# Patient Record
Sex: FEMALE | Race: WHITE | NOT HISPANIC OR LATINO | Employment: FULL TIME | URBAN - METROPOLITAN AREA
[De-identification: names, ages, dates, MRNs, and addresses within clinical notes are randomized per-mention and may not be internally consistent; named-entity substitution may affect disease eponyms.]

---

## 2017-03-05 ENCOUNTER — OFFICE VISIT (OUTPATIENT)
Dept: URGENT CARE | Age: 57
End: 2017-03-05
Payer: COMMERCIAL

## 2017-03-05 PROCEDURE — 99203 OFFICE O/P NEW LOW 30 MIN: CPT | Performed by: FAMILY MEDICINE

## 2017-03-26 ENCOUNTER — TRANSCRIBE ORDERS (OUTPATIENT)
Dept: ADMINISTRATIVE | Age: 57
End: 2017-03-26

## 2017-03-26 ENCOUNTER — APPOINTMENT (OUTPATIENT)
Dept: LAB | Age: 57
End: 2017-03-26
Payer: COMMERCIAL

## 2017-03-26 DIAGNOSIS — Z98.84 BARIATRIC SURGERY STATUS: ICD-10-CM

## 2017-03-26 DIAGNOSIS — Z98.84 BARIATRIC SURGERY STATUS: Primary | ICD-10-CM

## 2017-03-26 LAB
25(OH)D3 SERPL-MCNC: 28.5 NG/ML (ref 30–100)
ALBUMIN SERPL BCP-MCNC: 3.8 G/DL (ref 3.5–5)
ALP SERPL-CCNC: 87 U/L (ref 46–116)
ALT SERPL W P-5'-P-CCNC: 21 U/L (ref 12–78)
ANION GAP SERPL CALCULATED.3IONS-SCNC: 7 MMOL/L (ref 4–13)
AST SERPL W P-5'-P-CCNC: 17 U/L (ref 5–45)
BASOPHILS # BLD AUTO: 0.02 THOUSANDS/ΜL (ref 0–0.1)
BASOPHILS NFR BLD AUTO: 0 % (ref 0–1)
BILIRUB SERPL-MCNC: 0.39 MG/DL (ref 0.2–1)
BUN SERPL-MCNC: 14 MG/DL (ref 5–25)
CALCIUM SERPL-MCNC: 8.7 MG/DL (ref 8.3–10.1)
CHLORIDE SERPL-SCNC: 108 MMOL/L (ref 100–108)
CO2 SERPL-SCNC: 30 MMOL/L (ref 21–32)
CREAT SERPL-MCNC: 0.73 MG/DL (ref 0.6–1.3)
EOSINOPHIL # BLD AUTO: 0.06 THOUSAND/ΜL (ref 0–0.61)
EOSINOPHIL NFR BLD AUTO: 1 % (ref 0–6)
ERYTHROCYTE [DISTWIDTH] IN BLOOD BY AUTOMATED COUNT: 13.2 % (ref 11.6–15.1)
FERRITIN SERPL-MCNC: 18 NG/ML (ref 8–388)
FOLATE SERPL-MCNC: 15.3 NG/ML (ref 3.1–17.5)
GFR SERPL CREATININE-BSD FRML MDRD: >60 ML/MIN/1.73SQ M
GLUCOSE P FAST SERPL-MCNC: 96 MG/DL (ref 65–99)
HCT VFR BLD AUTO: 43.5 % (ref 34.8–46.1)
HGB BLD-MCNC: 13.8 G/DL (ref 11.5–15.4)
IRON SERPL-MCNC: 51 UG/DL (ref 50–170)
LYMPHOCYTES # BLD AUTO: 1.54 THOUSANDS/ΜL (ref 0.6–4.47)
LYMPHOCYTES NFR BLD AUTO: 28 % (ref 14–44)
MCH RBC QN AUTO: 29.6 PG (ref 26.8–34.3)
MCHC RBC AUTO-ENTMCNC: 31.7 G/DL (ref 31.4–37.4)
MCV RBC AUTO: 93 FL (ref 82–98)
MONOCYTES # BLD AUTO: 0.47 THOUSAND/ΜL (ref 0.17–1.22)
MONOCYTES NFR BLD AUTO: 9 % (ref 4–12)
NEUTROPHILS # BLD AUTO: 3.41 THOUSANDS/ΜL (ref 1.85–7.62)
NEUTS SEG NFR BLD AUTO: 62 % (ref 43–75)
NRBC BLD AUTO-RTO: 0 /100 WBCS
PLATELET # BLD AUTO: 235 THOUSANDS/UL (ref 149–390)
PMV BLD AUTO: 12 FL (ref 8.9–12.7)
POTASSIUM SERPL-SCNC: 4.4 MMOL/L (ref 3.5–5.3)
PROT SERPL-MCNC: 7.4 G/DL (ref 6.4–8.2)
RBC # BLD AUTO: 4.66 MILLION/UL (ref 3.81–5.12)
SODIUM SERPL-SCNC: 145 MMOL/L (ref 136–145)
TIBC SERPL-MCNC: 332 UG/DL (ref 250–450)
TSH SERPL DL<=0.05 MIU/L-ACNC: 0.68 UIU/ML (ref 0.36–3.74)
VIT B12 SERPL-MCNC: 377 PG/ML (ref 100–900)
WBC # BLD AUTO: 5.51 THOUSAND/UL (ref 4.31–10.16)

## 2017-03-26 PROCEDURE — 36415 COLL VENOUS BLD VENIPUNCTURE: CPT

## 2017-03-26 PROCEDURE — 83550 IRON BINDING TEST: CPT

## 2017-03-26 PROCEDURE — 84443 ASSAY THYROID STIM HORMONE: CPT

## 2017-03-26 PROCEDURE — 82607 VITAMIN B-12: CPT

## 2017-03-26 PROCEDURE — 85025 COMPLETE CBC W/AUTO DIFF WBC: CPT

## 2017-03-26 PROCEDURE — 84425 ASSAY OF VITAMIN B-1: CPT

## 2017-03-26 PROCEDURE — 82728 ASSAY OF FERRITIN: CPT

## 2017-03-26 PROCEDURE — 82746 ASSAY OF FOLIC ACID SERUM: CPT

## 2017-03-26 PROCEDURE — 80053 COMPREHEN METABOLIC PANEL: CPT

## 2017-03-26 PROCEDURE — 84207 ASSAY OF VITAMIN B-6: CPT

## 2017-03-26 PROCEDURE — 83540 ASSAY OF IRON: CPT

## 2017-03-26 PROCEDURE — 82306 VITAMIN D 25 HYDROXY: CPT

## 2017-03-30 LAB
VIT B1 BLD-SCNC: 147.8 NMOL/L (ref 66.5–200)
VIT B6 SERPL-MCNC: 9.6 UG/L (ref 2–32.8)

## 2017-06-02 ENCOUNTER — ALLSCRIPTS OFFICE VISIT (OUTPATIENT)
Dept: OTHER | Facility: OTHER | Age: 57
End: 2017-06-02

## 2017-06-02 ENCOUNTER — LAB REQUISITION (OUTPATIENT)
Dept: LAB | Facility: HOSPITAL | Age: 57
End: 2017-06-02
Payer: COMMERCIAL

## 2017-06-02 DIAGNOSIS — Z01.419 ENCOUNTER FOR GYNECOLOGICAL EXAMINATION WITHOUT ABNORMAL FINDING: ICD-10-CM

## 2017-06-02 DIAGNOSIS — R35.0 FREQUENCY OF MICTURITION: ICD-10-CM

## 2017-06-02 DIAGNOSIS — Z12.31 ENCOUNTER FOR SCREENING MAMMOGRAM FOR MALIGNANT NEOPLASM OF BREAST: ICD-10-CM

## 2017-06-02 PROCEDURE — G0145 SCR C/V CYTO,THINLAYER,RESCR: HCPCS | Performed by: PHYSICIAN ASSISTANT

## 2017-06-02 PROCEDURE — 87624 HPV HI-RISK TYP POOLED RSLT: CPT | Performed by: PHYSICIAN ASSISTANT

## 2017-06-06 LAB — HPV RRNA GENITAL QL NAA+PROBE: NORMAL

## 2017-06-12 LAB
LAB AP GYN PRIMARY INTERPRETATION: NORMAL
Lab: NORMAL

## 2017-06-13 ENCOUNTER — OFFICE VISIT (OUTPATIENT)
Dept: URGENT CARE | Age: 57
End: 2017-06-13
Payer: COMMERCIAL

## 2017-06-13 PROCEDURE — S9088 SERVICES PROVIDED IN URGENT: HCPCS | Performed by: FAMILY MEDICINE

## 2017-06-13 PROCEDURE — 99213 OFFICE O/P EST LOW 20 MIN: CPT | Performed by: FAMILY MEDICINE

## 2017-06-28 ENCOUNTER — OFFICE VISIT (OUTPATIENT)
Dept: URGENT CARE | Age: 57
End: 2017-06-28
Payer: COMMERCIAL

## 2017-06-28 ENCOUNTER — TRANSCRIBE ORDERS (OUTPATIENT)
Dept: URGENT CARE | Age: 57
End: 2017-06-28

## 2017-06-28 ENCOUNTER — APPOINTMENT (OUTPATIENT)
Dept: LAB | Age: 57
End: 2017-06-28
Payer: COMMERCIAL

## 2017-06-28 DIAGNOSIS — R35.0 FREQUENCY OF MICTURITION: ICD-10-CM

## 2017-06-28 PROCEDURE — 87086 URINE CULTURE/COLONY COUNT: CPT

## 2017-06-28 PROCEDURE — S9088 SERVICES PROVIDED IN URGENT: HCPCS | Performed by: FAMILY MEDICINE

## 2017-06-28 PROCEDURE — 87186 SC STD MICRODIL/AGAR DIL: CPT

## 2017-06-28 PROCEDURE — 87077 CULTURE AEROBIC IDENTIFY: CPT

## 2017-06-28 PROCEDURE — 99213 OFFICE O/P EST LOW 20 MIN: CPT | Performed by: FAMILY MEDICINE

## 2017-07-01 LAB — BACTERIA UR CULT: NORMAL

## 2017-07-15 ENCOUNTER — TRANSCRIBE ORDERS (OUTPATIENT)
Dept: LAB | Facility: CLINIC | Age: 57
End: 2017-07-15

## 2017-07-15 ENCOUNTER — APPOINTMENT (OUTPATIENT)
Dept: LAB | Facility: CLINIC | Age: 57
End: 2017-07-15
Payer: COMMERCIAL

## 2017-07-15 DIAGNOSIS — Z00.8 HEALTH EXAMINATION IN POPULATION SURVEY: ICD-10-CM

## 2017-07-15 DIAGNOSIS — E55.9 UNSPECIFIED VITAMIN D DEFICIENCY: ICD-10-CM

## 2017-07-15 DIAGNOSIS — Z00.8 HEALTH EXAMINATION IN POPULATION SURVEY: Primary | ICD-10-CM

## 2017-07-15 DIAGNOSIS — B35.1 DERMATOPHYTOSIS OF NAIL: ICD-10-CM

## 2017-07-15 DIAGNOSIS — B35.1 DERMATOPHYTOSIS OF NAIL: Primary | ICD-10-CM

## 2017-07-15 LAB
25(OH)D3 SERPL-MCNC: 36.3 NG/ML (ref 30–100)
ALBUMIN SERPL BCP-MCNC: 3.3 G/DL (ref 3.5–5)
ALP SERPL-CCNC: 74 U/L (ref 46–116)
ALT SERPL W P-5'-P-CCNC: 20 U/L (ref 12–78)
AST SERPL W P-5'-P-CCNC: 23 U/L (ref 5–45)
BILIRUB DIRECT SERPL-MCNC: 0.08 MG/DL (ref 0–0.2)
BILIRUB SERPL-MCNC: 0.3 MG/DL (ref 0.2–1)
CHOLEST SERPL-MCNC: 173 MG/DL (ref 50–200)
EST. AVERAGE GLUCOSE BLD GHB EST-MCNC: 105 MG/DL
HBA1C MFR BLD: 5.3 % (ref 4.2–6.3)
HDLC SERPL-MCNC: 68 MG/DL (ref 40–60)
LDLC SERPL CALC-MCNC: 97 MG/DL (ref 0–100)
PROT SERPL-MCNC: 6.8 G/DL (ref 6.4–8.2)
TRIGL SERPL-MCNC: 40 MG/DL

## 2017-07-15 PROCEDURE — 80076 HEPATIC FUNCTION PANEL: CPT

## 2017-07-15 PROCEDURE — 36415 COLL VENOUS BLD VENIPUNCTURE: CPT

## 2017-07-15 PROCEDURE — 83036 HEMOGLOBIN GLYCOSYLATED A1C: CPT

## 2017-07-15 PROCEDURE — 82306 VITAMIN D 25 HYDROXY: CPT

## 2017-07-15 PROCEDURE — 80061 LIPID PANEL: CPT

## 2017-07-28 ENCOUNTER — HOSPITAL ENCOUNTER (OUTPATIENT)
Dept: RADIOLOGY | Age: 57
Discharge: HOME | End: 2017-07-28
Payer: COMMERCIAL

## 2017-07-28 DIAGNOSIS — Z12.31 ENCOUNTER FOR SCREENING MAMMOGRAM FOR MALIGNANT NEOPLASM OF BREAST: ICD-10-CM

## 2017-07-28 PROCEDURE — G0202 SCR MAMMO BI INCL CAD: HCPCS

## 2017-11-29 ENCOUNTER — APPOINTMENT (OUTPATIENT)
Dept: LAB | Facility: HOSPITAL | Age: 57
End: 2017-11-29
Payer: COMMERCIAL

## 2017-11-29 ENCOUNTER — TRANSCRIBE ORDERS (OUTPATIENT)
Dept: ADMINISTRATIVE | Facility: HOSPITAL | Age: 57
End: 2017-11-29

## 2017-11-29 DIAGNOSIS — K12.1 PYOSTOMATITIS VEGETANS: Primary | ICD-10-CM

## 2017-11-29 DIAGNOSIS — K12.1 PYOSTOMATITIS VEGETANS: ICD-10-CM

## 2017-11-29 PROCEDURE — 87252 VIRUS INOCULATION TISSUE: CPT

## 2017-11-29 PROCEDURE — 36415 COLL VENOUS BLD VENIPUNCTURE: CPT

## 2017-12-12 LAB — MISCELLANEOUS LAB TEST RESULT: NORMAL

## 2018-01-09 ENCOUNTER — APPOINTMENT (OUTPATIENT)
Dept: LAB | Facility: HOSPITAL | Age: 58
End: 2018-01-09
Payer: COMMERCIAL

## 2018-01-09 ENCOUNTER — TRANSCRIBE ORDERS (OUTPATIENT)
Dept: ADMINISTRATIVE | Facility: HOSPITAL | Age: 58
End: 2018-01-09

## 2018-01-09 DIAGNOSIS — B35.1 DERMATOPHYTOSIS OF NAIL: ICD-10-CM

## 2018-01-09 DIAGNOSIS — B35.1 DERMATOPHYTOSIS OF NAIL: Primary | ICD-10-CM

## 2018-01-09 LAB
ALBUMIN SERPL BCP-MCNC: 3.5 G/DL (ref 3.5–5)
ALP SERPL-CCNC: 79 U/L (ref 46–116)
ALT SERPL W P-5'-P-CCNC: 23 U/L (ref 12–78)
AST SERPL W P-5'-P-CCNC: 22 U/L (ref 5–45)
BILIRUB DIRECT SERPL-MCNC: 0.11 MG/DL (ref 0–0.2)
BILIRUB SERPL-MCNC: 0.36 MG/DL (ref 0.2–1)
PROT SERPL-MCNC: 7.1 G/DL (ref 6.4–8.2)

## 2018-01-09 PROCEDURE — 36415 COLL VENOUS BLD VENIPUNCTURE: CPT

## 2018-01-09 PROCEDURE — 80076 HEPATIC FUNCTION PANEL: CPT

## 2018-01-18 NOTE — MISCELLANEOUS
Message  Return to work or school:   Olga Quintero is under my professional care  She was seen in my office on feb 29th, 2016   She is able to return to work on  march 2nd 2016            Future Appointments    Signatures   Electronically signed by : HERB Rivera; Feb 29 2016  5:33PM EST                       (Author)    Electronically signed by :  HERB Rivera; Feb 29 2016  5:33PM EST                       (Author)

## 2018-01-18 NOTE — MISCELLANEOUS
Message   Recorded as Task   Date: 05/23/2016 08:32 AM, Created By: Estelle Mas   Task Name: Miscellaneous   Assigned To: Karrie Holland   Regarding Patient: Kaia Obrien, Status: Active   CommentRob Damion - 23 May 2016 8:32 AM     TASK CREATED  Caller: Self; [phone] (Home)  pt needs script for mammo   is due in june will go to [facility]  [address] [facility]   pt had to r/s tobin appt for today w/ l87  [facility] appt now 11/21 pt @ [phone]   Karrie Holland - 23 May 2016 9:23 AM     TASK EDITED  order in allscripts        Active Problems    1  Acute sinus infection (461 9) (J01 90)   2  Anxiety disorder (300 00) (F41 9)   3  Encounter for routine gynecological examination (V72 31) (Z01 419)   4  Encounter for screening mammogram for malignant neoplasm of breast (V76 12)   (Z12 31)   5  Joint pain, knee (719 46) (M25 569)   6  Neck pain (723 1) (M54 2)   7  Screening for human papillomavirus (HPV) (V73 81) (Z11 51)    Current Meds   1  Amoxicillin-Pot Clavulanate 875-125 MG Oral Tablet; take 1 tablet every twelve hours; Therapy: 31MDK1364 to (Evaluate:10Mar2016)  Requested for: 08GTY1030; Last   Rx:[id number] Ordered   2  Calcium 600+D 600-400 MG-UNIT Oral Tablet; TAKE 2 TABLET Daily; Therapy: 27TVR3960 to (Last Rx:00Eqg2699)  Requested for: 28Apr2014 Ordered   3  Multi-Day Vitamins Oral Tablet; TAKE 1 TABLET DAILY; Therapy: 44KEW1398 to Recorded   4  Multiple Vitamins Oral Tablet; Take 1 daily; Therapy: 42UHD6982 to (Last Rx:49Dvx3406)  Requested for: 28Apr2014 Ordered   5  TraMADol HCl - 50 MG Oral Tablet; TAKE 1 TABLET 3 TIMES DAILY AS NEEDED; Therapy: 75HCP9840 to (Evaluate:07Mar2016); Last Rx:94Kjw7753 Ordered    Allergies    1  Sulfa Drugs    Plan  Encounter for screening mammogram for malignant neoplasm of breast    · * MAMMO SCREENING BILATERAL W CAD; Status:Hold For - Scheduling,Retrospective  By Protocol Authorization;  Requested FQX:[id number];     Signatures   Electronically signed by : Ramirez Jacobs Kaci Block, ; May 23 2016  9:24AM EST                       (Author)

## 2018-03-12 ENCOUNTER — OFFICE VISIT (OUTPATIENT)
Dept: URGENT CARE | Age: 58
End: 2018-03-12
Payer: COMMERCIAL

## 2018-03-12 VITALS
SYSTOLIC BLOOD PRESSURE: 130 MMHG | DIASTOLIC BLOOD PRESSURE: 71 MMHG | HEART RATE: 67 BPM | OXYGEN SATURATION: 98 % | RESPIRATION RATE: 20 BRPM | TEMPERATURE: 97.7 F

## 2018-03-12 DIAGNOSIS — J06.9 ACUTE URI: Primary | ICD-10-CM

## 2018-03-12 PROCEDURE — 99203 OFFICE O/P NEW LOW 30 MIN: CPT | Performed by: FAMILY MEDICINE

## 2018-03-12 PROCEDURE — S9088 SERVICES PROVIDED IN URGENT: HCPCS | Performed by: FAMILY MEDICINE

## 2018-03-12 RX ORDER — FLUTICASONE PROPIONATE 50 MCG
1 SPRAY, SUSPENSION (ML) NASAL DAILY
Qty: 16 G | Refills: 0 | Status: SHIPPED | OUTPATIENT
Start: 2018-03-12 | End: 2020-05-11

## 2018-03-12 NOTE — PATIENT INSTRUCTIONS
As we discussed your illness is viral   No antibiotics are indicated at this time  Rest and drink extra fluids  OTC cough and cold medications as needed  Tylenol as needed for pain or fever  We will also try Flonase to help with nasal and sinus pressure  You can also use Mucinex but increase fluids while taking medication  Follow up with PCP if no improvement  Go to ER with worsening symptoms  Cold Symptoms   WHAT YOU NEED TO KNOW:   A cold is an infection caused by a virus  The infection causes your upper respiratory system to become inflamed  Common symptoms of a cold include sneezing, dry throat, a stuffy nose, headache, watery eyes, and a cough  Your cough may be dry, or you may cough up mucus  You may also have muscle aches, joint pain, and tiredness  Rarely, you may have a fever  Most colds go away without treatment  DISCHARGE INSTRUCTIONS:   Return to the emergency department if:   · You have increased tiredness and weakness  · You are unable to eat  · Your heart is beating much faster than usual for you  · You see white spots in the back of your throat and your neck is swollen and sore to the touch  · You see pinpoint or larger reddish-purple dots on your skin  Contact your healthcare provider if:   · You have a fever higher than 102°F (38 9°C)  · You have new or worsening shortness of breath  · You have thick nasal drainage for more than 2 days  · Your symptoms do not improve or get worse within 5 days  · You have questions or concerns about your condition or care  Medicines: The following medicines may be suggested by your healthcare provider to decrease your cold symptoms  These medicines are available without a doctor's order  Ask which medicines to take and when to take them  Follow directions  · acetaminophen  help to bring down a fever or decrease pain  · Decongestants  help decrease nasal stuffiness       · Antihistamines  help decrease sneezing and a runny nose  · Cough suppressants  help decrease how much you cough  · Expectorants  help loosen mucus so you can cough it up  · Take your medicine as directed  Contact your healthcare provider if you think your medicine is not helping or if you have side effects  Tell him of her if you are allergic to any medicine  Keep a list of the medicines, vitamins, and herbs you take  Include the amounts, and when and why you take them  Bring the list or the pill bottles to follow-up visits  Carry your medicine list with you in case of an emergency  Symptom relief: The following may help relieve cold symptoms, such as a dry throat and congestion:  · Gargle with mouthwash or warm salt water as directed  · Suck on throat lozenges or hard candy  · Use a cold or warm vaporizer or humidifier to ease your breathing  · Rest for at least 2 days and then as needed to decrease tiredness and weakness  · Use petroleum based jelly around your nostrils to decrease irritation from blowing your nose  Drink liquids:  Liquids will help thin and loosen thick mucus so you can cough it up  Liquids will also keep you hydrated  Ask your healthcare provider which liquids are best for you and how much to drink each day  Prevent the spread of germs: You can spread your cold germs to others for at least 3 days after your symptoms start  Wash your hands often  Do not share items, such as eating utensils  Cover your nose and mouth when you cough or sneeze using the crook of your elbow instead of your hands  Throw used tissues in the garbage  Do not smoke:  Smoking may worsen your symptoms and increase the length of time you feel sick  Talk with your healthcare provider if you need help to stop smoking  Follow up with your healthcare provider as directed:  Write down your questions so you remember to ask them during your visits     © 2017 [employer] Information is for End User's use only and may not be sold, redistributed or otherwise used for commercial purposes  All illustrations and images included in CareNotes® are the copyrighted property of [employer]  or Rustam Manjarrez  The above information is an  only  It is not intended as medical advice for individual conditions or treatments  Talk to your doctor, nurse or pharmacist before following any medical regimen to see if it is safe and effective for you

## 2018-03-12 NOTE — PROGRESS NOTES
[facility]        NAME: Christine Figueroa is a 60 y o  female  : 1960    MRN: [id number]  DATE: 2018  TIME: 8:16 AM    Assessment and Plan   Acute URI [J06 9]  1  Acute URI  fluticasone (FLONASE) 50 mcg/act nasal spray         Patient Instructions     Patient Instructions   As we discussed your illness is viral   No antibiotics are indicated at this time  Rest and drink extra fluids  OTC cough and cold medications as needed  Tylenol as needed for pain or fever  We will also try Flonase to help with nasal and sinus pressure  You can also use Mucinex but increase fluids while taking medication  Follow up with PCP if no improvement  Go to ER with worsening symptoms  Chief Complaint     Chief Complaint   Patient presents with    Cold Like Symptoms         History of Present Illness   Christine Figueroa presents to the clinic c/o    This is a 60year old female here today with sinus pressure and congestion  Symptoms started yesterday  She had chills and tmax of 99 9  She also has slight cough which may be from post nasal drip  She has slight sore throat  She used Advil cold and sinus with no relief  No body aches  Review of Systems   Review of Systems   Constitutional: Positive for activity change, chills and fatigue  Negative for fever  HENT: Positive for congestion, sinus pain and sinus pressure  Respiratory: Positive for cough and chest tightness  Negative for wheezing  Cardiovascular: Negative  Musculoskeletal: Negative  Psychiatric/Behavioral: Negative            Current Medications     Long-Term Prescriptions   Medication Sig Dispense Refill    fluticasone (FLONASE) 50 mcg/act nasal spray 1 spray into each nostril daily 16 g 0       Current Allergies     Allergies as of 2018 - Reviewed 2016   Allergen Reaction Noted    Sulfa antibiotics Swelling 2016            The following portions of the patient's history were reviewed and updated as appropriate: allergies, current medications, past family history, past medical history, past social history, past surgical history and problem list     Objective   /71   Pulse 67   Temp 97 7 °F (36 5 °C) (Temporal)   Resp 20   SpO2 98%        Physical Exam     Physical Exam   Constitutional: She is oriented to person, place, and time  She appears well-developed and well-nourished  HENT:   Head: Normocephalic  Right Ear: External ear normal    Left Ear: External ear normal    Mouth/Throat: Oropharynx is clear and moist    Neck: Normal range of motion  Cardiovascular: Regular rhythm  Pulmonary/Chest: Effort normal and breath sounds normal    Neurological: She is alert and oriented to person, place, and time  Skin: Skin is warm and dry  Psychiatric: She has a normal mood and affect   Her behavior is normal

## 2018-03-13 ENCOUNTER — TELEPHONE (OUTPATIENT)
Dept: URGENT CARE | Age: 58
End: 2018-03-13

## 2018-03-13 NOTE — LETTER
March 13, 2018         Patient: Allen Bonilla   YOB: 1960   Date of Visit: 3/13/2018        To whom it May concern:    Sonya Magallanes was seen in our urgent care department on 3/12/2018  Please excuse from work today           Sincerely,        Heidy Escobar PA-C      CC: [unfilled]    Enclosure

## 2018-03-14 ENCOUNTER — TELEPHONE (OUTPATIENT)
Dept: URGENT CARE | Age: 58
End: 2018-03-14

## 2018-06-14 ENCOUNTER — APPOINTMENT (OUTPATIENT)
Dept: LAB | Age: 58
End: 2018-06-14

## 2018-06-14 ENCOUNTER — TRANSCRIBE ORDERS (OUTPATIENT)
Dept: ADMINISTRATIVE | Age: 58
End: 2018-06-14

## 2018-06-14 DIAGNOSIS — Z00.8 HEALTH EXAMINATION IN POPULATION SURVEY: ICD-10-CM

## 2018-06-14 DIAGNOSIS — Z00.8 HEALTH EXAMINATION IN POPULATION SURVEY: Primary | ICD-10-CM

## 2018-06-14 LAB
CHOLEST SERPL-MCNC: 179 MG/DL (ref 50–200)
EST. AVERAGE GLUCOSE BLD GHB EST-MCNC: 105 MG/DL
HBA1C MFR BLD: 5.3 % (ref 4.2–6.3)
HDLC SERPL-MCNC: 75 MG/DL (ref 40–60)
LDLC SERPL CALC-MCNC: 91 MG/DL (ref 0–100)
NONHDLC SERPL-MCNC: 104 MG/DL
TRIGL SERPL-MCNC: 64 MG/DL

## 2018-06-14 PROCEDURE — 83036 HEMOGLOBIN GLYCOSYLATED A1C: CPT

## 2018-06-14 PROCEDURE — 80061 LIPID PANEL: CPT

## 2018-06-14 PROCEDURE — 36415 COLL VENOUS BLD VENIPUNCTURE: CPT

## 2018-07-27 ENCOUNTER — OFFICE VISIT (OUTPATIENT)
Dept: URGENT CARE | Facility: MEDICAL CENTER | Age: 58
End: 2018-07-27
Payer: COMMERCIAL

## 2018-07-27 VITALS
DIASTOLIC BLOOD PRESSURE: 70 MMHG | OXYGEN SATURATION: 98 % | SYSTOLIC BLOOD PRESSURE: 122 MMHG | HEART RATE: 62 BPM | WEIGHT: 203 LBS | RESPIRATION RATE: 16 BRPM | BODY MASS INDEX: 33.82 KG/M2 | TEMPERATURE: 97.1 F | HEIGHT: 65 IN

## 2018-07-27 DIAGNOSIS — N30.01 ACUTE CYSTITIS WITH HEMATURIA: Primary | ICD-10-CM

## 2018-07-27 PROBLEM — R42 VERTIGO: Status: ACTIVE | Noted: 2017-06-02

## 2018-07-27 LAB
SL AMB  POCT GLUCOSE, UA: NEGATIVE
SL AMB LEUKOCYTE ESTERASE,UA: ABNORMAL
SL AMB POCT BILIRUBIN,UA: NEGATIVE
SL AMB POCT BLOOD,UA: ABNORMAL
SL AMB POCT CLARITY,UA: ABNORMAL
SL AMB POCT COLOR,UA: YELLOW
SL AMB POCT KETONES,UA: NEGATIVE
SL AMB POCT NITRITE,UA: NEGATIVE
SL AMB POCT PH,UA: 7
SL AMB POCT SPECIFIC GRAVITY,UA: 1.01
SL AMB POCT URINE PROTEIN: NEGATIVE
SL AMB POCT UROBILINOGEN: NEGATIVE

## 2018-07-27 PROCEDURE — 87077 CULTURE AEROBIC IDENTIFY: CPT | Performed by: PHYSICIAN ASSISTANT

## 2018-07-27 PROCEDURE — 81002 URINALYSIS NONAUTO W/O SCOPE: CPT | Performed by: PHYSICIAN ASSISTANT

## 2018-07-27 PROCEDURE — S9088 SERVICES PROVIDED IN URGENT: HCPCS | Performed by: PHYSICIAN ASSISTANT

## 2018-07-27 PROCEDURE — 87086 URINE CULTURE/COLONY COUNT: CPT | Performed by: PHYSICIAN ASSISTANT

## 2018-07-27 PROCEDURE — 99213 OFFICE O/P EST LOW 20 MIN: CPT | Performed by: PHYSICIAN ASSISTANT

## 2018-07-27 PROCEDURE — 87186 SC STD MICRODIL/AGAR DIL: CPT | Performed by: PHYSICIAN ASSISTANT

## 2018-07-27 RX ORDER — NITROFURANTOIN 25; 75 MG/1; MG/1
100 CAPSULE ORAL 2 TIMES DAILY
Qty: 10 CAPSULE | Refills: 0 | Status: SHIPPED | OUTPATIENT
Start: 2018-07-27 | End: 2018-08-01

## 2018-07-27 NOTE — PROGRESS NOTES
[facility]        NAME: Mariangel Smith is a 60 y o  female  : 1960    MRN: [id number]  DATE: 2018  TIME: 7:53 AM    Assessment and Plan   Acute cystitis with hematuria [N30 01]  1  Acute cystitis with hematuria  POCT urine dip    Urine culture    nitrofurantoin (MACROBID) 100 mg capsule         Patient Instructions       Follow up with PCP in 3-5 days  Proceed to  ER if symptoms worsen  Chief Complaint     Chief Complaint   Patient presents with    Possible UTI     Pt with complaints of urgency since last night  Denies fever, chills or abdominal pain  States had "dip stick" at home, tested postitive for leukocytes and nitrites  History of Present Illness       50-year-old female presents complaining of dysuria, urinary urgency and frequency since last night  Denies fever, chills, nausea, vomiting, flank pain  She has an allergy to Bactrim  She has had pyelonephritis in the past        Review of Systems   Review of Systems   Constitutional: Negative for chills and fever  Respiratory: Negative for cough and shortness of breath  Cardiovascular: Negative for chest pain  Gastrointestinal: Negative for abdominal pain, nausea and vomiting  Genitourinary: Positive for dysuria, frequency, pelvic pain and urgency  Negative for dyspareunia, flank pain, hematuria, vaginal discharge and vaginal pain  Musculoskeletal: Negative for back pain  Current Medications       Current Outpatient Prescriptions:     Cholecalciferol (VITAMIN D PO), Take 3,000 Units by mouth daily  , Disp: , Rfl:     Multiple Vitamins-Minerals (BARIATRIC FUSION PO), Take by mouth 4 (four) times a day Indications: 1 tablet , Disp: , Rfl:     fluticasone (FLONASE) 50 mcg/act nasal spray, 1 spray into each nostril daily, Disp: 16 g, Rfl: 0    nitrofurantoin (MACROBID) 100 mg capsule, Take 1 capsule (100 mg total) by mouth 2 (two) times a day for 5 days, Disp: 10 capsule, Rfl: 0    Current Allergies Allergies as of 07/27/2018 - Reviewed 07/27/2018   Allergen Reaction Noted    Sulfa antibiotics Swelling 06/01/2016            The following portions of the patient's history were reviewed and updated as appropriate: allergies, current medications, past family history, past medical history, past social history, past surgical history and problem list      Past Medical History:   Diagnosis Date    Tooth abscess        Past Surgical History:   Procedure Laterality Date    BACK SURGERY      lumbar laminectomy    GASTRIC BYPASS  2004    ND COLONOSCOPY FLX DX W/COLLJ SPEC WHEN PFRMD N/A 6/1/2016    Procedure: COLONOSCOPY;  Surgeon: Michelle Ornelas MD;  Location: [facility]; Service: Colorectal    ROTATOR CUFF REPAIR Right 2010       No family history on file  Medications have been verified  Objective   /70 (BP Location: Right arm, Patient Position: Sitting, Cuff Size: Standard)   Pulse 62   Temp (!) 97 1 °F (36 2 °C) (Tympanic)   Resp 16   Ht 5' 5" (1 651 m)   Wt 92 1 kg (203 lb)   SpO2 98%   BMI 33 78 kg/m²        Physical Exam     Physical Exam   Constitutional: She appears well-developed and well-nourished  No distress  Cardiovascular: Normal rate, regular rhythm and normal heart sounds  Exam reveals no gallop and no friction rub  No murmur heard  Pulmonary/Chest: Effort normal and breath sounds normal    Abdominal: Soft  She exhibits no distension and no mass  There is no tenderness  There is no rebound, no guarding and no CVA tenderness  Skin: She is not diaphoretic

## 2018-07-27 NOTE — PATIENT INSTRUCTIONS

## 2018-07-29 LAB — BACTERIA UR CULT: ABNORMAL

## 2018-07-30 ENCOUNTER — HOSPITAL ENCOUNTER (OUTPATIENT)
Dept: RADIOLOGY | Age: 58
Discharge: HOME | End: 2018-07-30
Payer: COMMERCIAL

## 2018-07-30 DIAGNOSIS — Z12.31 ENCOUNTER FOR SCREENING MAMMOGRAM FOR BREAST CANCER: Primary | ICD-10-CM

## 2018-07-30 DIAGNOSIS — Z12.31 ENCOUNTER FOR SCREENING MAMMOGRAM FOR BREAST CANCER: ICD-10-CM

## 2018-07-30 DIAGNOSIS — Z12.31 ENCOUNTER FOR SCREENING MAMMOGRAM FOR MALIGNANT NEOPLASM OF BREAST: ICD-10-CM

## 2018-07-30 PROCEDURE — 77067 SCR MAMMO BI INCL CAD: CPT

## 2018-07-31 ENCOUNTER — TRANSCRIBE ORDERS (OUTPATIENT)
Dept: LAB | Facility: CLINIC | Age: 58
End: 2018-07-31

## 2018-07-31 ENCOUNTER — APPOINTMENT (OUTPATIENT)
Dept: LAB | Facility: CLINIC | Age: 58
End: 2018-07-31
Payer: COMMERCIAL

## 2018-07-31 DIAGNOSIS — M79.10 MYALGIA: Primary | ICD-10-CM

## 2018-07-31 DIAGNOSIS — Z98.84 BARIATRIC SURGERY STATUS: ICD-10-CM

## 2018-07-31 DIAGNOSIS — E55.9 AVITAMINOSIS D: ICD-10-CM

## 2018-07-31 DIAGNOSIS — R53.83 TIREDNESS: ICD-10-CM

## 2018-07-31 DIAGNOSIS — M79.10 MYALGIA: ICD-10-CM

## 2018-07-31 DIAGNOSIS — M25.50 PAIN IN JOINT, MULTIPLE SITES: ICD-10-CM

## 2018-07-31 LAB
ALBUMIN SERPL BCP-MCNC: 3.2 G/DL (ref 3.5–5)
ALP SERPL-CCNC: 98 U/L (ref 46–116)
ALT SERPL W P-5'-P-CCNC: 28 U/L (ref 12–78)
ANION GAP SERPL CALCULATED.3IONS-SCNC: 6 MMOL/L (ref 4–13)
AST SERPL W P-5'-P-CCNC: 25 U/L (ref 5–45)
BASOPHILS # BLD AUTO: 0.03 THOUSANDS/ΜL (ref 0–0.1)
BASOPHILS NFR BLD AUTO: 1 % (ref 0–1)
BILIRUB SERPL-MCNC: 0.4 MG/DL (ref 0.2–1)
BUN SERPL-MCNC: 16 MG/DL (ref 5–25)
CALCIUM SERPL-MCNC: 8.7 MG/DL (ref 8.3–10.1)
CHLORIDE SERPL-SCNC: 104 MMOL/L (ref 100–108)
CK SERPL-CCNC: 122 U/L (ref 26–192)
CO2 SERPL-SCNC: 31 MMOL/L (ref 21–32)
CREAT SERPL-MCNC: 0.86 MG/DL (ref 0.6–1.3)
CRP SERPL QL: 36.1 MG/L
EOSINOPHIL # BLD AUTO: 0.44 THOUSAND/ΜL (ref 0–0.61)
EOSINOPHIL NFR BLD AUTO: 8 % (ref 0–6)
ERYTHROCYTE [DISTWIDTH] IN BLOOD BY AUTOMATED COUNT: 13 % (ref 11.6–15.1)
ERYTHROCYTE [SEDIMENTATION RATE] IN BLOOD: 15 MM/HOUR (ref 0–20)
GFR SERPL CREATININE-BSD FRML MDRD: 74 ML/MIN/1.73SQ M
GLUCOSE SERPL-MCNC: 91 MG/DL (ref 65–140)
HCT VFR BLD AUTO: 43.5 % (ref 34.8–46.1)
HGB BLD-MCNC: 13.7 G/DL (ref 11.5–15.4)
IMM GRANULOCYTES # BLD AUTO: 0.02 THOUSAND/UL (ref 0–0.2)
IMM GRANULOCYTES NFR BLD AUTO: 0 % (ref 0–2)
LYMPHOCYTES # BLD AUTO: 0.41 THOUSANDS/ΜL (ref 0.6–4.47)
LYMPHOCYTES NFR BLD AUTO: 8 % (ref 14–44)
MCH RBC QN AUTO: 29.4 PG (ref 26.8–34.3)
MCHC RBC AUTO-ENTMCNC: 31.5 G/DL (ref 31.4–37.4)
MCV RBC AUTO: 93 FL (ref 82–98)
MONOCYTES # BLD AUTO: 0.29 THOUSAND/ΜL (ref 0.17–1.22)
MONOCYTES NFR BLD AUTO: 6 % (ref 4–12)
NEUTROPHILS # BLD AUTO: 4.07 THOUSANDS/ΜL (ref 1.85–7.62)
NEUTS SEG NFR BLD AUTO: 77 % (ref 43–75)
NRBC BLD AUTO-RTO: 0 /100 WBCS
PLATELET # BLD AUTO: 200 THOUSANDS/UL (ref 149–390)
PMV BLD AUTO: 10.8 FL (ref 8.9–12.7)
POTASSIUM SERPL-SCNC: 4.1 MMOL/L (ref 3.5–5.3)
PROT SERPL-MCNC: 6.9 G/DL (ref 6.4–8.2)
RBC # BLD AUTO: 4.66 MILLION/UL (ref 3.81–5.12)
SODIUM SERPL-SCNC: 141 MMOL/L (ref 136–145)
WBC # BLD AUTO: 5.26 THOUSAND/UL (ref 4.31–10.16)

## 2018-07-31 PROCEDURE — 85652 RBC SED RATE AUTOMATED: CPT

## 2018-07-31 PROCEDURE — 82550 ASSAY OF CK (CPK): CPT

## 2018-07-31 PROCEDURE — 86140 C-REACTIVE PROTEIN: CPT

## 2018-07-31 PROCEDURE — 85025 COMPLETE CBC W/AUTO DIFF WBC: CPT

## 2018-07-31 PROCEDURE — 36415 COLL VENOUS BLD VENIPUNCTURE: CPT

## 2018-07-31 PROCEDURE — 80053 COMPREHEN METABOLIC PANEL: CPT

## 2018-08-25 ENCOUNTER — APPOINTMENT (OUTPATIENT)
Dept: LAB | Facility: CLINIC | Age: 58
End: 2018-08-25
Payer: COMMERCIAL

## 2018-08-25 DIAGNOSIS — R53.83 TIREDNESS: ICD-10-CM

## 2018-08-25 DIAGNOSIS — M25.50 PAIN IN JOINT, MULTIPLE SITES: ICD-10-CM

## 2018-08-25 DIAGNOSIS — M79.10 MYALGIA: ICD-10-CM

## 2018-08-25 DIAGNOSIS — Z98.84 BARIATRIC SURGERY STATUS: ICD-10-CM

## 2018-08-25 DIAGNOSIS — E55.9 AVITAMINOSIS D: ICD-10-CM

## 2018-08-25 LAB
25(OH)D3 SERPL-MCNC: 40.7 NG/ML (ref 30–100)
CHOLEST SERPL-MCNC: 199 MG/DL (ref 50–200)
FERRITIN SERPL-MCNC: 13 NG/ML (ref 8–388)
FOLATE SERPL-MCNC: >20 NG/ML (ref 3.1–17.5)
HDLC SERPL-MCNC: 75 MG/DL (ref 40–60)
IRON SERPL-MCNC: 81 UG/DL (ref 50–170)
LDLC SERPL CALC-MCNC: 113 MG/DL (ref 0–100)
MAGNESIUM SERPL-MCNC: 1.8 MG/DL (ref 1.6–2.6)
NONHDLC SERPL-MCNC: 124 MG/DL
T4 FREE SERPL-MCNC: 1.12 NG/DL (ref 0.76–1.46)
TIBC SERPL-MCNC: 352 UG/DL (ref 250–450)
TRIGL SERPL-MCNC: 53 MG/DL
TSH SERPL DL<=0.05 MIU/L-ACNC: 1.16 UIU/ML (ref 0.36–3.74)
VIT B12 SERPL-MCNC: 316 PG/ML (ref 100–900)

## 2018-08-25 PROCEDURE — 82728 ASSAY OF FERRITIN: CPT

## 2018-08-25 PROCEDURE — 80061 LIPID PANEL: CPT

## 2018-08-25 PROCEDURE — 83540 ASSAY OF IRON: CPT

## 2018-08-25 PROCEDURE — 86038 ANTINUCLEAR ANTIBODIES: CPT

## 2018-08-25 PROCEDURE — 83550 IRON BINDING TEST: CPT

## 2018-08-25 PROCEDURE — 84207 ASSAY OF VITAMIN B-6: CPT

## 2018-08-25 PROCEDURE — 84425 ASSAY OF VITAMIN B-1: CPT

## 2018-08-25 PROCEDURE — 82746 ASSAY OF FOLIC ACID SERUM: CPT

## 2018-08-25 PROCEDURE — 82306 VITAMIN D 25 HYDROXY: CPT

## 2018-08-25 PROCEDURE — 36415 COLL VENOUS BLD VENIPUNCTURE: CPT

## 2018-08-25 PROCEDURE — 86200 CCP ANTIBODY: CPT

## 2018-08-25 PROCEDURE — 84443 ASSAY THYROID STIM HORMONE: CPT

## 2018-08-25 PROCEDURE — 83735 ASSAY OF MAGNESIUM: CPT

## 2018-08-25 PROCEDURE — 82607 VITAMIN B-12: CPT

## 2018-08-25 PROCEDURE — 82085 ASSAY OF ALDOLASE: CPT

## 2018-08-25 PROCEDURE — 86618 LYME DISEASE ANTIBODY: CPT

## 2018-08-25 PROCEDURE — 86430 RHEUMATOID FACTOR TEST QUAL: CPT

## 2018-08-25 PROCEDURE — 84439 ASSAY OF FREE THYROXINE: CPT

## 2018-08-27 LAB
ALDOLASE SERPL-CCNC: 4.6 U/L (ref 3.3–10.3)
B BURGDOR IGG SER IA-ACNC: 0.06
B BURGDOR IGM SER IA-ACNC: 0.14
RHEUMATOID FACT SER QL LA: NEGATIVE
RYE IGE QN: NEGATIVE

## 2018-08-28 LAB — CCP IGA+IGG SERPL IA-ACNC: 15 UNITS (ref 0–19)

## 2018-08-28 PROCEDURE — 88305 TISSUE EXAM BY PATHOLOGIST: CPT | Performed by: PATHOLOGY

## 2018-08-29 LAB
VIT B1 BLD-SCNC: 141.9 NMOL/L (ref 66.5–200)
VIT B6 SERPL-MCNC: 11.2 UG/L (ref 2–32.8)

## 2018-08-30 ENCOUNTER — LAB REQUISITION (OUTPATIENT)
Dept: LAB | Facility: HOSPITAL | Age: 58
End: 2018-08-30
Payer: COMMERCIAL

## 2018-08-30 DIAGNOSIS — C44.612 BASAL CELL CARCINOMA OF SKIN OF RIGHT UPPER LIMB, INCLUDING SHOULDER: ICD-10-CM

## 2018-10-12 NOTE — PROGRESS NOTES
Assessment/Plan:    Class 1 obesity  -Discussed options of HealthyCORE-Intensive Lifestyle Intervention Program, Very Low Calorie Diet-VLCD and Conservative Program and the role of weight loss medications   -Initial weight loss goal of 5-10% weight loss for improved health  -Screening labs: check fasting insulin with vitamin labs, otherwise within acceptable limits    -Patient is interested in pursuing HealthyCORE-Intensive Lifestyle Intervention Program and will call to schedule if she would like to proceed after further review of the programs  For now, she will reduce calories to 1200 calories, continue food logging, add in exercise and increase servings of fruits/vegetables  -Reviewed indications, mechanism, and side effect profile of weight loss medication  List provided to the patient  If interested she will call  May benefit from Contrave due to stressors and increased cravings for carbohydrates  Initial (Start of [facility] 10/15/18): 201 5 lbs  Goal: 170 lbs, feel better in clothes, fit comfortably in size 14    Status post bariatric surgery  -s/p Graciela-En-Y Gastric Bypass on May 2004  Overall doing Well  Initial: 325 lbs  Current: 201 5 lbs  Luis: 178 lbs  Current BMI is 34 59  There is no height or weight on file to calculate BMI  Tolerating a regular diet-yes  Eating at least 60 grams of protein per day-yes  Following 30/60 minute rule with liquids-yes  Drinking at least 64 ounces of fluid per day-yes  Drinking carbonated beverages-no  Sufficient exercise-no  Using alcohol-yes- last winter was drinking 1-3 drinks per day on the weekend, but now has reduced to one  Encouraged elimination of alcohol      Postgastrectomy malabsorption  -At risk for malabsorption of vitamins/minerals secondary to malabsorption and restriction of intake from their procedure  -Currently taking adequate postop bariatric surgery vitamin supplementation-yes- barifusion  -Last set of bariatric labs completed on 08/2018 and are not within acceptable limits  B12 slightly decreased  She will start additional B12 and will repeat with MMA in 3 months    -Next set of bariatric labs ordered: check A, copper, zinc, PTH, fasting insulin  Recommend checking lab coverage before having labs drawn    Anxiety disorder  -feeling overwhelmed at times with life stressors, but denies feeling depressed  Has xanax prn and reports she has not needed to use in a long time    -Encouraged meditation, deep breathing, and exercise  -Reviewed options for medication if sx of depression occur- pt currently not interested    Follow up for HealthyCore start and in approximately 3 months-4 months with Non-Surgical Physician/Advanced Practitioner  45 minute visit, >50% time spent counseling patient on nonsurgical interventions for the treatment of excess weight  Discussed in detail nonsurgical options including intensive lifestyle intervention program, very low-calorie diet program and conservative program   Discussed the role of weight loss medications  Counseled patient on diet behavior and exercise modification for weight loss  Reviewed the importance of following the lessons in the manual and the dietary, behavioral, and exercise changes for long-term success following bariatric surgery  Goals:  Food log (ie ) www myfitnesspal com,sparkpeople  com,loseit com,calorieking  com,etc  baritastic  No sugary beverages  At least 64oz of water daily  Increase physical activity by 10 minutes daily   Gradually increase physical activity to a goal of 5 days per week for 30 minutes of MODERATE intensity PLUS 2 days per week of FULL BODY resistance training  Exercise: 10 minute walks during work + additional 10 minutes 4/7 days per week  Practice lesson plans 1-6 in bariatric manual  and Practice 30/60 rule  0501-1692 calories per day  5-10 servings of fruits and vegetables per day Goal:  3 servings per day  25-35 grams of dietary fiber per day    Diagnoses and all orders for this visit:    Abnormal weight gain  Comments:  See plan as discussed under Class 1 obesity  Orders:  -     PTH, intact; Future  -     Insulin, fasting; Future  -     Zinc; Future  -     Vitamin A; Future  -     Copper Level; Future  -     Vitamin B12; Future  -     Methylmalonic acid, serum; Future    Body mass index 34 0-34 9, adult    Class 1 obesity  -     PTH, intact; Future  -     Insulin, fasting; Future  -     Zinc; Future  -     Vitamin A; Future  -     Copper Level; Future  -     Vitamin B12; Future  -     Methylmalonic acid, serum; Future    Status post bariatric surgery  -     PTH, intact; Future  -     Insulin, fasting; Future  -     Zinc; Future  -     Vitamin A; Future  -     Copper Level; Future  -     Vitamin B12; Future  -     Methylmalonic acid, serum; Future    Postgastrectomy malabsorption  -     PTH, intact; Future  -     Insulin, fasting; Future  -     Zinc; Future  -     Vitamin A; Future  -     Copper Level; Future  -     Vitamin B12; Future  -     Methylmalonic acid, serum; Future    Generalized anxiety disorder    Vitamin B12 deficiency  -     Vitamin B12; Future  -     Methylmalonic acid, serum; Future    Other orders  -     ALPRAZolam (XANAX PO); Take by mouth Prn use    Subjective:   Chief Complaint   Patient presents with    Consult     Patient ID: Nayeli Olguin  is a 60 y o  female with excess weight/obesity here to pursue weight management  Past Medical History:   Diagnosis Date    Anxiety disorder     Basal cell carcinoma     Hypertension     Neck pain     Sinus infection     Tooth abscess     Vertigo      HPI:  Obesity/Excess Weight:  Severity: Mild  Onset:  3 years    Modifiers: Diet and Exercise, s/p gastric bypass  Contributing factors: Insufficient Physical Activity, Stress/Emotional Eating and Depression   Reports three years ago was caring for her mother until her passing, shortly after her  was diagnosed with prostate cancer and was on dialysis for kidney failure  She works full time, but has many responsibilities at home in addition to accompanying her  to his many appointments  Had been feeling down last winter and was given Celexa  She worked on lifestyle changes (prayer, etc) to help with sx and she found sx improved  Occasionally feels down when overwhelmed, but denies sx of depression/feeling depressed  Home responsibilities often keeping her from making appropriate lifestyle changes  States she is feeling appropriately full since her surgery  Associated symptoms: decreased self esteem and clothes do not fit    Goals: feel more comfortable in her clothing/fit comfortably in size 14, 170s  Hydration: reaching hydration goals, avoid caloric beverages other than alcohol, but reducing and only having one drink per week  Trying to reduce condiment servings    B: toast, 1 and 1/2 eggs, swiss cheese (1/2 oz)  S: chobani yogurt  L: leftovers  D: protein and vegetable  S: crackers    The following portions of the patient's history were reviewed and updated as appropriate: allergies, current medications, past family history, past medical history, past social history, past surgical history and problem list     Review of Systems   Psychiatric/Behavioral:        Denies sx of depression     Objective:    /82 (BP Location: Left arm, Patient Position: Sitting, Cuff Size: Standard)   Pulse 63   Temp 97 8 °F (36 6 °C) (Tympanic)   Resp 18   Ht 5' 4" (1 626 m)   Wt 91 4 kg (201 lb 8 oz)   BMI 34 59 kg/m²     Physical Exam   Nursing note and vitals reviewed  Constitutional   General appearance: Abnormal   well developed and obese  Eyes No conjunctival pallor  Ears, Nose, Mouth, and Throat Oral mucosa moist    Pulmonary   Respiratory effort: No increased work of breathing or signs of respiratory distress      Auscultation of lungs: Clear to auscultation, equal breath sounds   Musculoskeletal   Gait and station: Normal     Psychiatric   Orientation to person, place and time: Normal     Affect: appropriate

## 2018-10-15 ENCOUNTER — OFFICE VISIT (OUTPATIENT)
Dept: BARIATRICS | Facility: CLINIC | Age: 58
End: 2018-10-15
Payer: COMMERCIAL

## 2018-10-15 VITALS
HEIGHT: 64 IN | SYSTOLIC BLOOD PRESSURE: 132 MMHG | TEMPERATURE: 97.8 F | WEIGHT: 201.5 LBS | DIASTOLIC BLOOD PRESSURE: 82 MMHG | RESPIRATION RATE: 18 BRPM | BODY MASS INDEX: 34.4 KG/M2 | HEART RATE: 63 BPM

## 2018-10-15 DIAGNOSIS — Z90.3 POSTGASTRECTOMY MALABSORPTION: ICD-10-CM

## 2018-10-15 DIAGNOSIS — Z98.84 STATUS POST BARIATRIC SURGERY: ICD-10-CM

## 2018-10-15 DIAGNOSIS — R63.5 ABNORMAL WEIGHT GAIN: Primary | ICD-10-CM

## 2018-10-15 DIAGNOSIS — E53.8 VITAMIN B12 DEFICIENCY: ICD-10-CM

## 2018-10-15 DIAGNOSIS — E66.9 CLASS 1 OBESITY: ICD-10-CM

## 2018-10-15 DIAGNOSIS — K91.2 POSTGASTRECTOMY MALABSORPTION: ICD-10-CM

## 2018-10-15 DIAGNOSIS — F41.1 GENERALIZED ANXIETY DISORDER: ICD-10-CM

## 2018-10-15 PROCEDURE — 99204 OFFICE O/P NEW MOD 45 MIN: CPT | Performed by: PHYSICIAN ASSISTANT

## 2018-10-15 NOTE — ASSESSMENT & PLAN NOTE
-At risk for malabsorption of vitamins/minerals secondary to malabsorption and restriction of intake from their procedure  -Currently taking adequate postop bariatric surgery vitamin supplementation-yes- barifusion  -Last set of bariatric labs completed on 08/2018 and are not within acceptable limits  B12 slightly decreased  She will start additional B12 and will repeat with MMA in 3 months    -Next set of bariatric labs ordered: check A, copper, zinc, PTH, fasting insulin   Recommend checking lab coverage before having labs drawn

## 2018-10-15 NOTE — ASSESSMENT & PLAN NOTE
-s/p Graciela-En-Y Gastric Bypass on May 2004  Overall doing Well  Initial: 325 lbs  Current: 201 5 lbs  Luis: 178 lbs  Current BMI is 34 59  There is no height or weight on file to calculate BMI  Tolerating a regular diet-yes  Eating at least 60 grams of protein per day-yes  Following 30/60 minute rule with liquids-yes  Drinking at least 64 ounces of fluid per day-yes  Drinking carbonated beverages-no  Sufficient exercise-no  Using alcohol-yes- last winter was drinking 1-3 drinks per day on the weekend, but now has reduced to one  Encouraged elimination of alcohol

## 2018-10-15 NOTE — ASSESSMENT & PLAN NOTE
-Discussed options of HealthyCORE-Intensive Lifestyle Intervention Program, Very Low Calorie Diet-VLCD and Conservative Program and the role of weight loss medications   -Initial weight loss goal of 5-10% weight loss for improved health  -Screening labs: check fasting insulin with vitamin labs, otherwise within acceptable limits    -Patient is interested in pursuing HealthyCORE-Intensive Lifestyle Intervention Program and will call to schedule if she would like to proceed after further review of the programs  For now, she will reduce calories to 1200 calories, continue food logging, add in exercise and increase servings of fruits/vegetables  -Reviewed indications, mechanism, and side effect profile of weight loss medication  List provided to the patient  If interested she will call  May benefit from Contrave due to stressors and increased cravings for carbohydrates      Initial (Start of [facility] 10/15/18): 201 5 lbs  Goal: 170 lbs, feel better in clothes, fit comfortably in size 14

## 2018-10-16 NOTE — ASSESSMENT & PLAN NOTE
-feeling overwhelmed at times with life stressors, but denies feeling depressed   Has xanax prn and reports she has not needed to use in a long time    -Encouraged meditation, deep breathing, and exercise  -Reviewed options for medication if sx of depression occur- pt currently not interested

## 2018-10-31 PROBLEM — Z01.419 WELL WOMAN EXAM: Status: ACTIVE | Noted: 2018-10-31

## 2018-10-31 NOTE — PROGRESS NOTES
Assessment/Plan   Diagnoses and all orders for this visit:    Well woman exam    Hypoestrogenism  -     DXA bone density spine hip and pelvis; Future    Breast cancer screening  -     Mammo screening bilateral w 3d & cad; Future    Other orders  -     cyclobenzaprine (FLEXERIL) 10 mg tablet; Discussion    All questions have been answered to her satisfaction  RTO for APE or sooner if needed      Subjective     Pt for annual GYN exam  , LMP 2005 no hot flashes or  complaints  Naty Kathleen is a 60 y o  female who presents for annual well woman exam    LMP -2005 ; Denies  bleeding  No vulvar itch/burn; No vaginal itch/burn; No abn discharge or odor; No urinary sx - burning/pain/frequency/hematuria  (+) SBEs - no breast masses, asymmetry, nipple discharge or bleeding, changes in skin of breast, or breast tenderness bilaterally  No abd/pelvic pain or HAs; No menopausal symptoms: No hot flashes/night sweats, problems with intercourse, vaginal dryness; sleeping well  Pt is sexually active in a mutually monog/ sexual relationship; No issues with intercourse; She declines std/hiv/hep testing; Feels safe at home  Condom use:  (+) PCP for routine Bw/care; Last Pap - 6/2/17 WNL neg HPV  History of abnormal Pap smear: 1985-had cryo, WNL since  Last mammo - 7/30/2018 WNL  History of abnormal mammogram: none  Last colonoscopy - 6/1/16  Last Dexa scan - 2011ish    Review of Systems   Constitutional: Negative for fatigue, fever and unexpected weight change  HENT: Negative for dental problem, mouth sores, nosebleeds, rhinorrhea, sinus pain, sinus pressure and sore throat  Eyes: Negative for pain, discharge and visual disturbance  Respiratory: Negative for cough, chest tightness, shortness of breath and wheezing  Cardiovascular: Negative for chest pain, palpitations and leg swelling  Gastrointestinal: Negative for blood in stool, constipation, diarrhea, nausea and vomiting  Endocrine: Negative for polydipsia  Genitourinary: Negative for difficulty urinating, dyspareunia, dysuria, menstrual problem, pelvic pain, urgency, vaginal discharge and vaginal pain  Musculoskeletal: Negative for arthralgias, back pain and joint swelling  Allergic/Immunologic: Negative for environmental allergies  Neurological: Negative for seizures, light-headedness and headaches  Hematological: Does not bruise/bleed easily  Psychiatric/Behavioral: Negative for sleep disturbance  The patient is not nervous/anxious  The following portions of the patient's history were reviewed and updated as appropriate: allergies, current medications, past family history, past medical history, past social history, past surgical history and problem list          OB History      Para Term  AB Living    1 1            SAB TAB Ectopic Multiple Live Births            1        Obstetric Comments     x 1          Past Medical History:   Diagnosis Date    Anxiety disorder     Basal cell carcinoma      1 para 1     Hypertension     Neck pain     Sinus infection     Tooth abscess     Vertigo        Past Surgical History:   Procedure Laterality Date    BACK SURGERY      lumbar laminectomy    GASTRIC BYPASS  2004    GYNECOLOGIC CRYOSURGERY      MT COLONOSCOPY FLX DX W/COLLJ SPEC WHEN PFRMD N/A 2016    Procedure: COLONOSCOPY;  Surgeon: Jodie Whipple MD;  Location: [facility]; Service: Colorectal    ROTATOR CUFF REPAIR Right 2010       Family History   Problem Relation Age of Onset    Heart block Mother     Hypertension Mother     Thyroid disease Mother     Heart block Father     Hypertension Father     Diabetes Family     Arthritis Family     Breast cancer Family        Social History     Social History    Marital status: /Civil Union     Spouse name: N/A    Number of children: N/A    Years of education: N/A     Occupational History    Not on file       Social History Main Topics    Smoking status: Never Smoker    Smokeless tobacco: Never Used    Alcohol use Yes      Comment: social 1 cocktail sat and sun    Drug use: No    Sexual activity: Yes     Partners: Male     Birth control/ protection: Post-menopausal     Other Topics Concern    Not on file     Social History Narrative    No narrative on file         Current Outpatient Prescriptions:     ALPRAZolam (XANAX PO), Take by mouth Prn use, Disp: , Rfl:     Cholecalciferol (VITAMIN D PO), Take 3,000 Units by mouth daily  , Disp: , Rfl:     cyclobenzaprine (FLEXERIL) 10 mg tablet, , Disp: , Rfl:     fluticasone (FLONASE) 50 mcg/act nasal spray, 1 spray into each nostril daily, Disp: 16 g, Rfl: 0    Multiple Vitamins-Minerals (BARIATRIC FUSION PO), Take by mouth 4 (four) times a day Indications: 1 tablet , Disp: , Rfl:     Allergies   Allergen Reactions    Nitrofurantoin      myalgias    Sulfa Antibiotics Swelling     Tongue swelling       Objective   Vitals:    11/03/18 0804   BP: 122/76   BP Location: Left arm   Patient Position: Sitting   Cuff Size: Large   Weight: 91 6 kg (202 lb)   Height: 5' 4" (1 626 m)     Physical Exam   Constitutional: She is oriented to person, place, and time  She appears well-developed and well-nourished  HENT:   Head: Normocephalic and atraumatic  Cardiovascular: Normal rate and regular rhythm  Pulmonary/Chest: Effort normal and breath sounds normal  Right breast exhibits no inverted nipple, no mass, no nipple discharge, no skin change and no tenderness  Left breast exhibits no inverted nipple, no mass, no nipple discharge, no skin change and no tenderness  Breasts are symmetrical    Abdominal: Soft  She exhibits no distension and no mass  There is no rebound and no guarding  Genitourinary: Vagina normal and uterus normal  Rectal exam shows guaiac negative stool  Neurological: She is alert and oriented to person, place, and time  Skin: Skin is warm and dry  Psychiatric: She has a normal mood and affect  Patient Instructions   Pt doing well  Pap deferred today  F/u 1 year, earlier as needed  Order entered for mammo for next year and DEXA as well

## 2018-11-03 ENCOUNTER — ANNUAL EXAM (OUTPATIENT)
Dept: OBGYN CLINIC | Facility: CLINIC | Age: 58
End: 2018-11-03
Payer: COMMERCIAL

## 2018-11-03 VITALS
HEIGHT: 64 IN | DIASTOLIC BLOOD PRESSURE: 76 MMHG | BODY MASS INDEX: 34.49 KG/M2 | WEIGHT: 202 LBS | SYSTOLIC BLOOD PRESSURE: 122 MMHG

## 2018-11-03 DIAGNOSIS — Z01.419 WELL WOMAN EXAM: Primary | ICD-10-CM

## 2018-11-03 DIAGNOSIS — E28.39 HYPOESTROGENISM: ICD-10-CM

## 2018-11-03 DIAGNOSIS — Z12.39 BREAST CANCER SCREENING: ICD-10-CM

## 2018-11-03 PROCEDURE — 99396 PREV VISIT EST AGE 40-64: CPT | Performed by: PHYSICIAN ASSISTANT

## 2018-11-03 RX ORDER — CYCLOBENZAPRINE HCL 10 MG
10 TABLET ORAL DAILY PRN
COMMUNITY
Start: 2018-10-02 | End: 2020-05-11

## 2018-11-03 NOTE — PATIENT INSTRUCTIONS
Pt doing well  Pap deferred today  F/u 1 year, earlier as needed  Order entered for mammo for next year and DEXA as well

## 2019-01-16 ENCOUNTER — TRANSCRIBE ORDERS (OUTPATIENT)
Dept: ADMINISTRATIVE | Facility: HOSPITAL | Age: 59
End: 2019-01-16

## 2019-01-16 ENCOUNTER — APPOINTMENT (OUTPATIENT)
Dept: LAB | Facility: HOSPITAL | Age: 59
End: 2019-01-16
Payer: COMMERCIAL

## 2019-01-16 DIAGNOSIS — Z98.84 BARIATRIC SURGERY STATUS: ICD-10-CM

## 2019-01-16 DIAGNOSIS — M54.40 LOW BACK PAIN WITH SCIATICA, SCIATICA LATERALITY UNSPECIFIED, UNSPECIFIED BACK PAIN LATERALITY, UNSPECIFIED CHRONICITY: ICD-10-CM

## 2019-01-16 DIAGNOSIS — R79.82 ELEVATED C-REACTIVE PROTEIN (CRP): ICD-10-CM

## 2019-01-16 DIAGNOSIS — R79.82 ELEVATED C-REACTIVE PROTEIN (CRP): Primary | ICD-10-CM

## 2019-01-16 LAB
CRP SERPL QL: <3 MG/L
ERYTHROCYTE [SEDIMENTATION RATE] IN BLOOD: 10 MM/HOUR (ref 0–20)

## 2019-01-16 PROCEDURE — 86140 C-REACTIVE PROTEIN: CPT

## 2019-01-16 PROCEDURE — 85652 RBC SED RATE AUTOMATED: CPT

## 2019-01-16 PROCEDURE — 36415 COLL VENOUS BLD VENIPUNCTURE: CPT

## 2019-01-25 ENCOUNTER — HOSPITAL ENCOUNTER (OUTPATIENT)
Dept: RADIOLOGY | Facility: HOSPITAL | Age: 59
Discharge: HOME | End: 2019-01-25
Payer: COMMERCIAL

## 2019-01-25 ENCOUNTER — APPOINTMENT (OUTPATIENT)
Dept: LAB | Facility: CLINIC | Age: 59
End: 2019-01-25
Payer: COMMERCIAL

## 2019-01-25 DIAGNOSIS — R79.82 ELEVATED C-REACTIVE PROTEIN (CRP): ICD-10-CM

## 2019-01-25 DIAGNOSIS — K91.2 POSTGASTRECTOMY MALABSORPTION: ICD-10-CM

## 2019-01-25 DIAGNOSIS — E53.8 VITAMIN B12 DEFICIENCY: ICD-10-CM

## 2019-01-25 DIAGNOSIS — Z98.84 BARIATRIC SURGERY STATUS: ICD-10-CM

## 2019-01-25 DIAGNOSIS — M54.40 LOW BACK PAIN WITH SCIATICA, SCIATICA LATERALITY UNSPECIFIED, UNSPECIFIED BACK PAIN LATERALITY, UNSPECIFIED CHRONICITY: ICD-10-CM

## 2019-01-25 DIAGNOSIS — R63.5 ABNORMAL WEIGHT GAIN: ICD-10-CM

## 2019-01-25 DIAGNOSIS — Z90.3 POSTGASTRECTOMY MALABSORPTION: ICD-10-CM

## 2019-01-25 DIAGNOSIS — Z98.84 STATUS POST BARIATRIC SURGERY: ICD-10-CM

## 2019-01-25 DIAGNOSIS — E66.9 CLASS 1 OBESITY: ICD-10-CM

## 2019-01-25 LAB
25(OH)D3 SERPL-MCNC: 39 NG/ML (ref 30–100)
ALBUMIN SERPL BCP-MCNC: 4.1 G/DL (ref 3.5–5)
ALP SERPL-CCNC: 98 U/L (ref 46–116)
ALT SERPL W P-5'-P-CCNC: 26 U/L (ref 12–78)
ANION GAP SERPL CALCULATED.3IONS-SCNC: 9 MMOL/L (ref 4–13)
AST SERPL W P-5'-P-CCNC: 17 U/L (ref 5–45)
BASOPHILS # BLD AUTO: 0.04 THOUSANDS/ΜL (ref 0–0.1)
BASOPHILS NFR BLD AUTO: 1 % (ref 0–1)
BILIRUB SERPL-MCNC: 0.4 MG/DL (ref 0.2–1)
BUN SERPL-MCNC: 20 MG/DL (ref 5–25)
CALCIUM SERPL-MCNC: 9.4 MG/DL (ref 8.3–10.1)
CHLORIDE SERPL-SCNC: 104 MMOL/L (ref 100–108)
CO2 SERPL-SCNC: 29 MMOL/L (ref 21–32)
CREAT SERPL-MCNC: 0.85 MG/DL (ref 0.6–1.3)
EOSINOPHIL # BLD AUTO: 0.1 THOUSAND/ΜL (ref 0–0.61)
EOSINOPHIL NFR BLD AUTO: 2 % (ref 0–6)
ERYTHROCYTE [DISTWIDTH] IN BLOOD BY AUTOMATED COUNT: 12.7 % (ref 11.6–15.1)
FERRITIN SERPL-MCNC: 21 NG/ML (ref 8–388)
GFR SERPL CREATININE-BSD FRML MDRD: 75 ML/MIN/1.73SQ M
GLUCOSE P FAST SERPL-MCNC: 83 MG/DL (ref 65–99)
HCT VFR BLD AUTO: 45 % (ref 34.8–46.1)
HGB BLD-MCNC: 14.5 G/DL (ref 11.5–15.4)
IMM GRANULOCYTES # BLD AUTO: 0.01 THOUSAND/UL (ref 0–0.2)
IMM GRANULOCYTES NFR BLD AUTO: 0 % (ref 0–2)
INSULIN SERPL-ACNC: 5.9 MU/L (ref 3–25)
LYMPHOCYTES # BLD AUTO: 1.52 THOUSANDS/ΜL (ref 0.6–4.47)
LYMPHOCYTES NFR BLD AUTO: 27 % (ref 14–44)
MCH RBC QN AUTO: 29.7 PG (ref 26.8–34.3)
MCHC RBC AUTO-ENTMCNC: 32.2 G/DL (ref 31.4–37.4)
MCV RBC AUTO: 92 FL (ref 82–98)
MONOCYTES # BLD AUTO: 0.47 THOUSAND/ΜL (ref 0.17–1.22)
MONOCYTES NFR BLD AUTO: 9 % (ref 4–12)
NEUTROPHILS # BLD AUTO: 3.41 THOUSANDS/ΜL (ref 1.85–7.62)
NEUTS SEG NFR BLD AUTO: 61 % (ref 43–75)
NRBC BLD AUTO-RTO: 0 /100 WBCS
PLATELET # BLD AUTO: 229 THOUSANDS/UL (ref 149–390)
PMV BLD AUTO: 11.4 FL (ref 8.9–12.7)
POTASSIUM SERPL-SCNC: 3.8 MMOL/L (ref 3.5–5.3)
PROT SERPL-MCNC: 7.8 G/DL (ref 6.4–8.2)
PTH-INTACT SERPL-MCNC: 48.2 PG/ML (ref 18.4–80.1)
RBC # BLD AUTO: 4.89 MILLION/UL (ref 3.81–5.12)
SODIUM SERPL-SCNC: 142 MMOL/L (ref 136–145)
TIBC SERPL-MCNC: 367 UG/DL (ref 250–450)
VIT B12 SERPL-MCNC: 670 PG/ML (ref 100–900)
WBC # BLD AUTO: 5.55 THOUSAND/UL (ref 4.31–10.16)

## 2019-01-25 PROCEDURE — 36415 COLL VENOUS BLD VENIPUNCTURE: CPT

## 2019-01-25 PROCEDURE — 82306 VITAMIN D 25 HYDROXY: CPT

## 2019-01-25 PROCEDURE — 80053 COMPREHEN METABOLIC PANEL: CPT

## 2019-01-25 PROCEDURE — 82607 VITAMIN B-12: CPT

## 2019-01-25 PROCEDURE — 83970 ASSAY OF PARATHORMONE: CPT

## 2019-01-25 PROCEDURE — 82525 ASSAY OF COPPER: CPT

## 2019-01-25 PROCEDURE — 83525 ASSAY OF INSULIN: CPT

## 2019-01-25 PROCEDURE — 84630 ASSAY OF ZINC: CPT

## 2019-01-25 PROCEDURE — 84590 ASSAY OF VITAMIN A: CPT

## 2019-01-25 PROCEDURE — 73502 X-RAY EXAM HIP UNI 2-3 VIEWS: CPT

## 2019-01-25 PROCEDURE — 85025 COMPLETE CBC W/AUTO DIFF WBC: CPT

## 2019-01-25 PROCEDURE — 82728 ASSAY OF FERRITIN: CPT

## 2019-01-25 PROCEDURE — 72110 X-RAY EXAM L-2 SPINE 4/>VWS: CPT

## 2019-01-25 PROCEDURE — 83918 ORGANIC ACIDS TOTAL QUANT: CPT

## 2019-01-25 PROCEDURE — 84207 ASSAY OF VITAMIN B-6: CPT

## 2019-01-25 PROCEDURE — 84425 ASSAY OF VITAMIN B-1: CPT

## 2019-01-25 PROCEDURE — 83550 IRON BINDING TEST: CPT

## 2019-01-26 LAB
COPPER SERPL-MCNC: 131 UG/DL (ref 72–166)
ZINC SERPL-MCNC: 96 UG/DL (ref 56–134)

## 2019-01-28 LAB
VIT B1 BLD-SCNC: 160.1 NMOL/L (ref 66.5–200)
VIT B6 SERPL-MCNC: 9.9 UG/L (ref 2–32.8)

## 2019-01-29 LAB
METHYLMALONATE SERPL-SCNC: 193 NMOL/L (ref 0–378)
SL AMB DISCLAIMER: NORMAL

## 2019-01-30 LAB — VIT A SERPL-MCNC: 18.6 UG/DL (ref 22–69.5)

## 2019-01-31 DIAGNOSIS — R79.89 LOW SERUM VITAMIN A: Primary | ICD-10-CM

## 2019-02-05 ENCOUNTER — EVALUATION (OUTPATIENT)
Dept: PHYSICAL THERAPY | Facility: CLINIC | Age: 59
End: 2019-02-05
Payer: COMMERCIAL

## 2019-02-05 DIAGNOSIS — M25.551 RIGHT HIP PAIN: ICD-10-CM

## 2019-02-05 DIAGNOSIS — M54.16 LUMBAR RADICULOPATHY: Primary | ICD-10-CM

## 2019-02-05 PROCEDURE — 97110 THERAPEUTIC EXERCISES: CPT | Performed by: PHYSICAL THERAPIST

## 2019-02-05 PROCEDURE — 97162 PT EVAL MOD COMPLEX 30 MIN: CPT | Performed by: PHYSICAL THERAPIST

## 2019-02-05 NOTE — PROGRESS NOTES
PT Evaluation     Today's date: 2019  Patient name: Donta Bartholomew  : 1960  MRN: [id number]  Referring provider: Angele Gaucher, DO  Dx:   Encounter Diagnosis     ICD-10-CM    1  Lumbar radiculopathy M54 16    2  Right hip pain M25 551                   Assessment  Assessment details: Pt presents with s/s consistent with L4-S1 DDD with an stability preference and R greater trochanteric bursitis  Pt will benefit from PT to address impairments and restore function  Impairments: abnormal gait, abnormal or restricted ROM, activity intolerance, impaired physical strength, lacks appropriate home exercise program, pain with function, poor posture  and poor body mechanics    Goals  ST-4 weeks  1   Pt will decrease pain > 50%  2   Pt will increase Hip ABD MMT > 3+/5 without pain  LT-6 weeks  1   Pt will decrease pain > 75%  2   Pt will standing for 2 hours at work without pain  Plan  Planned therapy interventions: abdominal trunk stabilization, manual therapy, neuromuscular re-education, body mechanics training, patient education, strengthening, stretching, therapeutic activities, therapeutic exercise, therapeutic training, flexibility, functional ROM exercises and home exercise program  Frequency: 2x week  Duration in weeks: 6        Subjective Evaluation    History of Present Illness  Mechanism of injury: Pt is a 60 yof c/o central LBP with occasional L posterior radicular sx to the foot that began 20 years ago  Pt c/o R lateral hip pain that began 2 months ago     Pain  Current pain ratin  At best pain ratin  At worst pain ratin  Quality: dull ache and radiating      Diagnostic Tests  X-ray: abnormal (19: R hip mild-mod OA, lumbar spine: L5/S1 DDD, L4 endplate osteophtye )  Patient Goals  Patient goals for therapy: decreased pain, decreased edema, increased strength, increased motion, independence with ADLs/IADLs and return to sport/leisure activities          Objective Palpation     Right   Tenderness of the gluteus rosi, gluteus medius and piriformis  Active Range of Motion     Lumbar   Flexion: 75 degrees   Extension: 25 degrees   Left lateral flexion:  Restriction level: minimal  Right lateral flexion:  with pain Restriction level: minimal  Left rotation:  Restriction level: minimal  Right rotation:  with pain Restriction level: minimal    Passive Range of Motion     Lumbar   Left lateral flexion:  Restriction level: minimal  Right lateral flexion:  Restriction level: minimal  Left rotation:  Restriction level: minimal  Right rotation:  Restriction level: minimal    Right Hip   Flexion: 95 degrees with pain  Abduction: 40 degrees   Adduction: 30 degrees with pain  External rotation (90/90): 45 degrees   Internal rotation (90/90): 15 degrees with pain    Joint Play   Joints within functional limits: L3, L4, L5 and S1   Mechanical Assessment    Cervical      Thoracic      Lumbar    Standing flexion: repeated movements   Pain location:no change  Standing extension: repeated movements  Pain location: no change    Strength/Myotome Testing     Right Hip   Planes of Motion   Flexion: 3+  Extension: 3+  Abduction: 3+  External rotation: 3+    Additional Strength Details  Pain with PROM Hip ADD, MMT Hip ER and ABD and R SL  Squatting: mod Valgus  SLS: Mod Trendelenberg Gait pattern  Tests     Lumbar   Negative sacral thrust  and sacral spring   Right Hip   Negative FADIR and Riley  Precautions: Vertigo currently unable to lie flat in supine  PMHx: Vertigo, obesity, anxiety, HTN, lumbar laminectomy  Dx: L4-S1 DDD/DJD with a stability preference, R GT bursitis      Daily Treatment Diary     Manual  2/5            R ITB foam roller             R hip flex, IR, ER PROM                                                        Exercise Diary  2/5            bridges 1x10            SL Hip ABD( R only) 1x10            SL clamshells ( R only) G/ 1x15 Mini-squats             Prone Hip ext             Mini-squats             Standing ITB stretch 15"x1            Seated Piriformis stretch                                                                                                                                                                             Modalities

## 2019-02-05 NOTE — LETTER
2019    Janice Sheehan[address]    Patient: Uriel Angel   YOB: 1960   Date of Visit: 2019     Encounter Diagnosis     ICD-10-CM    1  Lumbar radiculopathy M54 16    2  Right hip pain M25 551        Dear Dr Maria L Poole:    Please review [facility]  Please verify that you agree therapy should continue by signing the attached document and sending it back to our office  If you have any questions or concerns, please don't hesitate to call  Sincerely,    Jose Fall, PT      Referring Provider:      I certify that I have read the below Plan of Care and certify the need for these services furnished under this plan of treatment while under my care  DO Kings Sheehan[address]: [phone]          PT Evaluation     Today's date: 2019  Patient name: Uriel Angel  : 1960  MRN: [id number]  Referring provider: Lon Mclaughlin DO  Dx:   Encounter Diagnosis     ICD-10-CM    1  Lumbar radiculopathy M54 16    2  Right hip pain M25 551                   Assessment  Assessment details: Pt presents with s/s consistent with L4-S1 DDD with an stability preference and R greater trochanteric bursitis  Pt will benefit from PT to address impairments and restore function  Impairments: abnormal gait, abnormal or restricted ROM, activity intolerance, impaired physical strength, lacks appropriate home exercise program, pain with function, poor posture  and poor body mechanics    Goals  ST-4 weeks  1   Pt will decrease pain > 50%  2   Pt will increase Hip ABD MMT > 3+/5 without pain  LT-6 weeks  1   Pt will decrease pain > 75%  2   Pt will standing for 2 hours at work without pain      Plan  Planned therapy interventions: abdominal trunk stabilization, manual therapy, neuromuscular re-education, body mechanics training, patient education, strengthening, stretching, therapeutic activities, therapeutic exercise, therapeutic training, flexibility, functional ROM exercises and home exercise program  Frequency: 2x week  Duration in weeks: 6        Subjective Evaluation    History of Present Illness  Mechanism of injury: Pt is a 60 yof c/o central LBP with occasional L posterior radicular sx to the foot that began 20 years ago  Pt c/o R lateral hip pain that began 2 months ago  Pain  Current pain ratin  At best pain ratin  At worst pain ratin  Quality: dull ache and radiating      Diagnostic Tests  X-ray: abnormal (19: R hip mild-mod OA, lumbar spine: L5/S1 DDD, L4 endplate osteophtye )  Patient Goals  Patient goals for therapy: decreased pain, decreased edema, increased strength, increased motion, independence with ADLs/IADLs and return to sport/leisure activities          Objective     Palpation     Right   Tenderness of the gluteus rosi, gluteus medius and piriformis       Active Range of Motion     Lumbar   Flexion: 75 degrees   Extension: 25 degrees   Left lateral flexion:  Restriction level: minimal  Right lateral flexion:  with pain Restriction level: minimal  Left rotation:  Restriction level: minimal  Right rotation:  with pain Restriction level: minimal    Passive Range of Motion     Lumbar   Left lateral flexion:  Restriction level: minimal  Right lateral flexion:  Restriction level: minimal  Left rotation:  Restriction level: minimal  Right rotation:  Restriction level: minimal    Right Hip   Flexion: 95 degrees with pain  Abduction: 40 degrees   Adduction: 30 degrees with pain  External rotation (90/90): 45 degrees   Internal rotation (90/90): 15 degrees with pain    Joint Play   Joints within functional limits: L3, L4, L5 and S1   Mechanical Assessment    Cervical      Thoracic      Lumbar    Standing flexion: repeated movements   Pain location:no change  Standing extension: repeated movements  Pain location: no change    Strength/Myotome Testing     Right Hip   Planes of Motion   Flexion: 3+  Extension: 3+  Abduction: 3+  External rotation: 3+    Additional Strength Details  Pain with PROM Hip ADD, MMT Hip ER and ABD and R SL  Squatting: mod Valgus  SLS: Mod Trendelenberg Gait pattern  Tests     Lumbar   Negative sacral thrust  and sacral spring   Right Hip   Negative FADIR and Riley  Precautions: Vertigo currently unable to lie flat in supine  PMHx: Vertigo, obesity, anxiety, HTN, lumbar laminectomy  Dx: L4-S1 DDD/DJD with a stability preference, R GT bursitis      Daily Treatment Diary     Manual  2/5            R ITB foam roller             R hip flex, IR, ER PROM                                                        Exercise Diary  2/5            bridges 1x10            SL Hip ABD( R only) 1x10            SL clamshells ( R only) G/ 1x15            Mini-squats             Prone Hip ext             Mini-squats             Standing ITB stretch 15"x1            Seated Piriformis stretch                                                                                                                                                                             Modalities

## 2019-02-08 NOTE — PROGRESS NOTES
Assessment/Plan:    Class 1 obesity  -Patient is pursuing the Conservative Program  -Initial weight loss goal of 5-10% weight loss for improved health  -Reviewed indications, mechanisms, and potential side effects of weight loss medications  The patient would like to hold off for now, but may consider them in the future, especially if cravings for carbs persist  Would likely try Contrave or Topamax first for this reason  Would be cautious with phentermine as she reports taking Benadryl has resulted in bigeminy  She reports she is feeling motivated now that her stressors have resolved  She will start by adding in fruits and vegetables  She has also purchased healthy freezer meals she will be taking to lunch that contain healthy proteins such as edamame and lentils  Reviewed that increasing fiber may help reduce her cravings  She would like to lose 4-5 lbs by her next follow up  Initial(Start of [facility] 10/15/18): 201 5 lbs  Current: 201 4  Change: -0 1 lbs  Goal: 170 lbs, feel better in clothes, fit comfortably in size 14    Status post bariatric surgery  -s/p Graciela-En-Y Gastric Bypass on May 2004  Overall doing Well  Initial: 325 lbs  Current: 201 4  Luis: 178 lbs  Current BMI is Body mass index is 33 51 kg/m²  Tolerating a regular diet-yes  Eating at least 60 grams of protein per day-yes  Following 30/60 minute rule with liquids-yes  Drinking at least 64 ounces of fluid per day-yes  Drinking carbonated beverages-no  Sufficient exercise-no  Using alcohol-since last visit had 3 drinks- one on each holiday  Keep up the great work!     Anxiety disorder  -improving now that her  and son's health is improving  -Would consider Contrave if considering weight loss medications    Postgastrectomy malabsorption  -At risk for malabsorption of vitamins/minerals secondary to malabsorption and restriction of intake from their procedure  -Currently taking adequate postop bariatric surgery vitamin supplementation-yes - barifusion  -Last set of bariatric labs completed on 1/25/18 and are within acceptable limits except for vitamin A  Supplementation as directed  Repeat A as ordered  -Next set of bariatric labs ordered for approximately 1 year    Follow up in approximately 4-6 weeks with Non-Surgical Physician/Advanced Practitioner  25 minute visit, >50% time spent counseling patient on diet behavior and exercise modification for weight loss  Goals:  Food log (ie ) www myfitnesspal com,sparkpeople  com,loseit com,calorieking  com,etc  baritastic  No sugary beverages  At least 64oz of water daily  Increase physical activity by 10 minutes daily  Gradually increase physical activity to a goal of 5 days per week for 30 minutes of MODERATE intensity PLUS 2 days per week of FULL BODY resistance training  7341-9470 calories per day  Practice 30/60 rule     Diagnoses and all orders for this visit:    Abnormal weight gain  Comments:  See plan under Class 1 Obesity    Class 1 obesity    Status post bariatric surgery    Postgastrectomy malabsorption    Generalized anxiety disorder    Body mass index 33 0-33 9, adult    Subjective:   Chief Complaint   Patient presents with    Follow-up     4 month mw follow up      Patient ID: Naty Kathleen  is a 60 y o  female with excess weight/obesity here to pursue weight management  Patient is pursuing Conservative Program      HPI  The patient presents for MWM follow up  Reports increased stress over the last few winter months with her  and son's health  Reports frequently going to appointments with him resulting in increased dining out/take out  Despite this, she has done well with maintaining her weight  Feels appropriately full, but does prefer/have cravings for carbohydrates       The following portions of the patient's history were reviewed and updated as appropriate: allergies, current medications, past family history, past medical history, past social history, past surgical history and problem list     Review of Systems    Objective:    /62 (BP Location: Left arm, Patient Position: Sitting, Cuff Size: Adult)   Pulse 68   Temp 97 8 °F (36 6 °C) (Tympanic)   Resp 16   Ht 5' 5" (1 651 m)   Wt 91 4 kg (201 lb 6 4 oz)   BMI 33 51 kg/m²      Physical Exam   Nursing note and vitals reviewed  Constitutional   General appearance: Abnormal   well developed and obese  Pulmonary   Respiratory effort: No increased work of breathing or signs of respiratory distress      Musculoskeletal   Gait and station: Normal     Psychiatric   Orientation to person, place and time: Normal     Affect: appropriate

## 2019-02-08 NOTE — ASSESSMENT & PLAN NOTE
-s/p Graciela-En-Y Gastric Bypass on May 2004  Overall doing Well  Initial: 325 lbs  Current: 201 4  Luis: 178 lbs  Current BMI is Body mass index is 33 51 kg/m²  Tolerating a regular diet-yes  Eating at least 60 grams of protein per day-yes  Following 30/60 minute rule with liquids-yes  Drinking at least 64 ounces of fluid per day-yes  Drinking carbonated beverages-no  Sufficient exercise-no  Using alcohol-since last visit had 3 drinks- one on each holiday  Keep up the great work!

## 2019-02-08 NOTE — ASSESSMENT & PLAN NOTE
-Patient is pursuing the Conservative Program  -Initial weight loss goal of 5-10% weight loss for improved health  -Reviewed indications, mechanisms, and potential side effects of weight loss medications  The patient would like to hold off for now, but may consider them in the future, especially if cravings for carbs persist  Would likely try Contrave or Topamax first for this reason  Would be cautious with phentermine as she reports taking Benadryl has resulted in bigeminy  She reports she is feeling motivated now that her stressors have resolved  She will start by adding in fruits and vegetables  She has also purchased healthy freezer meals she will be taking to lunch that contain healthy proteins such as edamame and lentils  Reviewed that increasing fiber may help reduce her cravings  She would like to lose 4-5 lbs by her next follow up      Initial(Start of [facility] 10/15/18): 201 5 lbs  Current: 201 4  Change: -0 1 lbs  Goal: 170 lbs, feel better in clothes, fit comfortably in size 14

## 2019-02-11 ENCOUNTER — OFFICE VISIT (OUTPATIENT)
Dept: BARIATRICS | Facility: CLINIC | Age: 59
End: 2019-02-11
Payer: COMMERCIAL

## 2019-02-11 VITALS
DIASTOLIC BLOOD PRESSURE: 62 MMHG | HEART RATE: 68 BPM | HEIGHT: 65 IN | RESPIRATION RATE: 16 BRPM | WEIGHT: 201.4 LBS | SYSTOLIC BLOOD PRESSURE: 116 MMHG | TEMPERATURE: 97.8 F | BODY MASS INDEX: 33.55 KG/M2

## 2019-02-11 DIAGNOSIS — Z98.84 STATUS POST BARIATRIC SURGERY: ICD-10-CM

## 2019-02-11 DIAGNOSIS — E66.9 CLASS 1 OBESITY: ICD-10-CM

## 2019-02-11 DIAGNOSIS — Z90.3 POSTGASTRECTOMY MALABSORPTION: ICD-10-CM

## 2019-02-11 DIAGNOSIS — R63.5 ABNORMAL WEIGHT GAIN: Primary | ICD-10-CM

## 2019-02-11 DIAGNOSIS — F41.1 GENERALIZED ANXIETY DISORDER: ICD-10-CM

## 2019-02-11 DIAGNOSIS — K91.2 POSTGASTRECTOMY MALABSORPTION: ICD-10-CM

## 2019-02-11 PROCEDURE — 99214 OFFICE O/P EST MOD 30 MIN: CPT | Performed by: PHYSICIAN ASSISTANT

## 2019-02-11 NOTE — PATIENT INSTRUCTIONS
Goals: Food log (ie ) www myfitnesspal com,sparkpeople  com,loseit com,calorieking  com,etc  baritastic  No sugary beverages  At least 64oz of water daily  Increase physical activity by 10 minutes daily   Gradually increase physical activity to a goal of 5 days per week for 30 minutes of MODERATE intensity PLUS 2 days per week of FULL BODY resistance training  0470-6372 calories per day  Practice 30/60 rule

## 2019-02-12 ENCOUNTER — APPOINTMENT (OUTPATIENT)
Dept: PHYSICAL THERAPY | Facility: CLINIC | Age: 59
End: 2019-02-12
Payer: COMMERCIAL

## 2019-02-12 NOTE — ASSESSMENT & PLAN NOTE
-At risk for malabsorption of vitamins/minerals secondary to malabsorption and restriction of intake from their procedure  -Currently taking adequate postop bariatric surgery vitamin supplementation-yes - barifusion  -Last set of bariatric labs completed on 1/25/18 and are within acceptable limits except for vitamin A  Supplementation as directed  Repeat A as ordered    -Next set of bariatric labs ordered for approximately 1 year

## 2019-02-12 NOTE — ASSESSMENT & PLAN NOTE
-improving now that her  and son's health is improving  -Would consider Contrave if considering weight loss medications

## 2019-02-13 ENCOUNTER — APPOINTMENT (OUTPATIENT)
Dept: PHYSICAL THERAPY | Facility: CLINIC | Age: 59
End: 2019-02-13
Payer: COMMERCIAL

## 2019-02-14 ENCOUNTER — OFFICE VISIT (OUTPATIENT)
Dept: PHYSICAL THERAPY | Facility: CLINIC | Age: 59
End: 2019-02-14
Payer: COMMERCIAL

## 2019-02-14 DIAGNOSIS — M25.551 RIGHT HIP PAIN: ICD-10-CM

## 2019-02-14 DIAGNOSIS — M54.16 LUMBAR RADICULOPATHY: Primary | ICD-10-CM

## 2019-02-14 PROCEDURE — 97140 MANUAL THERAPY 1/> REGIONS: CPT | Performed by: PHYSICAL THERAPIST

## 2019-02-14 PROCEDURE — 97110 THERAPEUTIC EXERCISES: CPT | Performed by: PHYSICAL THERAPIST

## 2019-02-14 PROCEDURE — 97112 NEUROMUSCULAR REEDUCATION: CPT | Performed by: PHYSICAL THERAPIST

## 2019-02-14 NOTE — PROGRESS NOTES
Daily Note     Today's date: 2019  Patient name: Angel Fine  : 1960  MRN: [id number]  Referring provider: Niyah Kerr DO  Dx:   Encounter Diagnosis     ICD-10-CM    1  Lumbar radiculopathy M54 16    2  Right hip pain M25 551                   Subjective: Pt reports good compliance with HEP, pain with R SL that wakes her up at night  Objective: See treatment diary below  Assessment: Pt demonstrated ITB and piriformis tightness  Plan: Cont POC  Precautions: Vertigo currently unable to lie flat in supine   PMHx: Vertigo, obesity, anxiety, HTN, lumbar laminectomy      Dx: L4-S1 DDD/DJD with a stability preference, R GT bursitis      Daily Treatment Diary      Manual                     R ITB foam roller    5 min                   R hip flex, IR, ER PROM    5 min                                                                                                 Exercise Diary                     bridges 1x10  2x10                   SL Hip ABD( R only) 1x10  2x10                   SL clamshells ( R only) G/ 1x15  G/ 3x15                   Mini-squats    2x10                   Prone Hip ext                                              Standing ITB stretch 15"x1                     Seated Piriformis stretch    15"x3                    Supine ITB stretch   15"x3                    Standing 4 way hip   1x20 ea                                                                                                                                                                                                                                                                         Modalities

## 2019-02-19 ENCOUNTER — OFFICE VISIT (OUTPATIENT)
Dept: PHYSICAL THERAPY | Facility: CLINIC | Age: 59
End: 2019-02-19
Payer: COMMERCIAL

## 2019-02-19 DIAGNOSIS — M54.16 LUMBAR RADICULOPATHY: Primary | ICD-10-CM

## 2019-02-19 DIAGNOSIS — M25.551 RIGHT HIP PAIN: ICD-10-CM

## 2019-02-19 PROCEDURE — 97110 THERAPEUTIC EXERCISES: CPT | Performed by: PHYSICAL THERAPIST

## 2019-02-19 PROCEDURE — 97112 NEUROMUSCULAR REEDUCATION: CPT | Performed by: PHYSICAL THERAPIST

## 2019-02-19 PROCEDURE — 97140 MANUAL THERAPY 1/> REGIONS: CPT | Performed by: PHYSICAL THERAPIST

## 2019-02-19 NOTE — PROGRESS NOTES
Daily Note     Today's date: 2019  Patient name: Debbie David  : 1960  MRN: [id number]  Referring provider: Namrata Moralez DO  Dx:   Encounter Diagnosis     ICD-10-CM    1  Lumbar radiculopathy M54 16    2  Right hip pain M25 551                   Subjective: Pt reports improved R hip pain when she avoids R SL, but began reporting L hip pain with L SL  Objective: See treatment diary below  Assessment: Pt demonstrated L hip flexion PROM restriction  Plan: Cont POC  Progress core and hip stability as tolerated  Precautions: Vertigo currently unable to lie flat in supine   PMHx: Vertigo, obesity, anxiety, HTN, lumbar laminectomy      Dx: L4-S1 DDD/DJD with a stability preference, R GT bursitis      Daily Treatment Diary      Manual                   R ITB foam roller    5 min  5 min                 B hip flex, IR, ER PROM    5 min  4 min                  R hip distraction      1 min                                                                       Exercise Diary                   bridges 1x10  2x10  2x12                 SL Hip ABD( R only) 1x10  2x10  2x12                 SL clamshells ( R only) G/ 1x15  G/ 3x15  G/ 3x15                 Mini-squats    2x10  2x12                                                               Standing ITB stretch 15"x1                     Seated Piriformis stretch    15"x3  15"x3                  Supine ITB stretch   15"x3  15"x3                  Standing 4 way hip   1x20 ea  1#/ 1x20                  Supine HA stretch     15"x3                                                                                                                                                                                                                                               Modalities

## 2019-02-21 ENCOUNTER — OFFICE VISIT (OUTPATIENT)
Dept: PHYSICAL THERAPY | Facility: CLINIC | Age: 59
End: 2019-02-21
Payer: COMMERCIAL

## 2019-02-21 DIAGNOSIS — M25.551 RIGHT HIP PAIN: ICD-10-CM

## 2019-02-21 DIAGNOSIS — M54.16 LUMBAR RADICULOPATHY: Primary | ICD-10-CM

## 2019-02-21 PROCEDURE — 97140 MANUAL THERAPY 1/> REGIONS: CPT | Performed by: PHYSICAL THERAPIST

## 2019-02-21 PROCEDURE — 97112 NEUROMUSCULAR REEDUCATION: CPT | Performed by: PHYSICAL THERAPIST

## 2019-02-21 PROCEDURE — 97110 THERAPEUTIC EXERCISES: CPT | Performed by: PHYSICAL THERAPIST

## 2019-02-21 NOTE — PROGRESS NOTES
Daily Note     Today's date: 2019  Patient name: Darien Hunt  : 1960  MRN: [id number]  Referring provider: Hortencia Ojeda DO  Dx:   Encounter Diagnosis     ICD-10-CM    1  Lumbar radiculopathy M54 16    2  Right hip pain M25 551                   Subjective: Pt reports improved R hip pain and FROM during ADLs and less pain with sleeping  Objective: See treatment diary below  Assessment: Pt demonstrated improved R hip PROM  Plan: Cont POC  Progress core and hip stability as tolerated  Precautions: Vertigo currently unable to lie flat in supine   PMHx: Vertigo, obesity, anxiety, HTN, lumbar laminectomy      Dx: L4-S1 DDD/DJD with a stability preference, R GT bursitis      Daily Treatment Diary      Manual                 R ITB foam roller    5 min  5 min  5 min               B hip flex, IR, ER PROM    5 min  4 min  4 min                R hip distraction      1 min  1 min                                                                     Exercise Diary                 bridges 1x10  2x10  2x12  2x12               SL Hip ABD( R only) 1x10  2x10  2x12  2x12               SL clamshells ( R only) G/ 1x15  G/ 3x15  G/ 3x15  GTB   3x15               Mini-squats    2x10  2x12  2x12                                                             Standing ITB stretch 15"x1                     Seated Piriformis stretch    15"x3  15"x3  15"x3                Supine ITB stretch   15"x3  15"x3  15"x3                Standing 4 way hip   1x20 ea  1#/ 1x20  1#/   3x15                Supine HA stretch     15"x3  15"x3                                                                                                                                                                                                                                             Modalities

## 2019-02-26 ENCOUNTER — OFFICE VISIT (OUTPATIENT)
Dept: PHYSICAL THERAPY | Facility: CLINIC | Age: 59
End: 2019-02-26
Payer: COMMERCIAL

## 2019-02-26 DIAGNOSIS — M54.16 LUMBAR RADICULOPATHY: Primary | ICD-10-CM

## 2019-02-26 DIAGNOSIS — M25.551 RIGHT HIP PAIN: ICD-10-CM

## 2019-02-26 PROCEDURE — 97112 NEUROMUSCULAR REEDUCATION: CPT | Performed by: PHYSICAL THERAPIST

## 2019-02-26 PROCEDURE — 97110 THERAPEUTIC EXERCISES: CPT | Performed by: PHYSICAL THERAPIST

## 2019-02-26 PROCEDURE — 97140 MANUAL THERAPY 1/> REGIONS: CPT | Performed by: PHYSICAL THERAPIST

## 2019-02-26 NOTE — PROGRESS NOTES
Daily Note     Today's date: 2019  Patient name: Adolfo Duffy  : 1960  MRN: [id number]  Referring provider: Manuel Vargas DO  Dx:   No diagnosis found  Subjective: Pt reports left sciatic pain starting over the weekend  She notes it has been steadily improving since the weekend  She notes 2/10 pain in the left hip today  No other pain noted  Objective: See treatment diary below  Assessment: Pt demonstrated good response to hip distraction  Fatigue noted with clamshells and S/L SLR  Plan: Cont POC  Progress core and hip stability as tolerated  Precautions: Vertigo currently unable to lie flat in supine   PMHx: Vertigo, obesity, anxiety, HTN, lumbar laminectomy      Dx: L4-S1 DDD/DJD with a stability preference, R GT bursitis      Daily Treatment Diary      Manual               R ITB foam roller    5 min  5 min  5 min  5 min             B hip flex, IR, ER PROM    5 min  4 min  4 min  4 min              R hip distraction      1 min  1 min  1 min                                                                   Exercise Diary               bridges 1x10  2x10  2x12  2x12  3x10             SL Hip ABD( R only) 1x10  2x10  2x12  2x12  2x12             SL clamshells ( R only) G/ 1x15  G/ 3x15  G/ 3x15  GTB   3x15  GTB 3x18             Mini-squats    2x10  2x12  2x12  2x12                                                           Standing ITB stretch 15"x1                     Seated Piriformis stretch    15"x3  15"x3  15"x3                Supine ITB stretch   15"x3  15"x3  15"x3  15"x3              Standing 4 way hip   1x20 ea  1#/ 1x20  1#/   3x15  1#/ 3x15              Supine HA stretch     15"x3  15"x3  15"x3                                                                                                                                                                                                                                         Modalities

## 2019-02-28 ENCOUNTER — APPOINTMENT (OUTPATIENT)
Dept: PHYSICAL THERAPY | Facility: CLINIC | Age: 59
End: 2019-02-28
Payer: COMMERCIAL

## 2019-03-05 ENCOUNTER — APPOINTMENT (OUTPATIENT)
Dept: PHYSICAL THERAPY | Facility: CLINIC | Age: 59
End: 2019-03-05
Payer: COMMERCIAL

## 2019-03-07 ENCOUNTER — OFFICE VISIT (OUTPATIENT)
Dept: PHYSICAL THERAPY | Facility: CLINIC | Age: 59
End: 2019-03-07
Payer: COMMERCIAL

## 2019-03-07 DIAGNOSIS — M54.16 LUMBAR RADICULOPATHY: Primary | ICD-10-CM

## 2019-03-07 DIAGNOSIS — M25.551 RIGHT HIP PAIN: ICD-10-CM

## 2019-03-07 PROCEDURE — 97110 THERAPEUTIC EXERCISES: CPT | Performed by: PHYSICAL THERAPIST

## 2019-03-07 PROCEDURE — 97140 MANUAL THERAPY 1/> REGIONS: CPT | Performed by: PHYSICAL THERAPIST

## 2019-03-07 PROCEDURE — 97112 NEUROMUSCULAR REEDUCATION: CPT | Performed by: PHYSICAL THERAPIST

## 2019-03-07 NOTE — PROGRESS NOTES
Daily Note     Today's date: 3/7/2019  Patient name: Angel Ibarra  : 1960  MRN: [id number]  Referring provider: Victor Hugo Davila DO  Dx:   Encounter Diagnosis     ICD-10-CM    1  Lumbar radiculopathy M54 16    2  Right hip pain M25 551                   Subjective: Pt reports 7-8/10 B sharp glute pain for 2 days after tx  Pt reports that she believes the pain is coming from the PROM since she performs her HEP without this pain at home  Objective: See treatment diary below  Held PROM  Assessment: Hip PROM may be irritating her SIJs  Plan: Cont POC  Assess response to glute pain     Precautions: Vertigo currently unable to lie flat in supine   PMHx: Vertigo, obesity, anxiety, HTN, lumbar laminectomy      Dx: L4-S1 DDD/DJD with a stability preference, R GT bursitis      Daily Treatment Diary      Manual    3/7           B ITB foam roller    5 min  5 min  5 min  5 min  10 min           B hip flex, IR, ER PROM    5 min  4 min  4 min  4 min  held hold           R hip distraction      1 min  1 min  1 min  held  hold                                                               Exercise Diary    3/7           bridges 1x10  2x10  2x12  2x12  3x10  3x10           SL Hip ABD( R only) 1x10  2x10  2x12  2x12  2x12  3x12           SL clamshells ( R only) G/ 1x15  G/ 3x15  G/ 3x15  GTB   3x15  GTB 3x18  G/ 3x20           Mini-squats    2x10  2x12  2x12  2x12  2x12                                                           Standing ITB stretch 15"x1          15"x1           Seated Piriformis stretch    15"x3  15"x3  15"x3    15"x3            Supine ITB stretch   15"x3  15"x3  15"x3  15"x3  15"x3            Standing 4 way hip   1x20 ea  1#/ 1x20  1#/   3x15  1#/ 3x15  1#/ 3x15            Supine HA stretch     15"x3  15"x3  15"x3  15"x3                                                                                                                                                                                                                                         Modalities

## 2019-03-12 ENCOUNTER — APPOINTMENT (OUTPATIENT)
Dept: PHYSICAL THERAPY | Facility: CLINIC | Age: 59
End: 2019-03-12
Payer: COMMERCIAL

## 2019-03-14 ENCOUNTER — APPOINTMENT (OUTPATIENT)
Dept: PHYSICAL THERAPY | Facility: CLINIC | Age: 59
End: 2019-03-14
Payer: COMMERCIAL

## 2019-03-19 ENCOUNTER — OFFICE VISIT (OUTPATIENT)
Dept: PHYSICAL THERAPY | Facility: CLINIC | Age: 59
End: 2019-03-19
Payer: COMMERCIAL

## 2019-03-19 DIAGNOSIS — M54.16 LUMBAR RADICULOPATHY: Primary | ICD-10-CM

## 2019-03-19 DIAGNOSIS — M25.551 RIGHT HIP PAIN: ICD-10-CM

## 2019-03-19 PROCEDURE — 97112 NEUROMUSCULAR REEDUCATION: CPT | Performed by: PHYSICAL THERAPIST

## 2019-03-19 PROCEDURE — 97140 MANUAL THERAPY 1/> REGIONS: CPT | Performed by: PHYSICAL THERAPIST

## 2019-03-19 PROCEDURE — 97110 THERAPEUTIC EXERCISES: CPT | Performed by: PHYSICAL THERAPIST

## 2019-03-19 NOTE — PROGRESS NOTES
Daily Note     Today's date: 3/19/2019  Patient name: Nettie Ny  : 1960  MRN: [id number]  Referring provider: Benita Bradford DO  Dx:   Encounter Diagnosis     ICD-10-CM    1  Lumbar radiculopathy M54 16    2  Right hip pain M25 551                   Subjective: Pt reports no radicular LE sx since last visit, improved tolerance to sleeping supine, no pain currently  Objective: See treatment diary below  Assessment: Pt demonstrated improved hip strength  Plan: Cont POC  Cyndra Eleazar currently unable to lie flat in supine   PMHx: Vertigo, obesity, anxiety, HTN, lumbar laminectomy      Dx: L4-S1 DDD/DJD with a stability preference, R GT bursitis      Daily Treatment Diary      Manual  2/5  2/14  2/19  2/21  2/26  3/7    3/19       B ITB foam roller    5 min  5 min  5 min  5 min  10 min    10 min       B hip flex, IR, ER PROM    5 min  4 min  4 min  4 min  held hold   d/c        R hip distraction      1 min  1 min  1 min  held  hold  d/c                                                             Exercise Diary  2/5  2/14  2/19  2/21  2/26  3/7  3/19         bridges 1x10  2x10  2x12  2x12  3x10  3x10  3x12         SL Hip ABD( R only) 1x10  2x10  2x12  2x12  2x12  3x12  3x12         SL clamshells ( R only) G/ 1x15  G/ 3x15  G/ 3x15  GTB   3x15  GTB 3x18  G/ 3x20  G/ 3x20         Mini-squats    2x10  2x12  2x12  2x12  2x12  2x13                                                         Standing ITB stretch 15"x1          15"x1  15"x3         Seated Piriformis stretch    15"x3  15"x3  15"x3    15"x3  15"x3          Supine ITB stretch   15"x3  15"x3  15"x3  15"x3  15"x3  15"x3          Standing 4 way hip   1x20 ea  1#/ 1x20  1#/   3x15  1#/ 3x15  1#/ 3x15  2#/ 3x10 ea          Supine HA stretch     15"x3  15"x3  15"x3  15"x3  15"x3                                                                                                                                                                                                                                       Modalities

## 2019-03-21 ENCOUNTER — APPOINTMENT (OUTPATIENT)
Dept: PHYSICAL THERAPY | Facility: CLINIC | Age: 59
End: 2019-03-21
Payer: COMMERCIAL

## 2019-03-26 ENCOUNTER — OFFICE VISIT (OUTPATIENT)
Dept: PHYSICAL THERAPY | Facility: CLINIC | Age: 59
End: 2019-03-26
Payer: COMMERCIAL

## 2019-03-26 DIAGNOSIS — M25.551 RIGHT HIP PAIN: ICD-10-CM

## 2019-03-26 DIAGNOSIS — M54.16 LUMBAR RADICULOPATHY: Primary | ICD-10-CM

## 2019-03-26 PROCEDURE — 97112 NEUROMUSCULAR REEDUCATION: CPT | Performed by: PHYSICAL THERAPIST

## 2019-03-26 PROCEDURE — 97110 THERAPEUTIC EXERCISES: CPT | Performed by: PHYSICAL THERAPIST

## 2019-03-26 NOTE — PROGRESS NOTES
Daily Note     Today's date: 3/26/2019  Patient name: Henrik Young  : 1960  MRN: [id number]  Referring provider: Kaia Ruff DO  Dx:   Encounter Diagnosis     ICD-10-CM    1  Lumbar radiculopathy M54 16    2  Right hip pain M25 551                   Subjective: Pt reports 0/10 pain today  States that she is still having difficulty sleeping on the R side  Objective: See treatment diary below  Assessment: Pt tolerated exercises well without increase in pain  Pt required minimal VCs for technique with mini squats and side stepping  Since d/c PROM of the hip, pt continues to report no sciatic pain  Plan: Cont POC  Mac Gins currently unable to lie flat in supine   PMHx: Vertigo, obesity, anxiety, HTN, lumbar laminectomy      Dx: L4-S1 DDD/DJD with a stability preference, R GT bursitis      Daily Treatment Diary      Manual  2/5  2/14  2/19  2/21  2/26  3/7    3/19  3/26     B ITB foam roller    5 min  5 min  5 min  5 min  10 min    10 min  8 min     B hip flex, IR, ER PROM    5 min  4 min  4 min  4 min  held hold   d/c        R hip distraction      1 min  1 min  1 min  held  hold  d/c                                                             Exercise Diary  2/5  2/14  2/19  2/21  2/26  3/7  3/19  3/26       bridges 1x10  2x10  2x12  2x12  3x10  3x10  3x12  3x12       SL Hip ABD( R only) 1x10  2x10  2x12  2x12  2x12  3x12  3x12  3x12       SL clamshells ( R only) G/ 1x15  G/ 3x15  G/ 3x15  GTB   3x15  GTB 3x18  G/ 3x20  G/ 3x20  G/ 3x20       Mini-squats    2x10  2x12  2x12  2x12  2x12  2x13  2x13                                                       Standing ITB stretch 15"x1          15"x1  15"x3  15"x3       Seated Piriformis stretch    15"x3  15"x3  15"x3    15"x3  15"x3  15"x3        Supine ITB stretch   15"x3  15"x3  15"x3  15"x3  15"x3  15"x3  15"x3        Standing 4 way hip   1x20 ea  1#/ 1x20  1#/   3x15  1#/ 3x15  1#/ 3x15  2#/ 3x10 ea  2#/ 3x10 ea        Supine HA stretch     15"x3  15"x3  15"x3  15"x3  15"x3  15"x3        TB Side Stepping                YTB / 6x15ft                                                                                                                                                                                                             Modalities

## 2019-03-28 ENCOUNTER — OFFICE VISIT (OUTPATIENT)
Dept: PHYSICAL THERAPY | Facility: CLINIC | Age: 59
End: 2019-03-28
Payer: COMMERCIAL

## 2019-03-28 DIAGNOSIS — M25.551 RIGHT HIP PAIN: ICD-10-CM

## 2019-03-28 DIAGNOSIS — M54.16 LUMBAR RADICULOPATHY: Primary | ICD-10-CM

## 2019-03-28 PROCEDURE — 97112 NEUROMUSCULAR REEDUCATION: CPT | Performed by: PHYSICAL THERAPIST

## 2019-03-28 PROCEDURE — 97140 MANUAL THERAPY 1/> REGIONS: CPT | Performed by: PHYSICAL THERAPIST

## 2019-03-28 PROCEDURE — 97110 THERAPEUTIC EXERCISES: CPT | Performed by: PHYSICAL THERAPIST

## 2019-03-28 NOTE — PROGRESS NOTES
Daily Note     Today's date: 3/28/2019  Patient name: Bryanna Parkinson  : 1960  MRN: [id number]  Referring provider: Bolivar Reed DO  Dx:   Encounter Diagnosis     ICD-10-CM    1  Lumbar radiculopathy M54 16    2  Right hip pain M25 551                   Subjective: Pt reports no pain with ambulation, standing, stairs or squatting  Pt reports 7-8/10 pain after lying on her R side  Pt is not sure how long she lies on  States that she is still having difficulty sleeping on the R side  Objective: See treatment diary below  Assessment: Pt demonstrated a Trendelenberg gait pattern with L SLS and difficulty with SLS balance and mild dizziness with L SLS with EC  Plan: Cont POC  Clora Oregon currently unable to lie flat in supine   PMHx: Vertigo, obesity, anxiety, HTN, lumbar laminectomy      Dx: L4-S1 DDD/DJD with a stability preference, R GT bursitis      Daily Treatment Diary      Manual  2/5  2/14  2/19  2/21  2/26  3/7    3/19  3/26  3/28   B ITB foam roller    5 min  5 min  5 min  5 min  10 min    10 min  8 min                                                                                         Exercise Diary  2/5  2/14  2/19  2/21  2/26  3/7  3/19  3/26  3/28     bridges 1x10  2x10  2x12  2x12  3x10  3x10  3x12  3x12  3x15     SL Hip ABD( R only) 1x10  2x10  2x12  2x12  2x12  3x12  3x12  3x12       SL clamshells ( R only) G/ 1x15  G/ 3x15  G/ 3x15  GTB   3x15  GTB 3x18  G/ 3x20  G/ 3x20  G/ 3x20  G/ 3x22     Mini-squats    2x10  2x12  2x12  2x12  2x12  2x13  2x13  2x13                                                     Standing ITB stretch 15"x1          15"x1  15"x3  15"x3  15"x3     Seated Piriformis stretch    15"x3  15"x3  15"x3    15"x3  15"x3  15"x3  15"x3      Supine ITB stretch   15"x3  15"x3  15"x3  15"x3  15"x3  15"x3  15"x3  15"x3      Standing 4 way hip   1x20 ea  1#/ 1x20  1#/   3x15  1#/ 3x15  1#/ 3x15  2#/ 3x10 ea  2#/ 3x10 ea        Supine HA stretch     15"x3  15"x3  15"x3  15"x3  15"x3  15"x3  15"x3      TB Side Stepping                YTB / 6x15ft  Y/ 6x15 ft      Biodex: SLS                 L11/ 1x60" ea      Biodex: SLS EC                 R/ 1x60"      Biodex: tandem stance                 L/1x60"                                                                                                                                   Modalities

## 2019-04-02 ENCOUNTER — OFFICE VISIT (OUTPATIENT)
Dept: PHYSICAL THERAPY | Facility: CLINIC | Age: 59
End: 2019-04-02
Payer: COMMERCIAL

## 2019-04-02 DIAGNOSIS — M54.16 LUMBAR RADICULOPATHY: Primary | ICD-10-CM

## 2019-04-02 DIAGNOSIS — M25.551 RIGHT HIP PAIN: ICD-10-CM

## 2019-04-02 PROCEDURE — 97112 NEUROMUSCULAR REEDUCATION: CPT | Performed by: PHYSICAL THERAPIST

## 2019-04-02 PROCEDURE — 97140 MANUAL THERAPY 1/> REGIONS: CPT | Performed by: PHYSICAL THERAPIST

## 2019-04-02 PROCEDURE — 97110 THERAPEUTIC EXERCISES: CPT | Performed by: PHYSICAL THERAPIST

## 2019-04-04 ENCOUNTER — OFFICE VISIT (OUTPATIENT)
Dept: PHYSICAL THERAPY | Facility: CLINIC | Age: 59
End: 2019-04-04
Payer: COMMERCIAL

## 2019-04-04 DIAGNOSIS — M54.16 LUMBAR RADICULOPATHY: Primary | ICD-10-CM

## 2019-04-04 DIAGNOSIS — M25.551 RIGHT HIP PAIN: ICD-10-CM

## 2019-04-04 PROCEDURE — 97140 MANUAL THERAPY 1/> REGIONS: CPT | Performed by: PHYSICAL THERAPIST

## 2019-04-04 PROCEDURE — 97112 NEUROMUSCULAR REEDUCATION: CPT | Performed by: PHYSICAL THERAPIST

## 2019-04-04 PROCEDURE — 97110 THERAPEUTIC EXERCISES: CPT | Performed by: PHYSICAL THERAPIST

## 2019-04-11 ENCOUNTER — APPOINTMENT (OUTPATIENT)
Dept: PHYSICAL THERAPY | Facility: CLINIC | Age: 59
End: 2019-04-11
Payer: COMMERCIAL

## 2019-04-16 ENCOUNTER — OFFICE VISIT (OUTPATIENT)
Dept: PHYSICAL THERAPY | Facility: CLINIC | Age: 59
End: 2019-04-16
Payer: COMMERCIAL

## 2019-04-16 DIAGNOSIS — M25.551 RIGHT HIP PAIN: ICD-10-CM

## 2019-04-16 DIAGNOSIS — M54.2 NECK PAIN: ICD-10-CM

## 2019-04-16 DIAGNOSIS — M54.16 LUMBAR RADICULOPATHY: Primary | ICD-10-CM

## 2019-04-16 PROCEDURE — 97112 NEUROMUSCULAR REEDUCATION: CPT | Performed by: PHYSICAL THERAPIST

## 2019-04-16 PROCEDURE — 97110 THERAPEUTIC EXERCISES: CPT | Performed by: PHYSICAL THERAPIST

## 2019-04-16 PROCEDURE — 97140 MANUAL THERAPY 1/> REGIONS: CPT | Performed by: PHYSICAL THERAPIST

## 2019-04-18 ENCOUNTER — OFFICE VISIT (OUTPATIENT)
Dept: PHYSICAL THERAPY | Facility: CLINIC | Age: 59
End: 2019-04-18
Payer: COMMERCIAL

## 2019-04-18 DIAGNOSIS — M54.16 LUMBAR RADICULOPATHY: Primary | ICD-10-CM

## 2019-04-18 DIAGNOSIS — M25.551 RIGHT HIP PAIN: ICD-10-CM

## 2019-04-18 DIAGNOSIS — M54.2 NECK PAIN: ICD-10-CM

## 2019-04-18 PROCEDURE — 97112 NEUROMUSCULAR REEDUCATION: CPT | Performed by: PHYSICAL THERAPIST

## 2019-04-18 PROCEDURE — 97140 MANUAL THERAPY 1/> REGIONS: CPT | Performed by: PHYSICAL THERAPIST

## 2019-04-23 ENCOUNTER — APPOINTMENT (OUTPATIENT)
Dept: PHYSICAL THERAPY | Facility: CLINIC | Age: 59
End: 2019-04-23
Payer: COMMERCIAL

## 2019-04-25 ENCOUNTER — APPOINTMENT (OUTPATIENT)
Dept: PHYSICAL THERAPY | Facility: CLINIC | Age: 59
End: 2019-04-25
Payer: COMMERCIAL

## 2019-04-30 ENCOUNTER — OFFICE VISIT (OUTPATIENT)
Dept: PHYSICAL THERAPY | Facility: CLINIC | Age: 59
End: 2019-04-30
Payer: COMMERCIAL

## 2019-04-30 DIAGNOSIS — M54.16 LUMBAR RADICULOPATHY: Primary | ICD-10-CM

## 2019-04-30 DIAGNOSIS — M25.551 RIGHT HIP PAIN: ICD-10-CM

## 2019-04-30 DIAGNOSIS — M54.2 NECK PAIN: ICD-10-CM

## 2019-04-30 PROCEDURE — 97140 MANUAL THERAPY 1/> REGIONS: CPT | Performed by: PHYSICAL THERAPIST

## 2019-04-30 PROCEDURE — 97110 THERAPEUTIC EXERCISES: CPT | Performed by: PHYSICAL THERAPIST

## 2019-04-30 PROCEDURE — 97112 NEUROMUSCULAR REEDUCATION: CPT | Performed by: PHYSICAL THERAPIST

## 2019-05-02 ENCOUNTER — OFFICE VISIT (OUTPATIENT)
Dept: BARIATRICS | Facility: CLINIC | Age: 59
End: 2019-05-02
Payer: COMMERCIAL

## 2019-05-02 ENCOUNTER — APPOINTMENT (OUTPATIENT)
Dept: PHYSICAL THERAPY | Facility: CLINIC | Age: 59
End: 2019-05-02
Payer: COMMERCIAL

## 2019-05-02 VITALS
HEART RATE: 70 BPM | TEMPERATURE: 97.9 F | WEIGHT: 204 LBS | SYSTOLIC BLOOD PRESSURE: 120 MMHG | HEIGHT: 65 IN | RESPIRATION RATE: 16 BRPM | DIASTOLIC BLOOD PRESSURE: 70 MMHG | BODY MASS INDEX: 33.99 KG/M2

## 2019-05-02 DIAGNOSIS — Z98.84 STATUS POST BARIATRIC SURGERY: ICD-10-CM

## 2019-05-02 DIAGNOSIS — R63.5 WEIGHT GAIN FOLLOWING GASTRIC BYPASS SURGERY: ICD-10-CM

## 2019-05-02 DIAGNOSIS — R63.5 ABNORMAL WEIGHT GAIN: Primary | ICD-10-CM

## 2019-05-02 DIAGNOSIS — Z98.84 WEIGHT GAIN FOLLOWING GASTRIC BYPASS SURGERY: ICD-10-CM

## 2019-05-02 DIAGNOSIS — E66.9 CLASS 1 OBESITY: ICD-10-CM

## 2019-05-02 PROCEDURE — 99214 OFFICE O/P EST MOD 30 MIN: CPT | Performed by: PHYSICIAN ASSISTANT

## 2019-05-03 ENCOUNTER — APPOINTMENT (OUTPATIENT)
Dept: LAB | Age: 59
End: 2019-05-03
Payer: COMMERCIAL

## 2019-05-03 ENCOUNTER — TRANSCRIBE ORDERS (OUTPATIENT)
Dept: ADMINISTRATIVE | Age: 59
End: 2019-05-03

## 2019-05-03 ENCOUNTER — OFFICE VISIT (OUTPATIENT)
Dept: URGENT CARE | Age: 59
End: 2019-05-03
Payer: COMMERCIAL

## 2019-05-03 VITALS
WEIGHT: 204 LBS | BODY MASS INDEX: 33.99 KG/M2 | TEMPERATURE: 98 F | RESPIRATION RATE: 18 BRPM | OXYGEN SATURATION: 99 % | HEART RATE: 75 BPM | DIASTOLIC BLOOD PRESSURE: 82 MMHG | HEIGHT: 65 IN | SYSTOLIC BLOOD PRESSURE: 126 MMHG

## 2019-05-03 DIAGNOSIS — R79.89 LOW SERUM VITAMIN A: ICD-10-CM

## 2019-05-03 DIAGNOSIS — J06.9 ACUTE URI: Primary | ICD-10-CM

## 2019-05-03 DIAGNOSIS — R05.9 COUGH: ICD-10-CM

## 2019-05-03 PROCEDURE — 99213 OFFICE O/P EST LOW 20 MIN: CPT | Performed by: FAMILY MEDICINE

## 2019-05-03 PROCEDURE — 36415 COLL VENOUS BLD VENIPUNCTURE: CPT

## 2019-05-03 PROCEDURE — 84590 ASSAY OF VITAMIN A: CPT

## 2019-05-03 PROCEDURE — S9088 SERVICES PROVIDED IN URGENT: HCPCS | Performed by: FAMILY MEDICINE

## 2019-05-03 RX ORDER — BENZONATATE 100 MG/1
100 CAPSULE ORAL 3 TIMES DAILY PRN
Qty: 20 CAPSULE | Refills: 0 | Status: SHIPPED | OUTPATIENT
Start: 2019-05-03 | End: 2019-11-08

## 2019-05-07 ENCOUNTER — OFFICE VISIT (OUTPATIENT)
Dept: PHYSICAL THERAPY | Facility: CLINIC | Age: 59
End: 2019-05-07
Payer: COMMERCIAL

## 2019-05-07 DIAGNOSIS — M54.16 LUMBAR RADICULOPATHY: Primary | ICD-10-CM

## 2019-05-07 DIAGNOSIS — M25.551 RIGHT HIP PAIN: ICD-10-CM

## 2019-05-07 PROCEDURE — 97112 NEUROMUSCULAR REEDUCATION: CPT | Performed by: PHYSICAL THERAPIST

## 2019-05-07 PROCEDURE — 97110 THERAPEUTIC EXERCISES: CPT | Performed by: PHYSICAL THERAPIST

## 2019-05-07 PROCEDURE — 97140 MANUAL THERAPY 1/> REGIONS: CPT | Performed by: PHYSICAL THERAPIST

## 2019-05-09 LAB — VIT A SERPL-MCNC: 33.9 UG/DL (ref 22–69.5)

## 2019-06-02 ENCOUNTER — APPOINTMENT (OUTPATIENT)
Dept: RADIOLOGY | Age: 59
End: 2019-06-02
Attending: PHYSICIAN ASSISTANT
Payer: COMMERCIAL

## 2019-06-02 ENCOUNTER — OFFICE VISIT (OUTPATIENT)
Dept: URGENT CARE | Age: 59
End: 2019-06-02
Payer: COMMERCIAL

## 2019-06-02 VITALS
OXYGEN SATURATION: 98 % | RESPIRATION RATE: 18 BRPM | TEMPERATURE: 97.8 F | DIASTOLIC BLOOD PRESSURE: 82 MMHG | WEIGHT: 204 LBS | HEIGHT: 65 IN | BODY MASS INDEX: 33.99 KG/M2 | SYSTOLIC BLOOD PRESSURE: 134 MMHG | HEART RATE: 68 BPM

## 2019-06-02 DIAGNOSIS — M79.672 LEFT FOOT PAIN: ICD-10-CM

## 2019-06-02 DIAGNOSIS — S92.515A NONDISPLACED FRACTURE OF PROXIMAL PHALANX OF LEFT LESSER TOE(S), INITIAL ENCOUNTER FOR CLOSED FRACTURE: Primary | ICD-10-CM

## 2019-06-02 PROCEDURE — 99213 OFFICE O/P EST LOW 20 MIN: CPT | Performed by: FAMILY MEDICINE

## 2019-06-02 PROCEDURE — 73630 X-RAY EXAM OF FOOT: CPT

## 2019-06-02 PROCEDURE — S9088 SERVICES PROVIDED IN URGENT: HCPCS | Performed by: FAMILY MEDICINE

## 2019-06-02 RX ORDER — HYDROCODONE BITARTRATE AND ACETAMINOPHEN 5; 325 MG/1; MG/1
1 TABLET ORAL EVERY 6 HOURS PRN
Qty: 16 TABLET | Refills: 0 | Status: SHIPPED | OUTPATIENT
Start: 2019-06-02 | End: 2019-06-06 | Stop reason: SDUPTHER

## 2019-06-03 ENCOUNTER — OFFICE VISIT (OUTPATIENT)
Dept: OBGYN CLINIC | Facility: HOSPITAL | Age: 59
End: 2019-06-03

## 2019-06-03 VITALS
BODY MASS INDEX: 34.83 KG/M2 | HEART RATE: 57 BPM | HEIGHT: 64 IN | SYSTOLIC BLOOD PRESSURE: 135 MMHG | DIASTOLIC BLOOD PRESSURE: 79 MMHG | WEIGHT: 204 LBS

## 2019-06-03 DIAGNOSIS — S92.902A FOOT FRACTURE, LEFT, CLOSED, INITIAL ENCOUNTER: Primary | ICD-10-CM

## 2019-06-03 PROCEDURE — 99202 OFFICE O/P NEW SF 15 MIN: CPT | Performed by: PHYSICIAN ASSISTANT

## 2019-06-06 ENCOUNTER — TELEPHONE (OUTPATIENT)
Dept: OBGYN CLINIC | Facility: HOSPITAL | Age: 59
End: 2019-06-06

## 2019-06-06 DIAGNOSIS — S92.515A NONDISPLACED FRACTURE OF PROXIMAL PHALANX OF LEFT LESSER TOE(S), INITIAL ENCOUNTER FOR CLOSED FRACTURE: ICD-10-CM

## 2019-06-06 RX ORDER — HYDROCODONE BITARTRATE AND ACETAMINOPHEN 5; 325 MG/1; MG/1
1 TABLET ORAL EVERY 6 HOURS PRN
Qty: 30 TABLET | Refills: 0 | Status: SHIPPED | OUTPATIENT
Start: 2019-06-06 | End: 2020-02-13 | Stop reason: ALTCHOICE

## 2019-06-18 ENCOUNTER — HOSPITAL ENCOUNTER (OUTPATIENT)
Dept: RADIOLOGY | Facility: HOSPITAL | Age: 59
Discharge: HOME | End: 2019-06-18
Attending: ORTHOPAEDIC SURGERY
Payer: COMMERCIAL

## 2019-06-18 ENCOUNTER — OFFICE VISIT (OUTPATIENT)
Dept: OBGYN CLINIC | Facility: HOSPITAL | Age: 59
End: 2019-06-18
Payer: COMMERCIAL

## 2019-06-18 VITALS
SYSTOLIC BLOOD PRESSURE: 125 MMHG | HEART RATE: 74 BPM | HEIGHT: 64 IN | BODY MASS INDEX: 34.83 KG/M2 | WEIGHT: 204 LBS | DIASTOLIC BLOOD PRESSURE: 81 MMHG

## 2019-06-18 DIAGNOSIS — Z09 FRACTURE FOLLOW-UP: Primary | ICD-10-CM

## 2019-06-18 DIAGNOSIS — S92.515A NONDISPLACED FRACTURE OF PROXIMAL PHALANX OF LEFT LESSER TOE(S), INITIAL ENCOUNTER FOR CLOSED FRACTURE: ICD-10-CM

## 2019-06-18 DIAGNOSIS — Z09 FRACTURE FOLLOW-UP: ICD-10-CM

## 2019-06-18 PROCEDURE — 73630 X-RAY EXAM OF FOOT: CPT

## 2019-06-18 PROCEDURE — 99213 OFFICE O/P EST LOW 20 MIN: CPT | Performed by: ORTHOPAEDIC SURGERY

## 2019-06-21 ENCOUNTER — TELEPHONE (OUTPATIENT)
Dept: OBGYN CLINIC | Facility: HOSPITAL | Age: 59
End: 2019-06-21

## 2019-08-01 ENCOUNTER — HOSPITAL ENCOUNTER (OUTPATIENT)
Dept: RADIOLOGY | Age: 59
Discharge: HOME | End: 2019-08-01
Payer: COMMERCIAL

## 2019-08-01 VITALS — WEIGHT: 195 LBS | BODY MASS INDEX: 33.29 KG/M2 | HEIGHT: 64 IN

## 2019-08-01 DIAGNOSIS — Z12.39 BREAST CANCER SCREENING: ICD-10-CM

## 2019-08-01 PROCEDURE — 77063 BREAST TOMOSYNTHESIS BI: CPT

## 2019-08-01 PROCEDURE — 77067 SCR MAMMO BI INCL CAD: CPT

## 2019-11-07 ENCOUNTER — TELEPHONE (OUTPATIENT)
Dept: BARIATRICS | Facility: CLINIC | Age: 59
End: 2019-11-07

## 2019-11-07 NOTE — TELEPHONE ENCOUNTER
Pt reached out, she only started Saxenda on Monday  She has an office visit tomorrow so will use that weight as the Saxenda start weight  Experienng mild burning with injections, but reports this is tolerable  Not using an alcohol swab before injecting, but had been injecting after a shower  Recommended she start using an alcohol swab prior to injection  Reports having cravings in the evenings and feeling hungry between lunch and dinner  Trying to switch afternoon snack from a beer pretzel to cheese  Recommended she add a small fruit  Snacking on crackers in the evening  If unsuccessful with Saxenda, can consider Contrave due to cravings  She stopped food logging 2 weeks ago, but states she was logging around 1500 calories  Reaching 8,000-15,000 steps 6 days of the week

## 2019-11-08 ENCOUNTER — ANNUAL EXAM (OUTPATIENT)
Dept: OBGYN CLINIC | Facility: CLINIC | Age: 59
End: 2019-11-08
Payer: COMMERCIAL

## 2019-11-08 VITALS
BODY MASS INDEX: 35 KG/M2 | WEIGHT: 205 LBS | HEIGHT: 64 IN | DIASTOLIC BLOOD PRESSURE: 82 MMHG | SYSTOLIC BLOOD PRESSURE: 124 MMHG

## 2019-11-08 DIAGNOSIS — Z13.820 SCREENING FOR OSTEOPOROSIS: ICD-10-CM

## 2019-11-08 DIAGNOSIS — M85.872: ICD-10-CM

## 2019-11-08 DIAGNOSIS — Z01.419 GYNECOLOGIC EXAM NORMAL: Primary | ICD-10-CM

## 2019-11-08 PROBLEM — M25.551 RIGHT HIP PAIN: Status: ACTIVE | Noted: 2019-01-18

## 2019-11-08 PROCEDURE — 99396 PREV VISIT EST AGE 40-64: CPT | Performed by: PHYSICIAN ASSISTANT

## 2019-11-08 NOTE — PROGRESS NOTES
Assessment/Plan   Problem List Items Addressed This Visit        Other    Gynecologic exam normal - Primary     Pap guidelines reviewed  Pap deferred secondary to negative pap and HPV in 2017 in this low risk patient  Recommend Calcium 1200mg in two divided doses  Vitamin D3 2,000-4,000 IU daily   Weight bearing exercises 3-4x's a week for 30-40min  Return to office for annual or as needed  Other Visit Diagnoses     Screening for osteoporosis        Relevant Orders    DXA bone density spine hip and pelvis    Oth disrd of bone density and structure, left ankle and foot        Relevant Orders    DXA bone density spine hip and pelvis          Subjective:     Patient ID: Carolyn White is a 60 y o  y o  female  HPI  59 yo seen for annual exam  LMP: 2005, no bleeding since  Denies bowel or bladder issues  Last pap: 2017 NILM (-)HRHPV  Last mammogram: 2019 BIR[facility] 1: negative  Last colonoscopy: 2016 Benign polyp  Has not had a DEXA scan  Had a toe fracture this past year of left foot and more hip pain  The following portions of the patient's history were reviewed and updated as appropriate:   She  has a past medical history of Anxiety disorder, Basal cell carcinoma,  1 para 1, Hypertension, Neck pain, Sinus infection, Tooth abscess, and Vertigo    She   Patient Active Problem List    Diagnosis Date Noted    Gynecologic exam normal 2019    Nondisplaced fracture of proximal phalanx of left lesser toe(s), initial encounter for closed fracture 2019    Weight gain following gastric bypass surgery 2019    Right hip pain 2019    Well woman exam 10/31/2018    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    Acute cystitis with hematuria 2018    Vertigo 2017    Screening for malignant neoplasm of colon 2016    Class 1 obesity 2015    Vitamin D deficiency 2014    Status post bariatric surgery 2014    Low back pain 08/15/2013    Neck pain 2013    Anxiety disorder 2013    Knee pain 2013     She  has a past surgical history that includes Back surgery; Rotator cuff repair (Right, ); Gastric bypass (); pr colonoscopy flx dx w/collj spec when pfrmd (N/A, 2016); and Gynecologic cryosurgery  Her family history includes Arthritis in her family; Breast cancer in her family; Breast cancer (age of onset: 61) in her cousin and paternal aunt; Diabetes in her family; Heart block in her father and mother; Hypertension in her father and mother; No Known Problems in her maternal aunt, maternal grandfather, maternal grandmother, paternal aunt, paternal grandfather, and paternal grandmother; Prostate cancer in her father; Thyroid disease in her mother  She  reports that she has never smoked  She has never used smokeless tobacco  She reports that she drinks alcohol  She reports that she does not use drugs  Current Outpatient Medications   Medication Sig Dispense Refill    ALPRAZolam (XANAX PO) Take by mouth Prn use      Cholecalciferol (VITAMIN D PO) Take 3,000 Units by mouth daily   cyclobenzaprine (FLEXERIL) 10 mg tablet       fluticasone (FLONASE) 50 mcg/act nasal spray 1 spray into each nostril daily 16 g 0    HYDROcodone-acetaminophen (NORCO) 5-325 mg per tablet Take 1 tablet by mouth every 6 (six) hours as needed for pain for up to 30 dosesMax Daily Amount: 4 tablets 30 tablet 0    Insulin Pen Needle (NOVOFINE PLUS) 32G X 4 MM MISC by Does not apply route daily 30 each 3    liraglutide (SAXENDA) injection Inject 0 5 mL (3 mg total) under the skin daily 15 mL 0    Multiple Vitamins-Minerals (BARIATRIC FUSION PO) Take by mouth 4 (four) times a day Indications: 1 tablet  No current facility-administered medications for this visit  She is allergic to nitrofurantoin and sulfa antibiotics       Menstrual History:  OB History        1    Para   1    Term   1            AB Living   1       SAB        TAB        Ectopic        Multiple        Live Births   1           Obstetric Comments    x 1            Menarche age: 14  No LMP recorded (approximate)  Patient is postmenopausal          Review of Systems   Constitutional: Negative for fatigue, fever and unexpected weight change  HENT: Negative for dental problem and sinus pressure  Eyes: Negative for visual disturbance  Respiratory: Negative for cough, shortness of breath and wheezing  Cardiovascular: Negative for chest pain  Gastrointestinal: Negative for abdominal pain, blood in stool, constipation, diarrhea, nausea and vomiting  Endocrine: Negative for polydipsia  Genitourinary: Negative for difficulty urinating, dyspareunia, dysuria, frequency, hematuria, pelvic pain and urgency  Musculoskeletal: Negative for arthralgias and back pain  Neurological: Negative for dizziness, seizures, light-headedness and headaches  Psychiatric/Behavioral: Negative for suicidal ideas  The patient is not nervous/anxious  Objective:  Vitals:    19 0758   BP: 124/82   BP Location: Left arm   Patient Position: Sitting   Cuff Size: Large   Weight: 93 kg (205 lb)   Height: 5' 4" (1 626 m)      Physical Exam   Constitutional: She is oriented to person, place, and time  She appears well-developed and well-nourished  Genitourinary: Rectum normal, vagina normal and uterus normal  There is no rash, tenderness, lesion, injury or Bartholin's cyst on the right labia  There is no rash, tenderness, lesion, injury or Bartholin's cyst on the left labia  Vagina exhibits no lesion  No erythema, tenderness or bleeding in the vagina  No signs of injury around the vagina  No vaginal discharge found  Right adnexum does not display mass, does not display tenderness and does not display fullness  Left adnexum does not display mass, does not display tenderness and does not display fullness   Cervix does not exhibit motion tenderness, lesion or discharge  Uterus is not enlarged, tender, exhibiting a mass, irregular (is regular) or mobile  Rectal exam shows no external hemorrhoid, no internal hemorrhoid, no fissure, no mass, no tenderness, anal tone normal and guaiac negative stool  HENT:   Head: Normocephalic and atraumatic  Neck: No thyromegaly present  Cardiovascular: Normal rate, regular rhythm and normal heart sounds  Exam reveals no gallop and no friction rub  No murmur heard  Pulmonary/Chest: Effort normal and breath sounds normal  No respiratory distress  She has no wheezes  Right breast exhibits no inverted nipple, no mass, no nipple discharge, no skin change and no tenderness  Left breast exhibits no inverted nipple, no mass, no nipple discharge, no skin change and no tenderness  No breast swelling, tenderness, discharge or bleeding  Breasts are symmetrical    Abdominal: Soft  She exhibits no distension and no mass  There is no tenderness  There is no rebound and no guarding  No hernia  Lymphadenopathy:     She has no cervical adenopathy  Right: No inguinal adenopathy present  Left: No inguinal adenopathy present  Neurological: She is alert and oriented to person, place, and time  Skin: Skin is warm and dry  Psychiatric: She has a normal mood and affect   Her behavior is normal

## 2019-12-03 ENCOUNTER — TRANSCRIBE ORDERS (OUTPATIENT)
Dept: ADMINISTRATIVE | Facility: HOSPITAL | Age: 59
End: 2019-12-03

## 2019-12-03 ENCOUNTER — HOSPITAL ENCOUNTER (OUTPATIENT)
Dept: RADIOLOGY | Facility: HOSPITAL | Age: 59
Discharge: HOME | End: 2019-12-03
Attending: ORTHOPAEDIC SURGERY
Payer: COMMERCIAL

## 2019-12-03 DIAGNOSIS — M25.551 RIGHT HIP PAIN: ICD-10-CM

## 2019-12-03 DIAGNOSIS — M25.551 RIGHT HIP PAIN: Primary | ICD-10-CM

## 2019-12-03 PROCEDURE — 73502 X-RAY EXAM HIP UNI 2-3 VIEWS: CPT

## 2019-12-10 NOTE — ASSESSMENT & PLAN NOTE
-s/pRYGB  -Patient is pursuing the Conservative Program  -Initial weight loss goal of 5-10% weight loss for improved health  Patient denies personal and family history of MEN2 tumors and medullary thyroid/thyroid carcinoma  Patient denies personal history of pancreatitis  She did not start [address] until 11/4/19 (weight from GYN office visit on 11/8/19 was 205 lbs)  On 1 8 and tolerating with mild nausea  Feeling stanton and eating smaller portions  Plans to increase to 2 4 mg, but discussed that she can return to 1 8 if nausea worsens    -Has Norco prn- has not been taking   Caution Contrave      Initial(Start of [facility] 10/15/18): 201 5 lbs  Highest weight: 205  Current: 198 7  Change: -6 3 lbs  Goal: 170 lbs, feel better in clothes, fit comfortably in size 14

## 2019-12-10 NOTE — PROGRESS NOTES
Assessment/Plan:    Class 1 obesity  -s/pRYGB  -Patient is pursuing the Conservative Program  -Initial weight loss goal of 5-10% weight loss for improved health  Patient denies personal and family history of MEN2 tumors and medullary thyroid/thyroid carcinoma  Patient denies personal history of pancreatitis  She did not start [address] until 11/4/19 (weight from GYN office visit on 11/8/19 was 205 lbs)  On 1 8 and tolerating with mild nausea  Feeling stanton and eating smaller portions  Plans to increase to 2 4 mg, but discussed that she can return to 1 8 if nausea worsens    -Has Norco prn- has not been taking  Caution Contrave      Initial(Start of [facility] 10/15/18): 201 5 lbs  Highest weight: 205  Current: 198 7  Change: -6 3 lbs  Goal: 170 lbs, feel better in clothes, fit comfortably in size 14    Status post bariatric surgery  -s/p Graciela-En-Y Gastric Bypass on May 2004      Initial: 325 lbs  Luis: 178 lbs    Postgastrectomy malabsorption  -At risk for malabsorption of vitamins/minerals secondary to malabsorption and restriction of intake from their procedure  -Last set of bariatric labs completed on 01/25/2019  -To f/u with PCP as this is routinely ordered by them    Follow up in approximately 2 months with Non-Surgical Physician/Advanced Practitioner  15 minute visit, >50% time spent counseling patient on diet behavior and exercise modification for weight loss  Colonoscopy-6/1/16- 5 year recall recommended  Discussed with the pt    Goals:  Food log (ie ) www myfitnesspal com,sparkpeople  com,loseit com,calorieking  com,etc  baritastic  No sugary beverages  At least 64oz of water daily  Increase physical activity by 10 minutes daily   Gradually increase physical activity to a goal of 5 days per week for 30 minutes of MODERATE intensity PLUS 2 days per week of FULL BODY resistance training  Practice lesson plans 1-6 in bariatric manual  and Practice 30/60 rule  Goal protein intake of 60-80 grams per day  1200 calories per day  Alcohol and nicotine use is not recommended following bariatric surgery  NSAIDs (Motrin, Advil, Aleve, Naproxen, ibuprofen, etc) should be avoided following bariatric surgery    Diagnoses and all orders for this visit:    Class 1 obesity  -     liraglutide (SAXENDA) injection; Inject 0 3 mL (1 8 mg total) under the skin daily Increase in weekly intervals by 0 6 mg until a dose of 3 mg is reached  -     Insulin Pen Needle (NOVOFINE PLUS) 32G X 4 MM MISC; by Does not apply route daily    Status post bariatric surgery  -     liraglutide (SAXENDA) injection; Inject 0 3 mL (1 8 mg total) under the skin daily Increase in weekly intervals by 0 6 mg until a dose of 3 mg is reached  -     Insulin Pen Needle (NOVOFINE PLUS) 32G X 4 MM MISC; by Does not apply route daily    Postgastrectomy malabsorption    Body mass index 34 0-34 9, adult      Subjective:   Chief Complaint   Patient presents with    Follow-up     mwm fu     Patient ID: Gus Verma  is a 60 y o  female with excess weight/obesity here to pursue weight management  Past Medical History:   Diagnosis Date    Anxiety disorder     Basal cell carcinoma      1 para 1     Hypertension     Neck pain     Sinus infection     Tooth abscess     Vertigo      HPI:  The patient presents for [facility]WM  On  8  Has been increasing dose every 2 weeks  Has nausea in the morning  Suggested trying it in the evening  Had one episode of vomiting after eating potato chips  Otherwise, denies vomiting  Trying to slow meal times and minimizing evening snacking  Happy to report she wore a pair of pants she hasn't worn in a year! Food logging: yes, 1200 calories  Fruit/Vegetable servings: 2 fruit, 1-2 vegetables  Exercise: walking more at work- getting 15,000 steps per day  Hydration: 16-24 oz water, 40 oz fluids- proprel, decaf peach tea  Sleep: working on getting more sleep   6 5-7 hours (improved from 5 5 hours)    The following portions of the patient's history were reviewed and updated as appropriate: allergies, current medications, past family history, past medical history, past social history, past surgical history and problem list     Review of Systems   Respiratory: Negative  Cardiovascular: Negative  Gastrointestinal: Negative  Psychiatric/Behavioral:        Has been sad over the holidays with loss of family members this year and sister in law diagnosed with cancer  Denies feeling down/depressed     Objective:    /64 (BP Location: Left arm, Patient Position: Sitting, Cuff Size: Large)   Pulse 75   Temp 97 5 °F (36 4 °C) (Tympanic)   Resp 18   Ht 5' 4" (1 626 m)   Wt 90 1 kg (198 lb 11 2 oz)   BMI 34 11 kg/m²     Physical Exam   Nursing note and vitals reviewed  Constitutional   General appearance: Abnormal   well developed and obese  Pulmonary   Respiratory effort: No increased work of breathing or signs of respiratory distress  Abdomen   Abdomen: Abnormal   The abdomen was obese      Musculoskeletal   Gait and station: Normal     Psychiatric   Orientation to person, place and time: Normal     Affect: appropriate

## 2019-12-12 ENCOUNTER — OFFICE VISIT (OUTPATIENT)
Dept: BARIATRICS | Facility: CLINIC | Age: 59
End: 2019-12-12
Payer: COMMERCIAL

## 2019-12-12 VITALS
HEIGHT: 64 IN | SYSTOLIC BLOOD PRESSURE: 100 MMHG | BODY MASS INDEX: 33.92 KG/M2 | HEART RATE: 75 BPM | TEMPERATURE: 97.5 F | DIASTOLIC BLOOD PRESSURE: 64 MMHG | RESPIRATION RATE: 18 BRPM | WEIGHT: 198.7 LBS

## 2019-12-12 DIAGNOSIS — Z90.3 POSTGASTRECTOMY MALABSORPTION: ICD-10-CM

## 2019-12-12 DIAGNOSIS — Z98.84 STATUS POST BARIATRIC SURGERY: ICD-10-CM

## 2019-12-12 DIAGNOSIS — K91.2 POSTGASTRECTOMY MALABSORPTION: ICD-10-CM

## 2019-12-12 DIAGNOSIS — E66.9 CLASS 1 OBESITY: Primary | ICD-10-CM

## 2019-12-12 PROCEDURE — 99213 OFFICE O/P EST LOW 20 MIN: CPT | Performed by: PHYSICIAN ASSISTANT

## 2019-12-12 NOTE — ASSESSMENT & PLAN NOTE
-At risk for malabsorption of vitamins/minerals secondary to malabsorption and restriction of intake from their procedure  -Last set of bariatric labs completed on 01/25/2019  -To f/u with PCP as this is routinely ordered by them

## 2019-12-12 NOTE — PATIENT INSTRUCTIONS
Goals: Food log (ie ) www myfitnesspal com,sparkpeople  com,loseit com,calorieking  com,etc  baritastic  No sugary beverages  At least 64oz of water daily  Increase physical activity by 10 minutes daily   Gradually increase physical activity to a goal of 5 days per week for 30 minutes of MODERATE intensity PLUS 2 days per week of FULL BODY resistance training  Practice lesson plans 1-6 in bariatric manual  and Practice 30/60 rule  Goal protein intake of 60-80 grams per day  1200 calories per day  Alcohol and nicotine use is not recommended following bariatric surgery  NSAIDs (Motrin, Advil, Aleve, Naproxen, ibuprofen, etc) should be avoided following bariatric surgery

## 2020-02-11 NOTE — PROGRESS NOTES
Assessment/Plan:    Class 1 obesity  -s/p RYGB  -Patient is pursuing the Conservative Program  -Initial weight loss goal of 5-10% weight loss for improved health  -Reviewed indications, mechanisms, and potential side effects of weight loss medications  Would be cautious with phentermine as she reports taking Benadryl has resulted in bigeminy  -Avoid Contrave as she takes Norco  -Patient denies personal and family history of MEN2 tumors and medullary thyroid/thyroid carcinoma  Patient denies personal history of pancreatitis  (started 11/4/19)  Still having cravings  -Reviewed option of Topamax vs Contrave  Since she is feeling sad at times will try Contrave  Denied hx of seizure and glaucoma  -Reviewed the potential side effects of Contrave, which include: abdominal upset, headache, dizziness, trouble sleeping, increased blood pressure, depression/anxiety, and fatigue  Patient should call/return if he/she develops symptoms of depression/aniety  Patient should have ER evaluation if thoughts of harming self/others occur  Contrave should be stopped prior to narcotic pain medications  Notify provider with any changes in vision  -Needs hip replacement, but trying to wait until the summer     Initial(Start of [facility] 10/15/18): 201 5 lbs  Start of [facility]: (205 lbs, per 11/8/19, states she was 208 lbs when she started)  Current: 197 4 (7 6 lbs since 11/4/19)  Change: -7 6 lbs since highest weight  Goal: 170 lbs, feel better in clothes, fit comfortably in size 14    Postgastrectomy malabsorption  -At risk for malabsorption of vitamins/minerals secondary to malabsorption and restriction of intake from their procedure  -Next set of bariatric labs ordered    Follow up in approximately 6 weeks with Non-Surgical Physician/Advanced Practitioner  25 minute visit, >50% time spent counseling patient on diet behavior and exercise modification for weight loss  Colonoscopy-per chart 6/1/16, 5 year recall recommended   Reviewed with the pt    Goals:  Food log (ie ) www myfitnesspal com,sparkpeople  com,loseit com,calorieking  com,etc  baritastic  No sugary beverages  At least 64oz of water daily  Increase physical activity by 10 minutes daily  Gradually increase physical activity to a goal of 5 days per week for 30 minutes of MODERATE intensity PLUS 2 days per week of FULL BODY resistance training  Practice lesson plans 1-6 in bariatric manual  and Practice 30/60 rule  Goal protein intake of 60-80 grams per day  9498-3135 calories per day  Alcohol and nicotine use is not recommended following bariatric surgery  NSAIDs (Motrin, Advil, Aleve, Naproxen, ibuprofen, etc) should be avoided following bariatric surgery  Recommend checking lab coverage before having labs drawn    Contrave: Take one tablet by mouth in the morning for one week, then take one tablet by mouth in the morning and one tablet by mouth at night for one week, then take two tablets by mouth in the morning and one tablet by mouth at night for one week, then take two tablets by mouth in the morning and at night  Do not take with high fat meal     Diagnoses and all orders for this visit:    Class 1 obesity  -     Discontinue: Naltrexone-buPROPion HCl ER 8-90 MG TB12; Take 1 tab po qam x1 week then 1 tab bid for 1 week then 2 tabs qam and 1 tab po qhs x1 week then 2 tabs po bid  -     Comprehensive metabolic panel; Future  -     PTH, intact; Future  -     Vitamin B1, whole blood; Future  -     Vitamin B12; Future  -     Vitamin D 25 hydroxy; Future  -     Zinc; Future  -     Copper Level; Future  -     Ferritin; Future  -     Folate; Future  -     CBC and Platelet; Future  -     Magnesium; Future  -     Vitamin A; Future  -     Iron Panel (Includes Ferritin, Iron Sat%, Iron, and TIBC); Future  -     liraglutide (SAXENDA) injection;  Inject 0 5 mL (3 mg total) under the skin daily  -     Naltrexone-buPROPion HCl ER 8-90 MG TB12; Take 1 tab po qam x1 week then 1 tab bid for 1 week then 2 tabs qam and 1 tab po qhs x1 week then 2 tabs po bid    Postgastrectomy malabsorption  -     Comprehensive metabolic panel; Future  -     PTH, intact; Future  -     Vitamin B1, whole blood; Future  -     Vitamin B12; Future  -     Vitamin D 25 hydroxy; Future  -     Zinc; Future  -     Copper Level; Future  -     Ferritin; Future  -     Folate; Future  -     CBC and Platelet; Future  -     Magnesium; Future  -     Vitamin A; Future  -     Iron Panel (Includes Ferritin, Iron Sat%, Iron, and TIBC); Future    Status post bariatric surgery  -     Comprehensive metabolic panel; Future  -     PTH, intact; Future  -     Vitamin B1, whole blood; Future  -     Vitamin B12; Future  -     Vitamin D 25 hydroxy; Future  -     Zinc; Future  -     Copper Level; Future  -     Ferritin; Future  -     Folate; Future  -     CBC and Platelet; Future  -     Magnesium; Future  -     Vitamin A; Future  -     Iron Panel (Includes Ferritin, Iron Sat%, Iron, and TIBC); Future  -     liraglutide (SAXENDA) injection; Inject 0 5 mL (3 mg total) under the skin daily    Low serum vitamin A  -     Comprehensive metabolic panel; Future  -     PTH, intact; Future  -     Vitamin B1, whole blood; Future  -     Vitamin B12; Future  -     Vitamin D 25 hydroxy; Future  -     Zinc; Future  -     Copper Level; Future  -     Ferritin; Future  -     Folate; Future  -     CBC and Platelet; Future  -     Magnesium; Future  -     Vitamin A; Future  -     Iron Panel (Includes Ferritin, Iron Sat%, Iron, and TIBC); Future    Body mass index 33 0-33 9, adult        Subjective:   Chief Complaint   Patient presents with    Follow-up     mwm fu     Patient ID: Ishmael Tripp  is a 63 y o  female with excess weight/obesity here to pursue weight management    Past Medical History:   Diagnosis Date    Anxiety disorder     Basal cell carcinoma      1 para 1     Hypertension     Neck pain     Sinus infection     Tooth abscess     Vertigo      HPI:  The patient presents for [facility]    Feels Cesar Galindo is controlling her appetite  No longer feeling nausea  Being more mindful  Grazing less  Still having carb cravings  Food logging: yes, 1500 calories  Exercise: 1-2 miles on the treadmill four times per week  Hydration: 24 oz water, 24-36 oz of fluid- det peach tea/propel    The following portions of the patient's history were reviewed and updated as appropriate: allergies, current medications, past family history, past medical history, past social history, past surgical history and problem list     Review of Systems   Respiratory: Negative  Cardiovascular: Negative  Gastrointestinal: Negative  Psychiatric/Behavioral:        Feels down due to many family stressors over the year (3 deaths, husbands health issues)  Denies SI/HI     Objective:    /80 (BP Location: Left arm, Patient Position: Sitting, Cuff Size: Large)   Pulse 75   Temp (!) 95 6 °F (35 3 °C) (Tympanic)   Resp 18   Ht 5' 4" (1 626 m)   Wt 89 5 kg (197 lb 6 4 oz)   BMI 33 88 kg/m²     Physical Exam   Nursing note and vitals reviewed  Constitutional   General appearance: Abnormal   well developed and obese  Pulmonary   Respiratory effort: No increased work of breathing or signs of respiratory distress  Abdomen   Abdomen: Abnormal   The abdomen was obese     Musculoskeletal   Gait and station: Normal     Psychiatric   Orientation to person, place and time: Normal     Affect: appropriate

## 2020-02-11 NOTE — ASSESSMENT & PLAN NOTE
-s/p RYGB  -Patient is pursuing the Conservative Program  -Initial weight loss goal of 5-10% weight loss for improved health  -Reviewed indications, mechanisms, and potential side effects of weight loss medications  Would be cautious with phentermine as she reports taking Benadryl has resulted in bigeminy  -Avoid Contrave as she takes Norco  -Patient denies personal and family history of MEN2 tumors and medullary thyroid/thyroid carcinoma  Patient denies personal history of pancreatitis  (started 11/4/19)  Still having cravings  -Reviewed option of Topamax vs Contrave  Since she is feeling sad at times will try Contrave  Denied hx of seizure and glaucoma  -Reviewed the potential side effects of Contrave, which include: abdominal upset, headache, dizziness, trouble sleeping, increased blood pressure, depression/anxiety, and fatigue  Patient should call/return if he/she develops symptoms of depression/aniety  Patient should have ER evaluation if thoughts of harming self/others occur  Contrave should be stopped prior to narcotic pain medications   Notify provider with any changes in vision  -Needs hip replacement, but trying to wait until the summer     Initial(Start of [facility] 10/15/18): 201 5 lbs  Start of [facility]: (205 lbs, per 11/8/19, states she was 208 lbs when she started)  Current: 197 4 (7 6 lbs since 11/4/19)  Change: -7 6 lbs since highest weight  Goal: 170 lbs, feel better in clothes, fit comfortably in size 14

## 2020-02-13 ENCOUNTER — OFFICE VISIT (OUTPATIENT)
Dept: BARIATRICS | Facility: CLINIC | Age: 60
End: 2020-02-13
Payer: COMMERCIAL

## 2020-02-13 VITALS
BODY MASS INDEX: 33.7 KG/M2 | RESPIRATION RATE: 18 BRPM | DIASTOLIC BLOOD PRESSURE: 80 MMHG | HEIGHT: 64 IN | SYSTOLIC BLOOD PRESSURE: 118 MMHG | HEART RATE: 75 BPM | WEIGHT: 197.4 LBS | TEMPERATURE: 95.6 F

## 2020-02-13 DIAGNOSIS — Z90.3 POSTGASTRECTOMY MALABSORPTION: ICD-10-CM

## 2020-02-13 DIAGNOSIS — K91.2 POSTGASTRECTOMY MALABSORPTION: ICD-10-CM

## 2020-02-13 DIAGNOSIS — E66.9 CLASS 1 OBESITY: Primary | ICD-10-CM

## 2020-02-13 DIAGNOSIS — Z98.84 STATUS POST BARIATRIC SURGERY: ICD-10-CM

## 2020-02-13 DIAGNOSIS — R79.89 LOW SERUM VITAMIN A: ICD-10-CM

## 2020-02-13 PROCEDURE — 99214 OFFICE O/P EST MOD 30 MIN: CPT | Performed by: PHYSICIAN ASSISTANT

## 2020-02-13 NOTE — PATIENT INSTRUCTIONS
Goals: Food log (ie ) www myfitnesspal com,sparkpeople  com,loseit com,calorieking  com,etc  baritastic  No sugary beverages  At least 64oz of water daily  Increase physical activity by 10 minutes daily  Gradually increase physical activity to a goal of 5 days per week for 30 minutes of MODERATE intensity PLUS 2 days per week of FULL BODY resistance training  Practice lesson plans 1-6 in bariatric manual  and Practice 30/60 rule  Goal protein intake of 60-80 grams per day  1034-7022 calories per day  Alcohol and nicotine use is not recommended following bariatric surgery  NSAIDs (Motrin, Advil, Aleve, Naproxen, ibuprofen, etc) should be avoided following bariatric surgery  Recommend checking lab coverage before having labs drawn    Contrave: Take one tablet by mouth in the morning for one week, then take one tablet by mouth in the morning and one tablet by mouth at night for one week, then take two tablets by mouth in the morning and one tablet by mouth at night for one week, then take two tablets by mouth in the morning and at night    Do not take with high fat meal

## 2020-02-13 NOTE — ASSESSMENT & PLAN NOTE
-At risk for malabsorption of vitamins/minerals secondary to malabsorption and restriction of intake from their procedure  -Next set of bariatric labs ordered

## 2020-02-19 ENCOUNTER — TELEPHONE (OUTPATIENT)
Dept: BARIATRICS | Facility: CLINIC | Age: 60
End: 2020-02-19

## 2020-02-19 NOTE — TELEPHONE ENCOUNTER
Pt called to confirm that she can take xanax prn and aleve with Contrave  Confirmed this is okay  She also stated she has taken two doses and is experiencing flushing, feeling warm, and muscle aches  The muscle aches occur within an hour after taking  Both resolve shortly after  She would like to continue to see if sx improve  She may stay at this dose for 2 weeks before titrating  She will notify the provider if sx worsen or do no improve and can d/c and switch to Topamax       Pt verbalized understanding and agreement

## 2020-02-28 ENCOUNTER — CONSULT (OUTPATIENT)
Dept: PAIN MEDICINE | Facility: CLINIC | Age: 60
End: 2020-02-28
Payer: COMMERCIAL

## 2020-02-28 ENCOUNTER — TELEPHONE (OUTPATIENT)
Dept: RADIOLOGY | Facility: MEDICAL CENTER | Age: 60
End: 2020-02-28

## 2020-02-28 VITALS
WEIGHT: 196 LBS | BODY MASS INDEX: 33.46 KG/M2 | DIASTOLIC BLOOD PRESSURE: 78 MMHG | HEART RATE: 68 BPM | HEIGHT: 64 IN | SYSTOLIC BLOOD PRESSURE: 118 MMHG

## 2020-02-28 DIAGNOSIS — Z98.890 STATUS POST LUMBAR LAMINECTOMY: ICD-10-CM

## 2020-02-28 DIAGNOSIS — M54.16 LUMBAR RADICULOPATHY: ICD-10-CM

## 2020-02-28 DIAGNOSIS — M16.11 ARTHRITIS OF RIGHT HIP: Primary | ICD-10-CM

## 2020-02-28 PROCEDURE — 99244 OFF/OP CNSLTJ NEW/EST MOD 40: CPT | Performed by: ANESTHESIOLOGY

## 2020-02-28 NOTE — PROGRESS NOTES
Assessment  1  Arthritis of right hip    2  Lumbar radiculopathy - Right    3  Status post lumbar laminectomy        Plan  Extremely pleasant 18-year-old female who believe her symptoms are multifactorial   Certainly with moderate hip arthritis I believe her groin anterior thigh pain is secondary to her hip  In regards to that I would entertain a right fluoroscopically guided intra-articular hip joint injection that would serve both diagnostic and hopefully therapeutic purposes  This was discussed in detail with the patient and information was provided for home review  She understands the risks associated with the procedure not limited to but including bleeding, tissue injury, allergic reaction, infection, bleeding and the patient provided verbal consent  I do believe her lower extremity symptoms are secondary to most likely irritation along the L4-L5 region secondary to either disc pathology verses stenosis  As she has had prior surgery will order an MRI of the lumbar spine with and without contrast   She already has an order for BUN and creatinine which she would need prior to the MRI  In addition she states she cannot tolerate an MRI and I do not believe in this case CT scan would be appropriate thus will try to have her undergo an MRI with and without contrast with anesthesia and she is in agreement with the plan  My impressions and treatment recommendations were discussed in detail with the patient who verbalized understanding and had no further questions  Discharge instructions were provided  I personally saw and examined the patient and I agree with the above discussed plan of care  This note is created using dictation transcription  It may contain typographical errors, grammatical errors, improperly dictated words, background noise and other errors      Orders Placed This Encounter   Procedures    MRI lumbar spine with and without contrast     Standing Status:   Future     Standing Expiration Date:   2/28/2024     Scheduling Instructions: There is no preparation for this test  Please leave your jewelry and valuables at home, wedding rings are the exception  Please bring your insurance cards, a form of photo ID and a list of your medications with you  Arrive 15 minutes prior to your appointment time in order to register  Please bring any prior CT or MRI studies of this area that were not performed at a [facility]  To schedule this appointment, please contact Central Scheduling at [phone]  Order Specific Question:   What is the patient's sedation requirement? Answer: Anesthesia    FL spine and pain procedure     Standing Status:   Future     Standing Expiration Date:   2/28/2024     Order Specific Question:   Reason for Exam:     Answer:   right hip injection     Order Specific Question:   Anticoagulant hold needed? Answer:   no    X-ray lumbar spine complete with bending     Standing Status:   Future     Standing Expiration Date:   2/28/2024     Scheduling Instructions:      Bring along any outside films relating to this procedure  New Medications Ordered This Visit   Medications    Meclizine HCl (ANTIVERT PO)     Sig: Take by mouth     REFERRED BY DR Byrne Both    History of Present Illness    Angel Moise is a 63 y o  female with a approximately 14 month history of right groin anterior thigh pain and six month history of pain radiating down the postal aspect of her right leg  She is unaware of any clear precipitating event denies any trauma or injury  Her pain is moderate to severe it is nearly constant with no typical pattern  She has subjective weakness of the right lower limb and her pain is described as shooting throbbing and grabbing with a sharp burning sensation down her leg  She has undergone physical therapy  She notes that lying down standing decreases symptoms while bending, sitting and exercise aggravate them    Physical therapy time provided moderate relief heat nice provides moderate relief she takes an occasional Naprosyn  Denies bowel or bladder dysfunction  I have personally reviewed and/or updated the patient's past medical history, past surgical history, family history, social history, current medications, allergies, and vital signs today  Review of Systems   Constitutional: Negative for fever and unexpected weight change  HENT: Negative for trouble swallowing  Eyes: Negative for visual disturbance  Respiratory: Negative for shortness of breath and wheezing  Cardiovascular: Negative for chest pain and palpitations  Gastrointestinal: Negative for constipation, diarrhea, nausea and vomiting  Endocrine: Negative for cold intolerance, heat intolerance and polydipsia  Genitourinary: Negative for difficulty urinating and frequency  Musculoskeletal: Positive for joint swelling (joint pain ) and myalgias (muscle pain)  Negative for arthralgias and gait problem  Skin: Negative for rash  Neurological: Positive for dizziness  Negative for seizures, syncope, weakness and headaches  Hematological: Does not bruise/bleed easily  Psychiatric/Behavioral: Negative for dysphoric mood  All other systems reviewed and are negative        Patient Active Problem List   Diagnosis    Screening for malignant neoplasm of colon    Anxiety disorder    Vitamin D deficiency    Status post bariatric surgery    Knee pain    Low back pain    Neck pain    Class 1 obesity    Vertigo    Acute cystitis with hematuria    Postgastrectomy malabsorption    Well woman exam    Weight gain following gastric bypass surgery    Nondisplaced fracture of proximal phalanx of left lesser toe(s), initial encounter for closed fracture    Right hip pain    Gynecologic exam normal       Past Medical History:   Diagnosis Date    Anxiety disorder     Basal cell carcinoma      1 para 1     Hypertension     Neck pain     Sinus infection     Tooth abscess     Vertigo        Past Surgical History:   Procedure Laterality Date    BACK SURGERY      lumbar laminectomy    GASTRIC BYPASS  2004    GYNECOLOGIC CRYOSURGERY      ND COLONOSCOPY FLX DX W/COLLJ SPEC WHEN PFRMD N/A 6/1/2016    Procedure: COLONOSCOPY;  Surgeon: Roma Marquez MD;  [facility]; Service: Colorectal    ROTATOR CUFF REPAIR Right 2010       Family History   Problem Relation Age of Onset    Heart block Mother     Hypertension Mother     Thyroid disease Mother     Heart block Father     Hypertension Father     Prostate cancer Father     Diabetes Family     Arthritis Family     Breast cancer Family     No Known Problems Maternal Grandmother     No Known Problems Maternal Grandfather     No Known Problems Paternal Grandmother     No Known Problems Paternal Grandfather     No Known Problems Maternal Aunt     No Known Problems Paternal Aunt     Breast cancer Paternal Aunt 61    Breast cancer Cousin 61       Social History     Occupational History    Not on file   Tobacco Use    Smoking status: Never Smoker    Smokeless tobacco: Never Used   Substance and Sexual Activity    Alcohol use: Yes     Comment: social 1 cocktail sat and sun    Drug use: No    Sexual activity: Yes     Partners: Male     Birth control/protection: Post-menopausal       Current Outpatient Medications on File Prior to Visit   Medication Sig    ALPRAZolam (XANAX PO) Take by mouth Prn use    Calcium 200 MG TABS Take 500 mg by mouth    Cholecalciferol (VITAMIN D PO) Take 3,000 Units by mouth daily   cyclobenzaprine (FLEXERIL) 10 mg tablet     Insulin Pen Needle (NOVOFINE PLUS) 32G X 4 MM MISC by Does not apply route daily    liraglutide (SAXENDA) injection Inject 0 5 mL (3 mg total) under the skin daily    Meclizine HCl (ANTIVERT PO) Take by mouth    Multiple Vitamins-Minerals (BARIATRIC FUSION PO) Take by mouth 4 (four) times a day Indications: 1 tablet      Naltrexone-buPROPion HCl ER 8-90 MG TB12 Take 1 tab po qam x1 week then 1 tab bid for 1 week then 2 tabs qam and 1 tab po qhs x1 week then 2 tabs po bid    fluticasone (FLONASE) 50 mcg/act nasal spray 1 spray into each nostril daily (Patient not taking: Reported on 2/13/2020)     No current facility-administered medications on file prior to visit  Allergies   Allergen Reactions    Nitrofurantoin      myalgias    Sulfa Antibiotics Swelling     Tongue swelling       Physical Exam    /78   Pulse 68   Ht 5' 4" (1 626 m)   Wt 88 9 kg (196 lb)   BMI 33 64 kg/m²     Constitutional: normal, well developed, well nourished, alert, in no distress and non-toxic and no overt pain behavior  and overweight  Eyes: anicteric  HEENT: grossly intact  Neck: supple, symmetric, trachea midline and no masses   Pulmonary:even and unlabored  Cardiovascular:No edema or pitting edema present  Skin:Normal without rashes or lesions and well hydrated  Psychiatric:Mood and affect appropriate  Neurologic:Cranial Nerves II-XII grossly intact  Musculoskeletal:normal, slight difficulty going from sitting to standing to sitting position no obvious skin lesions or erythema lumbar sacral spine, no tenderness to palpation lumbar sacral spine spinous process sacroiliac joint or greater trochanter bilateral, deep tendon reflexes are absent right patella 1+ left patella diminished but symmetrical bilateral Achilles, decreased sensation right L5 distribution to pinwheel, positive straight leg raising on the right negative the left, 3/5 strength right extensor hallucis longus no other motor deficits are appreciated, she does have pain on right hip internal external rotation      Imaging  RIGHT HIP     INDICATION:   M25 551: Pain in right hip      COMPARISON:  1/25/2019     VIEWS:  XR HIP/PELV 2-3 VWS RIGHT W PELVIS IF PERFORMED   Images: 3     FINDINGS:     There is no acute fracture or dislocation      Progressive moderate degenerative arthritis right hip     No lytic or blastic osseous lesions      Soft tissues are unremarkable      Persistent mild multilevel degenerative spondylosis     IMPRESSION:     Progressive moderate degenerative arthritis right hip      LUMBAR SPINE     INDICATION:   R79 82: Elevated C-reactive protein (CRP)  M54 40: Lumbago with sciatica, unspecified side  Z98 84: Bariatric surgery status      COMPARISON:   views CT abdomen and pelvis 10/2/2012     VIEWS:  XR SPINE LUMBAR MINIMUM 4 VIEWS NON INJURY  Images: 5     FINDINGS:     There is no evidence of acute fracture or destructive osseous lesion      Alignment is unremarkable      Degenerative disc disease L5-S1  Marginal endplate osteophyte seen along the anterior superior margin of L4      Marginal endplate osteophytes V07-T85      The pedicles appear intact  No pars defects      Soft tissues are unremarkable  Surgical clips and sutures left mid abdomen      IMPRESSION:     No acute osseous abnormality        Degenerative changes as described    I have personally reviewed pertinent films in PACS

## 2020-03-03 NOTE — TELEPHONE ENCOUNTER
Pt called back and asked for a few dates/time and will check her scheduled and rtc to [place]

## 2020-03-09 NOTE — TELEPHONE ENCOUNTER
Pt scheduled for 3/19/20, pt aware need for , npo1 hr prior, wear pants without any metal, if become ill/abx call to r/s, pt verbalized understanding

## 2020-03-14 ENCOUNTER — APPOINTMENT (OUTPATIENT)
Dept: LAB | Facility: CLINIC | Age: 60
End: 2020-03-14
Payer: COMMERCIAL

## 2020-03-14 ENCOUNTER — HOSPITAL ENCOUNTER (OUTPATIENT)
Dept: RADIOLOGY | Facility: HOSPITAL | Age: 60
Discharge: HOME | End: 2020-03-14
Payer: COMMERCIAL

## 2020-03-14 DIAGNOSIS — Z98.84 STATUS POST BARIATRIC SURGERY: ICD-10-CM

## 2020-03-14 DIAGNOSIS — Z90.3 POSTGASTRECTOMY MALABSORPTION: ICD-10-CM

## 2020-03-14 DIAGNOSIS — R79.89 LOW SERUM VITAMIN A: ICD-10-CM

## 2020-03-14 DIAGNOSIS — K91.2 POSTGASTRECTOMY MALABSORPTION: ICD-10-CM

## 2020-03-14 DIAGNOSIS — M54.16 LUMBAR RADICULOPATHY: ICD-10-CM

## 2020-03-14 DIAGNOSIS — E66.9 CLASS 1 OBESITY: ICD-10-CM

## 2020-03-14 LAB
25(OH)D3 SERPL-MCNC: 57.2 NG/ML (ref 30–100)
ALBUMIN SERPL BCP-MCNC: 3.4 G/DL (ref 3.5–5)
ALP SERPL-CCNC: 104 U/L (ref 46–116)
ALT SERPL W P-5'-P-CCNC: 22 U/L (ref 12–78)
ANION GAP SERPL CALCULATED.3IONS-SCNC: 8 MMOL/L (ref 4–13)
AST SERPL W P-5'-P-CCNC: 9 U/L (ref 5–45)
BILIRUB SERPL-MCNC: 0.34 MG/DL (ref 0.2–1)
BUN SERPL-MCNC: 13 MG/DL (ref 5–25)
CALCIUM SERPL-MCNC: 9.1 MG/DL (ref 8.3–10.1)
CHLORIDE SERPL-SCNC: 106 MMOL/L (ref 100–108)
CO2 SERPL-SCNC: 30 MMOL/L (ref 21–32)
CREAT SERPL-MCNC: 0.88 MG/DL (ref 0.6–1.3)
ERYTHROCYTE [DISTWIDTH] IN BLOOD BY AUTOMATED COUNT: 12.5 % (ref 11.6–15.1)
FERRITIN SERPL-MCNC: 22 NG/ML (ref 8–388)
FOLATE SERPL-MCNC: >20 NG/ML (ref 3.1–17.5)
GFR SERPL CREATININE-BSD FRML MDRD: 71 ML/MIN/1.73SQ M
GLUCOSE P FAST SERPL-MCNC: 90 MG/DL (ref 65–99)
HCT VFR BLD AUTO: 42.5 % (ref 34.8–46.1)
HGB BLD-MCNC: 13.6 G/DL (ref 11.5–15.4)
IRON SATN MFR SERPL: 15 %
IRON SERPL-MCNC: 52 UG/DL (ref 50–170)
MAGNESIUM SERPL-MCNC: 1.9 MG/DL (ref 1.6–2.6)
MCH RBC QN AUTO: 30.1 PG (ref 26.8–34.3)
MCHC RBC AUTO-ENTMCNC: 32 G/DL (ref 31.4–37.4)
MCV RBC AUTO: 94 FL (ref 82–98)
PLATELET # BLD AUTO: 223 THOUSANDS/UL (ref 149–390)
PMV BLD AUTO: 11.2 FL (ref 8.9–12.7)
POTASSIUM SERPL-SCNC: 3.9 MMOL/L (ref 3.5–5.3)
PROT SERPL-MCNC: 7.3 G/DL (ref 6.4–8.2)
PTH-INTACT SERPL-MCNC: 54 PG/ML (ref 18.4–80.1)
RBC # BLD AUTO: 4.52 MILLION/UL (ref 3.81–5.12)
SODIUM SERPL-SCNC: 144 MMOL/L (ref 136–145)
TIBC SERPL-MCNC: 340 UG/DL (ref 250–450)
VIT B12 SERPL-MCNC: 549 PG/ML (ref 100–900)
WBC # BLD AUTO: 5.02 THOUSAND/UL (ref 4.31–10.16)

## 2020-03-14 PROCEDURE — 82525 ASSAY OF COPPER: CPT

## 2020-03-14 PROCEDURE — 85027 COMPLETE CBC AUTOMATED: CPT

## 2020-03-14 PROCEDURE — 83970 ASSAY OF PARATHORMONE: CPT

## 2020-03-14 PROCEDURE — 83735 ASSAY OF MAGNESIUM: CPT

## 2020-03-14 PROCEDURE — 36415 COLL VENOUS BLD VENIPUNCTURE: CPT

## 2020-03-14 PROCEDURE — 80053 COMPREHEN METABOLIC PANEL: CPT

## 2020-03-14 PROCEDURE — 84630 ASSAY OF ZINC: CPT

## 2020-03-14 PROCEDURE — 82607 VITAMIN B-12: CPT

## 2020-03-14 PROCEDURE — 84425 ASSAY OF VITAMIN B-1: CPT

## 2020-03-14 PROCEDURE — 83550 IRON BINDING TEST: CPT

## 2020-03-14 PROCEDURE — 82306 VITAMIN D 25 HYDROXY: CPT

## 2020-03-14 PROCEDURE — 82728 ASSAY OF FERRITIN: CPT

## 2020-03-14 PROCEDURE — 72114 X-RAY EXAM L-S SPINE BENDING: CPT

## 2020-03-14 PROCEDURE — 84590 ASSAY OF VITAMIN A: CPT

## 2020-03-14 PROCEDURE — 82746 ASSAY OF FOLIC ACID SERUM: CPT

## 2020-03-14 PROCEDURE — 83540 ASSAY OF IRON: CPT

## 2020-03-17 DIAGNOSIS — K91.2 POSTGASTRECTOMY MALABSORPTION: ICD-10-CM

## 2020-03-17 DIAGNOSIS — R79.89 LOW SERUM VITAMIN A: Primary | ICD-10-CM

## 2020-03-17 DIAGNOSIS — Z90.3 POSTGASTRECTOMY MALABSORPTION: ICD-10-CM

## 2020-03-17 DIAGNOSIS — Z98.84 STATUS POST BARIATRIC SURGERY: ICD-10-CM

## 2020-03-17 DIAGNOSIS — E66.9 CLASS 1 OBESITY: ICD-10-CM

## 2020-03-17 LAB
COPPER SERPL-MCNC: 138 UG/DL (ref 72–166)
VIT A SERPL-MCNC: 20 UG/DL (ref 22–69.5)
ZINC SERPL-MCNC: 73 UG/DL (ref 56–134)

## 2020-03-18 LAB — VIT B1 BLD-SCNC: 170.1 NMOL/L (ref 66.5–200)

## 2020-03-25 DIAGNOSIS — E66.9 CLASS 1 OBESITY: ICD-10-CM

## 2020-04-09 ENCOUNTER — TELEMEDICINE (OUTPATIENT)
Dept: BARIATRICS | Facility: CLINIC | Age: 60
End: 2020-04-09
Payer: COMMERCIAL

## 2020-04-09 VITALS — BODY MASS INDEX: 31.76 KG/M2 | WEIGHT: 185 LBS

## 2020-04-09 DIAGNOSIS — Z90.3 POSTGASTRECTOMY MALABSORPTION: ICD-10-CM

## 2020-04-09 DIAGNOSIS — Z98.84 STATUS POST BARIATRIC SURGERY: ICD-10-CM

## 2020-04-09 DIAGNOSIS — K91.2 POSTGASTRECTOMY MALABSORPTION: ICD-10-CM

## 2020-04-09 DIAGNOSIS — F41.1 GENERALIZED ANXIETY DISORDER: ICD-10-CM

## 2020-04-09 DIAGNOSIS — E66.9 CLASS 1 OBESITY: Primary | ICD-10-CM

## 2020-04-09 PROCEDURE — 99213 OFFICE O/P EST LOW 20 MIN: CPT | Performed by: PHYSICIAN ASSISTANT

## 2020-04-09 RX ORDER — BUSPIRONE HYDROCHLORIDE 5 MG/1
5 TABLET ORAL 2 TIMES DAILY
COMMUNITY
Start: 2020-04-01 | End: 2020-05-11 | Stop reason: SDUPTHER

## 2020-05-05 ENCOUNTER — TELEPHONE (OUTPATIENT)
Dept: FAMILY MEDICINE CLINIC | Facility: CLINIC | Age: 60
End: 2020-05-05

## 2020-05-11 ENCOUNTER — OFFICE VISIT (OUTPATIENT)
Dept: FAMILY MEDICINE CLINIC | Facility: CLINIC | Age: 60
End: 2020-05-11
Payer: COMMERCIAL

## 2020-05-11 VITALS
WEIGHT: 193.3 LBS | BODY MASS INDEX: 33 KG/M2 | OXYGEN SATURATION: 97 % | SYSTOLIC BLOOD PRESSURE: 116 MMHG | HEIGHT: 64 IN | DIASTOLIC BLOOD PRESSURE: 72 MMHG | RESPIRATION RATE: 16 BRPM | HEART RATE: 88 BPM | TEMPERATURE: 98.1 F

## 2020-05-11 DIAGNOSIS — E66.9 CLASS 1 OBESITY WITH BODY MASS INDEX (BMI) OF 33.0 TO 33.9 IN ADULT, UNSPECIFIED OBESITY TYPE, UNSPECIFIED WHETHER SERIOUS COMORBIDITY PRESENT: ICD-10-CM

## 2020-05-11 DIAGNOSIS — Z00.00 ANNUAL PHYSICAL EXAM: Primary | ICD-10-CM

## 2020-05-11 DIAGNOSIS — Z13.6 SCREENING FOR CARDIOVASCULAR CONDITION: ICD-10-CM

## 2020-05-11 DIAGNOSIS — F41.1 GENERALIZED ANXIETY DISORDER: ICD-10-CM

## 2020-05-11 DIAGNOSIS — M50.20 CERVICAL DISC HERNIATION: ICD-10-CM

## 2020-05-11 DIAGNOSIS — E55.9 VITAMIN D DEFICIENCY: ICD-10-CM

## 2020-05-11 DIAGNOSIS — G47.09 OTHER INSOMNIA: ICD-10-CM

## 2020-05-11 DIAGNOSIS — Z98.84 STATUS POST BARIATRIC SURGERY: ICD-10-CM

## 2020-05-11 DIAGNOSIS — M16.11 OSTEOARTHRITIS OF RIGHT HIP, UNSPECIFIED OSTEOARTHRITIS TYPE: ICD-10-CM

## 2020-05-11 DIAGNOSIS — K91.2 POSTGASTRECTOMY MALABSORPTION: ICD-10-CM

## 2020-05-11 DIAGNOSIS — R42 VERTIGO: ICD-10-CM

## 2020-05-11 DIAGNOSIS — Z13.1 DIABETES MELLITUS SCREENING: ICD-10-CM

## 2020-05-11 DIAGNOSIS — Z90.3 POSTGASTRECTOMY MALABSORPTION: ICD-10-CM

## 2020-05-11 PROBLEM — N30.01 ACUTE CYSTITIS WITH HEMATURIA: Status: RESOLVED | Noted: 2018-07-27 | Resolved: 2020-05-11

## 2020-05-11 PROCEDURE — 99386 PREV VISIT NEW AGE 40-64: CPT | Performed by: FAMILY MEDICINE

## 2020-05-11 PROCEDURE — 1036F TOBACCO NON-USER: CPT | Performed by: FAMILY MEDICINE

## 2020-05-11 PROCEDURE — 99203 OFFICE O/P NEW LOW 30 MIN: CPT | Performed by: FAMILY MEDICINE

## 2020-05-11 RX ORDER — TRAZODONE HYDROCHLORIDE 50 MG/1
1 TABLET ORAL
COMMUNITY
Start: 2020-04-01 | End: 2020-05-11 | Stop reason: SDUPTHER

## 2020-05-11 RX ORDER — TRAZODONE HYDROCHLORIDE 50 MG/1
50 TABLET ORAL
Qty: 90 TABLET | Refills: 0 | Status: SHIPPED | OUTPATIENT
Start: 2020-05-11 | End: 2020-05-21

## 2020-05-11 RX ORDER — BUSPIRONE HYDROCHLORIDE 5 MG/1
5 TABLET ORAL 2 TIMES DAILY
Qty: 180 TABLET | Refills: 0 | Status: SHIPPED | OUTPATIENT
Start: 2020-05-11 | End: 2020-05-21

## 2020-05-11 RX ORDER — OXYCODONE HYDROCHLORIDE AND ACETAMINOPHEN 5; 325 MG/1; MG/1
1 TABLET ORAL EVERY 8 HOURS PRN
COMMUNITY
Start: 2020-05-04 | End: 2020-05-11

## 2020-05-15 PROBLEM — Z01.419 GYNECOLOGIC EXAM NORMAL: Status: RESOLVED | Noted: 2019-11-08 | Resolved: 2020-05-15

## 2020-05-15 PROBLEM — Z01.419 WELL WOMAN EXAM: Status: RESOLVED | Noted: 2018-10-31 | Resolved: 2020-05-15

## 2020-05-21 ENCOUNTER — ANESTHESIA EVENT (OUTPATIENT)
Dept: RADIOLOGY | Facility: HOSPITAL | Age: 60
End: 2020-05-21

## 2020-05-21 DIAGNOSIS — Z11.59 SCREENING FOR VIRAL DISEASE: ICD-10-CM

## 2020-05-21 PROCEDURE — U0003 INFECTIOUS AGENT DETECTION BY NUCLEIC ACID (DNA OR RNA); SEVERE ACUTE RESPIRATORY SYNDROME CORONAVIRUS 2 (SARS-COV-2) (CORONAVIRUS DISEASE [COVID-19]), AMPLIFIED PROBE TECHNIQUE, MAKING USE OF HIGH THROUGHPUT TECHNOLOGIES AS DESCRIBED BY CMS-2020-01-R: HCPCS

## 2020-05-21 RX ORDER — CALCIUM CARBONATE/VITAMIN D3 600 MG-10
2 TABLET ORAL 2 TIMES DAILY
COMMUNITY

## 2020-05-21 RX ORDER — CYCLOBENZAPRINE HCL 10 MG
10 TABLET ORAL 3 TIMES DAILY PRN
COMMUNITY

## 2020-05-22 ENCOUNTER — TELEPHONE (OUTPATIENT)
Dept: FAMILY MEDICINE CLINIC | Facility: CLINIC | Age: 60
End: 2020-05-22

## 2020-05-22 LAB
INPATIENT: NORMAL
SARS-COV-2 RNA SPEC QL NAA+PROBE: NOT DETECTED

## 2020-05-27 ENCOUNTER — TELEPHONE (OUTPATIENT)
Dept: PAIN MEDICINE | Facility: CLINIC | Age: 60
End: 2020-05-27

## 2020-05-28 ENCOUNTER — HOSPITAL ENCOUNTER (OUTPATIENT)
Dept: RADIOLOGY | Facility: HOSPITAL | Age: 60
Discharge: HOME | End: 2020-05-28
Attending: ANESTHESIOLOGY
Payer: COMMERCIAL

## 2020-05-28 ENCOUNTER — ANESTHESIA (OUTPATIENT)
Dept: RADIOLOGY | Facility: HOSPITAL | Age: 60
End: 2020-05-28

## 2020-05-28 VITALS
DIASTOLIC BLOOD PRESSURE: 71 MMHG | BODY MASS INDEX: 32.95 KG/M2 | SYSTOLIC BLOOD PRESSURE: 135 MMHG | RESPIRATION RATE: 18 BRPM | OXYGEN SATURATION: 99 % | HEIGHT: 64 IN | WEIGHT: 193 LBS | TEMPERATURE: 98 F | HEART RATE: 73 BPM

## 2020-05-28 DIAGNOSIS — Z98.84 STATUS POST BARIATRIC SURGERY: ICD-10-CM

## 2020-05-28 DIAGNOSIS — Z11.59 SCREENING FOR VIRAL DISEASE: Primary | ICD-10-CM

## 2020-05-28 DIAGNOSIS — M54.16 LUMBAR RADICULOPATHY: ICD-10-CM

## 2020-05-28 DIAGNOSIS — E66.9 CLASS 1 OBESITY: ICD-10-CM

## 2020-05-28 LAB — GLUCOSE SERPL-MCNC: 73 MG/DL (ref 65–140)

## 2020-05-28 PROCEDURE — 82948 REAGENT STRIP/BLOOD GLUCOSE: CPT

## 2020-05-28 PROCEDURE — 72158 MRI LUMBAR SPINE W/O & W/DYE: CPT

## 2020-05-28 PROCEDURE — A9585 GADOBUTROL INJECTION: HCPCS | Performed by: FAMILY MEDICINE

## 2020-05-28 RX ORDER — ONDANSETRON 2 MG/ML
4 INJECTION INTRAMUSCULAR; INTRAVENOUS ONCE AS NEEDED
Status: CANCELLED | OUTPATIENT
Start: 2020-05-28

## 2020-05-28 RX ORDER — ALPRAZOLAM 0.25 MG/1
0.25 TABLET ORAL 2 TIMES DAILY PRN
COMMUNITY
End: 2020-06-23

## 2020-05-28 RX ORDER — LIDOCAINE HYDROCHLORIDE 10 MG/ML
INJECTION, SOLUTION EPIDURAL; INFILTRATION; INTRACAUDAL; PERINEURAL AS NEEDED
Status: DISCONTINUED | OUTPATIENT
Start: 2020-05-28 | End: 2020-05-28 | Stop reason: SURG

## 2020-05-28 RX ORDER — SODIUM CHLORIDE, SODIUM LACTATE, POTASSIUM CHLORIDE, CALCIUM CHLORIDE 600; 310; 30; 20 MG/100ML; MG/100ML; MG/100ML; MG/100ML
125 INJECTION, SOLUTION INTRAVENOUS CONTINUOUS
Status: DISCONTINUED | OUTPATIENT
Start: 2020-05-28 | End: 2020-05-29 | Stop reason: HOSPADM

## 2020-05-28 RX ORDER — ONDANSETRON 2 MG/ML
INJECTION INTRAMUSCULAR; INTRAVENOUS AS NEEDED
Status: DISCONTINUED | OUTPATIENT
Start: 2020-05-28 | End: 2020-05-28 | Stop reason: SURG

## 2020-05-28 RX ORDER — LIDOCAINE HYDROCHLORIDE 10 MG/ML
0.5 INJECTION, SOLUTION EPIDURAL; INFILTRATION; INTRACAUDAL; PERINEURAL ONCE AS NEEDED
Status: COMPLETED | OUTPATIENT
Start: 2020-05-28 | End: 2020-05-28

## 2020-05-28 RX ORDER — PROPOFOL 10 MG/ML
INJECTION, EMULSION INTRAVENOUS AS NEEDED
Status: DISCONTINUED | OUTPATIENT
Start: 2020-05-28 | End: 2020-05-28 | Stop reason: SURG

## 2020-05-28 RX ADMIN — GADOBUTROL 8 ML: 604.72 INJECTION INTRAVENOUS at 12:22

## 2020-05-28 RX ADMIN — LIDOCAINE HYDROCHLORIDE 0.5 ML: 10 INJECTION, SOLUTION EPIDURAL; INFILTRATION; INTRACAUDAL; PERINEURAL at 10:39

## 2020-05-28 RX ADMIN — LIDOCAINE HYDROCHLORIDE 50 MG: 10 INJECTION, SOLUTION EPIDURAL; INFILTRATION; INTRACAUDAL; PERINEURAL at 11:26

## 2020-05-28 RX ADMIN — PROPOFOL 20 MG: 10 INJECTION, EMULSION INTRAVENOUS at 11:33

## 2020-05-28 RX ADMIN — PROPOFOL 180 MG: 10 INJECTION, EMULSION INTRAVENOUS at 11:26

## 2020-05-28 RX ADMIN — SODIUM CHLORIDE, SODIUM LACTATE, POTASSIUM CHLORIDE, AND CALCIUM CHLORIDE 125 ML/HR: .6; .31; .03; .02 INJECTION, SOLUTION INTRAVENOUS at 10:39

## 2020-05-28 RX ADMIN — ONDANSETRON 4 MG: 2 INJECTION INTRAMUSCULAR; INTRAVENOUS at 12:09

## 2020-05-29 ENCOUNTER — TELEPHONE (OUTPATIENT)
Dept: PAIN MEDICINE | Facility: CLINIC | Age: 60
End: 2020-05-29

## 2020-06-01 ENCOUNTER — HOSPITAL ENCOUNTER (OUTPATIENT)
Dept: RADIOLOGY | Facility: CLINIC | Age: 60
Discharge: HOME | End: 2020-06-01
Attending: ANESTHESIOLOGY
Payer: COMMERCIAL

## 2020-06-01 VITALS
OXYGEN SATURATION: 96 % | TEMPERATURE: 98.2 F | HEART RATE: 79 BPM | RESPIRATION RATE: 20 BRPM | DIASTOLIC BLOOD PRESSURE: 75 MMHG | SYSTOLIC BLOOD PRESSURE: 114 MMHG

## 2020-06-01 DIAGNOSIS — M16.11 PRIMARY OSTEOARTHRITIS OF RIGHT HIP: ICD-10-CM

## 2020-06-01 PROCEDURE — 77002 NEEDLE LOCALIZATION BY XRAY: CPT | Performed by: ANESTHESIOLOGY

## 2020-06-01 PROCEDURE — 20610 DRAIN/INJ JOINT/BURSA W/O US: CPT | Performed by: ANESTHESIOLOGY

## 2020-06-01 PROCEDURE — 77002 NEEDLE LOCALIZATION BY XRAY: CPT

## 2020-06-01 RX ORDER — METHYLPREDNISOLONE ACETATE 80 MG/ML
80 INJECTION, SUSPENSION INTRA-ARTICULAR; INTRALESIONAL; INTRAMUSCULAR; PARENTERAL; SOFT TISSUE ONCE
Status: COMPLETED | OUTPATIENT
Start: 2020-06-01 | End: 2020-06-01

## 2020-06-01 RX ORDER — LIDOCAINE HYDROCHLORIDE 10 MG/ML
5 INJECTION, SOLUTION EPIDURAL; INFILTRATION; INTRACAUDAL; PERINEURAL ONCE
Status: COMPLETED | OUTPATIENT
Start: 2020-06-01 | End: 2020-06-01

## 2020-06-01 RX ADMIN — IOHEXOL 1 ML: 300 INJECTION, SOLUTION INTRAVENOUS at 13:52

## 2020-06-01 RX ADMIN — LIDOCAINE HYDROCHLORIDE 2 ML: 20 INJECTION, SOLUTION EPIDURAL; INFILTRATION; INTRACAUDAL at 13:54

## 2020-06-01 RX ADMIN — METHYLPREDNISOLONE ACETATE 80 MG: 80 INJECTION, SUSPENSION INTRA-ARTICULAR; INTRALESIONAL; INTRAMUSCULAR; SOFT TISSUE at 13:55

## 2020-06-01 RX ADMIN — LIDOCAINE HYDROCHLORIDE 3 ML: 10 INJECTION, SOLUTION EPIDURAL; INFILTRATION; INTRACAUDAL; PERINEURAL at 13:52

## 2020-06-08 ENCOUNTER — TELEPHONE (OUTPATIENT)
Dept: PAIN MEDICINE | Facility: CLINIC | Age: 60
End: 2020-06-08

## 2020-06-09 ENCOUNTER — OFFICE VISIT (OUTPATIENT)
Dept: BARIATRICS | Facility: CLINIC | Age: 60
End: 2020-06-09
Payer: COMMERCIAL

## 2020-06-09 VITALS
DIASTOLIC BLOOD PRESSURE: 76 MMHG | BODY MASS INDEX: 30.92 KG/M2 | HEART RATE: 76 BPM | TEMPERATURE: 98.5 F | WEIGHT: 185.6 LBS | SYSTOLIC BLOOD PRESSURE: 112 MMHG | HEIGHT: 65 IN

## 2020-06-09 DIAGNOSIS — Z98.84 STATUS POST BARIATRIC SURGERY: ICD-10-CM

## 2020-06-09 DIAGNOSIS — Z90.3 POSTGASTRECTOMY MALABSORPTION: ICD-10-CM

## 2020-06-09 DIAGNOSIS — K91.2 POSTGASTRECTOMY MALABSORPTION: ICD-10-CM

## 2020-06-09 DIAGNOSIS — E66.9 CLASS 1 OBESITY: Primary | ICD-10-CM

## 2020-06-09 PROCEDURE — 1036F TOBACCO NON-USER: CPT | Performed by: PHYSICIAN ASSISTANT

## 2020-06-09 PROCEDURE — 3008F BODY MASS INDEX DOCD: CPT | Performed by: PHYSICIAN ASSISTANT

## 2020-06-09 PROCEDURE — 99214 OFFICE O/P EST MOD 30 MIN: CPT | Performed by: PHYSICIAN ASSISTANT

## 2020-06-20 ENCOUNTER — TRANSCRIBE ORDERS (OUTPATIENT)
Dept: LAB | Facility: CLINIC | Age: 60
End: 2020-06-20

## 2020-06-20 ENCOUNTER — LAB (OUTPATIENT)
Dept: LAB | Facility: CLINIC | Age: 60
End: 2020-06-20
Payer: COMMERCIAL

## 2020-06-20 DIAGNOSIS — R79.89 LOW SERUM VITAMIN A: ICD-10-CM

## 2020-06-20 DIAGNOSIS — Z13.6 SCREENING FOR CARDIOVASCULAR CONDITION: ICD-10-CM

## 2020-06-20 DIAGNOSIS — F41.1 GENERALIZED ANXIETY DISORDER: ICD-10-CM

## 2020-06-20 DIAGNOSIS — E66.9 CLASS 1 OBESITY: ICD-10-CM

## 2020-06-20 DIAGNOSIS — Z90.3 POSTGASTRECTOMY MALABSORPTION: ICD-10-CM

## 2020-06-20 DIAGNOSIS — Z98.84 STATUS POST BARIATRIC SURGERY: ICD-10-CM

## 2020-06-20 DIAGNOSIS — G47.09 OTHER INSOMNIA: ICD-10-CM

## 2020-06-20 DIAGNOSIS — E66.9 CLASS 1 OBESITY WITH BODY MASS INDEX (BMI) OF 33.0 TO 33.9 IN ADULT, UNSPECIFIED OBESITY TYPE, UNSPECIFIED WHETHER SERIOUS COMORBIDITY PRESENT: ICD-10-CM

## 2020-06-20 DIAGNOSIS — K91.2 POSTGASTRECTOMY MALABSORPTION: ICD-10-CM

## 2020-06-20 DIAGNOSIS — Z13.1 DIABETES MELLITUS SCREENING: ICD-10-CM

## 2020-06-20 LAB
ALBUMIN SERPL BCP-MCNC: 3.6 G/DL (ref 3.5–5)
ALP SERPL-CCNC: 106 U/L (ref 46–116)
ALT SERPL W P-5'-P-CCNC: 23 U/L (ref 12–78)
ANION GAP SERPL CALCULATED.3IONS-SCNC: 8 MMOL/L (ref 4–13)
AST SERPL W P-5'-P-CCNC: 18 U/L (ref 5–45)
BILIRUB SERPL-MCNC: 0.33 MG/DL (ref 0.2–1)
BUN SERPL-MCNC: 16 MG/DL (ref 5–25)
CALCIUM SERPL-MCNC: 9.1 MG/DL (ref 8.3–10.1)
CHLORIDE SERPL-SCNC: 105 MMOL/L (ref 100–108)
CHOLEST SERPL-MCNC: 192 MG/DL (ref 50–200)
CO2 SERPL-SCNC: 26 MMOL/L (ref 21–32)
CREAT SERPL-MCNC: 0.91 MG/DL (ref 0.6–1.3)
EST. AVERAGE GLUCOSE BLD GHB EST-MCNC: 103 MG/DL
GFR SERPL CREATININE-BSD FRML MDRD: 68 ML/MIN/1.73SQ M
GLUCOSE P FAST SERPL-MCNC: 85 MG/DL (ref 65–99)
HBA1C MFR BLD: 5.2 %
HDLC SERPL-MCNC: 84 MG/DL
LDLC SERPL CALC-MCNC: 99 MG/DL (ref 0–100)
LDLC SERPL DIRECT ASSAY-MCNC: 104 MG/DL (ref 0–100)
MAGNESIUM SERPL-MCNC: 1.8 MG/DL (ref 1.6–2.6)
NONHDLC SERPL-MCNC: 108 MG/DL
POTASSIUM SERPL-SCNC: 3.6 MMOL/L (ref 3.5–5.3)
PROT SERPL-MCNC: 7.1 G/DL (ref 6.4–8.2)
SODIUM SERPL-SCNC: 139 MMOL/L (ref 136–145)
TRIGL SERPL-MCNC: 47 MG/DL
TSH SERPL DL<=0.05 MIU/L-ACNC: 1.5 UIU/ML (ref 0.36–3.74)

## 2020-06-20 PROCEDURE — 80061 LIPID PANEL: CPT

## 2020-06-20 PROCEDURE — 83721 ASSAY OF BLOOD LIPOPROTEIN: CPT

## 2020-06-20 PROCEDURE — 83036 HEMOGLOBIN GLYCOSYLATED A1C: CPT

## 2020-06-20 PROCEDURE — 36415 COLL VENOUS BLD VENIPUNCTURE: CPT

## 2020-06-20 PROCEDURE — 80053 COMPREHEN METABOLIC PANEL: CPT

## 2020-06-20 PROCEDURE — 84590 ASSAY OF VITAMIN A: CPT

## 2020-06-20 PROCEDURE — 83735 ASSAY OF MAGNESIUM: CPT

## 2020-06-20 PROCEDURE — 84443 ASSAY THYROID STIM HORMONE: CPT

## 2020-06-23 ENCOUNTER — OFFICE VISIT (OUTPATIENT)
Dept: FAMILY MEDICINE CLINIC | Facility: CLINIC | Age: 60
End: 2020-06-23
Payer: COMMERCIAL

## 2020-06-23 VITALS
SYSTOLIC BLOOD PRESSURE: 110 MMHG | TEMPERATURE: 97.8 F | DIASTOLIC BLOOD PRESSURE: 70 MMHG | WEIGHT: 186.8 LBS | HEIGHT: 65 IN | RESPIRATION RATE: 16 BRPM | BODY MASS INDEX: 31.12 KG/M2 | OXYGEN SATURATION: 97 % | HEART RATE: 84 BPM

## 2020-06-23 DIAGNOSIS — Z23 NEED FOR VACCINATION: ICD-10-CM

## 2020-06-23 DIAGNOSIS — Z23 ENCOUNTER FOR IMMUNIZATION: ICD-10-CM

## 2020-06-23 DIAGNOSIS — F41.1 GENERALIZED ANXIETY DISORDER: ICD-10-CM

## 2020-06-23 DIAGNOSIS — Z11.59 NEED FOR HEPATITIS C SCREENING TEST: ICD-10-CM

## 2020-06-23 DIAGNOSIS — E66.9 CLASS 1 OBESITY WITH BODY MASS INDEX (BMI) OF 31.0 TO 31.9 IN ADULT, UNSPECIFIED OBESITY TYPE, UNSPECIFIED WHETHER SERIOUS COMORBIDITY PRESENT: ICD-10-CM

## 2020-06-23 DIAGNOSIS — R42 VERTIGO: ICD-10-CM

## 2020-06-23 DIAGNOSIS — E78.5 HYPERLIPIDEMIA, UNSPECIFIED HYPERLIPIDEMIA TYPE: Primary | ICD-10-CM

## 2020-06-23 DIAGNOSIS — M16.11 OSTEOARTHRITIS OF RIGHT HIP, UNSPECIFIED OSTEOARTHRITIS TYPE: ICD-10-CM

## 2020-06-23 DIAGNOSIS — R43.0 ANOSMIA: ICD-10-CM

## 2020-06-23 DIAGNOSIS — E66.9 CLASS 1 OBESITY: ICD-10-CM

## 2020-06-23 DIAGNOSIS — Z11.4 SCREENING FOR HIV (HUMAN IMMUNODEFICIENCY VIRUS): ICD-10-CM

## 2020-06-23 DIAGNOSIS — G47.09 OTHER INSOMNIA: ICD-10-CM

## 2020-06-23 PROCEDURE — 90471 IMMUNIZATION ADMIN: CPT | Performed by: FAMILY MEDICINE

## 2020-06-23 PROCEDURE — 90750 HZV VACC RECOMBINANT IM: CPT | Performed by: FAMILY MEDICINE

## 2020-06-23 PROCEDURE — 99214 OFFICE O/P EST MOD 30 MIN: CPT | Performed by: FAMILY MEDICINE

## 2020-06-23 PROCEDURE — 1036F TOBACCO NON-USER: CPT | Performed by: FAMILY MEDICINE

## 2020-06-23 PROCEDURE — 3008F BODY MASS INDEX DOCD: CPT | Performed by: FAMILY MEDICINE

## 2020-06-24 LAB — VIT A SERPL-MCNC: 31.4 UG/DL (ref 22–69.5)

## 2020-07-27 ENCOUNTER — OFFICE VISIT (OUTPATIENT)
Dept: PAIN MEDICINE | Facility: CLINIC | Age: 60
End: 2020-07-27
Payer: COMMERCIAL

## 2020-07-27 VITALS
TEMPERATURE: 97.9 F | BODY MASS INDEX: 31.49 KG/M2 | HEART RATE: 69 BPM | SYSTOLIC BLOOD PRESSURE: 116 MMHG | DIASTOLIC BLOOD PRESSURE: 80 MMHG | WEIGHT: 189 LBS | HEIGHT: 65 IN

## 2020-07-27 DIAGNOSIS — M25.551 RIGHT HIP PAIN: ICD-10-CM

## 2020-07-27 DIAGNOSIS — M54.50 CHRONIC RIGHT-SIDED LOW BACK PAIN WITHOUT SCIATICA: ICD-10-CM

## 2020-07-27 DIAGNOSIS — M47.816 LUMBAR SPONDYLOSIS: ICD-10-CM

## 2020-07-27 DIAGNOSIS — G89.29 CHRONIC RIGHT-SIDED LOW BACK PAIN WITHOUT SCIATICA: ICD-10-CM

## 2020-07-27 DIAGNOSIS — M16.11 OSTEOARTHRITIS OF RIGHT HIP, UNSPECIFIED OSTEOARTHRITIS TYPE: ICD-10-CM

## 2020-07-27 DIAGNOSIS — G89.4 CHRONIC PAIN SYNDROME: Primary | ICD-10-CM

## 2020-07-27 PROCEDURE — 1036F TOBACCO NON-USER: CPT | Performed by: NURSE PRACTITIONER

## 2020-07-27 PROCEDURE — 3008F BODY MASS INDEX DOCD: CPT | Performed by: NURSE PRACTITIONER

## 2020-07-27 PROCEDURE — 99213 OFFICE O/P EST LOW 20 MIN: CPT | Performed by: NURSE PRACTITIONER

## 2020-07-27 NOTE — PROGRESS NOTES
Assessment:  1  Chronic pain syndrome    2  Chronic right-sided low back pain without sciatica    3  Right hip pain    4  Osteoarthritis of right hip, unspecified osteoarthritis type    5  Lumbar spondylosis        Plan:  While the patient was in the office today, I did have a thorough conversation with the patient regarding their chronic pain syndrome, symptoms, medication regimen, and treatment plan  I discussed with the patient at this point time since she did not note any sustainable relief for at least 2-3 months, I do not feel would be in her best interest to proceed with any repeat injections and that I agree that most likely at this point she is looking at having to have her hip replaced to see if that will help with her pain symptoms as I feel most of it is coming from the hip joint  The patient was agreeable and verbalized an understanding  I did discuss medication options such as Cymbalta to help prevent weight gain, however, because she has a gastric bypass patient we have to be careful with extended release medications and for now she would like to hold off medication options  I discussed with the patient that at this point time she can followup with our office on an as-needed basis  I did review the patient that if her pain symptoms should change, worsen, and/or if she would experience any new symptoms as she would like to be evaluated for, she should give our office a call  The patient was agreeable and verbalized an understanding  History of Present Illness: The patient is a 63 y o  female last seen on 2/28/2020 who presents for a follow up office visit in regards to chronic right hip pain with chronic pain syndrome secondary to right hip osteoarthritis and lumbar spondylosis    The patient currently reports that she did feel there was 70-80% improvement in her right hip pain for approximately 1-2 weeks after her injection, however, then quickly the pain returned to his normal baseline  She does report right hip and groin pain as well as anterior thigh and lateral thigh pain over the greater trochanter bursa down to her knee  The patient reports that at this point she is aware that most likely she needs to proceed with the right total hip replacement as she has been trying as hard as she can to hold off on the hip replacement  She does report occasionally using over-the-counter naproxen as needed with some moderate relief of her pain, however, she has to be careful how much anti-inflammatory she takes because she is a gastric bypass patient  I have personally reviewed and/or updated the patient's past medical history, past surgical history, family history, social history, current medications, allergies, and vital signs today  Review of Systems:    Review of Systems   Respiratory: Negative for shortness of breath  Cardiovascular: Negative for chest pain  Gastrointestinal: Negative for constipation, diarrhea, nausea and vomiting  Musculoskeletal: Positive for gait problem  Negative for arthralgias, joint swelling and myalgias  Skin: Negative for rash  Neurological: Positive for weakness  Negative for dizziness and seizures  All other systems reviewed and are negative  Past Medical History:   Diagnosis Date    Anxiety disorder     Basal cell carcinoma     Cervical disc herniation     C7     1 para 1     Hyperlipidemia     Hypertension     Neck pain     OA (osteoarthritis) of hip     Obesity     Oral herpes simplex infection     Other insomnia 2020    Postmenopausal     Sciatica     Vertigo     Vitamin D deficiency 2014       Past Surgical History:   Procedure Laterality Date    COLPOSCOPY      Cervical Dysplasia    DENTAL SURGERY  2018    Has dental implant; s/p tooth abscess    GASTRIC BYPASS  2004    GYNECOLOGIC CRYOSURGERY      Cervical Dysplasia    LUMBAR LAMINECTOMY  1980    L5-S1;  No Hardware    CA COLONOSCOPY FLX DX W/COLLJ SPEC WHEN PFRMD N/A 6/1/2016    Procedure: COLONOSCOPY;  Surgeon: Nallely Blank MD;  [facility];   Service: Colorectal    ROTATOR CUFF REPAIR Right 2010    SKIN CANCER EXCISION      s/p BCC Back and R Clavicle    WISDOM TOOTH EXTRACTION         Family History   Problem Relation Age of Onset    Hypertension Mother     Heart failure Mother         s/p ICD    Diabetes type II Mother 54    Hypothyroidism Mother     Heart attack Mother 80    Hyperlipidemia Mother     Depression Mother     Heart block Father         s/p ICD    Hypertension Father     Prostate cancer Father 79        c Mets    Diabetes Family     Arthritis Family     Breast cancer Family     No Known Problems Maternal Grandmother     No Known Problems Maternal Grandfather     No Known Problems Paternal Grandmother     No Known Problems Paternal Grandfather     No Known Problems Maternal Aunt     No Known Problems Paternal Aunt     Breast cancer Paternal Aunt 61    Breast cancer Cousin 61    Leukemia Brother 72        CLL    Hyperlipidemia Brother     OCD Son     Anxiety disorder Son     Hypertension Son     Obesity Son     Diabetes type II Brother 61    Osteoarthritis Brother         s/p MVA       Social History     Occupational History    Not on file   Tobacco Use    Smoking status: Never Smoker    Smokeless tobacco: Never Used   Substance and Sexual Activity    Alcohol use: Never     Frequency: Never    Drug use: Not Currently     Types: Marijuana     Comment: Last marijuana use in college    Sexual activity: Yes     Partners: Male     Birth control/protection: Post-menopausal         Current Outpatient Medications:     Calcium Carbonate-Vitamin D3 (Calcium 600-D) 600-400 MG-UNIT TABS, Take 2 capsules by mouth 2 (two) times a day , Disp: , Rfl:     cyclobenzaprine (FLEXERIL) 10 mg tablet, Take 10 mg by mouth 3 (three) times a day as needed for muscle spasms, Disp: , Rfl:     liraglutide (SAXENDA) injection, Inject 0 5 mL (3 mg total) under the skin daily, Disp: 45 mL, Rfl: 0    Meclizine HCl (ANTIVERT PO), Take 1 tablet by mouth as needed Rx per prior PCP, Disp: , Rfl:     Multiple Vitamins-Minerals (BARIATRIC FUSION PO), Take 2 tablets by mouth 2 (two) times a day , Disp: , Rfl:     Naltrexone-buPROPion HCl ER (Contrave) 8-90 MG TB12, TAKE 2 tabs po bid, Disp: 120 tablet, Rfl: 2    Insulin Pen Needle (NOVOFINE PLUS) 32G X 4 MM MISC, by Does not apply route daily (Patient taking differently: by Does not apply route daily Rx per Weight Management for Saxenda), Disp: 30 each, Rfl: 3    Allergies   Allergen Reactions    Sulfa Antibiotics Swelling     Tongue swelling    Macrodantin [Nitrofurantoin]      myalgias       Physical Exam:    /80 (BP Location: Left arm, Patient Position: Sitting, Cuff Size: Standard)   Pulse 69   Temp 97 9 °F (36 6 °C)   Ht 5' 5" (1 651 m)   Wt 85 7 kg (189 lb)   LMP 05/01/2005 (Within Months)   BMI 31 45 kg/m²     Constitutional:normal, well developed, well nourished, alert, in no distress and non-toxic and no overt pain behavior  and overweight  Eyes:anicteric  HEENT:grossly intact  Neck:supple, symmetric, trachea midline and no masses   Pulmonary:even and unlabored  Cardiovascular:No edema or pitting edema present  Skin:Normal without rashes or lesions and well hydrated  Psychiatric:Mood and affect appropriate  Neurologic:Cranial Nerves II-XII grossly intact  Musculoskeletal:The patient's gait is slightly antalgic occasionally limping, but steady without the use of any assistive devices  Imaging  No orders to display         No orders of the defined types were placed in this encounter

## 2020-08-03 ENCOUNTER — HOSPITAL ENCOUNTER (OUTPATIENT)
Dept: RADIOLOGY | Age: 60
Discharge: HOME | End: 2020-08-03
Payer: COMMERCIAL

## 2020-08-03 VITALS — BODY MASS INDEX: 30.73 KG/M2 | WEIGHT: 180 LBS | HEIGHT: 64 IN

## 2020-08-03 DIAGNOSIS — Z12.31 ENCOUNTER FOR SCREENING MAMMOGRAM FOR MALIGNANT NEOPLASM OF BREAST: ICD-10-CM

## 2020-08-03 PROCEDURE — 77067 SCR MAMMO BI INCL CAD: CPT

## 2020-08-03 PROCEDURE — 77063 BREAST TOMOSYNTHESIS BI: CPT

## 2020-09-01 ENCOUNTER — CLINICAL SUPPORT (OUTPATIENT)
Dept: FAMILY MEDICINE CLINIC | Facility: CLINIC | Age: 60
End: 2020-09-01
Payer: COMMERCIAL

## 2020-09-01 DIAGNOSIS — Z23 NEED FOR VACCINATION: Primary | ICD-10-CM

## 2020-09-01 PROCEDURE — 90471 IMMUNIZATION ADMIN: CPT

## 2020-09-01 PROCEDURE — 90750 HZV VACC RECOMBINANT IM: CPT

## 2020-09-01 NOTE — PROGRESS NOTES
Patient tolerated vaccine(s) without issue  VIS sheet provided  No additional questions or concerns

## 2020-09-09 ENCOUNTER — OFFICE VISIT (OUTPATIENT)
Dept: BARIATRICS | Facility: CLINIC | Age: 60
End: 2020-09-09

## 2020-09-09 VITALS
WEIGHT: 179.5 LBS | RESPIRATION RATE: 16 BRPM | TEMPERATURE: 97.3 F | DIASTOLIC BLOOD PRESSURE: 76 MMHG | HEART RATE: 80 BPM | SYSTOLIC BLOOD PRESSURE: 126 MMHG | HEIGHT: 64 IN | BODY MASS INDEX: 30.64 KG/M2

## 2020-09-09 DIAGNOSIS — Z90.3 POSTGASTRECTOMY MALABSORPTION: ICD-10-CM

## 2020-09-09 DIAGNOSIS — K91.2 POSTGASTRECTOMY MALABSORPTION: ICD-10-CM

## 2020-09-09 DIAGNOSIS — Z98.84 STATUS POST BARIATRIC SURGERY: ICD-10-CM

## 2020-09-09 DIAGNOSIS — E66.9 CLASS 1 OBESITY: Primary | ICD-10-CM

## 2020-09-09 PROCEDURE — 99214 OFFICE O/P EST MOD 30 MIN: CPT | Performed by: PHYSICIAN ASSISTANT

## 2020-09-09 RX ORDER — NALTREXONE HYDROCHLORIDE AND BUPROPION HYDROCHLORIDE 8; 90 MG/1; MG/1
TABLET, EXTENDED RELEASE ORAL
Qty: 120 TABLET | Refills: 2 | Status: SHIPPED | OUTPATIENT
Start: 2020-09-09 | End: 2020-12-08 | Stop reason: ALTCHOICE

## 2020-09-09 RX ORDER — ALPRAZOLAM 0.25 MG/1
0.25 TABLET ORAL 3 TIMES DAILY PRN
COMMUNITY
End: 2021-06-03 | Stop reason: SDUPTHER

## 2020-09-09 NOTE — ASSESSMENT & PLAN NOTE
-At risk for malabsorption of vitamins/minerals secondary to malabsorption and restriction of intake from their procedure  -Currently taking adequate postop bariatric surgery vitamin supplementation-yes  -Last set of bariatric labs completed on 3/14/20

## 2020-09-09 NOTE — PROGRESS NOTES
Assessment/Plan:    Class 1 obesity  -s/p RYGB  -Patient is pursuing the Cons[employer]ative Program  -Initial weight loss goal of 5-10% weight loss for improved health  -Reviewed indications, mechanisms, and potential side effects of weight loss medications  Would be cautious with phentermine as she reports taking Benadryl has resulted in bigeminy     -Patient denies personal and family history of MEN2 tumors and medullary thyroid/thyroid carcinoma  Patient denies personal history of pancreatitis  (started 11/4/19)  Still having cravings   -Denied hx of seizure and glaucoma  Started on Contrave- confirmed she is not taking narcotics  (2/13/20, 197 4 lbs)  She plans to have hip replacement surgery and is aware medication will need to be tapered prior  Taper instruction and dates provided as well as retitration info for when she is off narcotics for 3 weeks following surgery     Initial(Start of MWM 10/15/18): 201 5 lbs  Start of Saxenda: (205 lbs, per 11/8/19, states she was 208 lbs when she started)  Current: 179 5  (-6 1 lbs since 6/9/20)   Change: -25 5 lbs (~12 5 % TBW)  Goal: 170 lbs, feel better in clothes, fit comfortably in size 14    Postgastrectomy malabsorption  -At risk for malabsorption of vitamins/minerals secondary to malabsorption and restriction of intake from their procedure  -Currently taking adequate postop bariatric surgery vitamin supplementation-yes  -Last set of bariatric labs completed on 3/14/20  Follow up in approximately 3 months with Non-Surgical Physician/Advanced Practitioner  25 minute visit, >50% time spent counseling patient on diet behavior and exercise modification for weight loss  Per 2/13/20 visit: Colonoscopy-per chart 6/1/16, 5 year recall recommended  Reviewed with the pt    Goals:  Food log (ie ) www myfitnesspal com,sparkpeople  com,loseit com,calorieking  com,etc  baritastic  No sugary beverages  At least 64oz of water daily    Increase physical activity by 10 minutes daily  Gradually increase physical activity to a goal of 5 days per week for 30 minutes of MODERATE intensity PLUS 2 days per week of FULL BODY resistance training  Practice lesson plans 1-6 in bariatric manual  and Practice  rule  Goal protein intake of 60-80 grams per day  [id number] calories  Alcohol and nicotine use is not recommended following bariatric surgery  NSAIDs (Motrin, Advil, Aleve, Naproxen, ibuprofen, etc) should be avoided following bariatric surgery  Start Contrave taper 20: Take 2 tabs po q am and 1 tab at night for 5 days, one tab twice a day for 5 days, then one tab po qam for 5 days then every other day for 3 doses  Retitrate once off narcotic pain medication for 10-14 days    Diagnoses and all orders for this visit:    Class 1 obesity  -     Naltrexone-buPROPion HCl ER (Contrave) 8-90 MG TB12; TAKE 2 tabs po bid  -     liraglutide (SAXENDA) injection; Inject 0 5 mL (3 mg total) under the skin daily    Postgastrectomy malabsorption    Body mass index 30 0-30 9, adult    Status post bariatric surgery  -     liraglutide (SAXENDA) injection; Inject 0 5 mL (3 mg total) under the skin daily      Subjective:   Chief Complaint   Patient presents with    Follow-up     3 month MWM follow up     Patient ID: Leeanna Rao  is a 63 y o  female with excess weight/obesity here to pursue weight management  Past Medical History:   Diagnosis Date    Anxiety disorder     Basal cell carcinoma     Cervical disc herniation     C7     1 para 1     Hyperlipidemia     Hypertension     Neck pain     OA (osteoarthritis) of hip     Obesity     Oral herpes simplex infection     Other insomnia 2020    Postmenopausal     Sciatica     Vertigo     Vitamin D deficiency 2014     HPI:  The patient presents for MWM    Has some nausea with Contrave, but otherwise tolerating well   No issues with the Rethink logging: no, but aware of portions and choices  Fruit/Vegetable servings: doing well since it is the summer  Exercise: no    The following portions of the patient's history were reviewed and updated as appropriate: allergies, current medications, past family history, past medical history, past social history, past surgical history and problem list     Review of Systems   Constitutional: Negative for fever  Respiratory: Negative for shortness of breath  Objective:    /76 (BP Location: Left arm, Patient Position: Sitting, Cuff Size: Adult)   Pulse 80   Temp (!) 97 3 °F (36 3 °C)   Resp 16   Ht 5' 4" (1 626 m)   Wt 81 4 kg (179 lb 8 oz)   LMP 05/01/2005 (Within Months)   BMI 30 81 kg/m²     Physical Exam  Vitals signs and nursing note reviewed  Constitutional   General appearance: Abnormal   well developed and obese  Pulmonary   Respiratory effort: No increased work of breathing or signs of respiratory distress  Abdomen   Abdomen: Abnormal   The abdomen was obese    Musculoskeletal   Gait and station: Normal     Psychiatric   Orientation to person, place and time: Normal     Affect: appropriate

## 2020-09-09 NOTE — ASSESSMENT & PLAN NOTE
-s/p RYGB  -Patient is pursuing the [facility] Program  -Initial weight loss goal of 5-10% weight loss for improved health  -Reviewed indications, mechanisms, and potential side effects of weight loss medications  Would be cautious with phentermine as she reports taking Benadryl has resulted in bigeminy     -Patient denies personal and family history of MEN2 tumors and medullary thyroid/thyroid carcinoma  Patient denies personal history of pancreatitis  (started 11/4/19)  Still having cravings   -Denied hx of seizure and glaucoma  Started on Contrave- confirmed she is not taking narcotics  (2/13/20, 197 4 lbs)  She plans to have hip replacement surgery and is aware medication will need to be tapered prior   Taper instruction and dates provided as well as retitration info for when she is off narcotics for 3 weeks following surgery     Initial(Start of MWM 10/15/18): 201 5 lbs  Start of Saxenda: (205 lbs, per 11/8/19, states she was 208 lbs when she started)  Current: 179 5  (-6 1 lbs since 6/9/20)   Change: -25 5 lbs (~12 5 % TBW)  Goal: 170 lbs, feel better in clothes, fit comfortably in size 14

## 2020-09-09 NOTE — PATIENT INSTRUCTIONS
Goals: Food log (ie ) www myfitnesspal com,sparkpeople  com,loseit com,calorieking  com,etc  baritastic  No sugary beverages  At least 64oz of water daily  Increase physical activity by 10 minutes daily  Gradually increase physical activity to a goal of 5 days per week for 30 minutes of MODERATE intensity PLUS 2 days per week of FULL BODY resistance training  Practice lesson plans 1-6 in bariatric manual  and Practice 30/60 rule  Goal protein intake of 60-80 grams per day  [id number] calories  Alcohol and nicotine use is not recommended following bariatric surgery  NSAIDs (Motrin, Advil, Aleve, Naproxen, ibuprofen, etc) should be avoided following bariatric surgery  Start Contrave taper 11/11/20: Take 2 tabs po q am and 1 tab at night for 5 days, one tab twice a day for 5 days, then one tab po qam for 5 days then every other day for 3 doses   Retitrate once off narcotic pain medication for 10-14 days

## 2020-11-04 ENCOUNTER — ANNUAL EXAM (OUTPATIENT)
Dept: OBGYN CLINIC | Facility: CLINIC | Age: 60
End: 2020-11-04
Payer: COMMERCIAL

## 2020-11-04 VITALS
DIASTOLIC BLOOD PRESSURE: 84 MMHG | BODY MASS INDEX: 30.39 KG/M2 | SYSTOLIC BLOOD PRESSURE: 132 MMHG | HEIGHT: 64 IN | TEMPERATURE: 96.8 F | WEIGHT: 178 LBS

## 2020-11-04 DIAGNOSIS — Z01.419 GYNECOLOGIC EXAM NORMAL: Primary | ICD-10-CM

## 2020-11-04 PROBLEM — M75.120 FULL THICKNESS ROTATOR CUFF TEAR: Status: ACTIVE | Noted: 2020-11-04

## 2020-11-04 PROBLEM — M71.9 DISORDER OF BURSAE OF SHOULDER REGION: Status: ACTIVE | Noted: 2020-11-04

## 2020-11-04 PROBLEM — M25.519 SHOULDER JOINT PAIN: Status: ACTIVE | Noted: 2020-11-04

## 2020-11-04 PROBLEM — M77.10 LATERAL EPICONDYLITIS: Status: ACTIVE | Noted: 2020-11-04

## 2020-11-04 PROBLEM — M25.529 PAIN IN ELBOW: Status: ACTIVE | Noted: 2020-11-04

## 2020-11-04 PROCEDURE — G0145 SCR C/V CYTO,THINLAYER,RESCR: HCPCS | Performed by: PHYSICIAN ASSISTANT

## 2020-11-04 PROCEDURE — 99396 PREV VISIT EST AGE 40-64: CPT | Performed by: PHYSICIAN ASSISTANT

## 2020-11-04 PROCEDURE — 87624 HPV HI-RISK TYP POOLED RSLT: CPT | Performed by: PHYSICIAN ASSISTANT

## 2020-11-05 LAB
HPV HR 12 DNA CVX QL NAA+PROBE: NEGATIVE
HPV16 DNA CVX QL NAA+PROBE: NEGATIVE
HPV18 DNA CVX QL NAA+PROBE: NEGATIVE

## 2020-11-10 ENCOUNTER — OFFICE VISIT (OUTPATIENT)
Dept: FAMILY MEDICINE CLINIC | Facility: CLINIC | Age: 60
End: 2020-11-10
Payer: COMMERCIAL

## 2020-11-10 VITALS
SYSTOLIC BLOOD PRESSURE: 128 MMHG | HEART RATE: 69 BPM | WEIGHT: 178.4 LBS | HEIGHT: 64 IN | BODY MASS INDEX: 30.46 KG/M2 | OXYGEN SATURATION: 100 % | TEMPERATURE: 97.3 F | DIASTOLIC BLOOD PRESSURE: 82 MMHG

## 2020-11-10 DIAGNOSIS — Z00.00 ANNUAL PHYSICAL EXAM: Primary | ICD-10-CM

## 2020-11-10 LAB
LAB AP GYN PRIMARY INTERPRETATION: NORMAL
LAB AP LMP: NORMAL
Lab: NORMAL

## 2020-11-10 PROCEDURE — 99386 PREV VISIT NEW AGE 40-64: CPT | Performed by: FAMILY MEDICINE

## 2020-11-22 ENCOUNTER — TRANSCRIBE ORDERS (OUTPATIENT)
Dept: ADMINISTRATIVE | Age: 60
End: 2020-11-22

## 2020-11-22 ENCOUNTER — APPOINTMENT (OUTPATIENT)
Dept: LAB | Age: 60
End: 2020-11-22
Payer: COMMERCIAL

## 2020-11-22 DIAGNOSIS — Z01.89 ENCOUNTER FOR OTHER SPECIFIED SPECIAL EXAMINATIONS: Primary | ICD-10-CM

## 2020-11-22 DIAGNOSIS — Z01.89 ENCOUNTER FOR OTHER SPECIFIED SPECIAL EXAMINATIONS: ICD-10-CM

## 2020-11-22 LAB
APTT PPP: 32 SECONDS (ref 23–37)
BASOPHILS # BLD AUTO: 0.03 THOUSANDS/ΜL (ref 0–0.1)
BASOPHILS NFR BLD AUTO: 1 % (ref 0–1)
BILIRUB UR QL STRIP: NEGATIVE
CLARITY UR: CLEAR
COLOR UR: YELLOW
CRP SERPL QL: <3 MG/L
EOSINOPHIL # BLD AUTO: 0.15 THOUSAND/ΜL (ref 0–0.61)
EOSINOPHIL NFR BLD AUTO: 3 % (ref 0–6)
ERYTHROCYTE [DISTWIDTH] IN BLOOD BY AUTOMATED COUNT: 13.2 % (ref 11.6–15.1)
EST. AVERAGE GLUCOSE BLD GHB EST-MCNC: 103 MG/DL
FERRITIN SERPL-MCNC: 8 NG/ML (ref 8–388)
GLUCOSE UR STRIP-MCNC: NEGATIVE MG/DL
HBA1C MFR BLD: 5.2 %
HCT VFR BLD AUTO: 42.8 % (ref 34.8–46.1)
HGB BLD-MCNC: 13.2 G/DL (ref 11.5–15.4)
HGB UR QL STRIP.AUTO: NEGATIVE
IMM GRANULOCYTES # BLD AUTO: 0.01 THOUSAND/UL (ref 0–0.2)
IMM GRANULOCYTES NFR BLD AUTO: 0 % (ref 0–2)
INR PPP: 0.98 (ref 0.84–1.19)
IRON SATN MFR SERPL: 17 %
IRON SERPL-MCNC: 66 UG/DL (ref 50–170)
KETONES UR STRIP-MCNC: NEGATIVE MG/DL
LEUKOCYTE ESTERASE UR QL STRIP: NEGATIVE
LYMPHOCYTES # BLD AUTO: 1.83 THOUSANDS/ΜL (ref 0.6–4.47)
LYMPHOCYTES NFR BLD AUTO: 32 % (ref 14–44)
MCH RBC QN AUTO: 29.1 PG (ref 26.8–34.3)
MCHC RBC AUTO-ENTMCNC: 30.8 G/DL (ref 31.4–37.4)
MCV RBC AUTO: 94 FL (ref 82–98)
MONOCYTES # BLD AUTO: 0.56 THOUSAND/ΜL (ref 0.17–1.22)
MONOCYTES NFR BLD AUTO: 10 % (ref 4–12)
NEUTROPHILS # BLD AUTO: 3.06 THOUSANDS/ΜL (ref 1.85–7.62)
NEUTS SEG NFR BLD AUTO: 54 % (ref 43–75)
NITRITE UR QL STRIP: NEGATIVE
NRBC BLD AUTO-RTO: 0 /100 WBCS
PH UR STRIP.AUTO: 6 [PH]
PLATELET # BLD AUTO: 210 THOUSANDS/UL (ref 149–390)
PMV BLD AUTO: 11.6 FL (ref 8.9–12.7)
PROT UR STRIP-MCNC: NEGATIVE MG/DL
PROTHROMBIN TIME: 13 SECONDS (ref 11.6–14.5)
RBC # BLD AUTO: 4.54 MILLION/UL (ref 3.81–5.12)
SP GR UR STRIP.AUTO: 1.02 (ref 1–1.03)
TIBC SERPL-MCNC: 397 UG/DL (ref 250–450)
UROBILINOGEN UR QL STRIP.AUTO: 1 E.U./DL
WBC # BLD AUTO: 5.64 THOUSAND/UL (ref 4.31–10.16)

## 2020-11-22 PROCEDURE — 81003 URINALYSIS AUTO W/O SCOPE: CPT | Performed by: PHYSICIAN ASSISTANT

## 2020-11-22 PROCEDURE — 83036 HEMOGLOBIN GLYCOSYLATED A1C: CPT

## 2020-11-22 PROCEDURE — 83550 IRON BINDING TEST: CPT

## 2020-11-22 PROCEDURE — 85610 PROTHROMBIN TIME: CPT

## 2020-11-22 PROCEDURE — 85025 COMPLETE CBC W/AUTO DIFF WBC: CPT

## 2020-11-22 PROCEDURE — 36415 COLL VENOUS BLD VENIPUNCTURE: CPT

## 2020-11-22 PROCEDURE — 86140 C-REACTIVE PROTEIN: CPT

## 2020-11-22 PROCEDURE — 82728 ASSAY OF FERRITIN: CPT

## 2020-11-22 PROCEDURE — 85730 THROMBOPLASTIN TIME PARTIAL: CPT

## 2020-11-22 PROCEDURE — 83540 ASSAY OF IRON: CPT

## 2020-11-23 ENCOUNTER — PREP FOR PROCEDURE (OUTPATIENT)
Dept: OBGYN CLINIC | Facility: OTHER | Age: 60
End: 2020-11-23

## 2020-11-23 ENCOUNTER — HOSPITAL ENCOUNTER (OUTPATIENT)
Dept: NON INVASIVE DIAGNOSTICS | Facility: HOSPITAL | Age: 60
Discharge: HOME | End: 2020-11-23
Payer: COMMERCIAL

## 2020-11-23 ENCOUNTER — TRANSCRIBE ORDERS (OUTPATIENT)
Dept: ADMINISTRATIVE | Facility: HOSPITAL | Age: 60
End: 2020-11-23

## 2020-11-23 ENCOUNTER — HOSPITAL ENCOUNTER (OUTPATIENT)
Dept: RADIOLOGY | Facility: HOSPITAL | Age: 60
Discharge: HOME | End: 2020-11-23
Payer: COMMERCIAL

## 2020-11-23 DIAGNOSIS — Z01.810 PRE-OPERATIVE CARDIOVASCULAR EXAMINATION: ICD-10-CM

## 2020-11-23 DIAGNOSIS — Z01.89 RADIOLOGICAL EXAMINATION, NOT ELSEWHERE CLASSIFIED: ICD-10-CM

## 2020-11-23 DIAGNOSIS — M16.11 PRIMARY OSTEOARTHRITIS OF RIGHT HIP: ICD-10-CM

## 2020-11-23 DIAGNOSIS — M16.11 PRIMARY OSTEOARTHRITIS OF RIGHT HIP: Primary | ICD-10-CM

## 2020-11-23 DIAGNOSIS — Z01.818 OTHER SPECIFIED PRE-OPERATIVE EXAMINATION: ICD-10-CM

## 2020-11-23 LAB
ATRIAL RATE: 63 BPM
P AXIS: 18 DEGREES
PR INTERVAL: 136 MS
QRS AXIS: 21 DEGREES
QRSD INTERVAL: 78 MS
QT INTERVAL: 396 MS
QTC INTERVAL: 405 MS
T WAVE AXIS: 35 DEGREES
VENTRICULAR RATE: 63 BPM

## 2020-11-23 PROCEDURE — 93005 ELECTROCARDIOGRAM TRACING: CPT

## 2020-11-23 PROCEDURE — 93010 ELECTROCARDIOGRAM REPORT: CPT | Performed by: INTERNAL MEDICINE

## 2020-11-23 PROCEDURE — 71046 X-RAY EXAM CHEST 2 VIEWS: CPT

## 2020-11-23 PROCEDURE — 73502 X-RAY EXAM HIP UNI 2-3 VIEWS: CPT

## 2020-11-27 RX ORDER — CHLORHEXIDINE GLUCONATE 4 G/100ML
SOLUTION TOPICAL DAILY PRN
COMMUNITY
End: 2020-12-18 | Stop reason: HOSPADM

## 2020-11-27 RX ORDER — NAPROXEN SODIUM 220 MG
220 TABLET ORAL EVERY 12 HOURS PRN
COMMUNITY
End: 2020-12-18 | Stop reason: HOSPADM

## 2020-11-30 ENCOUNTER — LAB (OUTPATIENT)
Dept: LAB | Facility: HOSPITAL | Age: 60
End: 2020-11-30
Attending: ORTHOPAEDIC SURGERY
Payer: COMMERCIAL

## 2020-11-30 DIAGNOSIS — M16.11 PRIMARY OSTEOARTHRITIS OF RIGHT HIP: ICD-10-CM

## 2020-11-30 LAB
ABO GROUP BLD: NORMAL
ALBUMIN SERPL BCP-MCNC: 3.7 G/DL (ref 3.5–5)
ALP SERPL-CCNC: 115 U/L (ref 46–116)
ALT SERPL W P-5'-P-CCNC: 24 U/L (ref 12–78)
ANION GAP SERPL CALCULATED.3IONS-SCNC: 8 MMOL/L (ref 4–13)
AST SERPL W P-5'-P-CCNC: 19 U/L (ref 5–45)
BILIRUB SERPL-MCNC: 0.31 MG/DL (ref 0.2–1)
BLD GP AB SCN SERPL QL: NEGATIVE
BUN SERPL-MCNC: 22 MG/DL (ref 5–25)
CALCIUM SERPL-MCNC: 9.3 MG/DL (ref 8.3–10.1)
CHLORIDE SERPL-SCNC: 106 MMOL/L (ref 100–108)
CO2 SERPL-SCNC: 25 MMOL/L (ref 21–32)
CREAT SERPL-MCNC: 0.87 MG/DL (ref 0.6–1.3)
GFR SERPL CREATININE-BSD FRML MDRD: 72 ML/MIN/1.73SQ M
GLUCOSE P FAST SERPL-MCNC: 82 MG/DL (ref 65–99)
POTASSIUM SERPL-SCNC: 4.2 MMOL/L (ref 3.5–5.3)
PROT SERPL-MCNC: 7.3 G/DL (ref 6.4–8.2)
RH BLD: POSITIVE
SODIUM SERPL-SCNC: 139 MMOL/L (ref 136–145)
SPECIMEN EXPIRATION DATE: NORMAL

## 2020-11-30 PROCEDURE — 86850 RBC ANTIBODY SCREEN: CPT

## 2020-11-30 PROCEDURE — 36415 COLL VENOUS BLD VENIPUNCTURE: CPT

## 2020-11-30 PROCEDURE — 80053 COMPREHEN METABOLIC PANEL: CPT

## 2020-11-30 PROCEDURE — 86901 BLOOD TYPING SEROLOGIC RH(D): CPT

## 2020-11-30 PROCEDURE — 86900 BLOOD TYPING SEROLOGIC ABO: CPT

## 2020-12-02 DIAGNOSIS — Z01.818 PREOP EXAMINATION: Primary | ICD-10-CM

## 2020-12-08 ENCOUNTER — OFFICE VISIT (OUTPATIENT)
Dept: FAMILY MEDICINE CLINIC | Facility: CLINIC | Age: 60
End: 2020-12-08
Payer: COMMERCIAL

## 2020-12-08 VITALS
WEIGHT: 179.6 LBS | DIASTOLIC BLOOD PRESSURE: 78 MMHG | SYSTOLIC BLOOD PRESSURE: 132 MMHG | HEIGHT: 64 IN | TEMPERATURE: 97.2 F | HEART RATE: 66 BPM | OXYGEN SATURATION: 100 % | BODY MASS INDEX: 30.66 KG/M2

## 2020-12-08 DIAGNOSIS — Z01.818 PRE-OP EXAMINATION: Primary | ICD-10-CM

## 2020-12-08 DIAGNOSIS — M16.11 OSTEOARTHRITIS OF RIGHT HIP, UNSPECIFIED OSTEOARTHRITIS TYPE: ICD-10-CM

## 2020-12-08 PROCEDURE — 99214 OFFICE O/P EST MOD 30 MIN: CPT | Performed by: FAMILY MEDICINE

## 2020-12-09 DIAGNOSIS — Z01.818 PREOP EXAMINATION: ICD-10-CM

## 2020-12-09 PROCEDURE — U0003 INFECTIOUS AGENT DETECTION BY NUCLEIC ACID (DNA OR RNA); SEVERE ACUTE RESPIRATORY SYNDROME CORONAVIRUS 2 (SARS-COV-2) (CORONAVIRUS DISEASE [COVID-19]), AMPLIFIED PROBE TECHNIQUE, MAKING USE OF HIGH THROUGHPUT TECHNOLOGIES AS DESCRIBED BY CMS-2020-01-R: HCPCS | Performed by: PHYSICIAN ASSISTANT

## 2020-12-10 ENCOUNTER — OFFICE VISIT (OUTPATIENT)
Dept: BARIATRICS | Facility: CLINIC | Age: 60
End: 2020-12-10
Payer: COMMERCIAL

## 2020-12-10 VITALS
TEMPERATURE: 97.2 F | DIASTOLIC BLOOD PRESSURE: 76 MMHG | HEIGHT: 65 IN | WEIGHT: 178.7 LBS | HEART RATE: 65 BPM | SYSTOLIC BLOOD PRESSURE: 114 MMHG | BODY MASS INDEX: 29.77 KG/M2 | RESPIRATION RATE: 16 BRPM

## 2020-12-10 DIAGNOSIS — R79.89 LOW SERUM VITAMIN A: ICD-10-CM

## 2020-12-10 DIAGNOSIS — Z79.899 MEDICATION MANAGEMENT: ICD-10-CM

## 2020-12-10 DIAGNOSIS — E66.3 OVERWEIGHT: Primary | ICD-10-CM

## 2020-12-10 DIAGNOSIS — Z90.3 POSTGASTRECTOMY MALABSORPTION: ICD-10-CM

## 2020-12-10 DIAGNOSIS — Z98.84 STATUS POST BARIATRIC SURGERY: ICD-10-CM

## 2020-12-10 DIAGNOSIS — K91.2 POSTGASTRECTOMY MALABSORPTION: ICD-10-CM

## 2020-12-10 LAB — SARS-COV-2 RNA SPEC QL NAA+PROBE: NOT DETECTED

## 2020-12-10 PROCEDURE — 99214 OFFICE O/P EST MOD 30 MIN: CPT | Performed by: PHYSICIAN ASSISTANT

## 2020-12-15 ENCOUNTER — ANESTHESIA EVENT (OUTPATIENT)
Dept: PERIOP | Facility: HOSPITAL | Age: 60
End: 2020-12-15
Payer: COMMERCIAL

## 2020-12-16 ENCOUNTER — APPOINTMENT (OUTPATIENT)
Dept: RADIOLOGY | Facility: HOSPITAL | Age: 60
End: 2020-12-16
Payer: COMMERCIAL

## 2020-12-16 ENCOUNTER — ANESTHESIA (OUTPATIENT)
Dept: PERIOP | Facility: HOSPITAL | Age: 60
End: 2020-12-16
Payer: COMMERCIAL

## 2020-12-16 ENCOUNTER — HOSPITAL ENCOUNTER (OUTPATIENT)
Dept: RADIOLOGY | Facility: HOSPITAL | Age: 60
Setting detail: OUTPATIENT SURGERY
Discharge: HOME | End: 2020-12-16
Payer: COMMERCIAL

## 2020-12-16 ENCOUNTER — HOSPITAL ENCOUNTER (OUTPATIENT)
Facility: HOSPITAL | Age: 60
Setting detail: OUTPATIENT SURGERY
Discharge: HOME HEALTH | End: 2020-12-18
Attending: ORTHOPAEDIC SURGERY | Admitting: ORTHOPAEDIC SURGERY
Payer: COMMERCIAL

## 2020-12-16 VITALS — HEART RATE: 63 BPM

## 2020-12-16 DIAGNOSIS — M16.11 PRIMARY OSTEOARTHRITIS OF RIGHT HIP: ICD-10-CM

## 2020-12-16 DIAGNOSIS — M16.11 PRIMARY OSTEOARTHRITIS OF ONE HIP, RIGHT: ICD-10-CM

## 2020-12-16 DIAGNOSIS — M16.11 OSTEOARTHRITIS OF RIGHT HIP, UNSPECIFIED OSTEOARTHRITIS TYPE: Primary | ICD-10-CM

## 2020-12-16 LAB
ABO GROUP BLD: NORMAL
RH BLD: POSITIVE

## 2020-12-16 PROCEDURE — 73502 X-RAY EXAM HIP UNI 2-3 VIEWS: CPT

## 2020-12-16 PROCEDURE — C1776 JOINT DEVICE (IMPLANTABLE): HCPCS | Performed by: ORTHOPAEDIC SURGERY

## 2020-12-16 PROCEDURE — 73501 X-RAY EXAM HIP UNI 1 VIEW: CPT

## 2020-12-16 DEVICE — PINNACLE HIP SOLUTIONS ALTRX POLYETHYLENE ACETABULAR LINER NEUTRAL 32MM ID 48MM OD
Type: IMPLANTABLE DEVICE | Site: HIP | Status: FUNCTIONAL
Brand: PINNACLE ALTRX

## 2020-12-16 DEVICE — PINNACLE POROCOAT ACETABULAR SHELL SECTOR II 48MM OD
Type: IMPLANTABLE DEVICE | Site: HIP | Status: FUNCTIONAL
Brand: PINNACLE POROCOAT

## 2020-12-16 DEVICE — BIOLOX DELTA CERAMIC FEMORAL HEAD 32MM DIA +1 12/14 TAPER
Type: IMPLANTABLE DEVICE | Site: HIP | Status: FUNCTIONAL
Brand: BIOLOX DELTA

## 2020-12-16 DEVICE — CORAIL HIP SYSTEM CEMENTLESS FEMORAL STEM 12/14 AMT 135 DEGREES KA SIZE 11 HA COATED STANDARD COLLAR
Type: IMPLANTABLE DEVICE | Site: HIP | Status: FUNCTIONAL
Brand: CORAIL

## 2020-12-16 RX ORDER — DOCUSATE SODIUM 100 MG/1
100 CAPSULE, LIQUID FILLED ORAL 2 TIMES DAILY
Status: DISCONTINUED | OUTPATIENT
Start: 2020-12-16 | End: 2020-12-18 | Stop reason: HOSPADM

## 2020-12-16 RX ORDER — CEFAZOLIN SODIUM 1 G/50ML
1000 SOLUTION INTRAVENOUS ONCE
Status: COMPLETED | OUTPATIENT
Start: 2020-12-16 | End: 2020-12-16

## 2020-12-16 RX ORDER — ONDANSETRON 2 MG/ML
INJECTION INTRAMUSCULAR; INTRAVENOUS AS NEEDED
Status: DISCONTINUED | OUTPATIENT
Start: 2020-12-16 | End: 2020-12-16

## 2020-12-16 RX ORDER — HYDROMORPHONE HCL/PF 1 MG/ML
0.5 SYRINGE (ML) INJECTION EVERY 2 HOUR PRN
Status: DISCONTINUED | OUTPATIENT
Start: 2020-12-16 | End: 2020-12-18 | Stop reason: HOSPADM

## 2020-12-16 RX ORDER — SODIUM CHLORIDE 9 MG/ML
125 INJECTION, SOLUTION INTRAVENOUS CONTINUOUS
Status: DISCONTINUED | OUTPATIENT
Start: 2020-12-16 | End: 2020-12-16 | Stop reason: HOSPADM

## 2020-12-16 RX ORDER — ACETAMINOPHEN 325 MG/1
650 TABLET ORAL EVERY 6 HOURS PRN
Status: DISCONTINUED | OUTPATIENT
Start: 2020-12-16 | End: 2020-12-18 | Stop reason: HOSPADM

## 2020-12-16 RX ORDER — OXYCODONE HYDROCHLORIDE 5 MG/1
5 TABLET ORAL EVERY 4 HOURS PRN
Status: DISCONTINUED | OUTPATIENT
Start: 2020-12-16 | End: 2020-12-18 | Stop reason: HOSPADM

## 2020-12-16 RX ORDER — FENTANYL CITRATE 50 UG/ML
25 INJECTION, SOLUTION INTRAMUSCULAR; INTRAVENOUS
Status: COMPLETED | OUTPATIENT
Start: 2020-12-16 | End: 2020-12-16

## 2020-12-16 RX ORDER — ONDANSETRON 2 MG/ML
4 INJECTION INTRAMUSCULAR; INTRAVENOUS EVERY 6 HOURS PRN
Status: DISCONTINUED | OUTPATIENT
Start: 2020-12-16 | End: 2020-12-18 | Stop reason: HOSPADM

## 2020-12-16 RX ORDER — FERROUS SULFATE 325(65) MG
325 TABLET ORAL
Status: DISCONTINUED | OUTPATIENT
Start: 2020-12-17 | End: 2020-12-18 | Stop reason: HOSPADM

## 2020-12-16 RX ORDER — SODIUM CHLORIDE 9 MG/ML
INJECTION, SOLUTION INTRAVENOUS AS NEEDED
Status: DISCONTINUED | OUTPATIENT
Start: 2020-12-16 | End: 2020-12-16 | Stop reason: HOSPADM

## 2020-12-16 RX ORDER — MAGNESIUM HYDROXIDE 1200 MG/15ML
LIQUID ORAL AS NEEDED
Status: DISCONTINUED | OUTPATIENT
Start: 2020-12-16 | End: 2020-12-16 | Stop reason: HOSPADM

## 2020-12-16 RX ORDER — BUPIVACAINE HYDROCHLORIDE 7.5 MG/ML
INJECTION, SOLUTION INTRASPINAL AS NEEDED
Status: DISCONTINUED | OUTPATIENT
Start: 2020-12-16 | End: 2020-12-16

## 2020-12-16 RX ORDER — ASPIRIN 325 MG
325 TABLET ORAL 2 TIMES DAILY
Status: DISCONTINUED | OUTPATIENT
Start: 2020-12-16 | End: 2020-12-16

## 2020-12-16 RX ORDER — FENTANYL CITRATE 50 UG/ML
INJECTION, SOLUTION INTRAMUSCULAR; INTRAVENOUS AS NEEDED
Status: DISCONTINUED | OUTPATIENT
Start: 2020-12-16 | End: 2020-12-16

## 2020-12-16 RX ORDER — CEFAZOLIN SODIUM 1 G/50ML
1000 SOLUTION INTRAVENOUS EVERY 8 HOURS
Status: COMPLETED | OUTPATIENT
Start: 2020-12-16 | End: 2020-12-17

## 2020-12-16 RX ORDER — DEXAMETHASONE SODIUM PHOSPHATE 4 MG/ML
INJECTION, SOLUTION INTRA-ARTICULAR; INTRALESIONAL; INTRAMUSCULAR; INTRAVENOUS; SOFT TISSUE AS NEEDED
Status: DISCONTINUED | OUTPATIENT
Start: 2020-12-16 | End: 2020-12-16

## 2020-12-16 RX ORDER — OXYCODONE HYDROCHLORIDE 10 MG/1
10 TABLET ORAL EVERY 6 HOURS PRN
Status: DISCONTINUED | OUTPATIENT
Start: 2020-12-16 | End: 2020-12-18 | Stop reason: HOSPADM

## 2020-12-16 RX ORDER — MIDAZOLAM HYDROCHLORIDE 2 MG/2ML
INJECTION, SOLUTION INTRAMUSCULAR; INTRAVENOUS AS NEEDED
Status: DISCONTINUED | OUTPATIENT
Start: 2020-12-16 | End: 2020-12-16

## 2020-12-16 RX ORDER — TRANEXAMIC ACID 100 MG/ML
INJECTION, SOLUTION INTRAVENOUS AS NEEDED
Status: DISCONTINUED | OUTPATIENT
Start: 2020-12-16 | End: 2020-12-16

## 2020-12-16 RX ORDER — SODIUM CHLORIDE, SODIUM LACTATE, POTASSIUM CHLORIDE, CALCIUM CHLORIDE 600; 310; 30; 20 MG/100ML; MG/100ML; MG/100ML; MG/100ML
100 INJECTION, SOLUTION INTRAVENOUS CONTINUOUS
Status: DISCONTINUED | OUTPATIENT
Start: 2020-12-16 | End: 2020-12-18 | Stop reason: HOSPADM

## 2020-12-16 RX ORDER — PROPOFOL 10 MG/ML
INJECTION, EMULSION INTRAVENOUS CONTINUOUS PRN
Status: DISCONTINUED | OUTPATIENT
Start: 2020-12-16 | End: 2020-12-16

## 2020-12-16 RX ORDER — SENNOSIDES 8.6 MG
1 TABLET ORAL DAILY
Status: DISCONTINUED | OUTPATIENT
Start: 2020-12-17 | End: 2020-12-18 | Stop reason: HOSPADM

## 2020-12-16 RX ORDER — HYDROMORPHONE HCL/PF 1 MG/ML
0.4 SYRINGE (ML) INJECTION
Status: DISCONTINUED | OUTPATIENT
Start: 2020-12-16 | End: 2020-12-16 | Stop reason: HOSPADM

## 2020-12-16 RX ORDER — ONDANSETRON 2 MG/ML
4 INJECTION INTRAMUSCULAR; INTRAVENOUS EVERY 6 HOURS PRN
Status: DISCONTINUED | OUTPATIENT
Start: 2020-12-16 | End: 2020-12-16 | Stop reason: HOSPADM

## 2020-12-16 RX ADMIN — FENTANYL CITRATE 25 MCG: 50 INJECTION INTRAMUSCULAR; INTRAVENOUS at 14:01

## 2020-12-16 RX ADMIN — SODIUM CHLORIDE 125 ML/HR: 0.9 INJECTION, SOLUTION INTRAVENOUS at 09:16

## 2020-12-16 RX ADMIN — CEFAZOLIN SODIUM 1000 MG: 1 SOLUTION INTRAVENOUS at 20:52

## 2020-12-16 RX ADMIN — MIDAZOLAM 1 MG: 1 INJECTION INTRAMUSCULAR; INTRAVENOUS at 11:08

## 2020-12-16 RX ADMIN — FENTANYL CITRATE 50 MCG: 50 INJECTION, SOLUTION INTRAMUSCULAR; INTRAVENOUS at 11:08

## 2020-12-16 RX ADMIN — BUPIVACAINE HYDROCHLORIDE IN DEXTROSE 1.6 ML: 7.5 INJECTION, SOLUTION SUBARACHNOID at 11:27

## 2020-12-16 RX ADMIN — PROPOFOL 100 MCG/KG/MIN: 10 INJECTION, EMULSION INTRAVENOUS at 11:32

## 2020-12-16 RX ADMIN — SODIUM CHLORIDE, SODIUM LACTATE, POTASSIUM CHLORIDE, AND CALCIUM CHLORIDE 100 ML/HR: .6; .31; .03; .02 INJECTION, SOLUTION INTRAVENOUS at 15:18

## 2020-12-16 RX ADMIN — FENTANYL CITRATE 50 MCG: 50 INJECTION, SOLUTION INTRAMUSCULAR; INTRAVENOUS at 11:17

## 2020-12-16 RX ADMIN — CEFAZOLIN SODIUM 1000 MG: 1 SOLUTION INTRAVENOUS at 11:29

## 2020-12-16 RX ADMIN — HYDROMORPHONE HYDROCHLORIDE 0.5 MG: 1 INJECTION, SOLUTION INTRAMUSCULAR; INTRAVENOUS; SUBCUTANEOUS at 18:33

## 2020-12-16 RX ADMIN — FENTANYL CITRATE 25 MCG: 50 INJECTION INTRAMUSCULAR; INTRAVENOUS at 13:41

## 2020-12-16 RX ADMIN — SODIUM CHLORIDE: 0.9 INJECTION, SOLUTION INTRAVENOUS at 11:55

## 2020-12-16 RX ADMIN — DOCUSATE SODIUM 100 MG: 100 CAPSULE, LIQUID FILLED ORAL at 20:52

## 2020-12-16 RX ADMIN — MIDAZOLAM 2 MG: 1 INJECTION INTRAMUSCULAR; INTRAVENOUS at 11:25

## 2020-12-16 RX ADMIN — ONDANSETRON 4 MG: 2 INJECTION INTRAMUSCULAR; INTRAVENOUS at 12:06

## 2020-12-16 RX ADMIN — OXYCODONE HYDROCHLORIDE 10 MG: 10 TABLET ORAL at 15:52

## 2020-12-16 RX ADMIN — MIDAZOLAM 1 MG: 1 INJECTION INTRAMUSCULAR; INTRAVENOUS at 11:17

## 2020-12-16 RX ADMIN — FENTANYL CITRATE 25 MCG: 50 INJECTION INTRAMUSCULAR; INTRAVENOUS at 14:16

## 2020-12-16 RX ADMIN — OXYCODONE HYDROCHLORIDE 10 MG: 10 TABLET ORAL at 21:24

## 2020-12-16 RX ADMIN — DEXAMETHASONE SODIUM PHOSPHATE 4 MG: 4 INJECTION INTRA-ARTICULAR; INTRALESIONAL; INTRAMUSCULAR; INTRAVENOUS; SOFT TISSUE at 12:06

## 2020-12-16 RX ADMIN — FENTANYL CITRATE 25 MCG: 50 INJECTION INTRAMUSCULAR; INTRAVENOUS at 13:51

## 2020-12-16 RX ADMIN — HYDROMORPHONE HYDROCHLORIDE 0.4 MG: 1 INJECTION, SOLUTION INTRAMUSCULAR; INTRAVENOUS; SUBCUTANEOUS at 14:44

## 2020-12-16 RX ADMIN — TRANEXAMIC ACID 1000 MG: 1 INJECTION, SOLUTION INTRAVENOUS at 12:06

## 2020-12-17 LAB
ANION GAP SERPL CALCULATED.3IONS-SCNC: 10 MMOL/L (ref 4–13)
BUN SERPL-MCNC: 12 MG/DL (ref 5–25)
CALCIUM SERPL-MCNC: 9 MG/DL (ref 8.3–10.1)
CHLORIDE SERPL-SCNC: 104 MMOL/L (ref 100–108)
CO2 SERPL-SCNC: 23 MMOL/L (ref 21–32)
CREAT SERPL-MCNC: 0.58 MG/DL (ref 0.6–1.3)
ERYTHROCYTE [DISTWIDTH] IN BLOOD BY AUTOMATED COUNT: 12.7 % (ref 11.6–15.1)
GFR SERPL CREATININE-BSD FRML MDRD: 100 ML/MIN/1.73SQ M
GLUCOSE SERPL-MCNC: 94 MG/DL (ref 65–140)
HCT VFR BLD AUTO: 35.7 % (ref 34.8–46.1)
HGB BLD-MCNC: 11.1 G/DL (ref 11.5–15.4)
MCH RBC QN AUTO: 29.1 PG (ref 26.8–34.3)
MCHC RBC AUTO-ENTMCNC: 31.1 G/DL (ref 31.4–37.4)
MCV RBC AUTO: 94 FL (ref 82–98)
PLATELET # BLD AUTO: 177 THOUSANDS/UL (ref 149–390)
PMV BLD AUTO: 12.2 FL (ref 8.9–12.7)
POTASSIUM SERPL-SCNC: 4 MMOL/L (ref 3.5–5.3)
RBC # BLD AUTO: 3.82 MILLION/UL (ref 3.81–5.12)
SODIUM SERPL-SCNC: 137 MMOL/L (ref 136–145)
WBC # BLD AUTO: 9.99 THOUSAND/UL (ref 4.31–10.16)

## 2020-12-17 PROCEDURE — 97530 THERAPEUTIC ACTIVITIES: CPT

## 2020-12-17 PROCEDURE — 97163 PT EVAL HIGH COMPLEX 45 MIN: CPT

## 2020-12-17 PROCEDURE — 80048 BASIC METABOLIC PNL TOTAL CA: CPT | Performed by: PHYSICIAN ASSISTANT

## 2020-12-17 PROCEDURE — 97166 OT EVAL MOD COMPLEX 45 MIN: CPT

## 2020-12-17 PROCEDURE — 97535 SELF CARE MNGMENT TRAINING: CPT

## 2020-12-17 PROCEDURE — 85027 COMPLETE CBC AUTOMATED: CPT | Performed by: ORTHOPAEDIC SURGERY

## 2020-12-17 PROCEDURE — 97110 THERAPEUTIC EXERCISES: CPT

## 2020-12-17 RX ORDER — METHOCARBAMOL 750 MG/1
750 TABLET, FILM COATED ORAL EVERY 6 HOURS PRN
Status: DISCONTINUED | OUTPATIENT
Start: 2020-12-17 | End: 2020-12-18 | Stop reason: HOSPADM

## 2020-12-17 RX ORDER — DOCUSATE SODIUM 100 MG/1
100 CAPSULE, LIQUID FILLED ORAL 2 TIMES DAILY
Qty: 10 CAPSULE | Refills: 0
Start: 2020-12-17 | End: 2021-05-19 | Stop reason: ALTCHOICE

## 2020-12-17 RX ORDER — FERROUS SULFATE 325(65) MG
325 TABLET ORAL
Refills: 0
Start: 2020-12-17 | End: 2021-06-03 | Stop reason: ALTCHOICE

## 2020-12-17 RX ORDER — OXYCODONE HYDROCHLORIDE 5 MG/1
5 TABLET ORAL EVERY 4 HOURS PRN
Qty: 30 TABLET | Refills: 0
Start: 2020-12-17 | End: 2021-03-17 | Stop reason: SDUPTHER

## 2020-12-17 RX ORDER — SENNOSIDES 8.6 MG
8.6 TABLET ORAL DAILY
Refills: 0
Start: 2020-12-17 | End: 2021-05-19 | Stop reason: ALTCHOICE

## 2020-12-17 RX ORDER — ACETAMINOPHEN 325 MG/1
650 TABLET ORAL EVERY 6 HOURS PRN
Refills: 0
Start: 2020-12-17 | End: 2021-11-09

## 2020-12-17 RX ORDER — METHOCARBAMOL 750 MG/1
750 TABLET, FILM COATED ORAL ONCE
Status: COMPLETED | OUTPATIENT
Start: 2020-12-17 | End: 2020-12-17

## 2020-12-17 RX ADMIN — DOCUSATE SODIUM 100 MG: 100 CAPSULE, LIQUID FILLED ORAL at 08:11

## 2020-12-17 RX ADMIN — OXYCODONE HYDROCHLORIDE 10 MG: 10 TABLET ORAL at 18:23

## 2020-12-17 RX ADMIN — OXYCODONE HYDROCHLORIDE 10 MG: 10 TABLET ORAL at 14:46

## 2020-12-17 RX ADMIN — DOCUSATE SODIUM 100 MG: 100 CAPSULE, LIQUID FILLED ORAL at 18:23

## 2020-12-17 RX ADMIN — CEFAZOLIN SODIUM 1000 MG: 1 SOLUTION INTRAVENOUS at 03:45

## 2020-12-17 RX ADMIN — METHOCARBAMOL 750 MG: 750 TABLET, FILM COATED ORAL at 10:28

## 2020-12-17 RX ADMIN — APIXABAN 2.5 MG: 2.5 TABLET, FILM COATED ORAL at 18:23

## 2020-12-17 RX ADMIN — SENNOSIDES 8.6 MG: 8.6 TABLET ORAL at 08:11

## 2020-12-17 RX ADMIN — HYDROMORPHONE HYDROCHLORIDE 0.5 MG: 1 INJECTION, SOLUTION INTRAMUSCULAR; INTRAVENOUS; SUBCUTANEOUS at 01:35

## 2020-12-17 RX ADMIN — METHOCARBAMOL 750 MG: 750 TABLET ORAL at 06:16

## 2020-12-17 RX ADMIN — FERROUS SULFATE TAB 325 MG (65 MG ELEMENTAL FE) 325 MG: 325 (65 FE) TAB at 08:11

## 2020-12-17 RX ADMIN — METHOCARBAMOL 750 MG: 750 TABLET, FILM COATED ORAL at 23:15

## 2020-12-17 RX ADMIN — OXYCODONE HYDROCHLORIDE 10 MG: 10 TABLET ORAL at 10:28

## 2020-12-17 RX ADMIN — OXYCODONE HYDROCHLORIDE 10 MG: 10 TABLET ORAL at 06:16

## 2020-12-17 RX ADMIN — SODIUM CHLORIDE, SODIUM LACTATE, POTASSIUM CHLORIDE, AND CALCIUM CHLORIDE 100 ML/HR: .6; .31; .03; .02 INJECTION, SOLUTION INTRAVENOUS at 06:12

## 2020-12-17 RX ADMIN — METHOCARBAMOL 750 MG: 750 TABLET, FILM COATED ORAL at 16:34

## 2020-12-17 RX ADMIN — DICLOFENAC SODIUM 2 G: 10 GEL TOPICAL at 03:23

## 2020-12-17 RX ADMIN — ACETAMINOPHEN 650 MG: 325 TABLET ORAL at 16:34

## 2020-12-18 VITALS
HEART RATE: 82 BPM | RESPIRATION RATE: 18 BRPM | SYSTOLIC BLOOD PRESSURE: 130 MMHG | BODY MASS INDEX: 30.83 KG/M2 | HEIGHT: 64 IN | TEMPERATURE: 98 F | DIASTOLIC BLOOD PRESSURE: 60 MMHG | WEIGHT: 180.56 LBS | OXYGEN SATURATION: 96 %

## 2020-12-18 LAB
ANION GAP SERPL CALCULATED.3IONS-SCNC: 1 MMOL/L (ref 4–13)
BUN SERPL-MCNC: 9 MG/DL (ref 5–25)
CALCIUM SERPL-MCNC: 8.9 MG/DL (ref 8.3–10.1)
CHLORIDE SERPL-SCNC: 105 MMOL/L (ref 100–108)
CO2 SERPL-SCNC: 32 MMOL/L (ref 21–32)
CREAT SERPL-MCNC: 0.71 MG/DL (ref 0.6–1.3)
GFR SERPL CREATININE-BSD FRML MDRD: 92 ML/MIN/1.73SQ M
GLUCOSE SERPL-MCNC: 110 MG/DL (ref 65–140)
POTASSIUM SERPL-SCNC: 3.7 MMOL/L (ref 3.5–5.3)
SODIUM SERPL-SCNC: 138 MMOL/L (ref 136–145)

## 2020-12-18 PROCEDURE — 97530 THERAPEUTIC ACTIVITIES: CPT

## 2020-12-18 PROCEDURE — 97110 THERAPEUTIC EXERCISES: CPT

## 2020-12-18 PROCEDURE — 80048 BASIC METABOLIC PNL TOTAL CA: CPT | Performed by: PHYSICIAN ASSISTANT

## 2020-12-18 RX ADMIN — APIXABAN 2.5 MG: 2.5 TABLET, FILM COATED ORAL at 08:18

## 2020-12-18 RX ADMIN — SENNOSIDES 8.6 MG: 8.6 TABLET ORAL at 08:18

## 2020-12-18 RX ADMIN — OXYCODONE HYDROCHLORIDE 5 MG: 5 TABLET ORAL at 12:26

## 2020-12-18 RX ADMIN — OXYCODONE HYDROCHLORIDE 10 MG: 10 TABLET ORAL at 00:18

## 2020-12-18 RX ADMIN — FERROUS SULFATE TAB 325 MG (65 MG ELEMENTAL FE) 325 MG: 325 (65 FE) TAB at 07:49

## 2020-12-18 RX ADMIN — OXYCODONE HYDROCHLORIDE 10 MG: 10 TABLET ORAL at 08:19

## 2020-12-18 RX ADMIN — METHOCARBAMOL 750 MG: 750 TABLET, FILM COATED ORAL at 11:26

## 2020-12-18 RX ADMIN — DOCUSATE SODIUM 100 MG: 100 CAPSULE, LIQUID FILLED ORAL at 08:18

## 2021-01-11 ENCOUNTER — APPOINTMENT (EMERGENCY)
Dept: RADIOLOGY | Facility: HOSPITAL | Age: 61
End: 2021-01-11
Payer: COMMERCIAL

## 2021-01-11 ENCOUNTER — OFFICE VISIT (OUTPATIENT)
Dept: URGENT CARE | Age: 61
End: 2021-01-11
Payer: COMMERCIAL

## 2021-01-11 ENCOUNTER — APPOINTMENT (EMERGENCY)
Dept: VASCULAR ULTRASOUND | Facility: HOSPITAL | Age: 61
End: 2021-01-11
Payer: COMMERCIAL

## 2021-01-11 ENCOUNTER — HOSPITAL ENCOUNTER (EMERGENCY)
Facility: HOSPITAL | Age: 61
Discharge: HOME | End: 2021-01-11
Attending: EMERGENCY MEDICINE | Admitting: EMERGENCY MEDICINE
Payer: COMMERCIAL

## 2021-01-11 ENCOUNTER — AMB VIDEO VISIT (OUTPATIENT)
Dept: OTHER | Facility: HOSPITAL | Age: 61
End: 2021-01-11
Payer: COMMERCIAL

## 2021-01-11 VITALS
DIASTOLIC BLOOD PRESSURE: 60 MMHG | RESPIRATION RATE: 18 BRPM | OXYGEN SATURATION: 98 % | TEMPERATURE: 100 F | SYSTOLIC BLOOD PRESSURE: 127 MMHG | HEART RATE: 88 BPM

## 2021-01-11 VITALS — TEMPERATURE: 96.3 F | HEART RATE: 113 BPM | OXYGEN SATURATION: 100 % | RESPIRATION RATE: 20 BRPM

## 2021-01-11 DIAGNOSIS — M25.569 KNEE PAIN: ICD-10-CM

## 2021-01-11 DIAGNOSIS — M25.461 PAIN AND SWELLING OF RIGHT KNEE: Primary | ICD-10-CM

## 2021-01-11 DIAGNOSIS — M25.561 PAIN AND SWELLING OF RIGHT KNEE: Primary | ICD-10-CM

## 2021-01-11 DIAGNOSIS — R50.9 LOW GRADE FEVER: ICD-10-CM

## 2021-01-11 DIAGNOSIS — M79.10 MYALGIA: ICD-10-CM

## 2021-01-11 DIAGNOSIS — M54.2 NECK PAIN: Primary | ICD-10-CM

## 2021-01-11 LAB
ALBUMIN SERPL BCP-MCNC: 3.6 G/DL (ref 3.5–5)
ALP SERPL-CCNC: 134 U/L (ref 46–116)
ALT SERPL W P-5'-P-CCNC: 18 U/L (ref 12–78)
ANION GAP SERPL CALCULATED.3IONS-SCNC: 9 MMOL/L (ref 4–13)
APTT PPP: 34 SECONDS (ref 23–37)
AST SERPL W P-5'-P-CCNC: 20 U/L (ref 5–45)
ATRIAL RATE: 101 BPM
BASOPHILS # BLD AUTO: 0.02 THOUSANDS/ΜL (ref 0–0.1)
BASOPHILS NFR BLD AUTO: 0 % (ref 0–1)
BILIRUB SERPL-MCNC: 0.83 MG/DL (ref 0.2–1)
BILIRUB UR QL STRIP: NEGATIVE
BUN SERPL-MCNC: 14 MG/DL (ref 5–25)
CALCIUM SERPL-MCNC: 9.8 MG/DL (ref 8.3–10.1)
CHLORIDE SERPL-SCNC: 99 MMOL/L (ref 100–108)
CLARITY UR: CLEAR
CO2 SERPL-SCNC: 28 MMOL/L (ref 21–32)
COLOR UR: YELLOW
CREAT SERPL-MCNC: 0.79 MG/DL (ref 0.6–1.3)
CRP SERPL HS-MCNC: 74.21 MG/L
EOSINOPHIL # BLD AUTO: 0.01 THOUSAND/ΜL (ref 0–0.61)
EOSINOPHIL NFR BLD AUTO: 0 % (ref 0–6)
ERYTHROCYTE [DISTWIDTH] IN BLOOD BY AUTOMATED COUNT: 12.6 % (ref 11.6–15.1)
FLUAV RNA RESP QL NAA+PROBE: NEGATIVE
FLUBV RNA RESP QL NAA+PROBE: NEGATIVE
GFR SERPL CREATININE-BSD FRML MDRD: 80 ML/MIN/1.73SQ M
GLUCOSE SERPL-MCNC: 114 MG/DL (ref 65–140)
GLUCOSE UR STRIP-MCNC: NEGATIVE MG/DL
HCT VFR BLD AUTO: 42 % (ref 34.8–46.1)
HGB BLD-MCNC: 12.7 G/DL (ref 11.5–15.4)
HGB UR QL STRIP.AUTO: NEGATIVE
IMM GRANULOCYTES # BLD AUTO: 0.04 THOUSAND/UL (ref 0–0.2)
IMM GRANULOCYTES NFR BLD AUTO: 0 % (ref 0–2)
INR PPP: 1.11 (ref 0.84–1.19)
KETONES UR STRIP-MCNC: NEGATIVE MG/DL
LACTATE SERPL-SCNC: 0.9 MMOL/L (ref 0.5–2)
LEUKOCYTE ESTERASE UR QL STRIP: NEGATIVE
LYMPHOCYTES # BLD AUTO: 0.86 THOUSANDS/ΜL (ref 0.6–4.47)
LYMPHOCYTES NFR BLD AUTO: 8 % (ref 14–44)
MCH RBC QN AUTO: 27.7 PG (ref 26.8–34.3)
MCHC RBC AUTO-ENTMCNC: 30.2 G/DL (ref 31.4–37.4)
MCV RBC AUTO: 92 FL (ref 82–98)
MONOCYTES # BLD AUTO: 1.05 THOUSAND/ΜL (ref 0.17–1.22)
MONOCYTES NFR BLD AUTO: 9 % (ref 4–12)
NEUTROPHILS # BLD AUTO: 9.28 THOUSANDS/ΜL (ref 1.85–7.62)
NEUTS SEG NFR BLD AUTO: 83 % (ref 43–75)
NITRITE UR QL STRIP: NEGATIVE
NRBC BLD AUTO-RTO: 0 /100 WBCS
P AXIS: 21 DEGREES
PH UR STRIP.AUTO: 5.5 [PH] (ref 4.5–8)
PLATELET # BLD AUTO: 262 THOUSANDS/UL (ref 149–390)
PMV BLD AUTO: 11.3 FL (ref 8.9–12.7)
POTASSIUM SERPL-SCNC: 3.7 MMOL/L (ref 3.5–5.3)
PR INTERVAL: 126 MS
PROT SERPL-MCNC: 8.1 G/DL (ref 6.4–8.2)
PROT UR STRIP-MCNC: NEGATIVE MG/DL
PROTHROMBIN TIME: 14.4 SECONDS (ref 11.6–14.5)
QRS AXIS: 19 DEGREES
QRSD INTERVAL: 86 MS
QT INTERVAL: 322 MS
QTC INTERVAL: 409 MS
RBC # BLD AUTO: 4.58 MILLION/UL (ref 3.81–5.12)
RSV RNA RESP QL NAA+PROBE: NEGATIVE
SARS-COV-2 RNA RESP QL NAA+PROBE: NEGATIVE
SODIUM SERPL-SCNC: 136 MMOL/L (ref 136–145)
SP GR UR STRIP.AUTO: 1.01 (ref 1–1.03)
T WAVE AXIS: 15 DEGREES
TROPONIN I SERPL-MCNC: <0.02 NG/ML
UROBILINOGEN UR QL STRIP.AUTO: 1 E.U./DL
VENTRICULAR RATE: 97 BPM
WBC # BLD AUTO: 11.26 THOUSAND/UL (ref 4.31–10.16)

## 2021-01-11 PROCEDURE — 71045 X-RAY EXAM CHEST 1 VIEW: CPT

## 2021-01-11 PROCEDURE — ECARE PR SL URGENT CARE VIRTUAL VISIT: Performed by: FAMILY MEDICINE

## 2021-01-11 PROCEDURE — 93005 ELECTROCARDIOGRAM TRACING: CPT

## 2021-01-11 PROCEDURE — 36415 COLL VENOUS BLD VENIPUNCTURE: CPT | Performed by: PHYSICIAN ASSISTANT

## 2021-01-11 PROCEDURE — 93010 ELECTROCARDIOGRAM REPORT: CPT | Performed by: INTERNAL MEDICINE

## 2021-01-11 PROCEDURE — 96374 THER/PROPH/DIAG INJ IV PUSH: CPT

## 2021-01-11 PROCEDURE — 93971 EXTREMITY STUDY: CPT

## 2021-01-11 PROCEDURE — 93971 EXTREMITY STUDY: CPT | Performed by: SURGERY

## 2021-01-11 PROCEDURE — 85730 THROMBOPLASTIN TIME PARTIAL: CPT | Performed by: PHYSICIAN ASSISTANT

## 2021-01-11 PROCEDURE — 86141 C-REACTIVE PROTEIN HS: CPT | Performed by: PHYSICIAN ASSISTANT

## 2021-01-11 PROCEDURE — 96361 HYDRATE IV INFUSION ADD-ON: CPT

## 2021-01-11 PROCEDURE — 80053 COMPREHEN METABOLIC PANEL: CPT | Performed by: PHYSICIAN ASSISTANT

## 2021-01-11 PROCEDURE — 84484 ASSAY OF TROPONIN QUANT: CPT | Performed by: PHYSICIAN ASSISTANT

## 2021-01-11 PROCEDURE — G0382 LEV 3 HOSP TYPE B ED VISIT: HCPCS | Performed by: PHYSICIAN ASSISTANT

## 2021-01-11 PROCEDURE — 81003 URINALYSIS AUTO W/O SCOPE: CPT

## 2021-01-11 PROCEDURE — 87040 BLOOD CULTURE FOR BACTERIA: CPT | Performed by: PHYSICIAN ASSISTANT

## 2021-01-11 PROCEDURE — 99284 EMERGENCY DEPT VISIT MOD MDM: CPT

## 2021-01-11 PROCEDURE — 83605 ASSAY OF LACTIC ACID: CPT | Performed by: PHYSICIAN ASSISTANT

## 2021-01-11 PROCEDURE — 85025 COMPLETE CBC W/AUTO DIFF WBC: CPT | Performed by: PHYSICIAN ASSISTANT

## 2021-01-11 PROCEDURE — 72040 X-RAY EXAM NECK SPINE 2-3 VW: CPT

## 2021-01-11 PROCEDURE — 99285 EMERGENCY DEPT VISIT HI MDM: CPT | Performed by: PHYSICIAN ASSISTANT

## 2021-01-11 PROCEDURE — 85610 PROTHROMBIN TIME: CPT | Performed by: PHYSICIAN ASSISTANT

## 2021-01-11 PROCEDURE — 0241U HB NFCT DS VIR RESP RNA 4 TRGT: CPT | Performed by: PHYSICIAN ASSISTANT

## 2021-01-11 PROCEDURE — 73564 X-RAY EXAM KNEE 4 OR MORE: CPT

## 2021-01-11 RX ORDER — METHOCARBAMOL 500 MG/1
500 TABLET, FILM COATED ORAL ONCE
Status: COMPLETED | OUTPATIENT
Start: 2021-01-11 | End: 2021-01-11

## 2021-01-11 RX ORDER — OXYCODONE HYDROCHLORIDE 5 MG/1
5 TABLET ORAL EVERY 6 HOURS PRN
Qty: 10 TABLET | Refills: 0 | Status: SHIPPED | OUTPATIENT
Start: 2021-01-11 | End: 2021-01-21

## 2021-01-11 RX ORDER — ACETAMINOPHEN 325 MG/1
650 TABLET ORAL ONCE
Status: COMPLETED | OUTPATIENT
Start: 2021-01-11 | End: 2021-01-11

## 2021-01-11 RX ORDER — HYDROMORPHONE HCL/PF 1 MG/ML
0.5 SYRINGE (ML) INJECTION ONCE
Status: COMPLETED | OUTPATIENT
Start: 2021-01-11 | End: 2021-01-11

## 2021-01-11 RX ORDER — LIDOCAINE 50 MG/G
1 PATCH TOPICAL ONCE
Status: DISCONTINUED | OUTPATIENT
Start: 2021-01-11 | End: 2021-01-11 | Stop reason: HOSPADM

## 2021-01-11 RX ADMIN — SODIUM CHLORIDE 1000 ML: 0.9 INJECTION, SOLUTION INTRAVENOUS at 12:06

## 2021-01-11 RX ADMIN — LIDOCAINE 5% 1 PATCH: 700 PATCH TOPICAL at 13:57

## 2021-01-11 RX ADMIN — HYDROMORPHONE HYDROCHLORIDE 0.5 MG: 1 INJECTION, SOLUTION INTRAMUSCULAR; INTRAVENOUS; SUBCUTANEOUS at 12:06

## 2021-01-11 RX ADMIN — ACETAMINOPHEN 650 MG: 325 TABLET, FILM COATED ORAL at 12:06

## 2021-01-11 RX ADMIN — METHOCARBAMOL TABLETS 500 MG: 500 TABLET, COATED ORAL at 13:57

## 2021-01-11 NOTE — PROGRESS NOTES
[facility]        NAME: Gus Verma is a 57 y o  female  : 1960    MRN: [id number]  DATE: 2021  TIME: 9:30 AM    Assessment and Plan   Pain and swelling of right knee [M25 561, M25 461]  1  Pain and swelling of right knee  Transfer to other facility   2  Myalgia         Patient Instructions     Pt sent to ER for further workup of pain and swelling  Primary concern/ r/o is for DVT  Pt agreeable to this  Her friend will drive her  She declines ambulance transport  All questions answered  Precautions given  Chief Complaint     Chief Complaint   Patient presents with    Fever     x yesterday     Neck Pain     x saturday, denies     Leg Swelling     right hip replacment x 3 weeks ago  History of Present Illness       56 y/o female who is 1 month s/p total hip replacement presenting with c/o right knee pain and swelling x 1 day  Awoke yesterday with low grade fever and swelling of the right knee  Reports pain of the posterior knee, that is constant but intensifies with activity  Has difficulty bending the knee  Pt underwent right hip replacement nearly 1 month ago on  without complication  Is on Eliquis and wears compression stockings for DVT prevention  No hx of clot  Assumed pain was secondary to grocery shopping the day prior noting she awoke with neck and b/l low back pain but reports these are very mild comparatively  No chills, sweats, cough, or stomach upset  No known sick contacts or recent travel  Non smoker  No hormone replacement  No active cancer  Review of Systems   Review of Systems   Constitutional: Positive for fever  Negative for chills and diaphoresis  Respiratory: Negative for cough, shortness of breath and wheezing  Cardiovascular: Negative for chest pain and palpitations  Gastrointestinal: Negative for abdominal pain, nausea and vomiting  Musculoskeletal: Positive for myalgias  Neurological: Negative for dizziness and headaches  Current Medications       Current Outpatient Medications:     acetaminophen (TYLENOL) 325 mg tablet, Take 2 tablets (650 mg total) by mouth every 6 (six) hours as needed for mild pain, Disp:  , Rfl: 0    ALPRAZolam (XANAX) 0 25 mg tablet, Take 0 25 mg by mouth 3 (three) times a day as needed for anxiety, Disp: , Rfl:     apixaban (ELIQUIS) 2 5 mg, Take 1 tablet (2 5 mg total) by mouth 2 (two) times a day, Disp:  , Rfl: 0    Calcium Carbonate-Vitamin D3 (Calcium 600-D) 600-400 MG-UNIT TABS, Take 2 capsules by mouth 2 (two) times a day , Disp: , Rfl:     cyclobenzaprine (FLEXERIL) 10 mg tablet, Take 10 mg by mouth 3 (three) times a day as needed for muscle spasms, Disp: , Rfl:     docusate sodium (COLACE) 100 mg capsule, Take 1 capsule (100 mg total) by mouth 2 (two) times a day, Disp: 10 capsule, Rfl: 0    ferrous sulfate 325 (65 Fe) mg tablet, Take 1 tablet (325 mg total) by mouth daily with breakfast, Disp:  , Rfl: 0    Insulin Pen Needle (NOVOFINE PLUS) 32G X 4 MM MISC, by Does not apply route daily (Patient taking differently: by Does not apply route daily Rx per Weight Management for Saxenda), Disp: 30 each, Rfl: 3    liraglutide (SAXENDA) injection, Inject 0 5 mL (3 mg total) under the skin daily, Disp: 15 mL, Rfl: 4    Meclizine HCl (ANTIVERT PO), Take 1 tablet by mouth as needed Rx per prior PCP, Disp: , Rfl:     Multiple Vitamins-Minerals (BARIATRIC FUSION PO), Take 2 tablets by mouth 2 (two) times a day , Disp: , Rfl:     senna (SENOKOT) 8 6 mg, Take 1 tablet (8 6 mg total) by mouth daily, Disp:  , Rfl: 0    Current Allergies     Allergies as of 01/11/2021 - Reviewed 01/11/2021   Allergen Reaction Noted    Macrodantin [nitrofurantoin]  07/31/2018    Sulfa antibiotics Swelling 06/01/2016            The following portions of the patient's history were reviewed and updated as appropriate: allergies, current medications, past family history, past medical history, past social history, past surgical history and problem list      Past Medical History:   Diagnosis Date    Anxiety disorder     Arthritis     osteoarthritis hip/poss knuckles"    Basal cell carcinoma     Cancer ([address] )     Cervical disc herniation     C7//sees chiropracter and no recent problems    Exercise involving walking     10-10346 steps per day     1 para 1     History of bariatric surgery     History of non anemic vitamin B12 deficiency     History of prediabetes     before bariatric surgery    Hyperlipidemia     not on meds    Hypertension     not on meds    Irregular heart beat     "history bigeminy from taking benadryl, tries to avoid taking it"no recent issues"    Motion sickness     "on rides"    Neck pain     occas    OA (osteoarthritis) of hip     Obesity     Oral herpes simplex infection     occas    Other insomnia 2020    Postmenopausal     Sciatica     Vertigo     Wears glasses        Past Surgical History:   Procedure Laterality Date    COLPOSCOPY      Cervical Dysplasia    DENTAL SURGERY      one lower right    DENTAL SURGERY      Has dental implant; s/p tooth abscess    EGD      GASTRIC BYPASS      titanium staples    GYNECOLOGIC CRYOSURGERY      Cervical Dysplasia    LUMBAR LAMINECTOMY  1980    L5-S1; No Hardware    MS COLONOSCOPY FLX DX W/COLLJ SPEC WHEN PFRMD N/A 2016    Procedure: COLONOSCOPY;  Surgeon: Lisa Moise MD;  Location: [facility];   Service: Colorectal    MS TOTAL HIP ARTHROPLASTY Right 2020    Procedure: ARTHROPLASTY HIP TOTAL ANTERIOR;  Surgeon: Garrett Russo MD;  Location: [facility];  Service: Orthopedics    ROTATOR CUFF REPAIR Right     SKIN CANCER EXCISION      s/p BCC Back and R Clavicle    WISDOM TOOTH EXTRACTION         Family History   Problem Relation Age of Onset    Hypertension Mother     Heart failure Mother         s/p ICD    Diabetes type II Mother 54    Hypothyroidism Mother     Heart attack Mother 80    Hyperlipidemia Mother     Depression Mother     Heart block Father         s/p ICD    Hypertension Father     Prostate cancer Father 79        c Mets    Diabetes Family     Arthritis Family     Breast cancer Family     No Known Problems Maternal Grandmother     No Known Problems Maternal Grandfather     No Known Problems Paternal Grandmother     No Known Problems Paternal Grandfather     No Known Problems Maternal Aunt     No Known Problems Paternal Aunt     Breast cancer Paternal Aunt 61    Breast cancer Cousin 61    Leukemia Brother 72        CLL    Hyperlipidemia Brother     OCD Son     Anxiety disorder Son     Hypertension Son     Obesity Son     Diabetes type II Brother 61    Osteoarthritis Brother         s/p MVA         Medications have been verified  Objective   Pulse (!) 113   Temp (!) 96 3 °F (35 7 °C)   Resp 20   LMP 05/01/2005 (Within Months)   SpO2 100%   Patient's last menstrual period was 05/01/2005 (within months)  Physical Exam     Physical Exam  Vitals signs and nursing note reviewed  Constitutional:       General: She is not in acute distress  Appearance: She is well-developed  She is not ill-appearing or diaphoretic  HENT:      Head: Normocephalic and atraumatic  Eyes:      General: Lids are normal       Conjunctiva/sclera: Conjunctivae normal    Cardiovascular:      Rate and Rhythm: Normal rate and regular rhythm  Pulmonary:      Effort: Pulmonary effort is normal       Breath sounds: Normal breath sounds  No decreased breath sounds, wheezing, rhonchi or rales  Musculoskeletal:      Comments: Pt is wearing compression stockings that extend to just below the knee with no distal edema  Right knee shows swelling 2 cm larger than left  Varicosieis present but no appreciable cord  There is tenderness of the posterior knee extending into the calf  Unable to perform Abdullahi's testing due to pt tolerance and decreased ROM  PT pulse +2   Pt appears unsteady with use of cane     Skin:     General: Skin is warm and dry  Comments: Surgical scar of anterior right hip with no signs of infection  Neurological:      General: No focal deficit present  Mental Status: She is alert  She is not disoriented  Cranial Nerves: Cranial nerves are intact  Coordination: Coordination normal       Gait: Gait normal    Psychiatric:         Behavior: Behavior normal  Behavior is cooperative  Thought Content:  Thought content normal          Judgment: Judgment normal

## 2021-01-11 NOTE — ED NOTES
Patient transported to [address] [place] [address], [address] [place] [address]  01/11/21 [id number]

## 2021-01-11 NOTE — ED PROVIDER NOTES
History  Chief Complaint   Patient presents with    Knee Swelling     c/o right knee pain/extensive swellilng/decreased AROM x 1 day  Pt admitted to having neck pain and back pain that started on Saturday  Pt had a fever last night  Pt denies recent trauma  +right hip replacement 12/16/2020     The patient is a 70-year-old female with history of hypertension, hyperlipidemia and recent right hip replacement on 12/16/2020 who presents the emergency department for evaluation of arthralgias and fever  The patient states 2 days ago, she began having a flare-up of her sciatica with pain in both of her buttocks  She was additionally reporting pain in her lower back  She states by evening on the same day, she began having severe pain in her neck  She states it is difficult to move her neck  She denies any injury to her neck  She reports that by the following evening, she had spiked a low-grade fever of 100  As of this morning, she is now having severe right knee pain with some swelling as well as ongoing fever  She states the neck pain has also significantly worsened  She reports until then, she not been having any significant issues after her hip replacement  She has been taking her Eliquis as directed  She states that she tried calling her surgeon this morning, however she has not received a call back  She did go to an urgent care, and she was ultimately advised to come to the emergency department for rule out of DVT of her right leg  Patient is additionally expressing concern about COVID-19, as her  is immunosuppressed  Patient states that she did not take anything for her pain yet this morning  She had been previously taking 10 mg oxycodone and Flexeril  The patient denies chest pain, shortness of breath, abdominal pain, nausea, vomiting, diarrhea, dysuria, hematuria, rash, headache, lightheadedness or dizziness          History provided by:  Patient   used: No        Prior to Admission Medications   Prescriptions Last Dose Informant Patient Reported? Taking?    ALPRAZolam (XANAX) 0 25 mg tablet  Self Yes No   Sig: Take 0 25 mg by mouth 3 (three) times a day as needed for anxiety   Calcium Carbonate-Vitamin D3 (Calcium 600-D) 600-400 MG-UNIT TABS   Yes No   Sig: Take 2 capsules by mouth 2 (two) times a day    Insulin Pen Needle (NOVOFINE PLUS) 32G X 4 MM MISC   No No   Sig: by Does not apply route daily   Patient taking differently: by Does not apply route daily Rx per Weight Management for Saxenda   Meclizine HCl (ANTIVERT PO)   Yes No   Sig: Take 1 tablet by mouth as needed Rx per prior PCP   Multiple Vitamins-Minerals (BARIATRIC FUSION PO)   Yes No   Sig: Take 2 tablets by mouth 2 (two) times a day    acetaminophen (TYLENOL) 325 mg tablet   No No   Sig: Take 2 tablets (650 mg total) by mouth every 6 (six) hours as needed for mild pain   apixaban (ELIQUIS) 2 5 mg   No No   Sig: Take 1 tablet (2 5 mg total) by mouth 2 (two) times a day   cyclobenzaprine (FLEXERIL) 10 mg tablet   Yes No   Sig: Take 10 mg by mouth 3 (three) times a day as needed for muscle spasms   docusate sodium (COLACE) 100 mg capsule   No No   Sig: Take 1 capsule (100 mg total) by mouth 2 (two) times a day   ferrous sulfate 325 (65 Fe) mg tablet   No No   Sig: Take 1 tablet (325 mg total) by mouth daily with breakfast   liraglutide (SAXENDA) injection   No No   Sig: Inject 0 5 mL (3 mg total) under the skin daily   senna (SENOKOT) 8 6 mg   No No   Sig: Take 1 tablet (8 6 mg total) by mouth daily      Facility-Administered Medications: None       Past Medical History:   Diagnosis Date    Anxiety disorder     Arthritis     osteoarthritis hip/poss knuckles"    Basal cell carcinoma     Cancer ([facility] [address] )     Cervical disc herniation     C7//sees chiropracter and no recent problems    Exercise involving walking     [phone] steps per day     1 para 1     History of bariatric surgery     History of non anemic vitamin B12 deficiency     History of prediabetes     before bariatric surgery    Hyperlipidemia     not on meds    Hypertension     not on meds    Irregular heart beat     "history bigeminy from taking benadryl, tries to avoid taking it"no recent issues"    Motion sickness     "on rides"    Neck pain     occas    OA (osteoarthritis) of hip     Obesity     Oral herpes simplex infection     occas    Other insomnia 5/11/2020    Postmenopausal     Sciatica     Vertigo     Wears glasses        Past Surgical History:   Procedure Laterality Date    COLPOSCOPY      Cervical Dysplasia    DENTAL SURGERY      one lower right    DENTAL SURGERY      Has dental implant; s/p tooth abscess    EGD      GASTRIC BYPASS  2004    titanium staples    GYNECOLOGIC CRYOSURGERY      Cervical Dysplasia    LUMBAR LAMINECTOMY  1980    L5-S1; No Hardware    OR COLONOSCOPY FLX DX W/COLLJ SPEC WHEN PFRMD N/A 6/1/2016    Procedure: COLONOSCOPY;  Surgeon: David Pena MD;  Location: [facility];   Service: Colorectal    OR TOTAL HIP ARTHROPLASTY Right 12/16/2020    Procedure: ARTHROPLASTY HIP TOTAL ANTERIOR;  Surgeon: Steve Knight MD;  Location: [facility];  Service: Orthopedics    ROTATOR CUFF REPAIR Right 2010    SKIN CANCER EXCISION      s/p BCC Back and R Clavicle    WISDOM TOOTH EXTRACTION         Family History   Problem Relation Age of Onset    Hypertension Mother     Heart failure Mother         s/p ICD    Diabetes type II Mother 54    Hypothyroidism Mother     Heart attack Mother 80    Hyperlipidemia Mother     Depression Mother     Heart block Father         s/p ICD    Hypertension Father     Prostate cancer Father 79        c Mets    Diabetes Family     Arthritis Family     Breast cancer Family     No Known Problems Maternal Grandmother     No Known Problems Maternal Grandfather     No Known Problems Paternal Grandmother     No Known Problems Paternal Grandfather     No Known Problems Maternal Aunt     No Known Problems Paternal Aunt     Breast cancer Paternal Aunt 61    Breast cancer Cousin 61    Leukemia Brother 72        CLL    Hyperlipidemia Brother     OCD Son    Mina Gloelena Anxiety disorder Son    Mina Gloelena Hypertension Son     Obesity Son     Diabetes type II Brother 61    Osteoarthritis Brother         s/p MVA     I have reviewed and agree with the history as documented  E-Cigarette/Vaping    E-Cigarette Use Never User      E-Cigarette/Vaping Substances    Nicotine No     THC No     CBD No     Flavoring No     Other No     Unknown No      Social History     Tobacco Use    Smoking status: Never Smoker    Smokeless tobacco: Never Used   Substance Use Topics    Alcohol use: Yes     Frequency: Monthly or less     Comment: rarely/holidays    Drug use: Not Currently     Types: Marijuana     Comment: Last marijuana use in college       Review of Systems   Constitutional: Positive for fever  Negative for chills  HENT: Negative for ear pain and sore throat  Eyes: Negative for redness and visual disturbance  Respiratory: Negative for cough and shortness of breath  Cardiovascular: Negative for chest pain  Gastrointestinal: Negative for abdominal pain, diarrhea, nausea and vomiting  Genitourinary: Negative for dysuria and hematuria  Musculoskeletal: Positive for arthralgias, back pain, joint swelling, myalgias, neck pain and neck stiffness  Skin: Negative for color change and rash  Neurological: Negative for dizziness, light-headedness and headaches  All other systems reviewed and are negative  Physical Exam  Physical Exam  Vitals signs and nursing note reviewed  Constitutional:       General: She is not in acute distress  Appearance: She is well-developed  She is not ill-appearing or toxic-appearing  HENT:      Head: Normocephalic and atraumatic  Mouth/Throat:      Pharynx: Uvula midline     Eyes:      General: Lids are normal       Conjunctiva/sclera: Conjunctivae normal    Neck:      Musculoskeletal: Decreased range of motion  Pain with movement, spinous process tenderness and muscular tenderness present  No neck rigidity or injury  Cardiovascular:      Rate and Rhythm: Regular rhythm  Tachycardia present  Heart sounds: Normal heart sounds  Pulmonary:      Effort: Pulmonary effort is normal       Breath sounds: Normal breath sounds  Abdominal:      General: There is no distension  Palpations: Abdomen is soft  Tenderness: There is no abdominal tenderness  Musculoskeletal:      Right knee: She exhibits decreased range of motion and swelling  Tenderness found  Right ankle: Normal       Cervical back: She exhibits decreased range of motion, tenderness and bony tenderness  Lumbar back: She exhibits decreased range of motion and tenderness  Legs:       Comments: Patient is able to actively flex her knee very slowly, however she appears uncomfortable  No overlying erythema  Knee is warm to the touch  No palpable effusion  Skin:     General: Skin is warm and dry  Neurological:      Mental Status: She is alert and oriented to person, place, and time           Vital Signs  ED Triage Vitals [01/11/21 0955]   Temperature Pulse Respirations Blood Pressure SpO2   100 °F (37 8 °C) (!) 116 20 123/60 97 %      Temp Source Heart Rate Source Patient Position - Orthostatic VS BP Location FiO2 (%)   Oral Monitor Sitting Left arm --      Pain Score       8           Vitals:    01/11/21 0955 01/11/21 1207 01/11/21 1308 01/11/21 1501   BP: 123/60 132/72 135/72 127/60   Pulse: (!) 116 100 85 88   Patient Position - Orthostatic VS: Sitting Lying Lying Lying         Visual Acuity  Visual Acuity      Most Recent Value   L Pupil Size (mm)  3   R Pupil Size (mm)  3          ED Medications  Medications   sodium chloride 0 9 % bolus 1,000 mL (0 mL Intravenous Stopped 1/11/21 1358)   HYDROmorphone (DILAUDID) injection 0 5 mg (0 5 mg Intravenous Given 1/11/21 1206)   acetaminophen (TYLENOL) tablet 650 mg (650 mg Oral Given 1/11/21 1206)   methocarbamol (ROBAXIN) tablet 500 mg (500 mg Oral Given 1/11/21 1357)       Diagnostic Studies  Results Reviewed     Procedure Component Value Units Date/Time    Blood culture #1 [[id number]] Collected: 01/11/21 1217    Lab Status: Preliminary result Specimen: Blood from Arm, Left Updated: 01/11/21 1602     Blood Culture Received in Microbiology Lab  Culture in Progress  Blood culture #2 [[id number]] Collected: 01/11/21 1207    Lab Status: Preliminary result Specimen: Blood from Arm, Right Updated: 01/11/21 1602     Blood Culture Received in Microbiology Lab  Culture in Progress      Urine Macroscopic, POC [[id number]] Collected: 01/11/21 1506    Lab Status: Final result Specimen: Urine Updated: 01/11/21 1507     Color, UA Yellow     Clarity, UA Clear     pH, UA 5 5     Leukocytes, UA Negative     Nitrite, UA Negative     Protein, UA Negative mg/dl      Glucose, UA Negative mg/dl      Ketones, UA Negative mg/dl      Urobilinogen, UA 1 0 E U /dl      Bilirubin, UA Negative     Blood, UA Negative     Specific Gravity, UA 1 015    Narrative:      CLINITEK RESULT    High sensitivity CRP [[id number]] Collected: 01/11/21 1207    Lab Status: Final result Specimen: Blood from Arm, Right Updated: 01/11/21 1353     CRP, High Sensitivity 74 21 mg/L     Narrative:            HsCRP Level       Relative Risk           <1 0 mg/L          Low           1 0 to 3 0 mg/L    Average           >3 0 mg/L          High        Comprehensive metabolic panel [[id number]]  (Abnormal) Collected: 01/11/21 1207    Lab Status: Final result Specimen: Blood from Arm, Right Updated: 01/11/21 1322     Sodium 136 mmol/L      Potassium 3 7 mmol/L      Chloride 99 mmol/L      CO2 28 mmol/L      ANION GAP 9 mmol/L      BUN 14 mg/dL      Creatinine 0 79 mg/dL      Glucose 114 mg/dL      Calcium 9 8 mg/dL      AST 20 U/L      ALT 18 U/L      Alkaline Phosphatase 134 U/L      Total Protein 8 1 g/dL      Albumin 3 6 g/dL      Total Bilirubin 0 83 mg/dL      eGFR 80 ml/min/1 73sq m     Narrative:      [place] guidelines for Chronic Kidney Disease (CKD):     Stage 1 with normal or high GFR (GFR > 90 mL/min/1 73 square meters)    Stage 2 Mild CKD (GFR = 60-89 mL/min/1 73 square meters)    Stage 3A Moderate CKD (GFR = 45-59 mL/min/1 73 square meters)    Stage 3B Moderate CKD (GFR = 30-44 mL/min/1 73 square meters)    Stage 4 Severe CKD (GFR = 15-29 mL/min/1 73 square meters)    Stage 5 End Stage CKD (GFR <15 mL/min/1 73 square meters)  Note: GFR calculation is accurate only with a steady state creatinine    COVID19, Influenza A/B, RSV PCR, [facility] [[id number]]  (Normal) Collected: 01/11/21 1210    Lab Status: Final result Specimen: Nares from Nose Updated: 01/11/21 1310     SARS-CoV-2 Negative     INFLUENZA A PCR Negative     INFLUENZA B PCR Negative     RSV PCR Negative    Narrative: This test has been authorized by FDA under an EUA (Emergency Use Assay) for use by authorized laboratories  Clinical caution and judgement should be used with the interpretation of these results with consideration of the clinical impression and other laboratory testing  Testing reported as "Positive" or "Negative" has been proven to be accurate according to standard laboratory validation requirements  All testing is performed with control materials showing appropriate reactivity at standard intervals  Lactic acid [[id number]]  (Normal) Collected: 01/11/21 1217    Lab Status: Final result Specimen: Blood from Arm, Left Updated: 01/11/21 1255     LACTIC ACID 0 9 mmol/L     Narrative:      Result may be elevated if tourniquet was used during collection      Troponin I [[id number]]  (Normal) Collected: 01/11/21 1207    Lab Status: Final result Specimen: Blood from Arm, Right Updated: 01/11/21 1254     Troponin I <0 02 ng/mL     Protime-INR [[id number]]  (Normal) Collected: 01/11/21 1207    Lab Status: Final result Specimen: Blood from Arm, Right Updated: 01/11/21 1243     Protime 14 4 seconds      INR 1 11    APTT [[id number]]  (Normal) Collected: 01/11/21 1207    Lab Status: Final result Specimen: Blood from Arm, Right Updated: 01/11/21 1243     PTT 34 seconds     CBC and differential [[id number]]  (Abnormal) Collected: 01/11/21 1207    Lab Status: Final result Specimen: Blood from Arm, Right Updated: 01/11/21 1227     WBC 11 26 Thousand/uL      RBC 4 58 Million/uL      Hemoglobin 12 7 g/dL      Hematocrit 42 0 %      MCV 92 fL      MCH 27 7 pg      MCHC 30 2 g/dL      RDW 12 6 %      MPV 11 3 fL      Platelets 215 Thousands/uL      nRBC 0 /100 WBCs      Neutrophils Relative 83 %      Immat GRANS % 0 %      Lymphocytes Relative 8 %      Monocytes Relative 9 %      Eosinophils Relative 0 %      Basophils Relative 0 %      Neutrophils Absolute 9 28 Thousands/µL      Immature Grans Absolute 0 04 Thousand/uL      Lymphocytes Absolute 0 86 Thousands/µL      Monocytes Absolute 1 05 Thousand/µL      Eosinophils Absolute 0 01 Thousand/µL      Basophils Absolute 0 02 Thousands/µL                  XR cervical spine 2 or 3 views   ED Interpretation by Janeen Beard PA-C (01/11 1428)   NO ACUTE OSSEOUS ABNORMALITY  Final Result by Nicole Jefferson MD (01/11 1539)      No acute osseous abnormality  Degenerative changes as above  Workstation performed: [id number]         VAS lower limb venous duplex study, unilateral/limited   Final Result by Elliot Russell MD (01/11 2024)      XR chest 1 view portable   Final Result by Guero Rowan DO (01/11 1337)      No acute cardiopulmonary disease  Workstation performed: [id number]         XR knee 4+ vw right injury   Final Result by Guero Rowan DO (01/11 1335)      No acute osseous abnormality  Degenerative changes as described  There is a moderate joint effusion in the suprapatellar bursa  Workstation performed: [id number]                    Procedures  Procedures         ED Course  ED Course as of Jan 12 0938   Mon Jan 11, 2021   1315 Negative for DVT per ultrasound tech  1330 Patient is resting comfortably after Dilaudid  1358 Labs unremarkable  X-ray of neck ordered  I suspect some sort of viral illness at this time, although the patient is negative for COVID and influenza  SBIRT 20yo+      Most Recent Value   SBIRT (24 yo +)   In order to provide better care to our patients, we are screening all of our patients for alcohol and drug use  Would it be okay to ask you these screening questions? Yes Filed at: 01/11/2021 1128   Initial Alcohol Screen: US AUDIT-C    1  How often do you have a drink containing alcohol?  0 Filed at: 01/11/2021 1128   2  How many drinks containing alcohol do you have on a typical day you are drinking? 0 Filed at: 01/11/2021 1128   3a  Male UNDER 65: How often do you have five or more drinks on one occasion? 0 Filed at: 01/11/2021 1128   3b  FEMALE Any Age, or MALE 65+: How often do you have 4 or more drinks on one occassion? 0 Filed at: 01/11/2021 1128   Audit-C Score  0 Filed at: 01/11/2021 1128   SCARLET: How many times in the past year have you    Used an illegal drug or used a prescription medication for non-medical reasons? Never Filed at: 01/11/2021 1128                    MDM  Number of Diagnoses or Management Options  Knee pain: new and requires workup  Low grade fever: new and requires workup  Neck pain: new and requires workup  Diagnosis management comments: Patient presents for evaluation of multiple arthralgias as well as fever that have been developing over the last 2 days  Patient is status post right hip replacement on 12/16/2020  Differential includes but is not limited to septic joint versus septic emboli versus meningitis versus COVID-19 versus other viral illness  Doubt septic joint at time  While there is some mild swelling, the patient is able to actively range her knee  There is no overlying erythema  Labs, imaging and meds ordered  Labs reviewed with no significant findings  Doubt meningitis  Patient states she does have a history of neck pain, although this is worse than normal   However, patient has low-grade fever with unremarkable labs  She has no headache  Would be unable to perform lumbar puncture the ED as patient is on Eliquis  We discussed this, and she acknowledges understanding  As there is low suspicion at this time, will not workup further for meningitis  Imaging reviewed no significant findings  Patient does have effusion of right knee  C-spine x-ray shows arthritic changes no acute findings  Suspicion for viral illness remains highest on differential at this time  However, I discussed strict return to ED precautions with the patient for any worsening fever, pain, swelling or other symptoms  Patient will be prescribed a course of pain medications for home  I advised that she further follow-up with her primary care doctor as well  She acknowledged understanding  At the time of discharge, the patient's tachycardia has resolved  She is feeling moderately better  She states she was able to ambulate back and forth to the bathroom with left pain in her knee than before  Patient is stable for discharge         Amount and/or Complexity of Data Reviewed  Clinical lab tests: ordered and reviewed  Tests in the radiology section of CPT®: ordered and reviewed  Decide to obtain previous medical records or to obtain history from someone other than the patient: yes  Review and summarize past medical records: yes  Discuss the patient with other providers: yes (Dr Davis Alamo)    Risk of Complications, Morbidity, and/or Mortality  Presenting problems: low  Diagnostic procedures: low  Management options: low    Patient Progress  Patient progress: stable      Disposition  Final diagnoses:   Neck pain   Knee pain   Low grade fever     Time reflects when diagnosis was documented in both MDM as applicable and the Disposition within this note     Time User Action Codes Description Comment    1/11/2021  2:52 PM Lars Clipper Add [M54 2] Neck pain     1/11/2021  2:53 PM Lars Clipper Add [M25 569] Knee pain     1/11/2021  2:53 PM Lars Clipper Add [R50 9] Low grade fever       ED Disposition     ED Disposition Condition Date/Time Comment    Discharge Stable Mon Jan 11, 2021  2:52 PM Deepti Baer discharge to home/self care  Follow-up Information     Follow up With Specialties Details Why Contact Info Additional Information    Sheila Mata MD Family Medicine Schedule an appointment as soon as possible for a visit in 2 days  [address] [facility] [address]  [phone]       Please follow-up with your surgeon as discussed           [facility] Emergency Medicine  If symptoms worsen [address] [id number] [phone], [address], [place], [place], [address]          Discharge Medication List as of 1/11/2021  2:57 PM      START taking these medications    Details   oxyCODONE (ROXICODONE) 5 mg immediate release tablet Take 1 tablet (5 mg total) by mouth every 6 (six) hours as needed for moderate pain for up to 10 daysMax Daily Amount: 20 mg, Starting Mon 1/11/2021, Until Thu 1/21/2021, Normal         CONTINUE these medications which have NOT CHANGED    Details   acetaminophen (TYLENOL) 325 mg tablet Take 2 tablets (650 mg total) by mouth every 6 (six) hours as needed for mild pain, Starting Thu 12/17/2020, No Print      ALPRAZolam (XANAX) 0 25 mg tablet Take 0 25 mg by mouth 3 (three) times a day as needed for anxiety, Historical Med      apixaban (ELIQUIS) 2 5 mg Take 1 tablet (2 5 mg total) by mouth 2 (two) times a day, Starting Thu 12/17/2020, No Print      Calcium Carbonate-Vitamin D3 (Calcium 600-D) 600-400 MG-UNIT TABS Take 2 capsules by mouth 2 (two) times a day , Historical Med      cyclobenzaprine (FLEXERIL) 10 mg tablet Take 10 mg by mouth 3 (three) times a day as needed for muscle spasms, Historical Med      docusate sodium (COLACE) 100 mg capsule Take 1 capsule (100 mg total) by mouth 2 (two) times a day, Starting Thu 12/17/2020, No Print      ferrous sulfate 325 (65 Fe) mg tablet Take 1 tablet (325 mg total) by mouth daily with breakfast, Starting Thu 12/17/2020, No Print      Insulin Pen Needle (NOVOFINE PLUS) 32G X 4 MM MISC by Does not apply route daily, Starting Thu 12/12/2019, Normal      liraglutide (SAXENDA) injection Inject 0 5 mL (3 mg total) under the skin daily, Starting Th 12/10/2020, Normal      Meclizine HCl (ANTIVERT PO) Take 1 tablet by mouth as needed Rx per prior PCP, Historical Med      Multiple Vitamins-Minerals (BARIATRIC FUSION PO) Take 2 tablets by mouth 2 (two) times a day , Historical Med      senna (SENOKOT) 8 6 mg Take 1 tablet (8 6 mg total) by mouth daily, Starting Thu 12/17/2020, No Print           No discharge procedures on file      PDMP Review       Value Time User    PDMP Reviewed  Yes 12/18/2020  8:17 AM Garrett Russo MD          ED Provider  Electronically Signed by           Janeen Beard PA-C  01/12/21 5709

## 2021-01-11 NOTE — CARE ANYWHERE EVISITS
Visit Summary for ZACK SCHULTZ - Gender: Female - Date of Birth: [id number]  Date: [id number] - Duration: 4 minutes  Patient: Tyree SCHULTZ  Provider: Ciara Perez    Patient Contact Information  [facility] [place] [address]  [phone]    Visit Topics  body aches and low grade fever [Added By: Self - 2021-01-11]    Triage Questions   What is your current physical address in the event of a medical emergency? Answer []  Are you allergic to any medications? Answer []  Are you now or could you be pregnant? Answer []  Do you have any immune system compromise or chronic lung   disease? Answer []  Do you have any vulnerable family members in the home (infant, pregnant, cancer, elderly)? Answer []     Conversation Transcripts  [0A][0A] [Notification] Mary Castañeda, Global Staff, will help you prepare for your visit  She is assisting Ciara Perez Family Physician [0A][[place] [address], and thank you for connecting  While you are waiting for the doctor, are there any   questions I can answer for you about our service? Please contact customer service if you have questions about billing, insurance, or technical issues  Visits work best with a stable WiFi connection, so please make sure you are connected before we   begin [0A][Notification] Mary Castañeda has left the room  [0A][Notification] You are connected with Family Radu Physician [0A][Notification] ZACK SCHULTZ is located in [place]  [0A][Notification] ZACK SCHULTZ has shared health   history  Jenna Serum  [0A]    Diagnosis  Myalgia, unspecified site    Procedures    Medications Prescribed    No prescriptions ordered    Provider Notes  [0A][0A] [0A]We strongly encourage you to share the following record of today's visit with your primary care physician  [0A][0A][0A][0A]Contact phone number: [0A][0A][0A][0A]Mode of Communication:  Video[0A][0A][0A][0A]HPI: The patient is having severe   neck and lower back pain for the last 2 days   The patient had  right hip replacement in december and now right knee is swollen  Had a temperature up to 100 last night also some nausea[0A][0A][0A][0A][0A][0A]PMH: Anxiety[0A][0A]PSH: Hip, lumbar, cervical,   shoulder, gastric bypas[0A][0A]Meds: eloquis, saxenda, oxycodone, flexeril, xanax[0A][0A]Allergies: NKDA[0A][0A][0A][0A]Exam: [0A][0A]Gen: Alert, normal mental status and interaction, no visible distress, non- toxic appearance  [0A][0A][0A][0A]Assessment: Diffuse body aches[0A][0A][0A][0A]Plan:  [0A][0A]1  I am recommending in person at urgent care for further evaluation and treatment  [0A][0A]2  Discussed precautions  [0A][0A][0A][0A]Follow up:[0A][0A]1  If there are any   questions or problems with the prescription, call [phone] anytime for assistance  [0A][0A]2  Please  see an in-person provider now[0A][0A]3  Taking a probiotic (either in pill form or by eating yogurt that contains probiotics) while using   antibiotics can help prevent some of the troublesome side effects that antibiotics can sometimes cause [0A][0A]4  Please print a copy of this note and send it to your regular doctor, or take it to your next visit so it may be included in your medical   record  [0A][0A][0A][0A]Patient voiced understanding and agrees to plan [0A][0A][0A][0A]Please see your PCP on an annual basis  [0A]    Electronically signed by: Nicholas Huerta(NPI [id number])

## 2021-01-16 LAB
BACTERIA BLD CULT: NORMAL
BACTERIA BLD CULT: NORMAL

## 2021-01-21 ENCOUNTER — HOSPITAL ENCOUNTER (OUTPATIENT)
Dept: RADIOLOGY | Facility: HOSPITAL | Age: 61
Discharge: HOME | End: 2021-01-21
Payer: COMMERCIAL

## 2021-01-21 ENCOUNTER — EVALUATION (OUTPATIENT)
Dept: PHYSICAL THERAPY | Facility: CLINIC | Age: 61
End: 2021-01-21
Payer: COMMERCIAL

## 2021-01-21 ENCOUNTER — TRANSCRIBE ORDERS (OUTPATIENT)
Dept: ADMINISTRATIVE | Facility: HOSPITAL | Age: 61
End: 2021-01-21

## 2021-01-21 DIAGNOSIS — M25.561 ACUTE PAIN OF RIGHT KNEE: ICD-10-CM

## 2021-01-21 DIAGNOSIS — M16.11 PRIMARY OSTEOARTHRITIS OF ONE HIP, RIGHT: ICD-10-CM

## 2021-01-21 DIAGNOSIS — M25.561 RIGHT KNEE PAIN, UNSPECIFIED CHRONICITY: Primary | ICD-10-CM

## 2021-01-21 DIAGNOSIS — M25.561 RIGHT KNEE PAIN, UNSPECIFIED CHRONICITY: ICD-10-CM

## 2021-01-21 DIAGNOSIS — Z96.641 STATUS POST TOTAL REPLACEMENT OF RIGHT HIP: Primary | ICD-10-CM

## 2021-01-21 DIAGNOSIS — M54.2 CERVICAL PAIN: ICD-10-CM

## 2021-01-21 PROCEDURE — 97110 THERAPEUTIC EXERCISES: CPT | Performed by: PHYSICAL THERAPIST

## 2021-01-21 PROCEDURE — 97112 NEUROMUSCULAR REEDUCATION: CPT | Performed by: PHYSICAL THERAPIST

## 2021-01-21 PROCEDURE — 97162 PT EVAL MOD COMPLEX 30 MIN: CPT | Performed by: PHYSICAL THERAPIST

## 2021-01-21 PROCEDURE — 73560 X-RAY EXAM OF KNEE 1 OR 2: CPT

## 2021-01-21 NOTE — PROGRESS NOTES
PT Evaluation     Today's date: 2021  Patient name: Antoine Joy  : 1960  MRN: [id number]  Referring provider: Johnnie Moore MD  Dx:   Encounter Diagnosis     ICD-10-CM    1  Status post total replacement of right hip  Z96 641    2  Primary osteoarthritis of one hip, right  M16 11 Ambulatory referral to Physical Therapy   3  Cervical pain  M54 2    4  Acute pain of right knee  M25 561                   Assessment  Assessment details: Pt presents with gait abnormalities, weakness, limited stair, standing and ambulation tolerance p/o R SINA  Pt presents with a posture dysfunction for her cervical spine and will utilize direct access benefits to address this for 30 days and will then be d/c'd to HEP  Impairments: abnormal gait, abnormal or restricted ROM, activity intolerance, impaired physical strength, lacks appropriate home exercise program, pain with function, poor posture  and poor body mechanics    Goals  ST-4 weeks  1   Pt will decrease cervical pain > 50%  2   Pt will have less 2/10 R hip pain with returning to work  LT-6 weeks  1   Pt will decrease pain > 75%  2   Pt will increase ambulation and standing tolerance > 2 hours  Plan  Planned therapy interventions: manual therapy, abdominal trunk stabilization, neuromuscular re-education, patient education, postural training, body mechanics training, strengthening, stretching, therapeutic activities, therapeutic exercise, therapeutic training, flexibility, functional ROM exercises, gait training and home exercise program  Frequency: 2x week  Duration in weeks: 6        Subjective Evaluation    History of Present Illness  Date of surgery: 2020  Mechanism of injury: Pt is a 57 yof s/p R SINA ant approach on 20  Pt reports an insidious onset of B c/s pain 2 weeks ago that is the worst with c/s Rot R>L  Pt also reports B glute pain without radicular sx and R knee pain and swelling    Pain  Current pain ratin (c/s 6/10, R hip 0/10 )  At best pain ratin  At worst pain rating: 10 (c/s 10/10, R hip: 1/10)  Quality: sharp  Relieving factors: rest    Social Support  Lives in: multiple-level home    Patient Goals  Patient goals for therapy: decreased edema, decreased pain, increased motion, increased strength, independence with ADLs/IADLs, return to sport/leisure activities and return to work          Objective     Postural Observations  Seated posture: poor  Standing posture: poor  Correction of posture: makes symptoms better    Additional Postural Observation Details  Severe protracted head, severe thoracic kyphosis, severe ext with B Rot  Sx improved 50% with posture correction during B c/s Rot  Active Range of Motion   Cervical/Thoracic Spine       Cervical    Flexion:  Restriction level: minimal  Extension:  Restriction level: moderate  Left lateral flexion:  Restriction level: maximal  Right lateral flexion:  Restriction level maximal  Left rotation:  Restriction level: moderate  Right rotation:  Restriction level: moderate    Thoracic    Flexion:  WFL  Extension:  Restriction level: maximal    Passive Range of Motion   Left Hip   Flexion: 100 degrees   External rotation (90/90): 30 degrees   Internal rotation (90/90): 10 degrees     Right Hip   Flexion: 100 degrees   External rotation (90/90): 3 degrees with pain  Internal rotation (90/90): 5 degrees with pain  Left Knee   Flexion: 120 degrees   Extension: -7 degrees     Right Knee   Flexion: 90 degrees with pain  Extension: 0 degrees with pain    Additional Passive Range of Motion Details  R hip ER reproduced glute pain  Gait: mild R lateral trunk lean during R stance  Stairs: ascendin inch on R, descendin inch on R      Strength/Myotome Testing     Left Hip   Planes of Motion   Flexion: 4-  Extension: 4-  Abduction: 4-  Adduction: 4-  External rotation: 4-  Internal rotation: 4-    Right Hip   Planes of Motion   Flexion: 3+  Extension: 3+  Abduction: 3  Adduction: 3+  External rotation: 3+  Internal rotation: 3+             Precautions:  L5/S1 laminectomy 80', no extension lumbar or hip ext beyond neutral     Dx: R SINA ant approach 12/16/20, c/s posture dysfunction/opening pref, R knee swelling        Manuals 1/21            R knee PROM             R hip gentle PROM 10"x3 ea                                      Neuro Re-Ed             scap retraction iso 5"x10            B c/s Rot with posture correction 1x5 ea            bridges 1x10            DLS: SLR                                                    Ther Ex             Supine clamshells Y/ 3"x10            AAROM heelslides 3"x10            Seated piriformis stretch B 10"x3 ea                                                                             Ther Activity             Step-ups 6"/ 1x2            Step-downs 6"/ 1x2            STS             Gait Training                                       Modalities

## 2021-01-22 ENCOUNTER — IMMUNIZATIONS (OUTPATIENT)
Dept: FAMILY MEDICINE CLINIC | Facility: HOSPITAL | Age: 61
End: 2021-01-22

## 2021-01-22 DIAGNOSIS — Z23 ENCOUNTER FOR IMMUNIZATION: Primary | ICD-10-CM

## 2021-01-22 PROCEDURE — 91300 SARS-COV-2 / COVID-19 MRNA VACCINE (PFIZER-BIONTECH) 30 MCG: CPT

## 2021-01-22 PROCEDURE — 0001A SARS-COV-2 / COVID-19 MRNA VACCINE (PFIZER-BIONTECH) 30 MCG: CPT

## 2021-01-25 ENCOUNTER — OFFICE VISIT (OUTPATIENT)
Dept: PHYSICAL THERAPY | Facility: CLINIC | Age: 61
End: 2021-01-25
Payer: COMMERCIAL

## 2021-01-25 DIAGNOSIS — M54.2 CERVICAL PAIN: ICD-10-CM

## 2021-01-25 DIAGNOSIS — M16.11 PRIMARY OSTEOARTHRITIS OF ONE HIP, RIGHT: Primary | ICD-10-CM

## 2021-01-25 DIAGNOSIS — Z96.641 STATUS POST TOTAL REPLACEMENT OF RIGHT HIP: ICD-10-CM

## 2021-01-25 DIAGNOSIS — M25.561 ACUTE PAIN OF RIGHT KNEE: ICD-10-CM

## 2021-01-25 PROCEDURE — 97112 NEUROMUSCULAR REEDUCATION: CPT | Performed by: PHYSICAL THERAPIST

## 2021-01-25 PROCEDURE — 97110 THERAPEUTIC EXERCISES: CPT | Performed by: PHYSICAL THERAPIST

## 2021-01-25 PROCEDURE — 97140 MANUAL THERAPY 1/> REGIONS: CPT | Performed by: PHYSICAL THERAPIST

## 2021-01-25 NOTE — PROGRESS NOTES
Daily Note     Today's date: 2021  Patient name: aHri Balbuena  : 1960  MRN: [id number]  Referring provider: Chalino Reeves MD  Dx:   Encounter Diagnosis     ICD-10-CM    1  Primary osteoarthritis of one hip, right  M16 11    2  Status post total replacement of right hip  Z96 641    3  Cervical pain  M54 2    4  Acute pain of right knee  M25 561                   Subjective: Pt reports 7/10 B c/s and UT pain and reports she can't turn her head, Pt reports getting a R knee injection today secondary to the edema  Pt reports 1/10 R knee pain  Objective: See treatment diary below  Assessment: Pt demonstrated limited c/s Rot that responded well to sub-occipital, ant scalene and UT STM  Pt's sleeping posture using a cervical brace likely protruded her c/s too much resulting in increased pain  Educated pt on proper sleeping posture and instructed pt to take picture of current set-up  Plan: Cont POC  Precautions:  L5/S1 laminectomy 80', no extension lumbar or hip ext beyond neutral     Dx: R SINA ant approach 20, c/s posture dysfunction/opening pref, R knee swelling        Manuals            R knee PROM  10"x5 ea           R hip gentle PROM 10"x3 ea 10"x3           B C5/6 Rot MWM  1x3 ea           Sub-occipital STM  10 min           Neuro Re-Ed             scap retraction iso 5"x10 5"x10           B c/s Rot with posture correction 1x5 ea 1x10 ea           bridges 1x10 3x10           DLS: SLR             Posture education  5 min           Supine chin tucks                          Ther Ex             Supine clamshells Y/ 3"x10 Y/ 3x10 SL           AAROM heelslides 3"x10 3"x10           Seated piriformis stretch B 10"x3 ea 10"x3                                                                            Ther Activity             Step-ups 6"/ 1x2            Step-downs 6"/ 1x2            STS             Gait Training                                       Modalities

## 2021-01-28 ENCOUNTER — OFFICE VISIT (OUTPATIENT)
Dept: PHYSICAL THERAPY | Facility: CLINIC | Age: 61
End: 2021-01-28
Payer: COMMERCIAL

## 2021-01-28 DIAGNOSIS — M25.561 ACUTE PAIN OF RIGHT KNEE: ICD-10-CM

## 2021-01-28 DIAGNOSIS — Z96.641 STATUS POST TOTAL REPLACEMENT OF RIGHT HIP: ICD-10-CM

## 2021-01-28 DIAGNOSIS — M54.2 CERVICAL PAIN: ICD-10-CM

## 2021-01-28 DIAGNOSIS — M16.11 PRIMARY OSTEOARTHRITIS OF ONE HIP, RIGHT: Primary | ICD-10-CM

## 2021-01-28 PROCEDURE — 97112 NEUROMUSCULAR REEDUCATION: CPT | Performed by: PHYSICAL THERAPIST

## 2021-01-28 PROCEDURE — 97110 THERAPEUTIC EXERCISES: CPT | Performed by: PHYSICAL THERAPIST

## 2021-01-28 PROCEDURE — 97140 MANUAL THERAPY 1/> REGIONS: CPT | Performed by: PHYSICAL THERAPIST

## 2021-01-28 NOTE — PROGRESS NOTES
Daily Note     Today's date: 2021  Patient name: Sanchez June  : 1960  MRN: [id number]  Referring provider: Blas Locke MD  Dx:   Encounter Diagnosis     ICD-10-CM    1  Primary osteoarthritis of one hip, right  M16 11    2  Status post total replacement of right hip  Z96 641    3  Cervical pain  M54 2    4  Acute pain of right knee  M25 561                   Subjective: Pt reports 1/10 B sub-occipital pain  Pt reports 9/10 B glute pain yesterday after returning to work for the first day  Objective: See treatment diary below  Assessment: Pt demonstrated improved c/s pain with posture correction, limited glute and core strength resulting in glute pain  Pt demonstrated improved R Hip ER PROM  Plan: Cont POC  Precautions:  L5/S1 laminectomy 80', no extension lumbar or hip ext beyond neutral     Dx: R SINA ant approach 20, c/s posture dysfunction/opening pref, R knee swelling        Manuals           R knee PROM  10"x5 ea 10"x5 ea          R hip gentle PROM 10"x3 ea 10"x3 10"x3          B C5/6 Rot MWM  1x3 ea 1x5 ea          Sub-occipital STM  10 min 10 min          Neuro Re-Ed             scap retraction iso 5"x10 5"x10 5"x10          B c/s Rot with posture correction 1x5 ea 1x10 ea 1x15 ea          bridges 1x10 3x10 3x10          DLS: SLR             Posture education  5 min           Supine chin tucks             DLS: standing hip ext             Ther Ex             Supine clamshells Y/ 3"x10 Y/ 3x10 SL Y/ 3x12 SL          AAROM heelslides 3"x10 3"x10 3"x10          Seated piriformis stretch B 10"x3 ea 10"x3 10"x3          Back extension machine                                                                 Ther Activity             Step-ups 6"/ 1x2  6"/ 1x10          Step-downs 6"/ 1x2            STS             Gait Training                                       Modalities

## 2021-02-01 ENCOUNTER — APPOINTMENT (OUTPATIENT)
Dept: PHYSICAL THERAPY | Facility: CLINIC | Age: 61
End: 2021-02-01
Payer: COMMERCIAL

## 2021-02-04 ENCOUNTER — OFFICE VISIT (OUTPATIENT)
Dept: PHYSICAL THERAPY | Facility: CLINIC | Age: 61
End: 2021-02-04
Payer: COMMERCIAL

## 2021-02-04 DIAGNOSIS — M25.561 ACUTE PAIN OF RIGHT KNEE: ICD-10-CM

## 2021-02-04 DIAGNOSIS — M16.11 PRIMARY OSTEOARTHRITIS OF ONE HIP, RIGHT: Primary | ICD-10-CM

## 2021-02-04 DIAGNOSIS — Z96.641 STATUS POST TOTAL REPLACEMENT OF RIGHT HIP: ICD-10-CM

## 2021-02-04 DIAGNOSIS — M25.562 ACUTE PAIN OF LEFT KNEE: ICD-10-CM

## 2021-02-04 DIAGNOSIS — M54.2 CERVICAL PAIN: ICD-10-CM

## 2021-02-04 PROCEDURE — 97110 THERAPEUTIC EXERCISES: CPT | Performed by: PHYSICAL THERAPIST

## 2021-02-04 PROCEDURE — 97140 MANUAL THERAPY 1/> REGIONS: CPT | Performed by: PHYSICAL THERAPIST

## 2021-02-04 PROCEDURE — 97112 NEUROMUSCULAR REEDUCATION: CPT | Performed by: PHYSICAL THERAPIST

## 2021-02-04 NOTE — PROGRESS NOTES
Daily Note     Today's date: 2021  Patient name: Ishmael Tripp  : 1960  MRN: [id number]  Referring provider: Ying Todd DO  Dx:   Encounter Diagnosis     ICD-10-CM    1  Primary osteoarthritis of one hip, right  M16 11    2  Status post total replacement of right hip  Z96 641    3  Cervical pain  M54 2    4  Acute pain of right knee  M25 561                   Subjective: Pt reports no c/s pain, no R knee pain, improved glute pain, but pt reports L ant knee pain  Objective: See treatment diary below  DA: L knee  Assessment: Pt demonstrated L knee PFPS, tape reduced pain 50% with knee flexion  Plan: Cont POC       Precautions:  L5/S1 laminectomy 80', no extension lumbar or hip ext beyond neutral     Dx: R SINA ant approach 20, c/s posture dysfunction/opening pref DA , R knee swelling, L knee PFPS DA: 2      Manuals /4         R knee PROM  10"x5 ea 10"x5 ea 10"x5 ea         R hip gentle PROM 10"x3 ea 10"x3 10"x3 10"x5         B C5/6 Rot MWM  1x3 ea 1x5 ea 1x5 ea         Sub-occipital STM  10 min 10 min 10 min         L knee PROM             L medial patella mobs/ taping    2x50/ 3 min         Neuro Re-Ed             scap retraction iso 5"x10 5"x10 5"x10 5"x10         B c/s Rot with posture correction 1x5 ea 1x10 ea 1x15 ea 1x15 ea         bridges 1x10 3x10 3x10 3x12         DLS: SLR    2J48         Posture education  5 min           Supine chin tucks             DLS: standing hip ext             Ther Ex             Supine clamshells Y/ 3"x10 Y/ 3x10 SL Y/ 3x12 SL Y/ 3x12 ea SL         AAROM heelslides 3"x10 3"x10 3"x10          Seated piriformis stretch B 10"x3 ea 10"x3 10"x3 10"x3         Back extension machine                                                                 Ther Activity             Step-ups 6"/ 1x2  6"/ 1x10          Step-downs 6"/ 1x2            STS             Gait Training                                       Modalities

## 2021-02-08 ENCOUNTER — OFFICE VISIT (OUTPATIENT)
Dept: PHYSICAL THERAPY | Facility: CLINIC | Age: 61
End: 2021-02-08
Payer: COMMERCIAL

## 2021-02-08 DIAGNOSIS — M54.2 CERVICAL PAIN: ICD-10-CM

## 2021-02-08 DIAGNOSIS — M25.561 ACUTE PAIN OF RIGHT KNEE: ICD-10-CM

## 2021-02-08 DIAGNOSIS — M16.11 PRIMARY OSTEOARTHRITIS OF ONE HIP, RIGHT: Primary | ICD-10-CM

## 2021-02-08 DIAGNOSIS — M25.562 ACUTE PAIN OF LEFT KNEE: ICD-10-CM

## 2021-02-08 DIAGNOSIS — Z96.641 STATUS POST TOTAL REPLACEMENT OF RIGHT HIP: ICD-10-CM

## 2021-02-08 PROCEDURE — 97140 MANUAL THERAPY 1/> REGIONS: CPT | Performed by: PHYSICAL THERAPIST

## 2021-02-08 PROCEDURE — 97110 THERAPEUTIC EXERCISES: CPT | Performed by: PHYSICAL THERAPIST

## 2021-02-08 PROCEDURE — 97112 NEUROMUSCULAR REEDUCATION: CPT | Performed by: PHYSICAL THERAPIST

## 2021-02-08 NOTE — PROGRESS NOTES
Daily Note     Today's date: 2021  Patient name: Polly Bradshaw  : 1960  MRN: [id number]  Referring provider: Delaney Ramirez DO  Dx:   Encounter Diagnosis     ICD-10-CM    1  Primary osteoarthritis of one hip, right  M16 11    2  Status post total replacement of right hip  Z96 641    3  Cervical pain  M54 2    4  Acute pain of right knee  M25 561    5  Acute pain of left knee  M25 562                   Subjective: Pt reports no c/s pain, improved L knee pain while tape was on, a worsening of sx after tape was removed and resolved pain currently  Objective: See treatment diary below  Held L knee taping secondary to resolved pain  Assessment: Pt demonstrated improved R hip ER PROM  Knee pain that increased with tape removal may be secondary to too much intensity of pull during tape or duration of wear time, which was 6 hours  Plan: Cont POC       Precautions:  L5/S1 laminectomy 80', no extension lumbar or hip ext beyond neutral     Dx: R SINA ant approach 20, c/s posture dysfunction/opening pref DA , R knee swelling, L knee PFPS DA: 2/4      Manuals         R knee PROM  10"x5 ea 10"x5 ea 10"x5 ea 10"x5 ea        R hip gentle PROM 10"x3 ea 10"x3 10"x3 10"x5 10"x5        B C5/6 Rot MWM  1x3 ea 1x5 ea 1x5 ea 1x5 ea        Sub-occipital STM  10 min 10 min 10 min 10 min        L knee PROM             L medial patella mobs/ taping    2x50/ 3 min 2x50/ 3 min        Neuro Re-Ed             scap retraction iso 5"x10 5"x10 5"x10 5"x10 5"x15        B c/s Rot with posture correction 1x5 ea 1x10 ea 1x15 ea 1x15 ea 1x15 ea        bridges 1x10 3x10 3x10 3x12 3x12        DLS: SLR    1G89 2W60        Posture education  5 min           Supine chin tucks             DLS: standing hip ext             Ther Ex             Supine clamshells Y/ 3"x10 Y/ 3x10 SL Y/ 3x12 SL Y/ 3x12 ea SL Y/ 3x15 ea SL        AAROM heelslides 3"x10 3"x10 3"x10          Seated piriformis stretch B 10"x3 ea 10"x3 10"x3 10"x3 10"x3        Back extension machine                                                                 Ther Activity             Step-ups 6"/ 1x2  6"/ 1x10          Step-downs 6"/ 1x2            STS             Gait Training                                       Modalities

## 2021-02-11 ENCOUNTER — OFFICE VISIT (OUTPATIENT)
Dept: PHYSICAL THERAPY | Facility: CLINIC | Age: 61
End: 2021-02-11
Payer: COMMERCIAL

## 2021-02-11 DIAGNOSIS — M25.561 ACUTE PAIN OF RIGHT KNEE: ICD-10-CM

## 2021-02-11 DIAGNOSIS — Z96.641 STATUS POST TOTAL REPLACEMENT OF RIGHT HIP: ICD-10-CM

## 2021-02-11 DIAGNOSIS — M16.11 PRIMARY OSTEOARTHRITIS OF ONE HIP, RIGHT: Primary | ICD-10-CM

## 2021-02-11 DIAGNOSIS — M54.2 CERVICAL PAIN: ICD-10-CM

## 2021-02-11 PROCEDURE — 97112 NEUROMUSCULAR REEDUCATION: CPT | Performed by: PHYSICAL THERAPIST

## 2021-02-11 PROCEDURE — 97140 MANUAL THERAPY 1/> REGIONS: CPT | Performed by: PHYSICAL THERAPIST

## 2021-02-11 PROCEDURE — 97110 THERAPEUTIC EXERCISES: CPT | Performed by: PHYSICAL THERAPIST

## 2021-02-11 NOTE — PROGRESS NOTES
Daily Note     Today's date: 2021  Patient name: Sherryle Pillow  : 1960  MRN: [id number]  Referring provider: Eber Claude, DO  Dx:   Encounter Diagnosis     ICD-10-CM    1  Primary osteoarthritis of one hip, right  M16 11    2  Status post total replacement of right hip  Z96 641    3  Cervical pain  M54 2    4  Acute pain of right knee  M25 561                   Subjective: Pt reports 6/10 L sided cervical pain, no R or L knee pain  Objective: See treatment diary below  Discharged B knees  Assessment: Pt demonstrated improved R hip ER PROM, facet irritation  Plan: Cont POC       Precautions:  L5/S1 laminectomy 80', no extension lumbar or hip ext beyond neutral     Dx: R SINA ant approach 20, c/s posture dysfunction/opening pref DA , R knee swelling, L knee PFPS DA: 2      Manuals  2       R knee PROM  10"x5 ea 10"x5 ea 10"x5 ea 10"x5 ea        R hip gentle PROM 10"x3 ea 10"x3 10"x3 10"x5 10"x5 10"x5        B C5/6 Rot MWM  1x3 ea 1x5 ea 1x5 ea 1x5 ea 1x5 ea       Seated c/s traction      30"x3       Sub-occipital STM  10 min 10 min 10 min 10 min 10 min       L knee PROM             L medial patella mobs/ taping    2x50/ 3 min 2x50/ 3 min        Neuro Re-Ed             scap retraction iso 5"x10 5"x10 5"x10 5"x10 5"x15 5"x15       B c/s Rot with posture correction 1x5 ea 1x10 ea 1x15 ea 1x15 ea 1x15 ea 1x15 ea       bridges 1x10 3x10 3x10 3x12 3x12 3x12 3x15      DLS: SLR    2Y61 9L26 3x10       Posture education  5 min           Supine chin tucks             DLS: standing hip ext             Ther Ex             Supine clamshells Y/ 3"x10 Y/ 3x10 SL Y/ 3x12 SL Y/ 3x12 ea SL Y/ 3x15 ea SL Y/ 3x15 ea SL       AAROM heelslides 3"x10 3"x10 3"x10          Seated piriformis stretch B 10"x3 ea 10"x3 10"x3 10"x3 10"x3 10"x3       Back extension machine                                                                 Ther Activity             Step-ups 6"/ 1x2  6"/ 1x10   6"/ 1x12       Step-downs 6"/ 1x2            STS      1x10       Gait Training                                       Modalities

## 2021-02-12 ENCOUNTER — IMMUNIZATIONS (OUTPATIENT)
Dept: FAMILY MEDICINE CLINIC | Facility: HOSPITAL | Age: 61
End: 2021-02-12

## 2021-02-12 DIAGNOSIS — Z23 ENCOUNTER FOR IMMUNIZATION: Primary | ICD-10-CM

## 2021-02-12 PROCEDURE — 91300 SARS-COV-2 / COVID-19 MRNA VACCINE (PFIZER-BIONTECH) 30 MCG: CPT

## 2021-02-12 PROCEDURE — 0002A SARS-COV-2 / COVID-19 MRNA VACCINE (PFIZER-BIONTECH) 30 MCG: CPT

## 2021-02-15 ENCOUNTER — OFFICE VISIT (OUTPATIENT)
Dept: PHYSICAL THERAPY | Facility: CLINIC | Age: 61
End: 2021-02-15
Payer: COMMERCIAL

## 2021-02-15 DIAGNOSIS — M16.11 PRIMARY OSTEOARTHRITIS OF ONE HIP, RIGHT: Primary | ICD-10-CM

## 2021-02-15 DIAGNOSIS — Z96.641 STATUS POST TOTAL REPLACEMENT OF RIGHT HIP: ICD-10-CM

## 2021-02-15 DIAGNOSIS — M54.2 CERVICAL PAIN: ICD-10-CM

## 2021-02-15 PROCEDURE — 97112 NEUROMUSCULAR REEDUCATION: CPT | Performed by: PHYSICAL THERAPIST

## 2021-02-15 PROCEDURE — 97110 THERAPEUTIC EXERCISES: CPT | Performed by: PHYSICAL THERAPIST

## 2021-02-16 NOTE — PROGRESS NOTES
Daily Note     Today's date: 2/15/2021  Patient name: Gus Verma  : 1960  MRN: [id number]  Referring provider: Cindra Bamberger, DO  Dx:   Encounter Diagnosis     ICD-10-CM    1  Primary osteoarthritis of one hip, right  M16 11    2  Status post total replacement of right hip  Z96 641    3  Cervical pain  M54 2                   Subjective: Pt reports 7/10 B upper cervical pain, with extreme difficulty driving  Pt denies hip pain  Objective: See treatment diary below  Assessment: Pt demonstrated facet irritation likely aggravated from upper c/s protracted posture  Plan: Cont POC       Precautions:  L5/S1 laminectomy 80', no extension lumbar or hip ext beyond neutral     Dx: R SINA ant approach 20, c/s posture dysfunction/opening pref DA , R knee swelling, L knee PFPS DA: 2      Manuals 1/21 1/25 1/28 2/4 2/8 2/11 2/15      R knee PROM  10"x5 ea 10"x5 ea 10"x5 ea 10"x5 ea        R hip gentle PROM 10"x3 ea 10"x3 10"x3 10"x5 10"x5 10"x5  10"x5      B C5/6 Rot MWM  1x3 ea 1x5 ea 1x5 ea 1x5 ea 1x5 ea       Seated c/s traction      30"x3 30"x3      Sub-occipital STM  10 min 10 min 10 min 10 min 10 min 10 min      Seated c/s traction with B Rot       1x5 ea      L knee PROM             L medial patella mobs/ taping    2x50/ 3 min 2x50/ 3 min        Neuro Re-Ed             scap retraction iso 5"x10 5"x10 5"x10 5"x10 5"x15 5"x15 5"x15      B c/s Rot with posture correction 1x5 ea 1x10 ea 1x15 ea 1x15 ea 1x15 ea 1x15 ea 1x15 ea      bridges 1x10 3x10 3x10 3x12 3x12 3x12 3x12      DLS: SLR    5J18 7P28 3x10 3x10      Posture education  5 min           Supine chin tucks       2"x10      Self h/a SNAGs       5"x5                   DLS: standing hip ext             Ther Ex             Supine clamshells Y/ 3"x10 Y/ 3x10 SL Y/ 3x12 SL Y/ 3x12 ea SL Y/ 3x15 ea SL Y/ 3x15 ea SL R/3x15 ea      AAROM heelslides 3"x10 3"x10 3"x10          Seated piriformis stretch B 10"x3 ea 10"x3 10"x3 10"x3 10"x3 10"x3 10"x3 Back extension machine             Self c/s traction       5"x5                                             Ther Activity             Step-ups 6"/ 1x2  6"/ 1x10   6"/ 1x12       Step-downs 6"/ 1x2            STS      1x10       Gait Training                                       Modalities

## 2021-02-18 ENCOUNTER — APPOINTMENT (OUTPATIENT)
Dept: PHYSICAL THERAPY | Facility: CLINIC | Age: 61
End: 2021-02-18
Payer: COMMERCIAL

## 2021-02-22 ENCOUNTER — APPOINTMENT (OUTPATIENT)
Dept: PHYSICAL THERAPY | Facility: CLINIC | Age: 61
End: 2021-02-22
Payer: COMMERCIAL

## 2021-02-25 ENCOUNTER — OFFICE VISIT (OUTPATIENT)
Dept: PHYSICAL THERAPY | Facility: CLINIC | Age: 61
End: 2021-02-25
Payer: COMMERCIAL

## 2021-02-25 DIAGNOSIS — Z96.641 STATUS POST TOTAL REPLACEMENT OF RIGHT HIP: ICD-10-CM

## 2021-02-25 DIAGNOSIS — M16.11 PRIMARY OSTEOARTHRITIS OF ONE HIP, RIGHT: Primary | ICD-10-CM

## 2021-02-25 PROCEDURE — 97530 THERAPEUTIC ACTIVITIES: CPT | Performed by: PHYSICAL THERAPIST

## 2021-02-25 PROCEDURE — 97112 NEUROMUSCULAR REEDUCATION: CPT | Performed by: PHYSICAL THERAPIST

## 2021-02-25 PROCEDURE — 97110 THERAPEUTIC EXERCISES: CPT | Performed by: PHYSICAL THERAPIST

## 2021-02-25 NOTE — PROGRESS NOTES
Daily Note     Today's date: 2021  Patient name: Therese Fuentes  : 1960  MRN: [id number]  Referring provider: Jennifer Buitrago DO  Dx:   Encounter Diagnosis     ICD-10-CM    1  Primary osteoarthritis of one hip, right  M16 11    2  Status post total replacement of right hip  Z96 641    3  Acute pain of right knee  M25 561                   Subjective: Pt reports 1/10 B upper cervical pain, and no hip pain  Objective: See treatment diary below  D/c'd DA c/s POC to HEP  Assessment: Pt demonstrated improved hip strength  Plan: Cont POC       Precautions:  L5/S1 laminectomy 80', no extension lumbar or hip ext beyond neutral     Dx: R SINA ant approach 20,     Manuals 1/21 1/25 1/28 2/4 2/8 2/11 2/15 2/25     R knee PROM  10"x5 ea 10"x5 ea 10"x5 ea 10"x5 ea        R hip gentle PROM 10"x3 ea 10"x3 10"x3 10"x5 10"x5 10"x5  10"x5 15"x5 ea     B C5/6 Rot MWM  1x3 ea 1x5 ea 1x5 ea 1x5 ea 1x5 ea       Seated c/s traction      30"x3 30"x3      Sub-occipital STM  10 min 10 min 10 min 10 min 10 min 10 min      Seated c/s traction with B Rot       1x5 ea      L knee PROM             L medial patella mobs/ taping    2x50/ 3 min 2x50/ 3 min        Neuro Re-Ed             scap retraction iso 5"x10 5"x10 5"x10 5"x10 5"x15 5"x15 5"x15 HEP     B c/s Rot with posture correction 1x5 ea 1x10 ea 1x15 ea 1x15 ea 1x15 ea 1x15 ea 1x15 ea HEP     bridges 1x10 3x10 3x10 3x12 3x12 3x12 3x12 3x15     DLS: SLR    0B78 9R72 3x10 3x10 3x10     Posture education  5 min           Supine chin tucks       2"x10 HEP     Self h/a SNAGs       5"x5 HEP                  DLS: standing hip ext             Ther Ex             Supine clamshells Y/ 3"x10 Y/ 3x10 SL Y/ 3x12 SL Y/ 3x12 ea SL Y/ 3x15 ea SL Y/ 3x15 ea SL R/3x15 ea R/ 3x15 ea     AAROM heelslides 3"x10 3"x10 3"x10          Seated piriformis stretch B 10"x3 ea 10"x3 10"x3 10"x3 10"x3 10"x3 10"x3 10"x3     Back extension machine             Self c/s traction       5"x5 HEP Ther Activity             Step-ups 6"/ 1x2  6"/ 1x10   6"/ 1x12  6"/ 1x10 8"/ 3x10     Step-downs 6"/ 1x2            STS      1x10  3x10     Gait Training                                       Modalities

## 2021-03-01 ENCOUNTER — OFFICE VISIT (OUTPATIENT)
Dept: PHYSICAL THERAPY | Facility: CLINIC | Age: 61
End: 2021-03-01
Payer: COMMERCIAL

## 2021-03-01 DIAGNOSIS — M16.11 PRIMARY OSTEOARTHRITIS OF ONE HIP, RIGHT: Primary | ICD-10-CM

## 2021-03-01 DIAGNOSIS — Z96.641 STATUS POST TOTAL REPLACEMENT OF RIGHT HIP: ICD-10-CM

## 2021-03-01 PROCEDURE — 97110 THERAPEUTIC EXERCISES: CPT | Performed by: PHYSICAL THERAPIST

## 2021-03-01 PROCEDURE — 97530 THERAPEUTIC ACTIVITIES: CPT | Performed by: PHYSICAL THERAPIST

## 2021-03-01 PROCEDURE — 97140 MANUAL THERAPY 1/> REGIONS: CPT | Performed by: PHYSICAL THERAPIST

## 2021-03-01 NOTE — PROGRESS NOTES
Daily Note     Today's date: 3/1/2021  Patient name: Angel Moise  : 1960  MRN: [id number]  Referring provider: Rose Hernandez DO  Dx:   Encounter Diagnosis     ICD-10-CM    1  Primary osteoarthritis of one hip, right  M16 11    2  Status post total replacement of right hip  Z96 641                   Subjective: Pt reports no hip pain, R quad weakness with ascending stairs  Objective: See treatment diary below  Assessment: Pt demonstrated improved R hip ROM and stability  Plan: Cont POC       Precautions:  L5/S1 laminectomy 80', no extension lumbar or hip ext beyond neutral     Dx: R SINA ant approach 20,     Manuals 1/21 1/25 1/28 2/4 2/8 2/11 2/15 2/25 3/1    R knee PROM  10"x5 ea 10"x5 ea 10"x5 ea 10"x5 ea        R hip gentle PROM 10"x3 ea 10"x3 10"x3 10"x5 10"x5 10"x5  10"x5 15"x5 ea 15"x5 ea    B C5/6 Rot MWM  1x3 ea 1x5 ea 1x5 ea 1x5 ea 1x5 ea       Seated c/s traction      30"x3 30"x3      Sub-occipital STM  10 min 10 min 10 min 10 min 10 min 10 min      Seated c/s traction with B Rot       1x5 ea      L knee PROM             L medial patella mobs/ taping    2x50/ 3 min 2x50/ 3 min        Neuro Re-Ed             scap retraction iso 5"x10 5"x10 5"x10 5"x10 5"x15 5"x15 5"x15 HEP     B c/s Rot with posture correction 1x5 ea 1x10 ea 1x15 ea 1x15 ea 1x15 ea 1x15 ea 1x15 ea HEP     bridges 1x10 3x10 3x10 3x12 3x12 3x12 3x12 3x15 3x15    DLS: SLR    6P67 2Y83 3x10 3x10 3x10 3x15    Posture education  5 min           Supine chin tucks       2"x10 HEP     Self h/a SNAGs       5"x5 HEP                  DLS: standing hip ext             Ther Ex             Supine clamshells Y/ 3"x10 Y/ 3x10 SL Y/ 3x12 SL Y/ 3x12 ea SL Y/ 3x15 ea SL Y/ 3x15 ea SL R/3x15 ea R/ 3x15 ea G/ 3x15    AAROM heelslides 3"x10 3"x10 3"x10          Seated piriformis stretch B 10"x3 ea 10"x3 10"x3 10"x3 10"x3 10"x3 10"x3 10"x3 10"x3    Back extension machine             Self c/s traction       5"x5 HEP     SL Leg Press Machine 20#/ 1x10, 25#/ 1x10, 30#/ 1x10                              Ther Activity             Step-ups 6"/ 1x2  6"/ 1x10   6"/ 1x12  6"/ 1x10 8"/ 3x10 6"/ 1x10, 8"/ 1x10    Step-downs 6"/ 1x2            STS      1x10  3x10 3x10    Gait Training                                       Modalities

## 2021-03-04 ENCOUNTER — OFFICE VISIT (OUTPATIENT)
Dept: PHYSICAL THERAPY | Facility: CLINIC | Age: 61
End: 2021-03-04
Payer: COMMERCIAL

## 2021-03-04 DIAGNOSIS — M16.11 PRIMARY OSTEOARTHRITIS OF ONE HIP, RIGHT: Primary | ICD-10-CM

## 2021-03-04 DIAGNOSIS — Z96.641 STATUS POST TOTAL REPLACEMENT OF RIGHT HIP: ICD-10-CM

## 2021-03-04 PROCEDURE — 97110 THERAPEUTIC EXERCISES: CPT | Performed by: PHYSICAL THERAPIST

## 2021-03-04 PROCEDURE — 97530 THERAPEUTIC ACTIVITIES: CPT | Performed by: PHYSICAL THERAPIST

## 2021-03-04 PROCEDURE — 97112 NEUROMUSCULAR REEDUCATION: CPT | Performed by: PHYSICAL THERAPIST

## 2021-03-04 NOTE — PROGRESS NOTES
Daily Note     Today's date: 3/4/2021  Patient name: Sanchez June  : 1960  MRN: [id number]  Referring provider: Raymundo Hinson DO  Dx:   Encounter Diagnosis     ICD-10-CM    1  Primary osteoarthritis of one hip, right  M16 11    2  Status post total replacement of right hip  Z96 641                   Subjective: Pt reports no hip pain, R quad weakness with ascending stairs  Objective: See treatment diary below  Assessment: Pt demonstrated improved stair tolerance  Plan: Cont POC       Precautions:  L5/S1 laminectomy 80', no extension lumbar or hip ext beyond neutral     Dx: R SINA ant approach 20,     Manuals 1/21 1/25 1/28 2/4 2/8 2/11 2/15 2/25 3/1 3/4   R knee PROM  10"x5 ea 10"x5 ea 10"x5 ea 10"x5 ea        R hip gentle PROM 10"x3 ea 10"x3 10"x3 10"x5 10"x5 10"x5  10"x5 15"x5 ea 15"x5 ea 15"x5 ea   B C5/6 Rot MWM  1x3 ea 1x5 ea 1x5 ea 1x5 ea 1x5 ea       Seated c/s traction      30"x3 30"x3      Sub-occipital STM  10 min 10 min 10 min 10 min 10 min 10 min      Seated c/s traction with B Rot       1x5 ea      L knee PROM             L medial patella mobs/ taping    2x50/ 3 min 2x50/ 3 min        Neuro Re-Ed             scap retraction iso 5"x10 5"x10 5"x10 5"x10 5"x15 5"x15 5"x15 HEP     B c/s Rot with posture correction 1x5 ea 1x10 ea 1x15 ea 1x15 ea 1x15 ea 1x15 ea 1x15 ea HEP     bridges 1x10 3x10 3x10 3x12 3x12 3x12 3x12 3x15 3x15 3x17   DLS: SLR    8I45 2E11 3x10 3x10 3x10 3x15 3x15   Posture education  5 min           Supine chin tucks       2"x10 HEP     Self h/a SNAGs       5"x5 HEP                  DLS: standing hip ext             Ther Ex             Supine clamshells Y/ 3"x10 Y/ 3x10 SL Y/ 3x12 SL Y/ 3x12 ea SL Y/ 3x15 ea SL Y/ 3x15 ea SL R/3x15 ea R/ 3x15 ea G/ 3x15 G/ 3x15   AAROM heelslides 3"x10 3"x10 3"x10          Seated piriformis stretch B 10"x3 ea 10"x3 10"x3 10"x3 10"x3 10"x3 10"x3 10"x3 10"x3 10"x3   Back extension machine             Self c/s traction       5"x5 HEP SL Leg Press Machine         20#/ 1x10, 25#/ 1x10, 30#/ 1x10 30#/ 3x10                             Ther Activity             Step-ups 6"/ 1x2  6"/ 1x10   6"/ 1x12  6"/ 1x10 8"/ 3x10 6"/ 1x10, 8"/ 1x10 8"/ 3x10   Step-downs 6"/ 1x2            STS      1x10  3x10 3x10 3x10   Gait Training                                       Modalities

## 2021-03-06 ENCOUNTER — OFFICE VISIT (OUTPATIENT)
Dept: URGENT CARE | Facility: CLINIC | Age: 61
End: 2021-03-06
Payer: COMMERCIAL

## 2021-03-06 VITALS
HEIGHT: 64 IN | DIASTOLIC BLOOD PRESSURE: 80 MMHG | WEIGHT: 175 LBS | HEART RATE: 83 BPM | SYSTOLIC BLOOD PRESSURE: 134 MMHG | RESPIRATION RATE: 16 BRPM | OXYGEN SATURATION: 97 % | TEMPERATURE: 96.2 F | BODY MASS INDEX: 29.88 KG/M2

## 2021-03-06 DIAGNOSIS — B02.9 HERPES ZOSTER WITHOUT COMPLICATION: Primary | ICD-10-CM

## 2021-03-06 PROCEDURE — 99213 OFFICE O/P EST LOW 20 MIN: CPT | Performed by: PHYSICIAN ASSISTANT

## 2021-03-06 RX ORDER — VALACYCLOVIR HYDROCHLORIDE 1 G/1
1000 TABLET, FILM COATED ORAL 3 TIMES DAILY
Qty: 21 TABLET | Refills: 0 | Status: SHIPPED | OUTPATIENT
Start: 2021-03-06 | End: 2021-11-09

## 2021-03-06 NOTE — PROGRESS NOTES
[facility]        NAME: Momo Poole is a 57 y o  female  : 1960    MRN: [id number]  DATE: 2021  TIME: 3:50 PM    Assessment and Plan   Herpes zoster without complication [N29 8]  1  Herpes zoster without complication  valACYclovir (VALTREX) 1,000 mg tablet     Patient Instructions   Shingles  rx valtrex three times daily x 7 days sent via EMR  Tylenol/ibuprofen as needed for pain  Follow up with PCP in 3-5 days  Proceed to  ER if symptoms worsen  Chief Complaint     Chief Complaint   Patient presents with    Herpes Zoster     feels she may have shingles at bra line needles, burning pain on rt side of back x 1 week  Has 1 isolated pimple on rt side  Has received athe Shingrex vaccine 1 yr ago  History of Present Illness       [facility]   Is a 57-year-old female who presents to clinic complaining of burning pain her right mid back x1 week  She states that feels like when she has shingles in the past however she only notes 1 small vesicle on her right mid back  She did have a shingles vaccine 1 year ago  She denies any injury or trauma  She denies any rash or pain anywhere else on her body  She denies any fever or chills  Review of Systems   Review of Systems   Constitutional: Negative for chills and fever  Musculoskeletal: Positive for back pain  Skin: Positive for rash       Current Medications       Current Outpatient Medications:     Multiple Vitamins-Minerals (BARIATRIC FUSION PO), Take 2 tablets by mouth 2 (two) times a day , Disp: , Rfl:     acetaminophen (TYLENOL) 325 mg tablet, Take 2 tablets (650 mg total) by mouth every 6 (six) hours as needed for mild pain (Patient not taking: Reported on 3/6/2021), Disp:  , Rfl: 0    ALPRAZolam (XANAX) 0 25 mg tablet, Take 0 25 mg by mouth 3 (three) times a day as needed for anxiety, Disp: , Rfl:     apixaban (ELIQUIS) 2 5 mg, Take 1 tablet (2 5 mg total) by mouth 2 (two) times a day, Disp:  , Rfl: 0    Calcium Carbonate-Vitamin D3 (Calcium 600-D) 600-400 MG-UNIT TABS, Take 2 capsules by mouth 2 (two) times a day , Disp: , Rfl:     cyclobenzaprine (FLEXERIL) 10 mg tablet, Take 10 mg by mouth 3 (three) times a day as needed for muscle spasms, Disp: , Rfl:     docusate sodium (COLACE) 100 mg capsule, Take 1 capsule (100 mg total) by mouth 2 (two) times a day, Disp: 10 capsule, Rfl: 0    ferrous sulfate 325 (65 Fe) mg tablet, Take 1 tablet (325 mg total) by mouth daily with breakfast (Patient not taking: Reported on 3/6/2021), Disp:  , Rfl: 0    Insulin Pen Needle (NOVOFINE PLUS) 32G X 4 MM MISC, by Does not apply route daily (Patient taking differently: by Does not apply route daily Rx per Weight Management for Saxenda), Disp: 30 each, Rfl: 3    liraglutide (SAXENDA) injection, Inject 0 5 mL (3 mg total) under the skin daily (Patient not taking: Reported on 3/6/2021), Disp: 15 mL, Rfl: 4    Meclizine HCl (ANTIVERT PO), Take 1 tablet by mouth as needed Rx per prior PCP, Disp: , Rfl:     senna (SENOKOT) 8 6 mg, Take 1 tablet (8 6 mg total) by mouth daily, Disp:  , Rfl: 0    valACYclovir (VALTREX) 1,000 mg tablet, Take 1 tablet (1,000 mg total) by mouth 3 (three) times a day for 7 days, Disp: 21 tablet, Rfl: 0    Current Allergies     Allergies as of 03/06/2021 - Reviewed 03/06/2021   Allergen Reaction Noted    Macrodantin [nitrofurantoin]  07/31/2018    Sulfa antibiotics Swelling 06/01/2016            The following portions of the patient's history were reviewed and updated as appropriate: allergies, current medications, past family history, past medical history, past social history, past surgical history and problem list      Past Medical History:   Diagnosis Date    Anxiety disorder     Arthritis     osteoarthritis hip/poss knuckles"    Basal cell carcinoma     Cancer ([facility] [address] )     Cervical disc herniation     C7//sees chiropracter and no recent problems    Exercise involving walking     [phone] steps per day   1 para 1     History of bariatric surgery     History of non anemic vitamin B12 deficiency     History of prediabetes     before bariatric surgery    Hyperlipidemia     not on meds    Hypertension     not on meds    Irregular heart beat     "history bigeminy from taking benadryl, tries to avoid taking it"no recent issues"    Motion sickness     "on rides"    Neck pain     occas    OA (osteoarthritis) of hip     Obesity     Oral herpes simplex infection     occas    Other insomnia 2020    Postmenopausal     Sciatica     Vertigo     Wears glasses        Past Surgical History:   Procedure Laterality Date    COLPOSCOPY      Cervical Dysplasia    DENTAL SURGERY      one lower right    DENTAL SURGERY      Has dental implant; s/p tooth abscess    EGD      GASTRIC BYPASS      titanium staples    GYNECOLOGIC CRYOSURGERY      Cervical Dysplasia    LUMBAR LAMINECTOMY  1980    L5-S1; No Hardware    NC COLONOSCOPY FLX DX W/COLLJ SPEC WHEN PFRMD N/A 2016    Procedure: COLONOSCOPY;  Surgeon: Bee Workman MD;  Location: [facility];   Service: Colorectal    NC TOTAL HIP ARTHROPLASTY Right 2020    Procedure: ARTHROPLASTY HIP TOTAL ANTERIOR;  Surgeon: Deangelo Harris MD;  Location: [facility];  Service: Orthopedics    ROTATOR CUFF REPAIR Right     SKIN CANCER EXCISION      s/p BCC Back and R Clavicle    WISDOM TOOTH EXTRACTION         Family History   Problem Relation Age of Onset    Hypertension Mother     Heart failure Mother         s/p ICD    Diabetes type II Mother 54    Hypothyroidism Mother     Heart attack Mother 80    Hyperlipidemia Mother     Depression Mother     Heart block Father         s/p ICD    Hypertension Father     Prostate cancer Father 79        c Mets    Diabetes Family     Arthritis Family     Breast cancer Family     No Known Problems Maternal Grandmother     No Known Problems Maternal Grandfather     No Known Problems Paternal Grandmother     No Known Problems Paternal Grandfather     No Known Problems Maternal Aunt     No Known Problems Paternal Aunt     Breast cancer Paternal Aunt 61    Breast cancer Cousin 61    Leukemia Brother 72        CLL    Hyperlipidemia Brother     OCD Son    Tim Anxiety disorder Son     Hypertension Son     Obesity Son     Diabetes type II Brother 61    Osteoarthritis Brother         s/p MVA         Medications have been verified  Objective   /80   Pulse 83   Temp (!) 96 2 °F (35 7 °C)   Resp 16   Ht 5' 4" (1 626 m)   Wt 79 4 kg (175 lb)   LMP 05/01/2005 (Within Months)   SpO2 97%   BMI 30 04 kg/m²   Patient's last menstrual period was 05/01/2005 (within months)  Physical Exam     Physical Exam  Vitals signs and nursing note reviewed  Constitutional:       General: She is not in acute distress  Appearance: Normal appearance  She is not ill-appearing  Cardiovascular:      Rate and Rhythm: Normal rate and regular rhythm  Heart sounds: Normal heart sounds  Pulmonary:      Effort: Pulmonary effort is normal       Breath sounds: Normal breath sounds  Skin:     Findings: Lesion and rash present  Rash is vesicular (one single vesicle on R mid back, +tender)  Neurological:      Mental Status: She is alert and oriented to person, place, and time     Psychiatric:         Mood and Affect: Mood normal          Behavior: Behavior normal

## 2021-03-06 NOTE — PATIENT INSTRUCTIONS
Shingles  rx valtrex three times daily x 7 days sent via EMR  Tylenol/ibuprofen as needed for pain  Follow up with PCP in 3-5 days  Proceed to  ER if symptoms worsen  Shingles   WHAT YOU NEED TO KNOW:   Shingles is a painful rash  Shingles is caused by the same virus that causes chickenpox (varicella-zoster)  After you get chickenpox, the virus stays in your body for several years without causing any symptoms  Shingles occurs when the virus becomes active again  The active virus travels along a nerve to your skin and causes a rash  DISCHARGE INSTRUCTIONS:   Call your local emergency number (911 in the [address] [place] [address],[address]) if:   · You have trouble moving your arms, legs, or face  · You become confused, or have difficulty speaking  · You have a seizure  Return to the emergency department if:   · You have weakness in an arm or leg  · You have dizziness, a severe headache, or hearing or vision loss  · You have painful, red, warm skin around the blisters, or the blisters drain pus  · Your neck is stiff or you have trouble moving it  Call your doctor if:   · You feel weak or have a headache  · You have a cough, chills, or a fever  · You have abdominal pain or nausea, or you are vomiting  · Your rash becomes more itchy or painful  · Your rash spreads to other parts of your body  · Your pain worsens and does not go away even after you take medicine  · You have questions or concerns about your condition or care  Medicines: You may need any of the following:  · Antiviral medicine  helps decrease symptoms and healing time  They may also decrease your risk of developing nerve pain  You will need to start taking them within 3 days of the start of symptoms to prevent nerve pain  · Prescription pain medicine  may be given  Ask your healthcare provider how to take this medicine safely  Some prescription pain medicines contain acetaminophen   Do not take other medicines that contain acetaminophen without talking to your healthcare provider  Too much acetaminophen may cause liver damage  Prescription pain medicine may cause constipation  Ask your healthcare provider how to prevent or treat constipation  · Acetaminophen  decreases pain and fever  It is available without a doctor's order  Ask how much to take and how often to take it  Follow directions  Read the labels of all other medicines you are using to see if they also contain acetaminophen, or ask your doctor or pharmacist  Acetaminophen can cause liver damage if not taken correctly  Do not use more than 4 grams (4,000 milligrams) total of acetaminophen in one day  · NSAIDs , such as ibuprofen, help decrease swelling, pain, and fever  This medicine is available with or without a doctor's order  NSAIDs can cause stomach bleeding or kidney problems in certain people  If you take blood thinner medicine, always ask if NSAIDs are safe for you  Always read the medicine label and follow directions  Do not give these medicines to children under 10months of age without direction from your child's healthcare provider  · Topical anesthetics  are used to numb the skin and decrease pain  They can be a cream, gel, spray, or patch  · Anticonvulsants  decrease nerve pain and may help you sleep at night  · Antidepressants  may be used to decrease nerve pain  · Take your medicine as directed  Contact your healthcare provider if you think your medicine is not helping or if you have side effects  Tell him of her if you are allergic to any medicine  Keep a list of the medicines, vitamins, and herbs you take  Include the amounts, and when and why you take them  Bring the list or the pill bottles to follow-up visits  Carry your medicine list with you in case of an emergency  Self-care:  Keep your rash clean and dry  Cover your rash with a bandage or clothing  Do not use bandages that stick to your skin   The sticky part may irritate your skin and make your rash last longer  Prevent the spread of germs:       · Wash your hands often  Wash your hands several times each day  Wash after you use the bathroom, change a child's diaper, and before you prepare or eat food  Use soap and water every time  Rub your soapy hands together, lacing your fingers  Wash the front and back of your hands, and in between your fingers  Use the fingers of one hand to scrub under the fingernails of the other hand  Wash for at least 20 seconds  Rinse with warm, running water for several seconds  Then dry your hands with a clean towel or paper towel  Use hand  that contains alcohol if soap and water are not available  Do not touch your eyes, nose, or mouth without washing your hands first          · Cover a sneeze or cough  Use a tissue that covers your mouth and nose  Throw the tissue away in a trash can right away  Use the bend of your arm if a tissue is not available  Wash your hands well with soap and water or use a hand   · Stay away from others while you are sick  Avoid crowds as much as possible  · Ask about vaccines you may need  Talk to your healthcare provider about your vaccine history  He or she will tell you which vaccines you need, and when to get them  Prevent shingles or another shingles outbreak:  A vaccine may be given to help prevent shingles  You can get the vaccine even if you already had shingles  The vaccine can help prevent a future outbreak  If you do get shingles again, the vaccine can keep it from becoming severe  The vaccine comes in 2 forms  Your healthcare provider will tell you which form is right for you  The decision is based on your age and any medical conditions you have  A 2-dose vaccine is usually given to adults 48 years or older  A 1-dose vaccine may be given to adults 61 years or older    For more information:   · Centers for Disease Control and Prevention  [address] , [address]  Phone: 2- 375 - 1186784  Phone: 8- 841 - 3975791  Web Address: [employer] com br    Follow up with your doctor as directed:  Write down your questions so you remember to ask them during your visits  © Copyright [address] Information is for End User's use only and may not be sold, redistributed or otherwise used for commercial purposes  All illustrations and images included in CareNotes® are the copyrighted property of A D A M , Inc  or [facility] [place] [employer]   The above information is an  only  It is not intended as medical advice for individual conditions or treatments  Talk to your doctor, nurse or pharmacist before following any medical regimen to see if it is safe and effective for you

## 2021-03-08 ENCOUNTER — OFFICE VISIT (OUTPATIENT)
Dept: PHYSICAL THERAPY | Facility: CLINIC | Age: 61
End: 2021-03-08
Payer: COMMERCIAL

## 2021-03-08 DIAGNOSIS — M16.11 PRIMARY OSTEOARTHRITIS OF ONE HIP, RIGHT: Primary | ICD-10-CM

## 2021-03-08 DIAGNOSIS — Z96.641 STATUS POST TOTAL REPLACEMENT OF RIGHT HIP: ICD-10-CM

## 2021-03-08 PROCEDURE — 97530 THERAPEUTIC ACTIVITIES: CPT

## 2021-03-08 PROCEDURE — 97110 THERAPEUTIC EXERCISES: CPT

## 2021-03-08 PROCEDURE — 97112 NEUROMUSCULAR REEDUCATION: CPT

## 2021-03-08 NOTE — PROGRESS NOTES
Daily Note     Today's date: 3/8/2021  Patient name: Dmitriy Gloria  : 1960  MRN: [id number]  Referring provider: Ashley Quintanilla DO  Dx:   Encounter Diagnosis     ICD-10-CM    1  Primary osteoarthritis of one hip, right  M16 11    2  Status post total replacement of right hip  Z96 641                   Subjective: Pt reports no hip pain  Objective: See treatment diary below  Assessment: Pt demonstrated fatigue with LE strengthening, pain-free tolerance to hip PROM  Plan: Cont POC as per PT       Precautions:  L5/S1 laminectomy 80', no extension lumbar or hip ext beyond neutral     Dx: R SINA ant approach 20,     Manuals 1/21 1/25 1/28 2/4 2/8 2/11 2/15 2/25 3/1 3/4 3/8   R knee PROM  10"x5 ea 10"x5 ea 10"x5 ea 10"x5 ea         R hip gentle PROM 10"x3 ea 10"x3 10"x3 10"x5 10"x5 10"x5  10"x5 15"x5 ea 15"x5 ea 15"x5 ea 15"x5 ea   B C5/6 Rot MWM  1x3 ea 1x5 ea 1x5 ea 1x5 ea 1x5 ea        Seated c/s traction      30"x3 30"x3       Sub-occipital STM  10 min 10 min 10 min 10 min 10 min 10 min       Seated c/s traction with B Rot       1x5 ea       L knee PROM              L medial patella mobs/ taping    2x50/ 3 min 2x50/ 3 min         Neuro Re-Ed              scap retraction iso 5"x10 5"x10 5"x10 5"x10 5"x15 5"x15 5"x15 HEP      B c/s Rot with posture correction 1x5 ea 1x10 ea 1x15 ea 1x15 ea 1x15 ea 1x15 ea 1x15 ea HEP      bridges 1x10 3x10 3x10 3x12 3x12 3x12 3x12 3x15 3x15 3x17 3x17   DLS: SLR    0Y76 4A77 3x10 3x10 3x10 3x15 3x15 3x15   Posture education  5 min            Supine chin tucks       2"x10 HEP      Self h/a SNAGs       5"x5 HEP                    DLS: standing hip ext              Ther Ex              Supine clamshells Y/ 3"x10 Y/ 3x10 SL Y/ 3x12 SL Y/ 3x12 ea SL Y/ 3x15 ea SL Y/ 3x15 ea SL R/3x15 ea R/ 3x15 ea G/ 3x15 G/ 3x15 G/ 3x15   AAROM heelslides 3"x10 3"x10 3"x10           Seated piriformis stretch B 10"x3 ea 10"x3 10"x3 10"x3 10"x3 10"x3 10"x3 10"x3 10"x3 10"x3    Back extension machine              Self c/s traction       5"x5 HEP      SL Leg Press Machine         20#/ 1x10, 25#/ 1x10, 30#/ 1x10 30#/ 3x10 30#/ 3x10                               Ther Activity              Step-ups 6"/ 1x2  6"/ 1x10   6"/ 1x12  6"/ 1x10 8"/ 3x10 6"/ 1x10, 8"/ 1x10 8"/ 3x10 8"/  3x10   Step-downs 6"/ 1x2             STS      1x10  3x10 3x10 3x10 3x10   Gait Training                                          Modalities

## 2021-03-15 ENCOUNTER — OFFICE VISIT (OUTPATIENT)
Dept: PHYSICAL THERAPY | Facility: CLINIC | Age: 61
End: 2021-03-15
Payer: COMMERCIAL

## 2021-03-15 DIAGNOSIS — Z96.641 STATUS POST TOTAL REPLACEMENT OF RIGHT HIP: ICD-10-CM

## 2021-03-15 DIAGNOSIS — M16.11 PRIMARY OSTEOARTHRITIS OF ONE HIP, RIGHT: Primary | ICD-10-CM

## 2021-03-15 PROCEDURE — 97112 NEUROMUSCULAR REEDUCATION: CPT | Performed by: PHYSICAL THERAPIST

## 2021-03-15 PROCEDURE — 97110 THERAPEUTIC EXERCISES: CPT | Performed by: PHYSICAL THERAPIST

## 2021-03-15 PROCEDURE — 97530 THERAPEUTIC ACTIVITIES: CPT | Performed by: PHYSICAL THERAPIST

## 2021-03-15 NOTE — PROGRESS NOTES
Daily Note     Today's date: 3/15/2021  Patient name: Christine Weldon  : 1960  MRN: [id number]  Referring provider: Cholo Dorantes DO  Dx:   Encounter Diagnosis     ICD-10-CM    1  Primary osteoarthritis of one hip, right  M16 11    2  Status post total replacement of right hip  Z96 641                   Subjective: Pt reports being able to ascend and descend stairs reciprocally, but not when carrying groceries  Objective: See treatment diary below  Assessment: Pt demonstrated improving R quad and hip strength  Plan: D/c in 1-3 visit after pt demonstrates sufficient strength to safely ascend and descend stairs while carrying groceries         Precautions:  L5/S1 laminectomy 80', no extension lumbar or hip ext beyond neutral     Dx: R SINA ant approach 20,     Manuals 3/15          3/8   R knee PROM              R hip gentle PROM 15"x5 ea          15"x5 ea   B C5/6 Rot MWM              Seated c/s traction              Sub-occipital STM              Seated c/s traction with B Rot              L knee PROM              L medial patella mobs/ taping              Neuro Re-Ed              scap retraction iso              B c/s Rot with posture correction              bridges 3x20          3x17   DLS: SLR           3H06   Posture education              Supine chin tucks              Self h/a SNAGs              ecc lateral step-downs 6"/ 2x10             DLS: standing hip ext              Ther Ex              Supine clamshells G/ 3" 3x15          G/ 3x15   AAROM heelslides              Seated piriformis stretch B 10"x3 ea             Back extension machine              Self c/s traction              SL Leg Press Machine 30#/ 3x10          30#/ 3x10                               Ther Activity              Step-ups 8"/ 3x12          8"/  3x10   Step-downs              STS 3x10          3x10   Gait Training                                          Modalities

## 2021-03-17 DIAGNOSIS — M16.11 OSTEOARTHRITIS OF RIGHT HIP, UNSPECIFIED OSTEOARTHRITIS TYPE: ICD-10-CM

## 2021-03-17 RX ORDER — OXYCODONE HYDROCHLORIDE 5 MG/1
5 TABLET ORAL EVERY 6 HOURS PRN
Qty: 20 TABLET | Refills: 0
Start: 2021-03-17 | End: 2021-03-18 | Stop reason: SDUPTHER

## 2021-03-18 ENCOUNTER — APPOINTMENT (OUTPATIENT)
Dept: PHYSICAL THERAPY | Facility: CLINIC | Age: 61
End: 2021-03-18
Payer: COMMERCIAL

## 2021-03-18 DIAGNOSIS — M16.11 OSTEOARTHRITIS OF RIGHT HIP, UNSPECIFIED OSTEOARTHRITIS TYPE: ICD-10-CM

## 2021-03-18 RX ORDER — OXYCODONE HYDROCHLORIDE 5 MG/1
5 TABLET ORAL EVERY 6 HOURS PRN
Qty: 20 TABLET | Refills: 0 | Status: SHIPPED | OUTPATIENT
Start: 2021-03-18 | End: 2021-03-28

## 2021-03-22 ENCOUNTER — APPOINTMENT (OUTPATIENT)
Dept: PHYSICAL THERAPY | Facility: CLINIC | Age: 61
End: 2021-03-22
Payer: COMMERCIAL

## 2021-04-01 ENCOUNTER — OFFICE VISIT (OUTPATIENT)
Dept: PHYSICAL THERAPY | Facility: CLINIC | Age: 61
End: 2021-04-01
Payer: COMMERCIAL

## 2021-04-01 DIAGNOSIS — M16.11 PRIMARY OSTEOARTHRITIS OF ONE HIP, RIGHT: Primary | ICD-10-CM

## 2021-04-01 DIAGNOSIS — Z96.641 STATUS POST TOTAL REPLACEMENT OF RIGHT HIP: ICD-10-CM

## 2021-04-01 PROCEDURE — 97530 THERAPEUTIC ACTIVITIES: CPT | Performed by: PHYSICAL THERAPIST

## 2021-04-01 PROCEDURE — 97112 NEUROMUSCULAR REEDUCATION: CPT | Performed by: PHYSICAL THERAPIST

## 2021-04-01 PROCEDURE — 97110 THERAPEUTIC EXERCISES: CPT | Performed by: PHYSICAL THERAPIST

## 2021-04-01 NOTE — PROGRESS NOTES
Daily Note     Today's date: 2021  Patient name: Darien Hunt  : 1960  MRN: [id number]  Referring provider: Hortencia Ojeda DO  Dx:   Encounter Diagnosis     ICD-10-CM    1  Primary osteoarthritis of one hip, right  M16 11    2  Status post total replacement of right hip  Z96 641                   Subjective: Pt reports improved stair tolerance but still c/o mild difficulty with carrying heavy bags up the stairs  Pt also c/o mild difficulty with hip flexion in a seated position  Objective: See treatment diary below  Updated HEP  Assessment: Pt demonstrated improved strength, balance and functional mobility  Pt was issued an updated HEP to address remaining impairments  Plan: D/c to HEP       Precautions:  L5/S1 laminectomy 80', no extension lumbar or hip ext beyond neutral     Dx: R SINA ant approach 20,     Manuals 3/15 4/1         3/8   R knee PROM              R hip gentle PROM 15"x5 ea 15"x5 ea         15"x5 ea   B C5/6 Rot MWM              Seated c/s traction              Sub-occipital STM              Seated c/s traction with B Rot              L knee PROM              L medial patella mobs/ taping              Neuro Re-Ed              SLS  1x30" ea            DLS: standing hip ABD  2x15            scap retraction iso              B c/s Rot with posture correction              bridges 3x20 3x20         3x17   DLS: SLR  4A26         3x15   Posture education              Supine chin tucks              Self h/a SNAGs              ecc lateral step-downs 6"/ 2x10             DLS: standing hip ext              Ther Ex              Supine clamshells G/ 3" 3x15 B/ 3" 3x10         G/ 3x15   AAROM heelslides              Seated piriformis stretch B 10"x3 ea 10"x3            Back extension machine              Self c/s traction              SL Leg Press Machine 30#/ 3x10          30#/ 3x10                               Ther Activity              Step-ups 8"/ 3x12 carrying bags 5# ea/ 2x10, 10# ea/ 1x10         8"/  3x10   Step-downs  carrying bags 5# ea/ 2x10            STS 3x10 3x10         3x10   Gait Training                                          Modalities

## 2021-05-03 ENCOUNTER — TELEPHONE (OUTPATIENT)
Dept: BARIATRICS | Facility: CLINIC | Age: 61
End: 2021-05-03

## 2021-05-03 DIAGNOSIS — R63.5 ABNORMAL WEIGHT GAIN: ICD-10-CM

## 2021-05-03 DIAGNOSIS — Z98.84 STATUS POST BARIATRIC SURGERY: ICD-10-CM

## 2021-05-03 DIAGNOSIS — R79.89 LOW SERUM VITAMIN A: ICD-10-CM

## 2021-05-03 DIAGNOSIS — Z90.3 POSTGASTRECTOMY MALABSORPTION: ICD-10-CM

## 2021-05-03 DIAGNOSIS — K91.2 POSTGASTRECTOMY MALABSORPTION: ICD-10-CM

## 2021-05-03 DIAGNOSIS — E66.9 CLASS 1 OBESITY: Primary | ICD-10-CM

## 2021-05-03 DIAGNOSIS — E55.9 VITAMIN D DEFICIENCY: ICD-10-CM

## 2021-05-03 DIAGNOSIS — Z87.898 HISTORY OF PREDIABETES: ICD-10-CM

## 2021-05-03 NOTE — TELEPHONE ENCOUNTER
Received email from pt:    "My hip surgery went well  No more limp and no more pain  I have not taken narcotics for weeks now  I also have not restarted the saxenda or the contrave  I am maintaining my weight  Hovering around 175-180  I am finding though that I am having difficulty with cravings  I am picking here and there which can be a slippery slope  So, just wondering if I should restart them and would I titrate them like I did when I started them? If that is the case, I remember how to increase the contrave but I forget how I increased the saxenda  Also, would you want to order my labs for the year?   I have an appointment on May 19th"

## 2021-05-04 NOTE — TELEPHONE ENCOUNTER
Discussed with pt  She will start one med and assess need for seocnd at f/u  She will start with Saxenda and has pens at home  She is requesting A1c and LP be ordered for caring starts with you  Titration reviewed, see below:  VISIT SAXENDA  COM  INJECT SAXENDA DAILY SUBCUTANEOUSLY  -WEEK 1: 0 6mg daily  -if tolerated WEEK 2: 1 2mg daily  -if tolerated WEEK 3: 1 8mg daily  -if tolerated WEEK 4: 2 4mg daily  -if tolerated WEEK 5: 3mg daily  -IF NAUSEA/VOMITING DEVELOP STOP MEDICATION FOR A FEW DAYS AND DECREASE TO PREVIOUSLY TOLERATED DOSE  STAY HYDRATED  -IF YOU STOP THE MEDICATION FOR 3 DAYS RESTART AT THE LOWEST DOSE (0 6 mg) AND FOLLOW ABOVE DOSING INSTRUCTIONS  -IF YOU DEVELOP SEVERE ABDOMINAL PAIN WHICH MAY RADIATE TO THE BACK, SOMETIMES ASSOCIATED WITH FEVER, AND VOMITING, STOP MEDICATION AND SEEK URGENT CARE AS THIS MAY BE A SIGN OF PANCREATITIS

## 2021-05-17 ENCOUNTER — APPOINTMENT (OUTPATIENT)
Dept: LAB | Facility: HOSPITAL | Age: 61
End: 2021-05-17
Attending: PHYSICIAN ASSISTANT
Payer: COMMERCIAL

## 2021-05-17 ENCOUNTER — APPOINTMENT (OUTPATIENT)
Dept: LAB | Facility: HOSPITAL | Age: 61
End: 2021-05-17

## 2021-05-17 ENCOUNTER — TRANSCRIBE ORDERS (OUTPATIENT)
Dept: ADMINISTRATIVE | Facility: HOSPITAL | Age: 61
End: 2021-05-17

## 2021-05-17 DIAGNOSIS — Z98.84 STATUS POST BARIATRIC SURGERY: ICD-10-CM

## 2021-05-17 DIAGNOSIS — K91.2 POSTGASTRECTOMY MALABSORPTION: ICD-10-CM

## 2021-05-17 DIAGNOSIS — R79.89 LOW SERUM VITAMIN A: ICD-10-CM

## 2021-05-17 DIAGNOSIS — E66.9 CLASS 1 OBESITY: ICD-10-CM

## 2021-05-17 DIAGNOSIS — E55.9 VITAMIN D DEFICIENCY: ICD-10-CM

## 2021-05-17 DIAGNOSIS — Z00.8 HEALTH EXAMINATION IN POPULATION SURVEY: ICD-10-CM

## 2021-05-17 DIAGNOSIS — Z00.8 HEALTH EXAMINATION IN POPULATION SURVEY: Primary | ICD-10-CM

## 2021-05-17 DIAGNOSIS — Z90.3 POSTGASTRECTOMY MALABSORPTION: ICD-10-CM

## 2021-05-17 LAB
25(OH)D3 SERPL-MCNC: 40.4 NG/ML (ref 30–100)
CHOLEST SERPL-MCNC: 185 MG/DL (ref 50–200)
EST. AVERAGE GLUCOSE BLD GHB EST-MCNC: 105 MG/DL
FERRITIN SERPL-MCNC: 6 NG/ML (ref 8–388)
FOLATE SERPL-MCNC: >20 NG/ML (ref 3.1–17.5)
HBA1C MFR BLD: 5.3 %
HDLC SERPL-MCNC: 88 MG/DL
IRON SATN MFR SERPL: 10 %
IRON SERPL-MCNC: 38 UG/DL (ref 50–170)
LDLC SERPL CALC-MCNC: 88 MG/DL (ref 0–100)
NONHDLC SERPL-MCNC: 97 MG/DL
PTH-INTACT SERPL-MCNC: 72.1 PG/ML (ref 18.4–80.1)
TIBC SERPL-MCNC: 386 UG/DL (ref 250–450)
TRIGL SERPL-MCNC: 46 MG/DL
VIT B12 SERPL-MCNC: 337 PG/ML (ref 100–900)

## 2021-05-17 PROCEDURE — 36415 COLL VENOUS BLD VENIPUNCTURE: CPT

## 2021-05-17 PROCEDURE — 83970 ASSAY OF PARATHORMONE: CPT

## 2021-05-17 PROCEDURE — 82728 ASSAY OF FERRITIN: CPT

## 2021-05-17 PROCEDURE — 82607 VITAMIN B-12: CPT

## 2021-05-17 PROCEDURE — 84590 ASSAY OF VITAMIN A: CPT

## 2021-05-17 PROCEDURE — 83550 IRON BINDING TEST: CPT

## 2021-05-17 PROCEDURE — 84425 ASSAY OF VITAMIN B-1: CPT

## 2021-05-17 PROCEDURE — 82525 ASSAY OF COPPER: CPT

## 2021-05-17 PROCEDURE — 82746 ASSAY OF FOLIC ACID SERUM: CPT

## 2021-05-17 PROCEDURE — 84630 ASSAY OF ZINC: CPT

## 2021-05-17 PROCEDURE — 83540 ASSAY OF IRON: CPT

## 2021-05-17 PROCEDURE — 80061 LIPID PANEL: CPT

## 2021-05-17 PROCEDURE — 83036 HEMOGLOBIN GLYCOSYLATED A1C: CPT

## 2021-05-17 PROCEDURE — 82306 VITAMIN D 25 HYDROXY: CPT

## 2021-05-18 NOTE — PROGRESS NOTES
Assessment/Plan:    Class 1 obesity  -s/p RYGB  -Patient is pursuing the Conservative Program  -Initial weight loss goal of 5-10% weight loss for improved health  -Reviewed indications, mechanisms, and potential side effects of weight loss medications  Would be cautious with phentermine as she reports taking Benadryl has resulted in bigeminy     -Patient denies personal and family history of MEN2 tumors and medullary thyroid/thyroid carcinoma  Patient denies personal history of pancreatitis  (started 11/4/19)  Held when she had hip surgery, restarted for a week, but was forgetting to take  Will hold for now  She is planning to restart Contrave    -Denied hx of seizure and glaucoma  Started on Contrave as she was having cravings-(2/13/20, 197 4 lbs)     Initial(Start of MWM 10/15/18): 201 5 lbs  Start of Saxenda: (205 lbs, per 11/8/19, states she was 208 lbs when she started)  Current: 176 8 (-1 0 lbs since 12/10/20)   Change: -28 2 lbs (~14 % TBW)  Goal: 160-170s lbs and maintain    Postgastrectomy malabsorption  -At risk for malabsorption of vitamins/minerals secondary to malabsorption and restriction of intake from their procedure  -Currently taking adequate postop bariatric surgery vitamin supplementation-yes, but forgetting evening doses    -Last set of bariatric labs completed on 5/17/21, referred to hematology for iron infusions  B12 injection given today, recommend making sure she reaches 1000 mg daily  Will recheck b12 in 3-6 months  Follow up in approximately 3 months with Non-Surgical Physician/Advanced Practitioner  Goals:  Food log (ie ) www myfitnesspal com,sparkpeople  com,loseit com,calorieking  com,etc  baritastic  No sugary beverages  At least 64oz of water daily  Increase physical activity by 10 minutes daily   Gradually increase physical activity to a goal of 5 days per week for 30 minutes of MODERATE intensity PLUS 2 days per week of FULL BODY resistance training  Practice lesson plans 1-6 in bariatric manual  and Practice 30/60 rule  Goal protein intake of 60-80 grams per day  Alcohol and nicotine use is not recommended following bariatric surgery  NSAIDs (Motrin, Advil, Aleve, Naproxen, ibuprofen, etc) should be avoided following bariatric surgery    Diagnoses and all orders for this visit:    Class 1 obesity  -     Cancel: Ambulatory referral to Hematology / Oncology; Future  -     Comprehensive metabolic panel; Future  -     Ambulatory referral to Hematology / Oncology; Future    Vitamin B12 deficiency  -     cyanocobalamin injection 1,000 mcg  -     Cancel: Ambulatory referral to Hematology / Oncology; Future  -     Comprehensive metabolic panel; Future  -     Ambulatory referral to Hematology / Oncology; Future    Postgastrectomy malabsorption  -     cyanocobalamin injection 1,000 mcg  -     Cancel: Ambulatory referral to Hematology / Oncology; Future  -     Comprehensive metabolic panel; Future  -     Ambulatory referral to Hematology / Oncology; Future    Status post bariatric surgery  -     Cancel: Ambulatory referral to Hematology / Oncology; Future  -     Comprehensive metabolic panel; Future  -     Ambulatory referral to Hematology / Oncology; Future    Other fatigue  -     cyanocobalamin injection 1,000 mcg  -     Cancel: Ambulatory referral to Hematology / Oncology; Future  -     Comprehensive metabolic panel; Future  -     Ambulatory referral to Hematology / Oncology; Future    Medication management  -     Comprehensive metabolic panel; Future  -     Ambulatory referral to Hematology / Oncology; Future    Elevated alkaline phosphatase level  -     Comprehensive metabolic panel; Future  -     Ambulatory referral to Hematology / Oncology; Future    Iron deficiency  -     Ambulatory referral to Hematology / Oncology;  Future    Body mass index 30 0-30 9, adult        Subjective:   Chief Complaint   Patient presents with    Follow-up     4 month [facility] Follow up      Patient ID: Henrik Young is a 57 y o  female with excess weight/obesity here to pursue weight management  Past Medical History:   Diagnosis Date    Anxiety disorder     Arthritis     osteoarthritis hip/poss knuckles"    Basal cell carcinoma     Cancer ([address] )     Cervical disc herniation     C7//sees chiropracter and no recent problems    Exercise involving walking     [phone] steps per day     1 para 1     History of bariatric surgery     History of non anemic vitamin B12 deficiency     History of prediabetes     before bariatric surgery    Hyperlipidemia     not on meds    Hypertension     not on meds    Irregular heart beat     "history bigeminy from taking benadryl, tries to avoid taking it"no recent issues"    Motion sickness     "on rides"    Neck pain     occas    OA (osteoarthritis) of hip     Obesity     Oral herpes simplex infection     occas    Other insomnia 2020    Postmenopausal     Sciatica     Vertigo     Wears glasses      HPI:  The patient presents for MWM    Started Saxenda a week ago as she was having increased hunger/cravings  Forgetting to take  Considering switching to Contrave as she has been feeling down due to husbands health and sister in law passed from metastatic cancer  Doing well with meal planning  Increased fatigue, falling asleep during day  Noticing changes in nails  The following portions of the patient's history were reviewed and updated as appropriate: allergies, current medications, past family history, past medical history, past social history, past surgical history and problem list     Review of Systems   Constitutional: Positive for fatigue  Objective:    /68 (BP Location: Left arm, Patient Position: Sitting, Cuff Size: Adult)   Pulse 64   Temp 98 6 °F (37 °C) (Tympanic)   Resp 16   Ht 5' 4" (1 626 m)   Wt 80 2 kg (176 lb 12 8 oz)   LMP 2005 (Within Months)   BMI 30 35 kg/m²     Physical Exam  Vitals signs and nursing note reviewed  Constitutional   General appearance: Abnormal   well developed and obese  Pulmonary   Respiratory effort: No increased work of breathing or signs of respiratory distress  Abdomen   Abdomen: Abnormal   The abdomen was obese  Musculoskeletal   Gait and station: Normal     Psychiatric   Orientation to person, place and time: Normal     Affect: appropriate    40 minute visit, >50% time spent counseling patient on diet behavior and exercise modification for weight loss

## 2021-05-19 ENCOUNTER — OFFICE VISIT (OUTPATIENT)
Dept: BARIATRICS | Facility: CLINIC | Age: 61
End: 2021-05-19
Payer: COMMERCIAL

## 2021-05-19 VITALS
BODY MASS INDEX: 30.19 KG/M2 | HEIGHT: 64 IN | WEIGHT: 176.8 LBS | TEMPERATURE: 98.6 F | DIASTOLIC BLOOD PRESSURE: 68 MMHG | RESPIRATION RATE: 16 BRPM | SYSTOLIC BLOOD PRESSURE: 122 MMHG | HEART RATE: 64 BPM

## 2021-05-19 DIAGNOSIS — E53.8 VITAMIN B12 DEFICIENCY: ICD-10-CM

## 2021-05-19 DIAGNOSIS — Z79.899 MEDICATION MANAGEMENT: ICD-10-CM

## 2021-05-19 DIAGNOSIS — R53.83 OTHER FATIGUE: ICD-10-CM

## 2021-05-19 DIAGNOSIS — E66.9 CLASS 1 OBESITY: Primary | ICD-10-CM

## 2021-05-19 DIAGNOSIS — Z98.84 STATUS POST BARIATRIC SURGERY: ICD-10-CM

## 2021-05-19 DIAGNOSIS — R74.8 ELEVATED ALKALINE PHOSPHATASE LEVEL: ICD-10-CM

## 2021-05-19 DIAGNOSIS — E61.1 IRON DEFICIENCY: ICD-10-CM

## 2021-05-19 DIAGNOSIS — Z90.3 POSTGASTRECTOMY MALABSORPTION: ICD-10-CM

## 2021-05-19 DIAGNOSIS — K91.2 POSTGASTRECTOMY MALABSORPTION: ICD-10-CM

## 2021-05-19 LAB — ZINC SERPL-MCNC: 90 UG/DL (ref 44–115)

## 2021-05-19 PROCEDURE — 99215 OFFICE O/P EST HI 40 MIN: CPT | Performed by: PHYSICIAN ASSISTANT

## 2021-05-19 RX ORDER — CYANOCOBALAMIN 1000 UG/ML
1000 INJECTION INTRAMUSCULAR; SUBCUTANEOUS ONCE
Status: COMPLETED | OUTPATIENT
Start: 2021-05-19 | End: 2021-05-19

## 2021-05-19 RX ADMIN — CYANOCOBALAMIN 1000 MCG: 1000 INJECTION INTRAMUSCULAR; SUBCUTANEOUS at 14:50

## 2021-05-19 NOTE — ASSESSMENT & PLAN NOTE
-At risk for malabsorption of vitamins/minerals secondary to malabsorption and restriction of intake from their procedure  -Currently taking adequate postop bariatric surgery vitamin supplementation-yes, but forgetting evening doses    -Last set of bariatric labs completed on 5/17/21, referred to hematology for iron infusions  B12 injection given today, recommend making sure she reaches 1000 mg daily  Will recheck b12 in 3-6 months

## 2021-05-19 NOTE — ASSESSMENT & PLAN NOTE
-s/p RYGB  -Patient is pursuing the Conservative Program  -Initial weight loss goal of 5-10% weight loss for improved health  -Reviewed indications, mechanisms, and potential side effects of weight loss medications  Would be cautious with phentermine as she reports taking Benadryl has resulted in bigeminy     -Patient denies personal and family history of MEN2 tumors and medullary thyroid/thyroid carcinoma  Patient denies personal history of pancreatitis  (started 11/4/19)  Held when she had hip surgery, restarted for a week, but was forgetting to take  Will hold for now  She is planning to restart Contrave    -Denied hx of seizure and glaucoma  Started on Contrave as she was having cravings-(2/13/20, 197 4 lbs)        Initial(Start of MWM 10/15/18): 201 5 lbs  Start of Saxenda: (205 lbs, per 11/8/19, states she was 208 lbs when she started)  Current: 176 8 (-1 0 lbs since 12/10/20)   Change: -28 2 lbs (~14 % TBW)  Goal: 160-170s lbs and maintain

## 2021-05-20 LAB — COPPER SERPL-MCNC: 127 UG/DL (ref 80–158)

## 2021-05-21 LAB — VIT B1 BLD-SCNC: 116.3 NMOL/L (ref 66.5–200)

## 2021-05-23 LAB — VIT A SERPL-MCNC: 24.9 UG/DL (ref 22–69.5)

## 2021-05-25 ENCOUNTER — TELEPHONE (OUTPATIENT)
Dept: HEMATOLOGY ONCOLOGY | Facility: CLINIC | Age: 61
End: 2021-05-25

## 2021-05-25 NOTE — TELEPHONE ENCOUNTER
New Patient Encounter    New Patient Intake Form   Patient Details:  Alejandro Bird  1960  [id number]    Background Information:  [address] starts by opening a telephone encounter and gathering the following information   Who is calling to schedule? If not self, relationship to patient? Patient   Referring Provider Valeria Dean   What is the diagnosis? anemia   Is this diagnosis confirmed? Yes   When was the diagnosis? 5/2021   Is there a confirmed diagnosis from a biopsy/tissue reviewed by pathology? NA   Were outside slides requested? NA   Is patient aware of diagnosis? Yes   Is there a personal history and what kind? No   Is there a family history and what kind? No   Reason for visit? New Diagnosis   Have you had any imaging or labs done? If so: when, where? yes  [facility]   Are records in [facility]? yes   If patient has a prior history of cancer were old records obtained? NA   Was the patient told to bring a disk? No   Does the patient smoke or Vape? No   If yes, how many packs or cartridges per day? Scheduling Information:   Preferred [place]:  [place]     Are there any dates/time the patient cannot be seen? Miscellaneous:    After completing the above information, please route to Financial Counselor and the appropriate Nurse Navigator for review

## 2021-06-02 NOTE — PROGRESS NOTES
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[place] [address] Road is a 57 y o  with history of gastric bypass (2004) here for new consultation with me today  The patient is referred by FLORENCIA Hollingsworth and the reason for consultation is anemia  She is currently being followed by weight management  Her iron panel shows significant iron deficiency:   5/17/2021    Iron 38 (L)   Ferritin 6 (L)   Iron Saturation 10   TIBC 386      5/17/2021    Folate >20 0 (H)   COPPER 127   Vitamin B-12 337     This Visit  She is here by herself today  She voices no acute complaints  She admits that she has been non-compliant with her bariatric multivitamins due to increased stress these past few months  Her  became blind and is going through hemodialysis and her sister-in-law passed away suddenly from cancer  She has noticed significant increase in fatigue for the past few months  She describes it as falling asleep in front of the computer at work  She notes cold intolerance and increased frequency of headaches  She notes increased brittle nails and facial pallor  She notes baseline vertigo and baseline insomnia  She denies hair thinning    She denies fevers, chills, unintentional weight loss, night sweats, headaches, lightheadedness/dizziness, cough, SOB, palpitations, chest pain, abdominal tenderness/distension, nausea/vomiting, constipation/diarrhea, melena/hematochezia, hematuria, petechiae/purpura, increased ecchymosis, or LE swelling  I have reviewed the relevant past medical, surgical, social and family history  I have also reviewed allergies and medications for this patient  Review of Systems  Review of Systems   All other systems reviewed and are negative  OBJECTIVE     Physical Exam  Vitals:    06/04/21 1452   BP: 124/82   BP Location: Left arm   Pulse: 65   Resp: 18   Temp: 97 7 °F (36 5 °C)   TempSrc: Tympanic   SpO2: 98%   Weight: 78 5 kg (173 lb)   Height: 5' 4" (1 626 m)       Physical Exam  Vitals signs reviewed  Constitutional:       General: She is not in acute distress  Appearance: Normal appearance  She is not ill-appearing, toxic-appearing or diaphoretic  Comments: Pleasant 58-year-old female sitting comfortably on an exam room chair  HENT:      Head: Normocephalic and atraumatic  Eyes:      General: No scleral icterus  Extraocular Movements: Extraocular movements intact  Conjunctiva/sclera: Conjunctivae normal       Pupils: Pupils are equal, round, and reactive to light  Comments: No conjunctival pallor  Neck:      Musculoskeletal: Normal range of motion and neck supple  No neck rigidity  Cardiovascular:      Rate and Rhythm: Normal rate and regular rhythm  Pulses: Normal pulses  Heart sounds: Normal heart sounds  No murmur  No friction rub  No gallop  Pulmonary:      Effort: Pulmonary effort is normal  No respiratory distress  Breath sounds: Normal breath sounds  No wheezing or rales  Abdominal:      General: Bowel sounds are normal  There is no distension  Palpations: Abdomen is soft  There is no mass  Tenderness: There is no abdominal tenderness  There is no guarding or rebound  Musculoskeletal: Normal range of motion  General: No swelling or tenderness  Right lower leg: No edema  Left lower leg: No edema  Lymphadenopathy:      Cervical: No cervical adenopathy     Skin: General: Skin is warm and dry  Coloration: Skin is not jaundiced or pale  Findings: No bruising, erythema or rash  Neurological:      General: No focal deficit present  Mental Status: She is alert  Mental status is at baseline  Psychiatric:         Mood and Affect: Mood normal          Behavior: Behavior normal           Imaging  Relevant imaging reviewed in chart    Labs  Relevant labs reviewed in chart     ASSESSMENT & PLAN      Diagnosis ICD-10-CM Associated Orders   1  Iron deficiency anemia, unspecified iron deficiency anemia type  D50 9    2  Postsurgical malabsorption  K91 2    3  History of gastric bypass  Z98 84        I discussed Mercy Garcia's case with Dr Nettie Pleitez and we formulated the plan together  59-year-old female with history of gastric bypass seeing us for iron deficiency  Discussion:   Given Mercy Garcia's iron deficiency, I recommend IV iron supplementation with IV Venofer    o I reviewed the reasoning, process, and side effect profile of Venofer, which includes but is not limited to nausea, headache, hypotension, tattooing of the skin, and an anaphylactic reaction   The underlying etiology of Mercy Garcia's iron deficiency is likely postsurgical malabsorption from her history of gastric bypass  However, I defer to her PCP to investigate the underlying cause and coordinate the appropriate management  I explained that IV iron supplementation will only temporarily alleviate her iron deficiency unless the underlying etiology is managed  Thus, I strongly encouraged her to follow up with her PCP and weight management providers   She will see me in the office in 3 months to review blood work and discuss plan moving forward  I answered all her questions and she voiced understanding       Plan/Labs:   CBC and platelet   MMA and homocysteine to further assess for concurrent malabsorption   I ordered IV iron sucrose (Venofer) 300mg weekly x 5     I have ordered a CBC and platelet and iron panel in 3 months to confirm adequate iron supplementation and resolution of anemia  Follow Up   3 months      All questions were answered to the patient's satisfaction during this encounter  They appreciated and thanked me for spending time with them  The patient knows the contact information for our office and know to reach out for any relevant concerns related to this encounter  For all other listed problems and medical diagnosis in his chart - they are managed by PCP and/or other specialists, which patient acknowledges        Ruddy Camarillo PA-C  Hematology & Medical Oncology

## 2021-06-03 ENCOUNTER — OFFICE VISIT (OUTPATIENT)
Dept: FAMILY MEDICINE CLINIC | Facility: CLINIC | Age: 61
End: 2021-06-03
Payer: COMMERCIAL

## 2021-06-03 VITALS
RESPIRATION RATE: 16 BRPM | SYSTOLIC BLOOD PRESSURE: 130 MMHG | HEIGHT: 64 IN | BODY MASS INDEX: 30.49 KG/M2 | HEART RATE: 64 BPM | OXYGEN SATURATION: 100 % | DIASTOLIC BLOOD PRESSURE: 76 MMHG | WEIGHT: 178.6 LBS | TEMPERATURE: 96 F

## 2021-06-03 DIAGNOSIS — E66.9 CLASS 1 OBESITY: ICD-10-CM

## 2021-06-03 DIAGNOSIS — G47.09 OTHER INSOMNIA: ICD-10-CM

## 2021-06-03 DIAGNOSIS — Z12.11 COLON CANCER SCREENING: ICD-10-CM

## 2021-06-03 DIAGNOSIS — Z90.3 POSTGASTRECTOMY MALABSORPTION: ICD-10-CM

## 2021-06-03 DIAGNOSIS — M54.50 CHRONIC RIGHT-SIDED LOW BACK PAIN WITHOUT SCIATICA: ICD-10-CM

## 2021-06-03 DIAGNOSIS — G89.29 CHRONIC RIGHT-SIDED LOW BACK PAIN WITHOUT SCIATICA: ICD-10-CM

## 2021-06-03 DIAGNOSIS — K91.2 POSTGASTRECTOMY MALABSORPTION: ICD-10-CM

## 2021-06-03 DIAGNOSIS — F41.1 GENERALIZED ANXIETY DISORDER: Primary | ICD-10-CM

## 2021-06-03 PROBLEM — M25.551 RIGHT HIP PAIN: Status: RESOLVED | Noted: 2019-01-18 | Resolved: 2021-06-03

## 2021-06-03 PROBLEM — Z01.419 GYNECOLOGIC EXAM NORMAL: Status: RESOLVED | Noted: 2020-11-04 | Resolved: 2021-06-03

## 2021-06-03 PROBLEM — R79.89 LOW SERUM VITAMIN A: Status: RESOLVED | Noted: 2020-03-17 | Resolved: 2021-06-03

## 2021-06-03 PROBLEM — M25.519 SHOULDER JOINT PAIN: Status: RESOLVED | Noted: 2020-11-04 | Resolved: 2021-06-03

## 2021-06-03 PROBLEM — S92.515A NONDISPLACED FRACTURE OF PROXIMAL PHALANX OF LEFT LESSER TOE(S), INITIAL ENCOUNTER FOR CLOSED FRACTURE: Status: RESOLVED | Noted: 2019-06-02 | Resolved: 2021-06-03

## 2021-06-03 PROBLEM — Z96.641 HISTORY OF TOTAL RIGHT HIP REPLACEMENT: Status: ACTIVE | Noted: 2021-03-11

## 2021-06-03 PROCEDURE — 99214 OFFICE O/P EST MOD 30 MIN: CPT | Performed by: FAMILY MEDICINE

## 2021-06-03 RX ORDER — ALPRAZOLAM 0.25 MG/1
0.25 TABLET ORAL
Qty: 20 TABLET | Refills: 0 | Status: SHIPPED | OUTPATIENT
Start: 2021-06-03 | End: 2021-07-16 | Stop reason: SDUPTHER

## 2021-06-03 NOTE — PROGRESS NOTES
Assessment/Plan:    Primary osteoarthritis of one hip, right  Replacement done dec 2020    Low back pain  Physical therapy at home helping        Diagnoses and all orders for this visit:    Generalized anxiety disorder  Comments:  Xanax PRN  Orders:  -     ALPRAZolam (XANAX) 0 25 mg tablet; Take 1 tablet (0 25 mg total) by mouth daily at bedtime as needed for anxiety    Postgastrectomy malabsorption  Comments:  vitamins as directed    Class 1 obesity  Comments:  danni is helping     Other insomnia  Comments:  to try Xanax at night     Chronic right-sided low back pain without sciatica  Comments:  PT is helping     Colon cancer screening  -     Ambulatory referral for colonoscopy; Future      BMI Counseling: Body mass index is 30 66 kg/m²  The BMI is above normal  Nutrition recommendations include decreasing portion sizes and encouraging healthy choices of fruits and vegetables  Exercise recommendations include moderate physical activity 150 minutes/week  No pharmacotherapy was ordered  Subjective:      Patient ID: Anibal Hinson is a 57 y o  female  Here for follow up   Feeling well overall other anxiety due to  health issues       Anxiety  Presents for follow-up visit  Patient reports no chest pain, compulsions, confusion, decreased concentration, depressed mood, dizziness, dry mouth, excessive worry, feeling of choking, hyperventilation, impotence, insomnia, irritability, malaise, muscle tension, nausea, nervous/anxious behavior, obsessions, palpitations, panic, restlessness, shortness of breath or suicidal ideas  Symptoms occur occasionally  The quality of sleep is good  Compliance with medications is %  The following portions of the patient's history were reviewed and updated as appropriate: allergies, current medications, past family history, past medical history, past social history, past surgical history and problem list     Review of Systems   Constitutional: Negative  Negative for irritability  HENT: Negative  Eyes: Negative  Respiratory: Negative  Negative for shortness of breath  Cardiovascular: Negative for chest pain and palpitations  Gastrointestinal: Negative for nausea  Endocrine: Negative  Genitourinary: Negative  Negative for impotence  Musculoskeletal: Negative  Allergic/Immunologic: Negative  Neurological: Negative for dizziness  Hematological: Negative  Psychiatric/Behavioral: Negative for confusion, decreased concentration and suicidal ideas  The patient is not nervous/anxious and does not have insomnia  Objective:      /76 (BP Location: Left arm, Patient Position: Sitting, Cuff Size: Adult)   Pulse 64   Temp (!) 96 °F (35 6 °C) (Tympanic)   Resp 16   Ht 5' 4" (1 626 m)   Wt 81 kg (178 lb 9 6 oz)   LMP 05/01/2005 (Within Months)   SpO2 100%   BMI 30 66 kg/m²          Physical Exam  Constitutional:       Appearance: Normal appearance  HENT:      Nose: Nose normal  No congestion or rhinorrhea  Cardiovascular:      Rate and Rhythm: Normal rate and regular rhythm  Pulmonary:      Effort: Pulmonary effort is normal       Breath sounds: Normal breath sounds  Musculoskeletal: Normal range of motion  Skin:     General: Skin is warm  Neurological:      General: No focal deficit present  Mental Status: She is alert and oriented to person, place, and time     Psychiatric:         Mood and Affect: Mood normal          Behavior: Behavior normal

## 2021-06-04 ENCOUNTER — CONSULT (OUTPATIENT)
Dept: HEMATOLOGY ONCOLOGY | Facility: CLINIC | Age: 61
End: 2021-06-04
Payer: COMMERCIAL

## 2021-06-04 VITALS
BODY MASS INDEX: 29.53 KG/M2 | DIASTOLIC BLOOD PRESSURE: 82 MMHG | TEMPERATURE: 97.7 F | WEIGHT: 173 LBS | RESPIRATION RATE: 18 BRPM | HEIGHT: 64 IN | SYSTOLIC BLOOD PRESSURE: 124 MMHG | HEART RATE: 65 BPM | OXYGEN SATURATION: 98 %

## 2021-06-04 DIAGNOSIS — K91.2 POSTSURGICAL MALABSORPTION: ICD-10-CM

## 2021-06-04 DIAGNOSIS — Z98.84 HISTORY OF GASTRIC BYPASS: ICD-10-CM

## 2021-06-04 DIAGNOSIS — E53.8 LOW VITAMIN B12 LEVEL: ICD-10-CM

## 2021-06-04 DIAGNOSIS — D50.9 IRON DEFICIENCY ANEMIA, UNSPECIFIED IRON DEFICIENCY ANEMIA TYPE: Primary | ICD-10-CM

## 2021-06-04 PROCEDURE — 99244 OFF/OP CNSLTJ NEW/EST MOD 40: CPT | Performed by: STUDENT IN AN ORGANIZED HEALTH CARE EDUCATION/TRAINING PROGRAM

## 2021-06-04 RX ORDER — SODIUM CHLORIDE 9 MG/ML
20 INJECTION, SOLUTION INTRAVENOUS ONCE
Status: CANCELLED | OUTPATIENT
Start: 2021-06-15

## 2021-06-05 ENCOUNTER — LAB (OUTPATIENT)
Dept: LAB | Facility: CLINIC | Age: 61
End: 2021-06-05
Payer: COMMERCIAL

## 2021-06-05 DIAGNOSIS — R74.8 ELEVATED ALKALINE PHOSPHATASE LEVEL: ICD-10-CM

## 2021-06-05 DIAGNOSIS — K91.2 POSTSURGICAL MALABSORPTION: ICD-10-CM

## 2021-06-05 DIAGNOSIS — Z79.899 MEDICATION MANAGEMENT: ICD-10-CM

## 2021-06-05 DIAGNOSIS — K91.2 POSTGASTRECTOMY MALABSORPTION: ICD-10-CM

## 2021-06-05 DIAGNOSIS — Z98.84 STATUS POST BARIATRIC SURGERY: ICD-10-CM

## 2021-06-05 DIAGNOSIS — E66.9 CLASS 1 OBESITY: ICD-10-CM

## 2021-06-05 DIAGNOSIS — R53.83 OTHER FATIGUE: ICD-10-CM

## 2021-06-05 DIAGNOSIS — E53.8 VITAMIN B12 DEFICIENCY: ICD-10-CM

## 2021-06-05 DIAGNOSIS — Z98.84 HISTORY OF GASTRIC BYPASS: ICD-10-CM

## 2021-06-05 DIAGNOSIS — E53.8 LOW VITAMIN B12 LEVEL: ICD-10-CM

## 2021-06-05 DIAGNOSIS — Z90.3 POSTGASTRECTOMY MALABSORPTION: ICD-10-CM

## 2021-06-05 LAB
ALBUMIN SERPL BCP-MCNC: 3.7 G/DL (ref 3.5–5)
ALP SERPL-CCNC: 108 U/L (ref 46–116)
ALT SERPL W P-5'-P-CCNC: 16 U/L (ref 12–78)
ANION GAP SERPL CALCULATED.3IONS-SCNC: 9 MMOL/L (ref 4–13)
AST SERPL W P-5'-P-CCNC: 15 U/L (ref 5–45)
BILIRUB SERPL-MCNC: 0.3 MG/DL (ref 0.2–1)
BUN SERPL-MCNC: 18 MG/DL (ref 5–25)
CALCIUM SERPL-MCNC: 9.2 MG/DL (ref 8.3–10.1)
CHLORIDE SERPL-SCNC: 107 MMOL/L (ref 100–108)
CO2 SERPL-SCNC: 28 MMOL/L (ref 21–32)
CREAT SERPL-MCNC: 0.81 MG/DL (ref 0.6–1.3)
ERYTHROCYTE [DISTWIDTH] IN BLOOD BY AUTOMATED COUNT: 14.5 % (ref 11.6–15.1)
GFR SERPL CREATININE-BSD FRML MDRD: 78 ML/MIN/1.73SQ M
GLUCOSE P FAST SERPL-MCNC: 86 MG/DL (ref 65–99)
HCT VFR BLD AUTO: 40.3 % (ref 34.8–46.1)
HCYS SERPL-SCNC: 8.9 UMOL/L (ref 3.7–11.2)
HGB BLD-MCNC: 12.2 G/DL (ref 11.5–15.4)
MCH RBC QN AUTO: 26.6 PG (ref 26.8–34.3)
MCHC RBC AUTO-ENTMCNC: 30.3 G/DL (ref 31.4–37.4)
MCV RBC AUTO: 88 FL (ref 82–98)
PLATELET # BLD AUTO: 209 THOUSANDS/UL (ref 149–390)
PMV BLD AUTO: 11 FL (ref 8.9–12.7)
POTASSIUM SERPL-SCNC: 4.1 MMOL/L (ref 3.5–5.3)
PROT SERPL-MCNC: 7.3 G/DL (ref 6.4–8.2)
RBC # BLD AUTO: 4.58 MILLION/UL (ref 3.81–5.12)
SODIUM SERPL-SCNC: 144 MMOL/L (ref 136–145)
WBC # BLD AUTO: 4.7 THOUSAND/UL (ref 4.31–10.16)

## 2021-06-05 PROCEDURE — 80053 COMPREHEN METABOLIC PANEL: CPT

## 2021-06-05 PROCEDURE — 85027 COMPLETE CBC AUTOMATED: CPT

## 2021-06-05 PROCEDURE — 83918 ORGANIC ACIDS TOTAL QUANT: CPT

## 2021-06-05 PROCEDURE — 83090 ASSAY OF HOMOCYSTEINE: CPT

## 2021-06-05 PROCEDURE — 36415 COLL VENOUS BLD VENIPUNCTURE: CPT

## 2021-06-06 NOTE — RESULT ENCOUNTER NOTE
Stable labs  Continue plan of care per PCP Dr Frye Deal        Message sent to patient via [employer] patient portal

## 2021-06-11 DIAGNOSIS — D50.9 IRON DEFICIENCY ANEMIA, UNSPECIFIED IRON DEFICIENCY ANEMIA TYPE: Primary | ICD-10-CM

## 2021-06-11 DIAGNOSIS — K91.2 POSTSURGICAL MALABSORPTION: ICD-10-CM

## 2021-06-11 LAB
METHYLMALONATE SERPL-SCNC: 203 NMOL/L (ref 0–378)
SL AMB DISCLAIMER: NORMAL

## 2021-06-11 RX ORDER — SODIUM CHLORIDE 9 MG/ML
20 INJECTION, SOLUTION INTRAVENOUS ONCE
Status: CANCELLED | OUTPATIENT
Start: 2021-06-15

## 2021-06-15 ENCOUNTER — HOSPITAL ENCOUNTER (OUTPATIENT)
Dept: INFUSION CENTER | Facility: CLINIC | Age: 61
Discharge: HOME | End: 2021-06-15
Payer: COMMERCIAL

## 2021-06-15 VITALS
HEART RATE: 75 BPM | TEMPERATURE: 98.4 F | SYSTOLIC BLOOD PRESSURE: 108 MMHG | RESPIRATION RATE: 18 BRPM | DIASTOLIC BLOOD PRESSURE: 72 MMHG

## 2021-06-15 DIAGNOSIS — D50.9 IRON DEFICIENCY ANEMIA, UNSPECIFIED IRON DEFICIENCY ANEMIA TYPE: ICD-10-CM

## 2021-06-15 DIAGNOSIS — K91.2 POSTSURGICAL MALABSORPTION: Primary | ICD-10-CM

## 2021-06-15 PROCEDURE — 96365 THER/PROPH/DIAG IV INF INIT: CPT

## 2021-06-15 RX ORDER — SODIUM CHLORIDE 9 MG/ML
20 INJECTION, SOLUTION INTRAVENOUS ONCE
Status: COMPLETED | OUTPATIENT
Start: 2021-06-15 | End: 2021-06-15

## 2021-06-15 RX ORDER — SODIUM CHLORIDE 9 MG/ML
20 INJECTION, SOLUTION INTRAVENOUS ONCE
Status: CANCELLED | OUTPATIENT
Start: 2021-06-22

## 2021-06-15 RX ADMIN — SODIUM CHLORIDE 20 ML/HR: 0.9 INJECTION, SOLUTION INTRAVENOUS at 14:15

## 2021-06-15 RX ADMIN — IRON SUCROSE 300 MG: 20 INJECTION, SOLUTION INTRAVENOUS at 14:32

## 2021-06-15 NOTE — PROGRESS NOTES
Pt  Denied new symptoms or concerns today  Pt  Tolerated #1/5 Venofer infusions without adverse event  Future appointments reviewed  AVS provided

## 2021-06-22 ENCOUNTER — TELEPHONE (OUTPATIENT)
Dept: BARIATRICS | Facility: CLINIC | Age: 61
End: 2021-06-22

## 2021-06-22 ENCOUNTER — HOSPITAL ENCOUNTER (OUTPATIENT)
Dept: INFUSION CENTER | Facility: CLINIC | Age: 61
Discharge: HOME | End: 2021-06-22

## 2021-06-22 NOTE — TELEPHONE ENCOUNTER
Spoke with pt and informed her of the approval of the PA for Yue  I also informed pt that insurance wants to see a 5% tbw loss before they renew authorization

## 2021-06-25 DIAGNOSIS — D50.9 IRON DEFICIENCY ANEMIA, UNSPECIFIED IRON DEFICIENCY ANEMIA TYPE: Primary | ICD-10-CM

## 2021-06-25 DIAGNOSIS — K91.2 POSTSURGICAL MALABSORPTION: ICD-10-CM

## 2021-06-25 RX ORDER — SODIUM CHLORIDE 9 MG/ML
20 INJECTION, SOLUTION INTRAVENOUS ONCE
Status: CANCELLED | OUTPATIENT
Start: 2021-06-29

## 2021-06-29 ENCOUNTER — HOSPITAL ENCOUNTER (OUTPATIENT)
Dept: INFUSION CENTER | Facility: CLINIC | Age: 61
Discharge: HOME | End: 2021-06-29
Payer: COMMERCIAL

## 2021-06-29 VITALS
TEMPERATURE: 97 F | SYSTOLIC BLOOD PRESSURE: 113 MMHG | HEART RATE: 73 BPM | RESPIRATION RATE: 20 BRPM | DIASTOLIC BLOOD PRESSURE: 75 MMHG

## 2021-06-29 DIAGNOSIS — K91.2 POSTSURGICAL MALABSORPTION: ICD-10-CM

## 2021-06-29 DIAGNOSIS — D50.9 IRON DEFICIENCY ANEMIA, UNSPECIFIED IRON DEFICIENCY ANEMIA TYPE: Primary | ICD-10-CM

## 2021-06-29 PROCEDURE — 96365 THER/PROPH/DIAG IV INF INIT: CPT

## 2021-06-29 RX ORDER — SODIUM CHLORIDE 9 MG/ML
20 INJECTION, SOLUTION INTRAVENOUS ONCE
Status: CANCELLED | OUTPATIENT
Start: 2021-07-06

## 2021-06-29 RX ORDER — SODIUM CHLORIDE 9 MG/ML
20 INJECTION, SOLUTION INTRAVENOUS ONCE
Status: COMPLETED | OUTPATIENT
Start: 2021-06-29 | End: 2021-06-29

## 2021-06-29 RX ADMIN — IRON SUCROSE 300 MG: 20 INJECTION, SOLUTION INTRAVENOUS at 14:26

## 2021-06-29 RX ADMIN — SODIUM CHLORIDE 20 ML/HR: 0.9 INJECTION, SOLUTION INTRAVENOUS at 14:18

## 2021-06-29 NOTE — PROGRESS NOTES
Pt arrived to unit without complaint  Pt tolerated Venofer without incident  AVS provided  Pt left unit in stable condition

## 2021-06-29 NOTE — PLAN OF CARE
Macho Joshua MD 6/2/2021 3:38 PM CDT    Try zpack allegra d Flonase gargling etc.  ----- Message -----  From: Alba Vu RN  Sent: 6/2/2021 3:30 PM CDT  To: Macho Joshua MD  Subject: FW: Non-Urgent Medical Question     Dr. Joshua: Please advise.   ----- Message -----  From: Say De  Sent: 6/2/2021 2:40 PM CDT  To: YAYA Joshua  Nurse Msg Pool  Subject: Non-Urgent Medical Question      Hi,    Starting yesterday, late morning, I began to experience a sore throat and a fever which ranged between 99.6 to 100.3. I've been taking Motrin and Tylenol to keep it down. My right neck glands are extremely swollen. My girlfriend is experiencing the same minus the fever. She went to the walk in today and she has strep throat. I tried to make an appointment to come in and see you but the online system won't allow it. So should I go to the walk in tomorrow morning or call the office for a sick appointment?    Thank you,  Say       Problem: Potential for Falls  Goal: Patient will remain free of falls  Description: INTERVENTIONS:  - Educate patient/family on patient safety including physical limitations  - Instruct patient to call for assistance with activity   - Keep Call bell within reach  - Keep bed low and locked with side rails adjusted as appropriate  - Keep care items and personal belongings within reach  - Initiate and maintain comfort rounds  - Make Fall Risk Sign visible to staff  - Consider moving patient to room near nurses station  Outcome: Progressing

## 2021-07-06 ENCOUNTER — HOSPITAL ENCOUNTER (OUTPATIENT)
Dept: INFUSION CENTER | Facility: CLINIC | Age: 61
Discharge: HOME | End: 2021-07-06
Payer: COMMERCIAL

## 2021-07-06 VITALS
HEART RATE: 67 BPM | TEMPERATURE: 98.7 F | SYSTOLIC BLOOD PRESSURE: 106 MMHG | DIASTOLIC BLOOD PRESSURE: 67 MMHG | RESPIRATION RATE: 16 BRPM

## 2021-07-06 DIAGNOSIS — D50.9 IRON DEFICIENCY ANEMIA, UNSPECIFIED IRON DEFICIENCY ANEMIA TYPE: ICD-10-CM

## 2021-07-06 DIAGNOSIS — K91.2 POSTSURGICAL MALABSORPTION: Primary | ICD-10-CM

## 2021-07-06 PROCEDURE — 96365 THER/PROPH/DIAG IV INF INIT: CPT

## 2021-07-06 RX ORDER — SODIUM CHLORIDE 9 MG/ML
20 INJECTION, SOLUTION INTRAVENOUS ONCE
Status: COMPLETED | OUTPATIENT
Start: 2021-07-06 | End: 2021-07-06

## 2021-07-06 RX ORDER — SODIUM CHLORIDE 9 MG/ML
20 INJECTION, SOLUTION INTRAVENOUS ONCE
Status: CANCELLED | OUTPATIENT
Start: 2021-07-13

## 2021-07-06 RX ADMIN — IRON SUCROSE 300 MG: 20 INJECTION, SOLUTION INTRAVENOUS at 14:30

## 2021-07-06 RX ADMIN — SODIUM CHLORIDE 20 ML/HR: 0.9 INJECTION, SOLUTION INTRAVENOUS at 14:10

## 2021-07-13 ENCOUNTER — HOSPITAL ENCOUNTER (OUTPATIENT)
Dept: INFUSION CENTER | Facility: CLINIC | Age: 61
Discharge: HOME | End: 2021-07-13
Payer: COMMERCIAL

## 2021-07-13 VITALS
RESPIRATION RATE: 18 BRPM | TEMPERATURE: 97 F | SYSTOLIC BLOOD PRESSURE: 114 MMHG | HEART RATE: 68 BPM | DIASTOLIC BLOOD PRESSURE: 73 MMHG

## 2021-07-13 DIAGNOSIS — K91.2 POSTSURGICAL MALABSORPTION: Primary | ICD-10-CM

## 2021-07-13 DIAGNOSIS — D50.9 IRON DEFICIENCY ANEMIA, UNSPECIFIED IRON DEFICIENCY ANEMIA TYPE: ICD-10-CM

## 2021-07-13 PROCEDURE — 96365 THER/PROPH/DIAG IV INF INIT: CPT

## 2021-07-13 RX ORDER — SODIUM CHLORIDE 9 MG/ML
20 INJECTION, SOLUTION INTRAVENOUS ONCE
Status: CANCELLED | OUTPATIENT
Start: 2021-07-20

## 2021-07-13 RX ORDER — SODIUM CHLORIDE 9 MG/ML
20 INJECTION, SOLUTION INTRAVENOUS ONCE
Status: COMPLETED | OUTPATIENT
Start: 2021-07-13 | End: 2021-07-13

## 2021-07-13 RX ADMIN — SODIUM CHLORIDE 20 ML/HR: 0.9 INJECTION, SOLUTION INTRAVENOUS at 14:30

## 2021-07-13 RX ADMIN — IRON SUCROSE 300 MG: 20 INJECTION, SOLUTION INTRAVENOUS at 14:30

## 2021-07-13 NOTE — PROGRESS NOTES
Patient tolerated Venofer infusion with no complications  Pt is aware of all future appointments   Declined AVS

## 2021-07-16 DIAGNOSIS — F41.1 GENERALIZED ANXIETY DISORDER: ICD-10-CM

## 2021-07-16 RX ORDER — ALPRAZOLAM 0.25 MG/1
0.25 TABLET ORAL
Qty: 20 TABLET | Refills: 0 | Status: SHIPPED | OUTPATIENT
Start: 2021-07-16 | End: 2021-10-05 | Stop reason: SDUPTHER

## 2021-07-16 NOTE — TELEPHONE ENCOUNTER
Medication:  PDMP   06/03/2021  1  06/03/2021  ALPRAZOLAM 0 25 MG TABLET  20 0  20  TI [facility]I     Active agreement on file -No

## 2021-07-20 ENCOUNTER — HOSPITAL ENCOUNTER (OUTPATIENT)
Dept: INFUSION CENTER | Facility: CLINIC | Age: 61
Discharge: HOME | End: 2021-07-20
Payer: COMMERCIAL

## 2021-07-20 VITALS
HEART RATE: 68 BPM | RESPIRATION RATE: 16 BRPM | DIASTOLIC BLOOD PRESSURE: 73 MMHG | TEMPERATURE: 98.8 F | SYSTOLIC BLOOD PRESSURE: 111 MMHG

## 2021-07-20 DIAGNOSIS — D50.9 IRON DEFICIENCY ANEMIA, UNSPECIFIED IRON DEFICIENCY ANEMIA TYPE: ICD-10-CM

## 2021-07-20 DIAGNOSIS — K91.2 POSTSURGICAL MALABSORPTION: Primary | ICD-10-CM

## 2021-07-20 PROCEDURE — 96366 THER/PROPH/DIAG IV INF ADDON: CPT

## 2021-07-20 PROCEDURE — 96365 THER/PROPH/DIAG IV INF INIT: CPT

## 2021-07-20 RX ORDER — SODIUM CHLORIDE 9 MG/ML
20 INJECTION, SOLUTION INTRAVENOUS ONCE
Status: CANCELLED | OUTPATIENT
Start: 2021-07-27

## 2021-07-20 RX ORDER — SODIUM CHLORIDE 9 MG/ML
20 INJECTION, SOLUTION INTRAVENOUS ONCE
Status: COMPLETED | OUTPATIENT
Start: 2021-07-20 | End: 2021-07-20

## 2021-07-20 RX ADMIN — SODIUM CHLORIDE 20 ML/HR: 0.9 INJECTION, SOLUTION INTRAVENOUS at 14:17

## 2021-07-20 RX ADMIN — IRON SUCROSE 300 MG: 20 INJECTION, SOLUTION INTRAVENOUS at 14:17

## 2021-07-20 NOTE — PROGRESS NOTES
Patient arrived on unit for Venofer  Denies any present issues/concerns  Tolerated Venofer without incident  No further appointments scheduled at this time  Patient has f/u labs and appointment with physician   Declined MICHELLES

## 2021-09-01 ENCOUNTER — APPOINTMENT (OUTPATIENT)
Dept: LAB | Facility: HOSPITAL | Age: 61
End: 2021-09-01
Payer: COMMERCIAL

## 2021-09-01 ENCOUNTER — OFFICE VISIT (OUTPATIENT)
Dept: BARIATRICS | Facility: CLINIC | Age: 61
End: 2021-09-01
Payer: COMMERCIAL

## 2021-09-01 VITALS
DIASTOLIC BLOOD PRESSURE: 80 MMHG | BODY MASS INDEX: 30.3 KG/M2 | HEIGHT: 64 IN | TEMPERATURE: 98 F | WEIGHT: 177.5 LBS | HEART RATE: 52 BPM | SYSTOLIC BLOOD PRESSURE: 118 MMHG

## 2021-09-01 DIAGNOSIS — D50.9 IRON DEFICIENCY ANEMIA, UNSPECIFIED IRON DEFICIENCY ANEMIA TYPE: ICD-10-CM

## 2021-09-01 DIAGNOSIS — Z98.84 HISTORY OF GASTRIC BYPASS: ICD-10-CM

## 2021-09-01 DIAGNOSIS — Z98.84 STATUS POST BARIATRIC SURGERY: ICD-10-CM

## 2021-09-01 DIAGNOSIS — F43.21 ADJUSTMENT DISORDER WITH DEPRESSED MOOD: ICD-10-CM

## 2021-09-01 DIAGNOSIS — R63.8 ABNORMAL CRAVING: ICD-10-CM

## 2021-09-01 DIAGNOSIS — K91.2 POSTSURGICAL MALABSORPTION: ICD-10-CM

## 2021-09-01 DIAGNOSIS — E66.9 CLASS 1 OBESITY: Primary | ICD-10-CM

## 2021-09-01 LAB
ERYTHROCYTE [DISTWIDTH] IN BLOOD BY AUTOMATED COUNT: 14.7 % (ref 11.6–15.1)
FERRITIN SERPL-MCNC: 166 NG/ML (ref 8–388)
HCT VFR BLD AUTO: 44 % (ref 34.8–46.1)
HGB BLD-MCNC: 13.5 G/DL (ref 11.5–15.4)
IRON SATN MFR SERPL: 22 %
IRON SERPL-MCNC: 73 UG/DL (ref 50–170)
MCH RBC QN AUTO: 29.1 PG (ref 26.8–34.3)
MCHC RBC AUTO-ENTMCNC: 30.7 G/DL (ref 31.4–37.4)
MCV RBC AUTO: 95 FL (ref 82–98)
PLATELET # BLD AUTO: 202 THOUSANDS/UL (ref 149–390)
PMV BLD AUTO: 10.8 FL (ref 8.9–12.7)
RBC # BLD AUTO: 4.64 MILLION/UL (ref 3.81–5.12)
TIBC SERPL-MCNC: 339 UG/DL (ref 250–450)
WBC # BLD AUTO: 4.96 THOUSAND/UL (ref 4.31–10.16)

## 2021-09-01 PROCEDURE — 85027 COMPLETE CBC AUTOMATED: CPT

## 2021-09-01 PROCEDURE — 83550 IRON BINDING TEST: CPT

## 2021-09-01 PROCEDURE — 83540 ASSAY OF IRON: CPT

## 2021-09-01 PROCEDURE — 99214 OFFICE O/P EST MOD 30 MIN: CPT | Performed by: PHYSICIAN ASSISTANT

## 2021-09-01 PROCEDURE — 36415 COLL VENOUS BLD VENIPUNCTURE: CPT

## 2021-09-01 PROCEDURE — 82728 ASSAY OF FERRITIN: CPT

## 2021-09-01 RX ORDER — NALTREXONE HYDROCHLORIDE 50 MG/1
25 TABLET, FILM COATED ORAL DAILY
Qty: 15 TABLET | Refills: 1 | Status: SHIPPED | OUTPATIENT
Start: 2021-09-01 | End: 2021-12-14 | Stop reason: SDUPTHER

## 2021-09-01 RX ORDER — NALTREXONE HYDROCHLORIDE AND BUPROPION HYDROCHLORIDE 8; 90 MG/1; MG/1
TABLET, EXTENDED RELEASE ORAL
COMMUNITY
Start: 2021-05-30 | End: 2021-09-01 | Stop reason: ALTCHOICE

## 2021-09-01 RX ORDER — BUPROPION HYDROCHLORIDE 150 MG/1
150 TABLET ORAL EVERY MORNING
Qty: 30 TABLET | Refills: 1 | Status: SHIPPED | OUTPATIENT
Start: 2021-09-01 | End: 2021-10-13 | Stop reason: SDUPTHER

## 2021-09-01 NOTE — PROGRESS NOTES
Assessment/Plan:    Class 1 obesity  -s/p RYGB  -Patient is pursuing the Conservative Program  -Initial weight loss goal of 5-10% weight loss for improved health  -Denied hx of seizure and glaucoma  -Reviewed indications, mechanisms, and potential side effects of weight loss medications  Would be cautious with phentermine as she reports taking Benadryl has resulted in bigeminy     -Patient denies personal and family history of MEN2 tumors and medullary thyroid/thyroid carcinoma  Patient denies personal history of pancreatitis  (started 11/4/19)  Held when she had hip surgery and has since restarted  Having positive effects on appetite and helping prevent weight gain  Having some nausea  Discussed dose time and tips to help reduce this    -Continues with hunger and had positive effects with Contrave in the past  Feeling down at times  Add Wellbutrin    -Update b12 at next appt     Initial(Start of MWM 10/15/18): 201 5 lbs  Start of Saxenda: (205 lbs, per 11/8/19  Current: 177 5 (+0 7 lbs since 5/19/21)   Change: -27 5 lbs (~14 % TBW)  Goal: 160-170s lbs and maintain    Postsurgical malabsorption  -At risk for malabsorption of vitamins/minerals secondary to malabsorption and restriction of intake from their procedure  -Currently taking adequate postop bariatric surgery vitamin supplementation-yes  -Last set of bariatric labs completed on 5/17/21, referred to hematology for iron infusions  B12 injection given today, recommend making sure she reaches 1000 mg daily  Will recheck b12 in 3-6 months  Follow up in approximately 6 weeks with Non-Surgical Physician/Advanced Practitioner  Goals:  Food log (ie ) www myfitnesspal com,sparkpeople  com,loseit com,calorieking  com,etc  baritastic  No sugary beverages  At least 64oz of water daily  Increase physical activity by 10 minutes daily   Gradually increase physical activity to a goal of 5 days per week for 30 minutes of MODERATE intensity PLUS 2 days per week of FULL BODY resistance training  Practice lesson plans 1-6 in bariatric manual  and Practice  rule  Goal protein intake of 60-80 grams per day  Alcohol and nicotine use is not recommended following bariatric surgery  NSAIDs (Motrin, Advil, Aleve, Naproxen, ibuprofen, etc) should be avoided following bariatric surgery    Diagnoses and all orders for this visit:    Class 1 obesity  -     liraglutide (SAXENDA) injection; Inject 0 5 mL (3 mg total) under the skin daily  -     buPROPion (WELLBUTRIN XL) 150 mg 24 hr tablet; Take 1 tablet (150 mg total) by mouth every morning  -     naltrexone (REVIA) 50 mg tablet; Take 0 5 tablets (25 mg total) by mouth daily    Status post bariatric surgery  -     liraglutide (SAXENDA) injection; Inject 0 5 mL (3 mg total) under the skin daily    Postsurgical malabsorption    Adjustment disorder with depressed mood  -     buPROPion (WELLBUTRIN XL) 150 mg 24 hr tablet; Take 1 tablet (150 mg total) by mouth every morning  -     naltrexone (REVIA) 50 mg tablet; Take 0 5 tablets (25 mg total) by mouth daily    Abnormal craving  -     buPROPion (WELLBUTRIN XL) 150 mg 24 hr tablet; Take 1 tablet (150 mg total) by mouth every morning  -     naltrexone (REVIA) 50 mg tablet; Take 0 5 tablets (25 mg total) by mouth daily    Body mass index 30 0-30 9, adult    Subjective:   Chief Complaint   Patient presents with    Follow-up     3 month MWM     Patient ID: Edmundo Adam  is a 57 y o  female with excess weight/obesity here to pursue weight management    Past Medical History:   Diagnosis Date    Anxiety disorder     Arthritis     osteoarthritis hip/poss knuckles"    Basal cell carcinoma     Cancer ([facility] [address] )     Cervical disc herniation     C7//sees chiropracter and no recent problems    Exercise involving walking     [phone] steps per day     1 para 1     History of bariatric surgery     History of non anemic vitamin B12 deficiency     History of prediabetes     before bariatric surgery  Hyperlipidemia     not on meds    Hypertension     not on meds    Irregular heart beat     "history bigeminy from taking benadryl, tries to avoid taking it"no recent issues"    Motion sickness     "on rides"    Neck pain     occas    Nondisplaced fracture of proximal phalanx of left lesser toe(s), initial encounter for closed fracture 6/2/2019    OA (osteoarthritis) of hip     Obesity     Oral herpes simplex infection     occas    Other insomnia 5/11/2020    Postmenopausal     Sciatica     Vertigo     Wears glasses      HPI:  The patient presents for MWM    Restarted Saxenda, but very gradually titrated  At 2 4 mg now  Positive effects, but noticing increased hunger 10 30 am-11  Was hungry and having increased snacking in afternoon, but was able to reduce calories from snacks and frequency of afternoon snacks  Feeling down at times as she has family stressors  The following portions of the patient's history were reviewed and updated as appropriate: allergies, current medications, past family history, past medical history, past social history, past surgical history and problem list     Review of Systems   Psychiatric/Behavioral: Negative for suicidal ideas (Denies HI)  Objective:    /80 (BP Location: Left arm, Patient Position: Sitting, Cuff Size: Standard)   Pulse (!) 52   Temp 98 °F (36 7 °C) (Tympanic)   Ht 5' 4" (1 626 m)   Wt 80 5 kg (177 lb 8 oz)   LMP 05/01/2005 (Within Months)   BMI 30 47 kg/m²     Physical Exam  Vitals and nursing note reviewed  Constitutional   General appearance: Abnormal   well developed and obese  Pulmonary   Respiratory effort: No increased work of breathing or signs of respiratory distress      Abdomen   Abdomen: Abnormal   The abdomen was obese    Musculoskeletal   Gait and station: Normal     Psychiatric   Orientation to person, place and time: Normal     Affect: appropriate

## 2021-09-02 NOTE — ASSESSMENT & PLAN NOTE
-At risk for malabsorption of vitamins/minerals secondary to malabsorption and restriction of intake from their procedure  -Currently taking adequate postop bariatric surgery vitamin supplementation-yes  -Last set of bariatric labs completed on 5/17/21, referred to hematology for iron infusions  B12 injection given today, recommend making sure she reaches 1000 mg daily  Will recheck b12 in 3-6 months

## 2021-09-02 NOTE — ASSESSMENT & PLAN NOTE
-s/p RYGB  -Patient is pursuing the Conservative Program  -Initial weight loss goal of 5-10% weight loss for improved health  -Denied hx of seizure and glaucoma  -Reviewed indications, mechanisms, and potential side effects of weight loss medications  Would be cautious with phentermine as she reports taking Benadryl has resulted in bigeminy     -Patient denies personal and family history of MEN2 tumors and medullary thyroid/thyroid carcinoma  Patient denies personal history of pancreatitis  (started 11/4/19)  Held when she had hip surgery and has since restarted  Having positive effects on appetite and helping prevent weight gain  Having some nausea  Discussed dose time and tips to help reduce this    -Continues with hunger and had positive effects with Contrave in the past  Feeling down at times   Add Wellbutrin    -Update b12 at next appt     Initial(Start of MWM 10/15/18): 201 5 lbs  Start of Saxenda: (205 lbs, per 11/8/19  Current: 177 5 (+0 7 lbs since 5/19/21)   Change: -27 5 lbs (~14 % TBW)  Goal: 160-170s lbs and maintain

## 2021-09-08 NOTE — PROGRESS NOTES
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[place] [address] Road is a 57 y o  with history of gastric bypass (2004) here for follow-up with me today for iron deficiency anemia  She is currently being followed by weight management  She tolerated IV Venofer well on 6/15, 6/29, 7/6, 7/13, and 7/20  Her iron panel shows significant iron deficiency:   5/17/2021 9/1/2021    Iron 38 (L) 73   Ferritin 6 (L) 166   Iron Saturation 10 22   TIBC 386 339     Her CBC has been unremarkable:   1/11/2021 6/5/2021 9/1/2021    WBC 11 26 (H) 4 70 4 96   Hemoglobin 12 7 12 2 13 5   HCT 42 0 40 3 44 0   MCV 92 88 95   Platelet Count 649 434 202      5/17/2021    Folate >20 0 (H)   Vitamin B-12 337      6/5/2021    METHYLMALONIC ACID, S 203   HOMOCYST(E)INE, P/S 8 9     During her initial visit, she admitted to being non-compliant with her bariatric multivitamins due to increased stress recently  Her  became blind and is going through hemodialysis and her sister-in-law passed away suddenly from cancer  She also admitted to increased fatigue, cold intolerance, increased frequency of headaches, brittle nails, and facial pallor  This Visit  She is here by herself today  She voices no acute complaints  She tells me that she continues to have low energy levels  However, she notes improved sleep quality  She notes resolution of headaches, cold intolerance, brittle nails, and facial pallor   She also notes improvement of stamina  She has baseline lightheadedness and dizziness due to vertigo  She denies fevers, chills, unintentional weight loss, night sweats, cough, SOB, palpitations, chest pain, abdominal tenderness/distension, nausea/vomiting, constipation/diarrhea, melena/hematochezia, hematuria, petechiae/purpura, increased ecchymosis, or LE swelling  I have reviewed the relevant past medical, surgical, social and family history  I have also reviewed allergies and medications for this patient  Review of Systems  Review of Systems   All other systems reviewed and are negative  OBJECTIVE     Physical Exam  Vitals:    09/09/21 1505   BP: 120/80   BP Location: Right arm   Patient Position: Sitting   Cuff Size: Adult   Pulse: 58   Resp: 17   Temp: 98 2 °F (36 8 °C)   SpO2: 100%   Weight: 80 3 kg (177 lb)   Height: 5' 4" (1 626 m)       Physical Exam  Vitals reviewed  Constitutional:       General: She is not in acute distress  Appearance: Normal appearance  She is normal weight  She is not ill-appearing, toxic-appearing or diaphoretic  Comments: Pleasant 23-year-old female sitting comfortably on an exam room chair  HENT:      Head: Normocephalic and atraumatic  Eyes:      General: No scleral icterus  Extraocular Movements: Extraocular movements intact  Conjunctiva/sclera: Conjunctivae normal       Pupils: Pupils are equal, round, and reactive to light  Comments: No conjunctival pallor  Cardiovascular:      Rate and Rhythm: Normal rate and regular rhythm  Pulses: Normal pulses  Heart sounds: Normal heart sounds  No murmur heard  No friction rub  No gallop  Pulmonary:      Effort: Pulmonary effort is normal  No respiratory distress  Breath sounds: Normal breath sounds  No wheezing or rales  Abdominal:      General: Bowel sounds are normal  There is no distension  Palpations: Abdomen is soft  There is no mass  Tenderness:  There is no abdominal tenderness  There is no guarding or rebound  Musculoskeletal:         General: No swelling or tenderness  Normal range of motion  Cervical back: Normal range of motion and neck supple  No rigidity  Right lower leg: No edema  Left lower leg: No edema  Lymphadenopathy:      Cervical: No cervical adenopathy  Skin:     General: Skin is warm and dry  Coloration: Skin is not jaundiced or pale  Findings: No bruising, erythema or rash  Neurological:      General: No focal deficit present  Mental Status: She is alert  Mental status is at baseline  Psychiatric:         Mood and Affect: Mood normal          Behavior: Behavior normal           Imaging  Relevant imaging reviewed in chart    Labs  Relevant labs reviewed in chart     ASSESSMENT & PLAN      Diagnosis ICD-10-CM Associated Orders   1  Iron deficiency anemia, unspecified iron deficiency anemia type  D50 9 CBC and Platelet     Iron Panel (Includes Ferritin, Iron Sat%, Iron, and TIBC)   2  Postsurgical malabsorption  K91 2 CBC and Platelet     Iron Panel (Includes Ferritin, Iron Sat%, Iron, and TIBC)     Vitamin B12     Folate     Methylmalonic acid, serum     Homocysteine, serum   3  History of gastric bypass  Z98 84 CBC and Platelet     Iron Panel (Includes Ferritin, Iron Sat%, Iron, and TIBC)     Vitamin B12     Folate     Methylmalonic acid, serum     Homocysteine, serum       I discussed Mercy Garcia's case with Dr Joseph Cancino and we formulated the plan together  60-year-old female with postsurgical malabsorption from history of gastric bypass with resolution of symptomatic iron deficiency with IV Venofer therapy  Discussion:   I extensively reviewed Mercy Garcia's blood work with her  After 5 doses of IV Venofer therapy, her iron deficiency anemia has resolved   Given that the etiology of her iron deficiency anemia is likely due to postsurgical malabsorption from her history of gastric bypass and she is at an increased risk for iron deficiency anemia in the future, I recommend continued monitoring at a 4 month interval initially  Also given her history of low vitamin B12, I ordered repeat blood work to be drawn in 4 months  Plan/Labs:   CBC and platelet, vitamin J27, folate, MMA, homocysteine, and iron panel in 4 months    Follow up   4 months    All questions were answered to the patient's satisfaction during this encounter  They appreciated and thanked me for spending time with them  The patient knows the contact information for our office and know to reach out for any relevant concerns related to this encounter  For all other listed problems and medical diagnosis in his chart - they are managed by PCP and/or other specialists, which patient acknowledges        Nathaly Oreilly PA-C  Hematology & Medical Oncology

## 2021-09-09 ENCOUNTER — OFFICE VISIT (OUTPATIENT)
Dept: HEMATOLOGY ONCOLOGY | Facility: CLINIC | Age: 61
End: 2021-09-09
Payer: COMMERCIAL

## 2021-09-09 VITALS
DIASTOLIC BLOOD PRESSURE: 80 MMHG | WEIGHT: 177 LBS | HEIGHT: 64 IN | BODY MASS INDEX: 30.22 KG/M2 | TEMPERATURE: 98.2 F | SYSTOLIC BLOOD PRESSURE: 120 MMHG | RESPIRATION RATE: 17 BRPM | OXYGEN SATURATION: 100 % | HEART RATE: 58 BPM

## 2021-09-09 DIAGNOSIS — D50.9 IRON DEFICIENCY ANEMIA, UNSPECIFIED IRON DEFICIENCY ANEMIA TYPE: Primary | ICD-10-CM

## 2021-09-09 DIAGNOSIS — Z98.84 HISTORY OF GASTRIC BYPASS: ICD-10-CM

## 2021-09-09 DIAGNOSIS — K91.2 POSTSURGICAL MALABSORPTION: ICD-10-CM

## 2021-09-09 PROCEDURE — 99214 OFFICE O/P EST MOD 30 MIN: CPT | Performed by: STUDENT IN AN ORGANIZED HEALTH CARE EDUCATION/TRAINING PROGRAM

## 2021-10-05 DIAGNOSIS — F41.1 GENERALIZED ANXIETY DISORDER: ICD-10-CM

## 2021-10-05 RX ORDER — ALPRAZOLAM 0.25 MG/1
0.25 TABLET ORAL
Qty: 20 TABLET | Refills: 0 | Status: SHIPPED | OUTPATIENT
Start: 2021-10-05 | End: 2021-11-11 | Stop reason: SDUPTHER

## 2021-10-06 PROCEDURE — 88305 TISSUE EXAM BY PATHOLOGIST: CPT | Performed by: STUDENT IN AN ORGANIZED HEALTH CARE EDUCATION/TRAINING PROGRAM

## 2021-10-07 ENCOUNTER — LAB REQUISITION (OUTPATIENT)
Dept: LAB | Facility: HOSPITAL | Age: 61
End: 2021-10-07
Payer: COMMERCIAL

## 2021-10-07 DIAGNOSIS — D48.5 NEOPLASM OF UNCERTAIN BEHAVIOR OF SKIN: ICD-10-CM

## 2021-10-12 ENCOUNTER — TELEPHONE (OUTPATIENT)
Dept: DERMATOLOGY | Facility: CLINIC | Age: 61
End: 2021-10-12

## 2021-10-13 ENCOUNTER — OFFICE VISIT (OUTPATIENT)
Dept: BARIATRICS | Facility: CLINIC | Age: 61
End: 2021-10-13
Payer: COMMERCIAL

## 2021-10-13 VITALS
BODY MASS INDEX: 30.27 KG/M2 | TEMPERATURE: 97.8 F | DIASTOLIC BLOOD PRESSURE: 68 MMHG | WEIGHT: 177.3 LBS | HEART RATE: 68 BPM | HEIGHT: 64 IN | RESPIRATION RATE: 16 BRPM | SYSTOLIC BLOOD PRESSURE: 128 MMHG

## 2021-10-13 DIAGNOSIS — R63.8 ABNORMAL CRAVING: ICD-10-CM

## 2021-10-13 DIAGNOSIS — F43.21 ADJUSTMENT DISORDER WITH DEPRESSED MOOD: ICD-10-CM

## 2021-10-13 DIAGNOSIS — Z98.84 STATUS POST BARIATRIC SURGERY: ICD-10-CM

## 2021-10-13 DIAGNOSIS — K91.2 POSTSURGICAL MALABSORPTION: ICD-10-CM

## 2021-10-13 DIAGNOSIS — E66.9 CLASS 1 OBESITY: Primary | ICD-10-CM

## 2021-10-13 PROCEDURE — 99214 OFFICE O/P EST MOD 30 MIN: CPT | Performed by: PHYSICIAN ASSISTANT

## 2021-10-13 RX ORDER — BUPROPION HYDROCHLORIDE 300 MG/1
300 TABLET ORAL EVERY MORNING
Qty: 30 TABLET | Refills: 5 | Status: SHIPPED | OUTPATIENT
Start: 2021-10-13 | End: 2021-12-20 | Stop reason: SDUPTHER

## 2021-10-19 ENCOUNTER — CONSULT (OUTPATIENT)
Dept: DERMATOLOGY | Facility: CLINIC | Age: 61
End: 2021-10-19
Payer: COMMERCIAL

## 2021-10-19 DIAGNOSIS — C44.41 BASAL CELL CARCINOMA (BCC) OF SCALP: Primary | ICD-10-CM

## 2021-10-19 PROCEDURE — 99203 OFFICE O/P NEW LOW 30 MIN: CPT | Performed by: DERMATOLOGY

## 2021-10-20 ENCOUNTER — TELEPHONE (OUTPATIENT)
Dept: BARIATRICS | Facility: CLINIC | Age: 61
End: 2021-10-20

## 2021-10-23 ENCOUNTER — IMMUNIZATIONS (OUTPATIENT)
Dept: FAMILY MEDICINE CLINIC | Facility: HOSPITAL | Age: 61
End: 2021-10-23

## 2021-10-23 DIAGNOSIS — Z23 ENCOUNTER FOR IMMUNIZATION: Primary | ICD-10-CM

## 2021-10-23 PROCEDURE — 0001A COVID-19 PFIZER VACC 0.3 ML: CPT

## 2021-10-23 PROCEDURE — 91300 COVID-19 PFIZER VACC 0.3 ML: CPT

## 2021-11-09 ENCOUNTER — ANNUAL EXAM (OUTPATIENT)
Dept: OBGYN CLINIC | Facility: CLINIC | Age: 61
End: 2021-11-09
Payer: COMMERCIAL

## 2021-11-09 VITALS
SYSTOLIC BLOOD PRESSURE: 126 MMHG | WEIGHT: 180 LBS | DIASTOLIC BLOOD PRESSURE: 74 MMHG | BODY MASS INDEX: 30.73 KG/M2 | HEIGHT: 64 IN

## 2021-11-09 DIAGNOSIS — Z01.419 GYNECOLOGIC EXAM NORMAL: Primary | ICD-10-CM

## 2021-11-09 PROCEDURE — 99396 PREV VISIT EST AGE 40-64: CPT | Performed by: PHYSICIAN ASSISTANT

## 2021-11-11 DIAGNOSIS — F41.1 GENERALIZED ANXIETY DISORDER: ICD-10-CM

## 2021-11-11 RX ORDER — ALPRAZOLAM 0.25 MG/1
0.25 TABLET ORAL
Qty: 20 TABLET | Refills: 0 | Status: SHIPPED | OUTPATIENT
Start: 2021-11-11 | End: 2021-12-07 | Stop reason: SDUPTHER

## 2021-11-15 ENCOUNTER — PROCEDURE VISIT (OUTPATIENT)
Dept: DERMATOLOGY | Facility: CLINIC | Age: 61
End: 2021-11-15
Payer: COMMERCIAL

## 2021-11-15 ENCOUNTER — TELEPHONE (OUTPATIENT)
Dept: DERMATOLOGY | Facility: CLINIC | Age: 61
End: 2021-11-15

## 2021-11-15 VITALS
WEIGHT: 180.6 LBS | RESPIRATION RATE: 18 BRPM | DIASTOLIC BLOOD PRESSURE: 72 MMHG | SYSTOLIC BLOOD PRESSURE: 118 MMHG | HEART RATE: 71 BPM | BODY MASS INDEX: 31 KG/M2 | TEMPERATURE: 97.6 F

## 2021-11-15 DIAGNOSIS — C44.41 BASAL CELL CARCINOMA (BCC) OF SCALP: Primary | ICD-10-CM

## 2021-11-15 PROCEDURE — 12031 INTMD RPR S/A/T/EXT 2.5 CM/<: CPT | Performed by: DERMATOLOGY

## 2021-11-15 PROCEDURE — 17311 MOHS 1 STAGE H/N/HF/G: CPT | Performed by: DERMATOLOGY

## 2021-11-15 RX ORDER — CEPHALEXIN 500 MG/1
500 CAPSULE ORAL ONCE
Status: COMPLETED | OUTPATIENT
Start: 2021-11-15 | End: 2021-11-15

## 2021-11-15 RX ADMIN — CEPHALEXIN 500 MG: 500 CAPSULE ORAL at 14:25

## 2021-11-19 ENCOUNTER — HOSPITAL ENCOUNTER (OUTPATIENT)
Dept: RADIOLOGY | Age: 61
Discharge: HOME | End: 2021-11-19
Payer: COMMERCIAL

## 2021-11-19 VITALS — HEIGHT: 64 IN | WEIGHT: 180 LBS | BODY MASS INDEX: 30.73 KG/M2

## 2021-11-19 DIAGNOSIS — Z12.31 ENCOUNTER FOR SCREENING MAMMOGRAM FOR MALIGNANT NEOPLASM OF BREAST: ICD-10-CM

## 2021-11-19 PROCEDURE — 77063 BREAST TOMOSYNTHESIS BI: CPT

## 2021-11-19 PROCEDURE — 77067 SCR MAMMO BI INCL CAD: CPT

## 2021-12-07 ENCOUNTER — OFFICE VISIT (OUTPATIENT)
Dept: FAMILY MEDICINE CLINIC | Facility: CLINIC | Age: 61
End: 2021-12-07
Payer: COMMERCIAL

## 2021-12-07 VITALS
RESPIRATION RATE: 16 BRPM | SYSTOLIC BLOOD PRESSURE: 128 MMHG | HEIGHT: 64 IN | BODY MASS INDEX: 31 KG/M2 | WEIGHT: 181.6 LBS | OXYGEN SATURATION: 98 % | TEMPERATURE: 97.4 F | HEART RATE: 75 BPM | DIASTOLIC BLOOD PRESSURE: 82 MMHG

## 2021-12-07 DIAGNOSIS — Z00.00 ANNUAL PHYSICAL EXAM: Primary | ICD-10-CM

## 2021-12-07 DIAGNOSIS — F41.1 GENERALIZED ANXIETY DISORDER: ICD-10-CM

## 2021-12-07 DIAGNOSIS — Z13.820 OSTEOPOROSIS SCREENING: ICD-10-CM

## 2021-12-07 PROBLEM — M25.529 PAIN IN ELBOW: Status: RESOLVED | Noted: 2020-11-04 | Resolved: 2021-12-07

## 2021-12-07 PROCEDURE — 99396 PREV VISIT EST AGE 40-64: CPT | Performed by: FAMILY MEDICINE

## 2021-12-07 RX ORDER — ALPRAZOLAM 0.25 MG/1
0.25 TABLET ORAL
Qty: 30 TABLET | Refills: 2 | Status: SHIPPED | OUTPATIENT
Start: 2021-12-07 | End: 2022-01-07 | Stop reason: SDUPTHER

## 2021-12-14 DIAGNOSIS — E66.9 CLASS 1 OBESITY: ICD-10-CM

## 2021-12-14 DIAGNOSIS — F43.21 ADJUSTMENT DISORDER WITH DEPRESSED MOOD: ICD-10-CM

## 2021-12-14 DIAGNOSIS — R63.8 ABNORMAL CRAVING: ICD-10-CM

## 2021-12-14 RX ORDER — NALTREXONE HYDROCHLORIDE 50 MG/1
25 TABLET, FILM COATED ORAL DAILY
Qty: 15 TABLET | Refills: 1 | Status: SHIPPED | OUTPATIENT
Start: 2021-12-14 | End: 2021-12-20 | Stop reason: SDUPTHER

## 2021-12-16 PROCEDURE — U0005 INFEC AGEN DETEC AMPLI PROBE: HCPCS | Performed by: FAMILY MEDICINE

## 2021-12-16 PROCEDURE — U0003 INFECTIOUS AGENT DETECTION BY NUCLEIC ACID (DNA OR RNA); SEVERE ACUTE RESPIRATORY SYNDROME CORONAVIRUS 2 (SARS-COV-2) (CORONAVIRUS DISEASE [COVID-19]), AMPLIFIED PROBE TECHNIQUE, MAKING USE OF HIGH THROUGHPUT TECHNOLOGIES AS DESCRIBED BY CMS-2020-01-R: HCPCS | Performed by: FAMILY MEDICINE

## 2021-12-20 ENCOUNTER — OFFICE VISIT (OUTPATIENT)
Dept: BARIATRICS | Facility: CLINIC | Age: 61
End: 2021-12-20
Payer: COMMERCIAL

## 2021-12-20 VITALS
SYSTOLIC BLOOD PRESSURE: 128 MMHG | WEIGHT: 181.2 LBS | BODY MASS INDEX: 30.19 KG/M2 | HEIGHT: 65 IN | HEART RATE: 73 BPM | TEMPERATURE: 97.5 F | DIASTOLIC BLOOD PRESSURE: 80 MMHG

## 2021-12-20 DIAGNOSIS — K91.2 POSTSURGICAL MALABSORPTION: ICD-10-CM

## 2021-12-20 DIAGNOSIS — F43.21 ADJUSTMENT DISORDER WITH DEPRESSED MOOD: ICD-10-CM

## 2021-12-20 DIAGNOSIS — R63.8 ABNORMAL CRAVING: ICD-10-CM

## 2021-12-20 DIAGNOSIS — Z98.84 STATUS POST BARIATRIC SURGERY: ICD-10-CM

## 2021-12-20 DIAGNOSIS — E66.9 CLASS 1 OBESITY: Primary | ICD-10-CM

## 2021-12-20 PROCEDURE — 99214 OFFICE O/P EST MOD 30 MIN: CPT | Performed by: PHYSICIAN ASSISTANT

## 2021-12-20 RX ORDER — BUPROPION HYDROCHLORIDE 300 MG/1
300 TABLET ORAL EVERY MORNING
Qty: 30 TABLET | Refills: 5 | Status: SHIPPED | OUTPATIENT
Start: 2021-12-20 | End: 2022-07-25 | Stop reason: SDUPTHER

## 2021-12-20 RX ORDER — NALTREXONE HYDROCHLORIDE 50 MG/1
50 TABLET, FILM COATED ORAL DAILY
Qty: 30 TABLET | Refills: 5 | Status: SHIPPED | OUTPATIENT
Start: 2021-12-20 | End: 2022-07-25 | Stop reason: ALTCHOICE

## 2022-01-04 ENCOUNTER — TELEPHONE (OUTPATIENT)
Dept: GASTROENTEROLOGY | Facility: HOSPITAL | Age: 62
End: 2022-01-04

## 2022-01-05 ENCOUNTER — ANESTHESIA EVENT (OUTPATIENT)
Dept: GASTROENTEROLOGY | Facility: HOSPITAL | Age: 62
End: 2022-01-05

## 2022-01-05 ENCOUNTER — ANESTHESIA (OUTPATIENT)
Dept: GASTROENTEROLOGY | Facility: HOSPITAL | Age: 62
End: 2022-01-05

## 2022-01-05 ENCOUNTER — HOSPITAL ENCOUNTER (OUTPATIENT)
Dept: GASTROENTEROLOGY | Facility: HOSPITAL | Age: 62
Setting detail: OUTPATIENT SURGERY
Discharge: HOME | End: 2022-01-05
Attending: COLON & RECTAL SURGERY
Payer: COMMERCIAL

## 2022-01-05 VITALS
DIASTOLIC BLOOD PRESSURE: 60 MMHG | SYSTOLIC BLOOD PRESSURE: 110 MMHG | TEMPERATURE: 96.5 F | HEART RATE: 72 BPM | RESPIRATION RATE: 18 BRPM | OXYGEN SATURATION: 98 %

## 2022-01-05 DIAGNOSIS — Z86.010 PERSONAL HISTORY OF COLONIC POLYPS: ICD-10-CM

## 2022-01-05 PROCEDURE — G0105 COLORECTAL SCRN; HI RISK IND: HCPCS | Performed by: COLON & RECTAL SURGERY

## 2022-01-05 PROCEDURE — 99213 OFFICE O/P EST LOW 20 MIN: CPT | Performed by: COLON & RECTAL SURGERY

## 2022-01-05 RX ORDER — PROPOFOL 10 MG/ML
INJECTION, EMULSION INTRAVENOUS CONTINUOUS PRN
Status: DISCONTINUED | OUTPATIENT
Start: 2022-01-05 | End: 2022-01-05

## 2022-01-05 RX ORDER — SODIUM CHLORIDE 9 MG/ML
INJECTION, SOLUTION INTRAVENOUS CONTINUOUS PRN
Status: DISCONTINUED | OUTPATIENT
Start: 2022-01-05 | End: 2022-01-05

## 2022-01-05 RX ORDER — PROPOFOL 10 MG/ML
INJECTION, EMULSION INTRAVENOUS AS NEEDED
Status: DISCONTINUED | OUTPATIENT
Start: 2022-01-05 | End: 2022-01-05

## 2022-01-05 RX ADMIN — PROPOFOL 20 MG: 10 INJECTION, EMULSION INTRAVENOUS at 10:59

## 2022-01-05 RX ADMIN — SODIUM CHLORIDE: 0.9 INJECTION, SOLUTION INTRAVENOUS at 10:52

## 2022-01-05 RX ADMIN — PROPOFOL 30 MG: 10 INJECTION, EMULSION INTRAVENOUS at 11:00

## 2022-01-05 RX ADMIN — PROPOFOL 120 MCG/KG/MIN: 10 INJECTION, EMULSION INTRAVENOUS at 10:57

## 2022-01-05 RX ADMIN — PROPOFOL 100 MG: 10 INJECTION, EMULSION INTRAVENOUS at 10:57

## 2022-01-05 NOTE — ANESTHESIA PREPROCEDURE EVALUATION
Procedure:  COLONOSCOPY    Relevant Problems   GI/HEPATIC   (+) Status post bariatric surgery      HEMATOLOGY   (+) Iron deficiency anemia      MUSCULOSKELETAL   (+) Lateral epicondylitis   (+) Low back pain   (+) Lumbar spondylosis   (+) Primary osteoarthritis of one hip, right      NEURO/PSYCH   (+) Anxiety disorder   (+) Chronic pain syndrome        Physical Exam    Airway    Mallampati score: I  TM Distance: >3 FB  Neck ROM: full     Dental   No notable dental hx     Cardiovascular  Cardiovascular exam normal    Pulmonary  Pulmonary exam normal     Other Findings        Anesthesia Plan  ASA Score- 2     Anesthesia Type- IV sedation with anesthesia with ASA Monitors  Additional Monitors:   Airway Plan:           Plan Factors-Exercise tolerance (METS): >4 METS  Chart reviewed  Existing labs reviewed  Patient summary reviewed  Patient is not a current smoker  Patient did not smoke on day of surgery  Induction- intravenous  Postoperative Plan-     Informed Consent- Anesthetic plan and risks discussed with patient  Torres Webber is a 60 y o  female presenting today for Colonoscopy  History of anesthesia colonscopy and EGD with no issues  NPO since appropriate  Bowel prep completed  Denies N/V, F, chills, CP, SOB  AQA  Plan for MAC

## 2022-01-05 NOTE — H&P
History and Physical   Colon and Rectal Surgery   George Morocho 60 y o  female MRN: [id number]  Unit/Bed#:  Encounter: [id number]  22   10:37 AM      CC:  Screening    History of Present Illness   HPI:  George Morocho is a 60 y o  female with no GI symptoms  Historical Information   Past Medical History:   Diagnosis Date    Anxiety disorder     Arthritis     osteoarthritis hip/poss knuckles"    Basal cell carcinoma     Cancer ([facility] [address]     Cervical disc herniation     C7//sees chiropracter and no recent problems    Exercise involving walking     10-46533 steps per day     1 para 1     History of bariatric surgery     History of non anemic vitamin B12 deficiency     History of prediabetes     before bariatric surgery    Hyperlipidemia     not on meds    Hypertension     not on meds    Irregular heart beat     "history bigeminy from taking benadryl, tries to avoid taking it"no recent issues"    Motion sickness     "on rides"    Neck pain     occas    Nondisplaced fracture of proximal phalanx of left lesser toe(s), initial encounter for closed fracture 2019    OA (osteoarthritis) of hip     Obesity     Oral herpes simplex infection     occas    Other insomnia 2020    Postmenopausal     Sciatica     Vertigo     Wears glasses      Past Surgical History:   Procedure Laterality Date    BARIATRIC SURGERY  2004    COLPOSCOPY      Cervical Dysplasia    DENTAL SURGERY      one lower right    DENTAL SURGERY      Has dental implant; s/p tooth abscess    EGD      GASTRIC BYPASS      titanium staples    GYNECOLOGIC CRYOSURGERY      Cervical Dysplasia    LUMBAR LAMINECTOMY  1980    L5-S1; No Hardware    AK COLONOSCOPY FLX DX W/COLLJ SPEC WHEN PFRMD N/A 2016    Procedure: COLONOSCOPY;  Surgeon: Bharat Al MD;  Location: [facility];   Service: Colorectal    AK TOTAL HIP ARTHROPLASTY Right 2020    Procedure: ARTHROPLASTY HIP TOTAL ANTERIOR;  Surgeon: Maximo Chambers Henna Otto MD;  Location: [facility];  Service: Orthopedics    ROTATOR CUFF REPAIR Right 2010    SKIN CANCER EXCISION      s/p BCC Back and R Clavicle    WISDOM TOOTH EXTRACTION         Meds/Allergies     (Not in a hospital admission)        Current Outpatient Medications:     ALPRAZolam (XANAX) 0 25 mg tablet, Take 1 tablet (0 25 mg total) by mouth daily at bedtime as needed for anxiety, Disp: 30 tablet, Rfl: 2    buPROPion (WELLBUTRIN XL) 300 mg 24 hr tablet, Take 1 tablet (300 mg total) by mouth every morning, Disp: 30 tablet, Rfl: 5    Calcium Carbonate-Vitamin D (Calcium 600-D) 600-400 MG-UNIT per tablet, Take 2 capsules by mouth 2 (two) times a day  , Disp: , Rfl:     cyclobenzaprine (FLEXERIL) 10 mg tablet, Take 10 mg by mouth 3 (three) times a day as needed for muscle spasms, Disp: , Rfl:     Insulin Pen Needle (NOVOFINE PLUS) 32G X 4 MM MISC, by Does not apply route daily (Patient taking differently: by Does not apply route daily Rx per Weight Management for Saxenda), Disp: 30 each, Rfl: 3    liraglutide (SAXENDA) injection, Inject 0 5 mL (3 mg total) under the skin daily, Disp: 45 mL, Rfl: 1    Meclizine HCl (ANTIVERT PO), Take 1 tablet by mouth as needed Rx per prior PCP, Disp: , Rfl:     Multiple Vitamins-Minerals (BARIATRIC FUSION PO), Take 2 tablets by mouth 2 (two) times a day , Disp: , Rfl:     naltrexone (REVIA) 50 mg tablet, Take 1 tablet (50 mg total) by mouth daily, Disp: 30 tablet, Rfl: 5    Allergies   Allergen Reactions    Macrodantin [Nitrofurantoin]      myalgias    Sulfa Antibiotics Swelling     Tongue swelling         Social History   Social History     Substance and Sexual Activity   Alcohol Use Yes    Alcohol/week: 1 0 standard drink    Types: 1 Shots of liquor per week    Comment: rarely/holidays     Social History     Substance and Sexual Activity   Drug Use Yes    Types: Marijuana    Comment: Last marijuana use in college     Social History     Tobacco Use   Smoking Status Never Smoker   Smokeless Tobacco Never Used         Family History:   Family History   Problem Relation Age of Onset    Hypertension Mother     Heart failure Mother         s/p ICD    Diabetes type II Mother 54    Hypothyroidism Mother     Heart attack Mother 80    Hyperlipidemia Mother     Depression Mother     Heart block Father         s/p ICD    Hypertension Father     Prostate cancer Father 79        c Mets    Hyperlipidemia Father     Diabetes Family     Arthritis Family     Breast cancer Family     No Known Problems Maternal Grandmother     No Known Problems Maternal Grandfather     No Known Problems Paternal Grandmother     No Known Problems Paternal Grandfather     No Known Problems Maternal Aunt     No Known Problems Paternal Aunt     Breast cancer Paternal Aunt 61    Breast cancer Cousin 61    Leukemia Brother 72        CLL    Hyperlipidemia Brother     Basal cell carcinoma Brother     OCD Son     Anxiety disorder Son     Hypertension Son     Obesity Son     Diabetes type II Brother 61    Osteoarthritis Brother         s/p MVA     Review of Systems - General ROS: negative  Respiratory ROS: negative  Cardiovascular ROS: negative     Objective     Current Vitals:      No intake or output data in the 24 hours ending 01/05/22 1037    Physical Exam:  General:  Well nourished, no distress  Neuro: Alert and oriented  Eyes:Sclera anicteric, conjunctiva pink  Pulm: Clear to auscultation bilaterally  No respiratory Distress  CV:  Regular rate and rhythm  No murmurs  Abdomen:  Soft, flat, non-tender, without masses or hepatosplenomegaly  Lab Results:       ASSESSMENT:  Ashvin Ramirez is a 60 y o  female for screening  PLAN:  Colonoscopy  Risks , including, but not limited to, bleeding, perforation, missed lesions, and potential need for surgery, were reviewed  Alternatives to colonoscopy were discussed    Karo Curiel MD

## 2022-01-05 NOTE — ANESTHESIA POSTPROCEDURE EVALUATION
Post-Op Assessment Note    CV Status:  Stable  Pain Score: 0    Pain management: adequate     Mental Status:  Alert and awake   Hydration Status:  Euvolemic   PONV Controlled:  Controlled   Airway Patency:  Patent      Post Op Vitals Reviewed: Yes      Staff: CRNA         No complications documented      BP   114/57   Temp     Pulse  74   Resp 12   SpO2   100

## 2022-01-06 ENCOUNTER — TELEPHONE (OUTPATIENT)
Dept: DERMATOLOGY | Facility: CLINIC | Age: 62
End: 2022-01-06

## 2022-01-06 NOTE — TELEPHONE ENCOUNTER
Faxed [facility] procedure notes from 11/15 to  Dermatology to the attention of Farooq Jessica  Faxed to [phone] and received confirmation

## 2022-01-07 ENCOUNTER — HOSPITAL ENCOUNTER (OUTPATIENT)
Dept: RADIOLOGY | Facility: HOSPITAL | Age: 62
Discharge: HOME | End: 2022-01-07
Attending: ORTHOPAEDIC SURGERY
Payer: COMMERCIAL

## 2022-01-07 ENCOUNTER — APPOINTMENT (OUTPATIENT)
Dept: LAB | Facility: HOSPITAL | Age: 62
End: 2022-01-07
Payer: COMMERCIAL

## 2022-01-07 DIAGNOSIS — Z98.84 HISTORY OF GASTRIC BYPASS: ICD-10-CM

## 2022-01-07 DIAGNOSIS — D50.9 IRON DEFICIENCY ANEMIA, UNSPECIFIED IRON DEFICIENCY ANEMIA TYPE: ICD-10-CM

## 2022-01-07 DIAGNOSIS — K91.2 POSTSURGICAL MALABSORPTION: ICD-10-CM

## 2022-01-07 DIAGNOSIS — F41.1 GENERALIZED ANXIETY DISORDER: ICD-10-CM

## 2022-01-07 DIAGNOSIS — M25.551 RIGHT HIP PAIN: ICD-10-CM

## 2022-01-07 LAB
ERYTHROCYTE [DISTWIDTH] IN BLOOD BY AUTOMATED COUNT: 12.3 % (ref 11.6–15.1)
FERRITIN SERPL-MCNC: 181 NG/ML (ref 8–388)
FOLATE SERPL-MCNC: >20 NG/ML (ref 3.1–17.5)
HCT VFR BLD AUTO: 43.2 % (ref 34.8–46.1)
HCYS SERPL-SCNC: 8.9 UMOL/L (ref 3.7–11.2)
HGB BLD-MCNC: 13.7 G/DL (ref 11.5–15.4)
IRON SATN MFR SERPL: 31 % (ref 15–50)
IRON SERPL-MCNC: 89 UG/DL (ref 50–170)
MCH RBC QN AUTO: 31.3 PG (ref 26.8–34.3)
MCHC RBC AUTO-ENTMCNC: 31.7 G/DL (ref 31.4–37.4)
MCV RBC AUTO: 99 FL (ref 82–98)
PLATELET # BLD AUTO: 193 THOUSANDS/UL (ref 149–390)
PMV BLD AUTO: 11.3 FL (ref 8.9–12.7)
RBC # BLD AUTO: 4.38 MILLION/UL (ref 3.81–5.12)
TIBC SERPL-MCNC: 284 UG/DL (ref 250–450)
VIT B12 SERPL-MCNC: 934 PG/ML (ref 100–900)
WBC # BLD AUTO: 5.14 THOUSAND/UL (ref 4.31–10.16)

## 2022-01-07 PROCEDURE — 82746 ASSAY OF FOLIC ACID SERUM: CPT

## 2022-01-07 PROCEDURE — 82607 VITAMIN B-12: CPT

## 2022-01-07 PROCEDURE — 83550 IRON BINDING TEST: CPT

## 2022-01-07 PROCEDURE — 82728 ASSAY OF FERRITIN: CPT

## 2022-01-07 PROCEDURE — 36415 COLL VENOUS BLD VENIPUNCTURE: CPT

## 2022-01-07 PROCEDURE — 85027 COMPLETE CBC AUTOMATED: CPT

## 2022-01-07 PROCEDURE — 83540 ASSAY OF IRON: CPT

## 2022-01-07 PROCEDURE — 83918 ORGANIC ACIDS TOTAL QUANT: CPT

## 2022-01-07 PROCEDURE — 83090 ASSAY OF HOMOCYSTEINE: CPT

## 2022-01-07 PROCEDURE — 73502 X-RAY EXAM HIP UNI 2-3 VIEWS: CPT

## 2022-01-07 RX ORDER — ALPRAZOLAM 0.25 MG/1
0.25 TABLET ORAL
Qty: 30 TABLET | Refills: 2 | Status: SHIPPED | OUTPATIENT
Start: 2022-01-07 | End: 2022-04-04 | Stop reason: SDUPTHER

## 2022-01-07 NOTE — PROGRESS NOTES
[facility] - Hematology & Medical Oncology  Outpatient Visit Encounter Note      Leeanna Rao 60 y o  female 1960 [id number] Date:  1/10/2022    The patient was identified by name and date of birth  Allison Brown was informed that this is a telemedicine visit and that the visit is being conducted through [address] [place] [address] Now and patient was informed that this is a secure, HIPAA-compliant platform  She agrees to proceed     My office door was closed  No one else was in the room  He acknowledged consent and understanding of privacy and security of the video platform  The patient has agreed to participate and understands they can discontinue the visit at any time  Patient is aware this is a billable service  HEMATOLOGICAL HISTORY        Clotting History Denies   Bleeding History Denies   Cancer History BCC   Family Cancer History Father with prostate cancer, paternal aunt with breast cancer   H/O COVID Infection Denies   H/O COVID Vaccination 2/2 [employer]   H/O Blood/Plt Transfusion Denies   Tobacco Use Denies   Alcohol Use Rarely   Occupation RN, 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[place] [address] is a 60 y o  with history of gastric bypass (2004) here for follow-up with me today for iron deficiency anemia  She is currently being followed by weight management  She tolerated IV Venofer well on 6/15, 6/29, 7/6, 7/13, and 7/20       Her CBC shows borderline macrocytosis:   1/11/2021 6/5/2021 9/1/2021 1/7/2022    WBC 11 26 (H) 4 70 4 96 5 14   Hemoglobin 12 7 12 2 13 5 13 7   HCT 42 0 40 3 44 0 43 2   MCV 92 88 95 99 (H)   Platelet Count 696 765 202 193     Her iron panel shows continued resolution of iron deficiency:   5/17/2021 9/1/2021 1/7/2022    Iron 38 (L) 73 89   Ferritin 6 (L) 166 181   Iron Saturation 10 22 31   TIBC 386 339 284        5/17/2021 1/7/2022    Vitamin B-12 337 934 (H)      5/17/2021 1/7/2022    Folate >20 0 (H) >20 0 (H)     Her homocysteine has remained WNL:   6/5/2021 1/7/2022    HOMOCYST(E)INE, P/S 8 9 8 9   Her MMA is still in process  During her initial visit, she admitted to being non-compliant with her bariatric multivitamins due to increased stress recently  Her  became blind and is going through hemodialysis and her sister-in-law passed away suddenly from cancer  She also admitted to increased fatigue, cold intolerance, increased frequency of headaches, brittle nails, and facial pallor  This Visit  She is here by herself today  She voices no acute complaints  She obtained a COVID test today because she has been experiencing headache, fatigue, and increased congestion for the past 2 days  She tells me that her energy levels are still better than they were prior to her iron infusions  She has also increased her dosage of oral iron supplementation  She tells me that she has been sleeping better  She has baseline cold intolerance, brittle nails, and facial pallor  She also notes improvement of stamina since receiving IV iron  She notes resolution of lightheadedness and dizziness  She denies fevers, chills, unintentional weight loss, night sweats, cough, SOB, palpitations, chest pain, abdominal tenderness/distension, nausea/vomiting, constipation/diarrhea, melena/hematochezia, abnormal vaginal bleeding, hematuria, petechiae/purpura, increased ecchymosis, or LE swelling  I have reviewed the relevant past medical, surgical, social and family history  I have also reviewed allergies and medications for this patient  Review of Systems  Review of Systems   All other systems reviewed and are negative  OBJECTIVE     Physical Exam  Vitals:    01/10/22 1454   Temp: 99 5 °F (37 5 °C)   Weight: 80 3 kg (177 lb)   Height: 5' 5" (1 651 m)       Physical Exam  Constitutional:       Appearance: Normal appearance  Comments: Limited due to nature of virtual visit    Tired appearing pleasant 57-year-old female visualized from the shoulder and up  HENT:      Head: Normocephalic and atraumatic  Nose: Nose normal    Eyes:      Extraocular Movements: Extraocular movements intact  Musculoskeletal:      Cervical back: Normal range of motion  Neurological:      Mental Status: She is alert  Imaging  Relevant imaging reviewed in chart    Labs  Relevant labs reviewed in chart     ASSESSMENT & PLAN      Diagnosis ICD-10-CM Associated Orders   1  Iron deficiency anemia, unspecified iron deficiency anemia type  D50 9 CBC and Platelet     Iron Panel (Includes Ferritin, Iron Sat%, Iron, and TIBC)   2  Postsurgical malabsorption  K91 2 CBC and Platelet     Vitamin B12     Folate     Iron Panel (Includes Ferritin, Iron Sat%, Iron, and TIBC)   3  History of gastric bypass  Z98 84 CBC and Platelet     Vitamin B12     Folate     Iron Panel (Includes Ferritin, Iron Sat%, Iron, and TIBC)       I discussed Mercy Garcia's case with Dr Adam Mendoza and we formulated the plan together  79-year-old female with postsurgical malabsorption from history of gastric bypass with resolution of symptomatic iron deficiency with IV Venofer therapy  Discussion:   I extensively reviewed Mercy Garcia's blood work with her  After 5 doses of IV Venofer therapy, her iron deficiency anemia has resolved  Given that the etiology of her iron deficiency anemia is likely due to postsurgical malabsorption from her history of gastric bypass and she is at an increased risk for iron deficiency anemia in the future, I recommend continued monitoring at a 4 month interval initially  Also given her history of vitamin B12 at the lower limit of normal, I ordered repeat blood work to be drawn in 4 months  Plan/Labs:   CBC and platelet, vitamin O51, folate, and iron panel in 4 months    Follow up   4 months with Dr Adam Mendoza    All questions were answered to the patient's satisfaction during this encounter  They appreciated and thanked me for spending time with them   The patient knows the contact information for our office and know to reach out for any relevant concerns related to this encounter  For all other listed problems and medical diagnosis in his chart - they are managed by PCP and/or other specialists, which patient acknowledges        Vidhya Garcia PA-C  Hematology & Medical Oncology

## 2022-01-10 ENCOUNTER — TELEMEDICINE (OUTPATIENT)
Dept: HEMATOLOGY ONCOLOGY | Facility: CLINIC | Age: 62
End: 2022-01-10
Payer: COMMERCIAL

## 2022-01-10 VITALS — WEIGHT: 177 LBS | HEIGHT: 65 IN | TEMPERATURE: 99.5 F | BODY MASS INDEX: 29.49 KG/M2

## 2022-01-10 DIAGNOSIS — Z98.84 HISTORY OF GASTRIC BYPASS: ICD-10-CM

## 2022-01-10 DIAGNOSIS — B34.9 VIRAL ILLNESS: Primary | ICD-10-CM

## 2022-01-10 DIAGNOSIS — D50.9 IRON DEFICIENCY ANEMIA, UNSPECIFIED IRON DEFICIENCY ANEMIA TYPE: Primary | ICD-10-CM

## 2022-01-10 DIAGNOSIS — K91.2 POSTSURGICAL MALABSORPTION: ICD-10-CM

## 2022-01-10 PROCEDURE — U0003 INFECTIOUS AGENT DETECTION BY NUCLEIC ACID (DNA OR RNA); SEVERE ACUTE RESPIRATORY SYNDROME CORONAVIRUS 2 (SARS-COV-2) (CORONAVIRUS DISEASE [COVID-19]), AMPLIFIED PROBE TECHNIQUE, MAKING USE OF HIGH THROUGHPUT TECHNOLOGIES AS DESCRIBED BY CMS-2020-01-R: HCPCS | Performed by: FAMILY MEDICINE

## 2022-01-10 PROCEDURE — U0005 INFEC AGEN DETEC AMPLI PROBE: HCPCS | Performed by: FAMILY MEDICINE

## 2022-01-10 PROCEDURE — 99213 OFFICE O/P EST LOW 20 MIN: CPT | Performed by: STUDENT IN AN ORGANIZED HEALTH CARE EDUCATION/TRAINING PROGRAM

## 2022-01-11 LAB
METHYLMALONATE SERPL-SCNC: 214 NMOL/L (ref 0–378)
SARS-COV-2 RNA RESP QL NAA+PROBE: POSITIVE

## 2022-01-12 ENCOUNTER — TELEMEDICINE (OUTPATIENT)
Dept: FAMILY MEDICINE CLINIC | Facility: CLINIC | Age: 62
End: 2022-01-12
Payer: COMMERCIAL

## 2022-01-12 VITALS
WEIGHT: 177 LBS | SYSTOLIC BLOOD PRESSURE: 121 MMHG | DIASTOLIC BLOOD PRESSURE: 72 MMHG | HEART RATE: 68 BPM | RESPIRATION RATE: 20 BRPM | TEMPERATURE: 99.5 F | HEIGHT: 65 IN | BODY MASS INDEX: 29.49 KG/M2

## 2022-01-12 DIAGNOSIS — F41.1 GENERALIZED ANXIETY DISORDER: ICD-10-CM

## 2022-01-12 DIAGNOSIS — K91.2 POSTGASTRECTOMY MALABSORPTION: ICD-10-CM

## 2022-01-12 DIAGNOSIS — R63.5 WEIGHT GAIN FOLLOWING GASTRIC BYPASS SURGERY: ICD-10-CM

## 2022-01-12 DIAGNOSIS — Z90.3 POSTGASTRECTOMY MALABSORPTION: ICD-10-CM

## 2022-01-12 DIAGNOSIS — Z98.84 WEIGHT GAIN FOLLOWING GASTRIC BYPASS SURGERY: ICD-10-CM

## 2022-01-12 DIAGNOSIS — U07.1 COVID-19: Primary | ICD-10-CM

## 2022-01-12 PROCEDURE — 99214 OFFICE O/P EST MOD 30 MIN: CPT | Performed by: FAMILY MEDICINE

## 2022-01-12 RX ORDER — PROMETHAZINE HYDROCHLORIDE AND CODEINE PHOSPHATE 6.25; 1 MG/5ML; MG/5ML
5 SYRUP ORAL
Qty: 118 ML | Refills: 0 | Status: SHIPPED | OUTPATIENT
Start: 2022-01-12 | End: 2022-02-15

## 2022-01-12 RX ORDER — PREDNISONE 10 MG/1
TABLET ORAL
Qty: 20 TABLET | Refills: 0 | Status: SHIPPED | OUTPATIENT
Start: 2022-01-12 | End: 2022-02-15

## 2022-01-12 NOTE — PROGRESS NOTES
COVID-19 Outpatient Progress Note    Assessment/Plan:    Problem List Items Addressed This Visit        Digestive    Postgastrectomy malabsorption     Care per weight management team   Continue to monitor  Vitamins as directed            Other    Anxiety disorder     Stable on wellbutrin          Weight gain following gastric bypass surgery     saxenda and Revia as directed           Other Visit Diagnoses     COVID-19    -  Primary    day #3 from onset of symptoms  not better yet  added prednisone to help with symptoms   cleared medically 1/17/2022    Relevant Medications    predniSONE 10 mg tablet    promethazine-codeine (PHENERGAN WITH CODEINE) 6 25-10 mg/5 mL syrup         Disposition:     Patient has COVID-19 infection  Based off CDC guidelines, they were recommended to isolate for 5 days from the date of the positive test  If they remain asymptomatic, isolation may be ended followed by 5 days of wearing a mask when around othes to minimize risk of infecting others  If they have a fever, continue to stay home until fever resolves for at least 24 hours  I recommended continued isolation until at least 24 hours have passed since recovery defined as resolution of fever without the use of fever-reducing medications AND improvement in COVID symptoms AND 10 days have passed since onset of symptoms (or 10 days have passed since date of first positive viral diagnostic test for asymptomatic patients)  I have spent 15 minutes directly with the patient  Encounter provider Rutherford Goodpasture, MD    Provider located at [address] [address]    Recent Visits  No visits were found meeting these conditions    Showing recent visits within past 7 days and meeting all other requirements  Today's Visits  Date Type Provider Dept   01/12/22 Telemedicine Rutherford Goodpasture, MD  Reno Amadeo today's visits and meeting all other requirements  Future Appointments  No visits were found meeting these conditions  Showing future appointments within next 150 days and meeting all other requirements     This virtual check-in was done via [facility] and patient was informed that this is a secure, HIPAA-compliant platform  She agrees to proceed  Patient agrees to participate in a virtual check in via telephone or video visit instead of presenting to the office to address urgent/immediate medical needs  Patient is aware this is a billable service  After connecting through [facility], the patient was identified by name and date of birth  Siddharth Boyd was informed that this was a telemedicine visit and that the exam was being conducted confidentially over secure lines  My office door was closed  No one else was in the room  Siddharth Boyd acknowledged consent and understanding of privacy and security of the telemedicine visit  I informed the patient that I have reviewed her record in Epic and presented the opportunity for her to ask any questions regarding the visit today  The patient agreed to participate  Verification of patient location:  Patient is located in the following state in which I hold an active license: [place]    Subjective:   Siddharth Boyd is a 60 y o  female who has been screened for COVID-19  Symptom change since last report: unchanged  Patient's symptoms include fever, fatigue, sore throat, cough, diarrhea, myalgias and headache  Patient denies chills, malaise, congestion, rhinorrhea, anosmia, loss of taste, shortness of breath, chest tightness, abdominal pain, nausea and vomiting      - Date of symptom onset: 1/9/2022  - Date of positive COVID-19 PCR: 1/10/2022  Type of test: PCR    COVID-19 vaccination status: Fully vaccinated with booster    Lakesha Xavier has been staying home and has isolated themselves in her home   She is taking care to not share personal items and is cleaning all surfaces that are touched often, like counters, tabletops, and doorknobs using household cleaning sprays or wipes  She is wearing a mask when she leaves her room  Lab Results   Component Value Date    SARSCOV2 Positive (A) 01/10/2022    SARSCOV2 Not Detected 2020     Past Medical History:   Diagnosis Date    Anxiety disorder     Arthritis     osteoarthritis hip/poss knuckles"    Basal cell carcinoma     Cancer ([facility] [address] )     Cervical disc herniation     C7//sees chiropracter and no recent problems    Exercise involving walking     10-48336 steps per day     1 para 1     History of bariatric surgery     History of non anemic vitamin B12 deficiency     History of prediabetes     before bariatric surgery    Hyperlipidemia     not on meds    Hypertension     not on meds    Irregular heart beat     "history bigeminy from taking benadryl, tries to avoid taking it"no recent issues"    Motion sickness     "on rides"    Neck pain     occas    Nondisplaced fracture of proximal phalanx of left lesser toe(s), initial encounter for closed fracture 2019    OA (osteoarthritis) of hip     Obesity     Oral herpes simplex infection     occas    Other insomnia 2020    Postmenopausal     Sciatica     Vertigo     Wears glasses      Past Surgical History:   Procedure Laterality Date    BARIATRIC SURGERY  2004    COLPOSCOPY      Cervical Dysplasia    DENTAL SURGERY      one lower right    DENTAL SURGERY      Has dental implant; s/p tooth abscess    EGD      GASTRIC BYPASS      titanium staples    GYNECOLOGIC CRYOSURGERY      Cervical Dysplasia    LUMBAR LAMINECTOMY  1980    L5-S1; No Hardware    HI COLONOSCOPY FLX DX W/COLLJ SPEC WHEN PFRMD N/A 2016    Procedure: COLONOSCOPY;  Surgeon: Paul Real MD;  Location: [facility];   Service: Colorectal    HI TOTAL HIP ARTHROPLASTY Right 2020    Procedure: ARTHROPLASTY HIP TOTAL ANTERIOR;  Surgeon: Natalie Mensah MD;  Location: [facility];  Service: Orthopedics    [address] [place] [address] Right 2010    SKIN CANCER EXCISION      s/p BCC Back and R Clavicle    WISDOM TOOTH EXTRACTION       Current Outpatient Medications   Medication Sig Dispense Refill    ALPRAZolam (XANAX) 0 25 mg tablet Take 1 tablet (0 25 mg total) by mouth daily at bedtime as needed for anxiety 30 tablet 2    buPROPion (WELLBUTRIN XL) 300 mg 24 hr tablet Take 1 tablet (300 mg total) by mouth every morning 30 tablet 5    Calcium Carbonate-Vitamin D (Calcium 600-D) 600-400 MG-UNIT per tablet Take 2 capsules by mouth 2 (two) times a day        cyclobenzaprine (FLEXERIL) 10 mg tablet Take 10 mg by mouth 3 (three) times a day as needed for muscle spasms      Insulin Pen Needle (NOVOFINE PLUS) 32G X 4 MM MISC by Does not apply route daily (Patient taking differently: by Does not apply route daily Rx per Weight Management for Saxenda) 30 each 3    liraglutide (SAXENDA) injection Inject 0 5 mL (3 mg total) under the skin daily 45 mL 1    Meclizine HCl (ANTIVERT PO) Take 1 tablet by mouth as needed Rx per prior PCP      Multiple Vitamins-Minerals (BARIATRIC FUSION PO) Take 2 tablets by mouth 2 (two) times a day       naltrexone (REVIA) 50 mg tablet Take 1 tablet (50 mg total) by mouth daily 30 tablet 5    predniSONE 10 mg tablet 4 tabs x 2 days, 3 tabs x 2 days, 2 tabs x 2 days, 1 tab x 2 days 20 tablet 0    promethazine-codeine (PHENERGAN WITH CODEINE) 6 25-10 mg/5 mL syrup Take 5 mL by mouth daily at bedtime as needed for cough 118 mL 0     No current facility-administered medications for this visit  Allergies   Allergen Reactions    Macrodantin [Nitrofurantoin]      myalgias    Sulfa Antibiotics Swelling     Tongue swelling       Review of Systems   Constitutional: Positive for fatigue and fever  Negative for chills  HENT: Positive for sore throat  Negative for congestion and rhinorrhea  Respiratory: Positive for cough  Negative for chest tightness and shortness of breath      Gastrointestinal: Positive for diarrhea  Negative for abdominal pain, nausea and vomiting  Musculoskeletal: Positive for myalgias  Neurological: Positive for headaches  Objective:    Vitals:    01/12/22 1117   BP: 121/72   Pulse: 68   Resp: 20   Temp: 99 5 °F (37 5 °C)   Weight: 80 3 kg (177 lb)   Height: 5' 5" (1 651 m)       Physical Exam  Constitutional:       Appearance: Normal appearance  Pulmonary:      Effort: Pulmonary effort is normal  No respiratory distress  Neurological:      General: No focal deficit present  Mental Status: She is alert and oriented to person, place, and time  VIRTUAL VISIT [address] [place] [address] verbally agrees to participate in [employer]  Pt is aware that [employer] could be limited without vital signs or the ability to perform a full hands-on physical Tanvirteresa Best understands she or the provider may request at any time to terminate the video visit and request the patient to seek care or treatment in person

## 2022-02-15 ENCOUNTER — HOSPITAL ENCOUNTER (OUTPATIENT)
Dept: RADIOLOGY | Facility: HOSPITAL | Age: 62
Discharge: HOME | End: 2022-02-15
Payer: COMMERCIAL

## 2022-02-15 ENCOUNTER — OFFICE VISIT (OUTPATIENT)
Dept: FAMILY MEDICINE CLINIC | Facility: CLINIC | Age: 62
End: 2022-02-15
Payer: COMMERCIAL

## 2022-02-15 VITALS
HEIGHT: 65 IN | OXYGEN SATURATION: 100 % | RESPIRATION RATE: 16 BRPM | TEMPERATURE: 97.5 F | SYSTOLIC BLOOD PRESSURE: 138 MMHG | DIASTOLIC BLOOD PRESSURE: 88 MMHG | HEART RATE: 64 BPM | WEIGHT: 182.4 LBS | BODY MASS INDEX: 30.39 KG/M2

## 2022-02-15 DIAGNOSIS — S80.01XA CONTUSION OF RIGHT KNEE, INITIAL ENCOUNTER: Primary | ICD-10-CM

## 2022-02-15 DIAGNOSIS — K91.2 POSTGASTRECTOMY MALABSORPTION: ICD-10-CM

## 2022-02-15 DIAGNOSIS — R07.81 RIB PAIN ON LEFT SIDE: ICD-10-CM

## 2022-02-15 DIAGNOSIS — R63.5 WEIGHT GAIN FOLLOWING GASTRIC BYPASS SURGERY: ICD-10-CM

## 2022-02-15 DIAGNOSIS — F41.1 GENERALIZED ANXIETY DISORDER: ICD-10-CM

## 2022-02-15 DIAGNOSIS — S80.01XA CONTUSION OF RIGHT KNEE, INITIAL ENCOUNTER: ICD-10-CM

## 2022-02-15 DIAGNOSIS — Z90.3 POSTGASTRECTOMY MALABSORPTION: ICD-10-CM

## 2022-02-15 DIAGNOSIS — Z98.84 WEIGHT GAIN FOLLOWING GASTRIC BYPASS SURGERY: ICD-10-CM

## 2022-02-15 PROBLEM — M75.120 FULL THICKNESS ROTATOR CUFF TEAR: Status: RESOLVED | Noted: 2020-11-04 | Resolved: 2022-02-15

## 2022-02-15 PROCEDURE — 71101 X-RAY EXAM UNILAT RIBS/CHEST: CPT

## 2022-02-15 PROCEDURE — 99214 OFFICE O/P EST MOD 30 MIN: CPT | Performed by: FAMILY MEDICINE

## 2022-02-15 PROCEDURE — 73564 X-RAY EXAM KNEE 4 OR MORE: CPT

## 2022-02-15 RX ORDER — TRAMADOL HYDROCHLORIDE 50 MG/1
50 TABLET ORAL EVERY 6 HOURS PRN
Qty: 30 TABLET | Refills: 0 | Status: SHIPPED | OUTPATIENT
Start: 2022-02-15

## 2022-02-15 NOTE — PROGRESS NOTES
Assessment/Plan:    Weight gain following gastric bypass surgery  Stable  Care per weight management   saxenda as directed    Postgastrectomy malabsorption  To continue vitamins as directed    Anxiety disorder  Stable on wellbutrin        Diagnoses and all orders for this visit:    Contusion of right knee, initial encounter  -     XR knee 4+ vw right injury; Future  -     traMADol (Ultram) 50 mg tablet; Take 1 tablet (50 mg total) by mouth every 6 (six) hours as needed for moderate pain    Weight gain following gastric bypass surgery    Rib pain on left side  -     XR ribs left w pa chest min 3 views; Future  -     traMADol (Ultram) 50 mg tablet; Take 1 tablet (50 mg total) by mouth every 6 (six) hours as needed for moderate pain    Postgastrectomy malabsorption    Generalized anxiety disorder          Subjective:      Patient ID: Henrik Young is a 60 y o  female  HPI  Patient tripped over an open  door last week and landed on her knees and hit the door at her left upper rib cage,  Tried ice/heat and aleve which is not helping with her pain     The following portions of the patient's history were reviewed and updated as appropriate: allergies, current medications, past family history, past medical history, past social history, past surgical history and problem list     Review of Systems   Constitutional: Negative  HENT: Negative  Respiratory: Negative  Musculoskeletal: Positive for arthralgias and joint swelling  Hematological: Negative  Psychiatric/Behavioral: Negative  Objective:      /88 (BP Location: Left arm, Patient Position: Sitting, Cuff Size: Adult)   Pulse 64   Temp 97 5 °F (36 4 °C) (Tympanic)   Resp 16   Ht 5' 5" (1 651 m)   Wt 82 7 kg (182 lb 6 4 oz)   LMP 05/01/2005 (Within Months)   SpO2 100%   BMI 30 35 kg/m²          Physical Exam  Constitutional:       Appearance: Normal appearance     Cardiovascular:      Rate and Rhythm: Normal rate and regular rhythm  Pulses: Normal pulses  Heart sounds: Normal heart sounds  Musculoskeletal:         General: Tenderness present  Comments: Right medial knee and left lower ribs  No bruising noted   Skin:     Findings: No bruising or erythema  Neurological:      General: No focal deficit present  Mental Status: She is alert and oriented to person, place, and time

## 2022-02-16 ENCOUNTER — TELEPHONE (OUTPATIENT)
Dept: HEMATOLOGY ONCOLOGY | Facility: CLINIC | Age: 62
End: 2022-02-16

## 2022-02-16 NOTE — TELEPHONE ENCOUNTER
Appointment Confirmation (to confirm pre existing appointments - ONLY)     Appointment with  Dr Jose Romero   Appointment date & time 5/9/22 @ 3pm   Location [place]   Patient verbilized Understanding  Yes

## 2022-02-24 ENCOUNTER — TELEPHONE (OUTPATIENT)
Dept: OTHER | Facility: OTHER | Age: 62
End: 2022-02-24

## 2022-04-04 ENCOUNTER — APPOINTMENT (OUTPATIENT)
Dept: LAB | Facility: HOSPITAL | Age: 62
End: 2022-04-04

## 2022-04-04 ENCOUNTER — OFFICE VISIT (OUTPATIENT)
Dept: BARIATRICS | Facility: CLINIC | Age: 62
End: 2022-04-04
Payer: COMMERCIAL

## 2022-04-04 DIAGNOSIS — K91.2 POSTGASTRECTOMY MALABSORPTION: ICD-10-CM

## 2022-04-04 DIAGNOSIS — Z90.3 POSTGASTRECTOMY MALABSORPTION: ICD-10-CM

## 2022-04-04 DIAGNOSIS — Z00.8 HEALTH EXAMINATION IN POPULATION SURVEY: ICD-10-CM

## 2022-04-04 DIAGNOSIS — F41.1 GENERALIZED ANXIETY DISORDER: ICD-10-CM

## 2022-04-04 DIAGNOSIS — E66.3 OVERWEIGHT: Primary | ICD-10-CM

## 2022-04-04 LAB
CHOLEST SERPL-MCNC: 205 MG/DL
EST. AVERAGE GLUCOSE BLD GHB EST-MCNC: 94 MG/DL
HBA1C MFR BLD: 4.9 %
HDLC SERPL-MCNC: 98 MG/DL
LDLC SERPL CALC-MCNC: 102 MG/DL (ref 0–100)
NONHDLC SERPL-MCNC: 107 MG/DL
TRIGL SERPL-MCNC: 26 MG/DL

## 2022-04-04 PROCEDURE — 99215 OFFICE O/P EST HI 40 MIN: CPT | Performed by: PHYSICIAN ASSISTANT

## 2022-04-04 PROCEDURE — 36415 COLL VENOUS BLD VENIPUNCTURE: CPT

## 2022-04-04 PROCEDURE — 83036 HEMOGLOBIN GLYCOSYLATED A1C: CPT

## 2022-04-04 PROCEDURE — 80061 LIPID PANEL: CPT

## 2022-04-04 RX ORDER — ALPRAZOLAM 0.25 MG/1
0.25 TABLET ORAL
Qty: 30 TABLET | Refills: 0 | Status: SHIPPED | OUTPATIENT
Start: 2022-04-04 | End: 2022-05-06 | Stop reason: SDUPTHER

## 2022-04-04 NOTE — TELEPHONE ENCOUNTER
Medication:  PDMP   03/08/2022 01/07/2022 ALPRAZolam (Tablet)  30 0 30 0 25 MG ABRAHAM JACOBS            Active agreement on file -No

## 2022-04-04 NOTE — PROGRESS NOTES
Assessment/Plan:  -s/p RYGB  -Patient is pursuing the [facility]  -Initial weight loss goal of 5-10% weight loss for improved health  -Denied hx of seizure and glaucoma  -Reviewed indications, mechanisms, and potential side effects of weight loss medications  Would be cautious with phentermine as she reports taking Benadryl has resulted in bigeminy     -Patient denies personal and family history of MEN2 tumors and medullary thyroid/thyroid carcinoma  Patient denies personal history of pancreatitis  (started 11/4/19- 205 lbs)  >5 % weight loss  Having positive effects on appetite and helping prevent weight gain  Can consider switch to [place] to see if this will help with additional weight loss  -Taking Wellbutrin and naltrexone  +effects on mood  -Update labs at next visit    Initial(Start of MWM 10/15/18): 201 5 lbs  Start of Saxenda: (205 lbs, per 11/8/19)  Current: 177 2  Change: -27 8 lb  Goal: 160-170s lbs and maintain      Postgastrectomy malabsorption  -At risk for malabsorption of vitamins/minerals secondary to malabsorption and restriction of intake from their procedure  -Due for lab update at next follow up    Follow up in approximately 3 months with Non-Surgical Physician/Advanced Practitioner  Goals:  Food log (ie ) www myfitnesspal com,sparkpeople  com,loseit com,calorieking  com,etc  baritastic  No sugary beverages  At least 64oz of water daily  Increase physical activity by 10 minutes daily   Gradually increase physical activity to a goal of 5 days per week for 30 minutes of MODERATE intensity PLUS 2 days per week of FULL BODY resistance training  Practice lesson plans 1-6 in bariatric manual  and Practice 30/60 rule  Goal protein intake of 60-80 grams per day  Alcohol and nicotine use is not recommended following bariatric surgery  NSAIDs (Motrin, Advil, Aleve, Naproxen, ibuprofen, etc) should be avoided following bariatric surgery    Diagnoses and all orders for this visit:    Overweight    Body mass index 29 0-29 9, adult    Postgastrectomy malabsorption        Subjective:   Chief Complaint   Patient presents with    Follow-up     3 mo f/u     Patient ID: Lalo Samson  is a 60 y o  female with excess weight/obesity here to pursue weight management  Past Medical History:   Diagnosis Date    Anxiety disorder     Arthritis     osteoarthritis hip/poss knuckles"    Basal cell carcinoma     Cancer ([address] )     Cervical disc herniation     C7//sees chiropracter and no recent problems    Exercise involving walking     10-48752 steps per day    Full thickness rotator cuff tear 2020     1 para 1     History of bariatric surgery     History of non anemic vitamin B12 deficiency     History of prediabetes     before bariatric surgery    Hyperlipidemia     not on meds    Hypertension     not on meds    Irregular heart beat     "history bigeminy from taking benadryl, tries to avoid taking it"no recent issues"    Motion sickness     "on rides"    Neck pain     occas    Nondisplaced fracture of proximal phalanx of left lesser toe(s), initial encounter for closed fracture 2019    OA (osteoarthritis) of hip     Obesity     Oral herpes simplex infection     occas    Other insomnia 2020    Postmenopausal     Sciatica     Vertigo     Wears glasses      HPI:  The patient presents for MWM    Prescribed tramadol after rib fracture- no longer taking  Advised avoid in future while taking naltrexone  Discussed Naltrexone can be held if opioid pain medication needed  Pts  passed away 2 month ago  Dietary choices have changed  Despite choices at times, pt is still mindful of portions and overall choices  Notes she needs to adjust how she grocery shops and cooks and plans to work on this in the upcoming months  Continues with Wellbutrin, naltrexone, and Saxenda  Happy to be maintaining weight loss, but would like to lose another 5-10 lbs       The following portions of the patient's history were reviewed and updated as appropriate: allergies, current medications, past family history, past medical history, past social history, past surgical history and problem list     Review of Systems   Psychiatric/Behavioral:        Grieving loss of her , but otherwise denies       Objective:    /74 (BP Location: Right arm, Patient Position: Sitting, Cuff Size: Standard)   Pulse 83   Temp 97 8 °F (36 6 °C) (Tympanic)   Wt 80 4 kg (177 lb 3 2 oz)   LMP 05/01/2005 (Within Months)   BMI 29 49 kg/m²     Physical Exam  Vitals and nursing note reviewed  Constitutional   General appearance: Abnormal   well developed and obese  Pulmonary   Respiratory effort: No increased work of breathing or signs of respiratory distress  Abdomen   Abdomen: Abnormal   The abdomen was obese  Musculoskeletal   Gait and station: Normal     Psychiatric   Orientation to person, place and time: Normal     Affect: appropriate, but tearful at times  50 minute visit, >50% time spent counseling patient on diet behavior and exercise modification for weight loss

## 2022-04-06 VITALS
WEIGHT: 177.2 LBS | SYSTOLIC BLOOD PRESSURE: 132 MMHG | HEART RATE: 83 BPM | BODY MASS INDEX: 29.49 KG/M2 | DIASTOLIC BLOOD PRESSURE: 74 MMHG | TEMPERATURE: 97.8 F

## 2022-04-06 NOTE — ASSESSMENT & PLAN NOTE
-At risk for malabsorption of vitamins/minerals secondary to malabsorption and restriction of intake from their procedure  -Due for lab update at next follow up

## 2022-05-04 ENCOUNTER — APPOINTMENT (OUTPATIENT)
Dept: LAB | Facility: HOSPITAL | Age: 62
End: 2022-05-04
Payer: COMMERCIAL

## 2022-05-04 DIAGNOSIS — K91.2 POSTSURGICAL MALABSORPTION: ICD-10-CM

## 2022-05-04 DIAGNOSIS — D50.9 IRON DEFICIENCY ANEMIA, UNSPECIFIED IRON DEFICIENCY ANEMIA TYPE: ICD-10-CM

## 2022-05-04 DIAGNOSIS — Z98.84 HISTORY OF GASTRIC BYPASS: ICD-10-CM

## 2022-05-04 LAB
ERYTHROCYTE [DISTWIDTH] IN BLOOD BY AUTOMATED COUNT: 13.2 % (ref 11.6–15.1)
FERRITIN SERPL-MCNC: 150 NG/ML (ref 8–388)
FOLATE SERPL-MCNC: >20 NG/ML (ref 3.1–17.5)
HCT VFR BLD AUTO: 41.5 % (ref 34.8–46.1)
HGB BLD-MCNC: 12.8 G/DL (ref 11.5–15.4)
IRON SATN MFR SERPL: 21 % (ref 15–50)
IRON SERPL-MCNC: 73 UG/DL (ref 50–170)
MCH RBC QN AUTO: 30.2 PG (ref 26.8–34.3)
MCHC RBC AUTO-ENTMCNC: 30.8 G/DL (ref 31.4–37.4)
MCV RBC AUTO: 98 FL (ref 82–98)
PLATELET # BLD AUTO: 197 THOUSANDS/UL (ref 149–390)
PMV BLD AUTO: 11.3 FL (ref 8.9–12.7)
RBC # BLD AUTO: 4.24 MILLION/UL (ref 3.81–5.12)
TIBC SERPL-MCNC: 347 UG/DL (ref 250–450)
VIT B12 SERPL-MCNC: 1476 PG/ML (ref 100–900)
WBC # BLD AUTO: 4.68 THOUSAND/UL (ref 4.31–10.16)

## 2022-05-04 PROCEDURE — 82607 VITAMIN B-12: CPT

## 2022-05-04 PROCEDURE — 85027 COMPLETE CBC AUTOMATED: CPT

## 2022-05-04 PROCEDURE — 82746 ASSAY OF FOLIC ACID SERUM: CPT

## 2022-05-04 PROCEDURE — 82728 ASSAY OF FERRITIN: CPT

## 2022-05-04 PROCEDURE — 83540 ASSAY OF IRON: CPT

## 2022-05-04 PROCEDURE — 83550 IRON BINDING TEST: CPT

## 2022-05-04 PROCEDURE — 36415 COLL VENOUS BLD VENIPUNCTURE: CPT

## 2022-05-06 DIAGNOSIS — F41.1 GENERALIZED ANXIETY DISORDER: ICD-10-CM

## 2022-05-06 RX ORDER — ALPRAZOLAM 0.25 MG/1
0.25 TABLET ORAL
Qty: 30 TABLET | Refills: 0 | Status: SHIPPED | OUTPATIENT
Start: 2022-05-06 | End: 2022-06-07 | Stop reason: SDUPTHER

## 2022-05-09 ENCOUNTER — OFFICE VISIT (OUTPATIENT)
Dept: HEMATOLOGY ONCOLOGY | Facility: CLINIC | Age: 62
End: 2022-05-09
Payer: COMMERCIAL

## 2022-05-09 ENCOUNTER — TELEPHONE (OUTPATIENT)
Dept: HEMATOLOGY ONCOLOGY | Facility: CLINIC | Age: 62
End: 2022-05-09

## 2022-05-09 VITALS
HEART RATE: 80 BPM | TEMPERATURE: 97 F | BODY MASS INDEX: 30.24 KG/M2 | RESPIRATION RATE: 18 BRPM | OXYGEN SATURATION: 97 % | HEIGHT: 65 IN | WEIGHT: 181.5 LBS | DIASTOLIC BLOOD PRESSURE: 78 MMHG | SYSTOLIC BLOOD PRESSURE: 116 MMHG

## 2022-05-09 DIAGNOSIS — Z98.84 HISTORY OF GASTRIC BYPASS: ICD-10-CM

## 2022-05-09 DIAGNOSIS — D50.9 IRON DEFICIENCY ANEMIA, UNSPECIFIED IRON DEFICIENCY ANEMIA TYPE: Primary | ICD-10-CM

## 2022-05-09 DIAGNOSIS — K91.2 POSTSURGICAL MALABSORPTION: ICD-10-CM

## 2022-05-09 PROCEDURE — 99215 OFFICE O/P EST HI 40 MIN: CPT | Performed by: INTERNAL MEDICINE

## 2022-05-09 RX ORDER — SODIUM CHLORIDE 9 MG/ML
20 INJECTION, SOLUTION INTRAVENOUS ONCE
Status: CANCELLED | OUTPATIENT
Start: 2022-05-09

## 2022-05-09 NOTE — PROGRESS NOTES
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[place] [address] Road is a 60 y o  with history of gastric bypass (2004) here for follow-up with me today for iron deficiency anemia  She is currently being followed by weight management  She tolerated IV Venofer well on 6/15, 6/29, 7/6, 7/13, and 7/20  Her CBC shows borderline macrocytosis:   1/11/2021 6/5/2021 9/1/2021 1/7/2022    WBC 11 26 (H) 4 70 4 96 5 14   Hemoglobin 12 7 12 2 13 5 13 7   HCT 42 0 40 3 44 0 43 2   MCV 92 88 95 99 (H)   Platelet Count 051 282 202 193     Her iron panel shows continued resolution of iron deficiency:   5/17/2021 9/1/2021 1/7/2022    Iron 38 (L) 73 89   Ferritin 6 (L) 166 181   Iron Saturation 10 22 31   TIBC 386 339 284        5/17/2021 1/7/2022    Vitamin B-12 337 934 (H)      5/17/2021 1/7/2022    Folate >20 0 (H) >20 0 (H)     Her homocysteine has remained WNL:   6/5/2021 1/7/2022    HOMOCYST(E)INE, P/S 8 9 8 9   Her MMA is still in process  During her initial visit, she admitted to being non-compliant with her bariatric multivitamins due to increased stress recently  Her  became blind and is going through hemodialysis and her sister-in-law passed away suddenly from cancer  She also admitted to increased fatigue, cold intolerance, increased frequency of headaches, brittle nails, and facial pallor  This Visit    Denies any f/c/n/v/cp/ap/sob/cough  Denies diarrhea or skin rash    No blood loss anywhere  Appetite is okay  I have reviewed the relevant past medical, surgical, social and family history  I have also reviewed allergies and medications for this patient  Review of Systems  Review of Systems   All other systems reviewed and are negative  OBJECTIVE     Physical Exam  Vitals:    05/09/22 1447   BP: 116/78   BP Location: Left arm   Pulse: 80   Resp: 18   Temp: (!) 97 °F (36 1 °C)   TempSrc: Tympanic Core   SpO2: 97%   Weight: 82 3 kg (181 lb 8 oz)   Height: 5' 5" (1 651 m)        Physical Exam  Vitals reviewed  Constitutional:       Appearance: Normal appearance  She is normal weight  Eyes:      General: No scleral icterus  Conjunctiva/sclera: Conjunctivae normal    Cardiovascular:      Rate and Rhythm: Normal rate  Pulmonary:      Effort: No respiratory distress  Abdominal:      General: There is no distension  Musculoskeletal:         General: No swelling  Normal range of motion  Neurological:      General: No focal deficit present  Mental Status: She is alert and oriented to person, place, and time  Mental status is at baseline  Imaging  Relevant imaging reviewed in chart    Labs  Relevant labs reviewed in chart     ASSESSMENT & PLAN      Diagnosis ICD-10-CM Associated Orders   1  Iron deficiency anemia, unspecified iron deficiency anemia type  D50 9 CBC     Iron Panel (Includes Ferritin, Iron Sat%, Iron, and TIBC)   2  Postsurgical malabsorption  K91 2 CBC     Iron Panel (Includes Ferritin, Iron Sat%, Iron, and TIBC)   3  History of gastric bypass  Z98 84 CBC     Iron Panel (Includes Ferritin, Iron Sat%, Iron, and TIBC)         60 y o  female with postsurgical malabsorption from history of gastric bypass with resolution of symptomatic iron deficiency with IV Venofer therapy  Discussion/Plan/Labs[de-identified]   I reviewed labs which indicate emerging signs of low iron   Given her history of the above, recommend q28d maintenance therapy with venofer   RTC with labs done beforehand  Follow up   3 months     All questions were answered to the patient's satisfaction during this encounter  They appreciated and thanked me for spending time with them  The patient knows the contact information for our office and know to reach out for any relevant concerns related to this encounter  For all other listed problems and medical diagnosis in his chart - they are managed by PCP and/or other specialists, which patient acknowledges          Hematology & Medical Oncology

## 2022-05-09 NOTE — TELEPHONE ENCOUNTER
Spoke to patient  She is not available on 5/13, 5/16 or 5/27  Can we please reschedule her appt on 5/13 to another day  Thank you!

## 2022-05-09 NOTE — TELEPHONE ENCOUNTER
While we try to accommodate patient requests, our priority is to schedule treatment according to Doctor's orders and site availability  1  Does the Provider use the intake sheet or checkout note? Checkout  2  What would be a preferred day of the week that would work best for your infusion appointment? Anyday  3  Do you prefer mornings or afternoons for your appointments? Afternoon  4  We are going to try our best to schedule you at the infusion center closest to your home  In the event that we are unable to what would be your next preferred infusion site or sites? [facility]        5  Do you have transportation to take you to all of your appointments? Yes  6   Would you like the infusion center to draw labs from your port? (disregard if patient doesn't have a port or need labs for infusion appointment) No

## 2022-05-10 ENCOUNTER — TELEPHONE (OUTPATIENT)
Dept: HEMATOLOGY ONCOLOGY | Facility: CLINIC | Age: 62
End: 2022-05-10

## 2022-05-10 NOTE — TELEPHONE ENCOUNTER
Appointment Cancellation Or Reschedule     Person calling in Patient    Provider Dr Milagro Bhardwaj   Office Visit Date and Time 08/22/22 @ 3pm   Office Visit Location [place]   Did patient want to reschedule their office appointment? If so, when was it scheduled to? Yes 8/26/22 @2pm   Is this patient calling to reschedule an infusion appointment? no   When is their next infusion appointment? na   Is this patient a Chemo patient? no   Reason for Cancellation or Reschedule Patient is returning from vacation  If the patient is a treatment patient, please route this to the office nurse  If the patient is not on treatment, please route to the office MA

## 2022-05-11 ENCOUNTER — TELEPHONE (OUTPATIENT)
Dept: HEMATOLOGY ONCOLOGY | Facility: CLINIC | Age: 62
End: 2022-05-11

## 2022-05-11 DIAGNOSIS — D50.9 IRON DEFICIENCY ANEMIA, UNSPECIFIED IRON DEFICIENCY ANEMIA TYPE: Primary | ICD-10-CM

## 2022-05-11 DIAGNOSIS — K91.2 POSTSURGICAL MALABSORPTION: ICD-10-CM

## 2022-05-11 RX ORDER — SODIUM CHLORIDE 9 MG/ML
20 INJECTION, SOLUTION INTRAVENOUS ONCE
Status: CANCELLED | OUTPATIENT
Start: 2022-05-19

## 2022-05-11 NOTE — TELEPHONE ENCOUNTER
Please reschedule treatment tomorrow, delay by a week  We have not received auth  From: Marilin Cline <[email or web address]   Sent: Wednesday, May 11, 2022 10:00 AM  To: Lucho Gonzáles <[email or web address]; Navneet Kaplan <[email or web address]; Yady Sterling <[email or web address]  Subject: ZACK SCHULTZ [birth year]     Good morning  This pt was added for Venofer, our ins takes some time for a determination can her tx be pushed out until we can secure an auth ? They have been taking about 15 days          Thank you,     Bennett Arguelles Specialist for Oncology/Hematology  Phone [phone]  Fax [phone]

## 2022-05-11 NOTE — TELEPHONE ENCOUNTER
Left detailed message for patient regarding her updated schedule  Advised patient to call back if she has any questions

## 2022-05-11 NOTE — TELEPHONE ENCOUNTER
Please see note below  Please defer treatment by 1 week and all appts after  Sera, please change start date to 5/19  Thank you!

## 2022-05-11 NOTE — TELEPHONE ENCOUNTER
CALL RETURN FORM   Reason for patient call? Patient calling about infusion appointments    Patient's primary oncologist?  Rangel Simmons   Name of person the patient was calling for? Heather Noguera   Any additional information to add, if applicable? Please call [phone]   Informed patient that the message will be forwarded to the team and someone will get back to them as soon as possible    Did you relay this information to the patient?  Yes

## 2022-05-12 ENCOUNTER — TELEPHONE (OUTPATIENT)
Dept: HEMATOLOGY ONCOLOGY | Facility: CLINIC | Age: 62
End: 2022-05-12

## 2022-05-12 NOTE — TELEPHONE ENCOUNTER
Spoke to patient  She is requesting her appts be scheduled for either 11:30am or 12pm  Can we accommodate this request?  Thank you!

## 2022-05-12 NOTE — TELEPHONE ENCOUNTER
CALL RETURN FORM   Reason for patient call? Patient returning your call  She needs to change her appt times for her treatments The dates are fine     Patient's primary oncologist? Dr Pia Chappell    Name of person the patient was calling for? Dr Pia Chappell   Any additional information to add, if applicable? [phone]   Informed patient that the message will be forwarded to the team and someone will get back to them as soon as possible    Did you relay this information to the patient?   yes

## 2022-05-12 NOTE — TELEPHONE ENCOUNTER
I can't add more treatments on since it is a therapy plan   This would have to be scheduled after her next infusion or manually by infusion

## 2022-05-12 NOTE — TELEPHONE ENCOUNTER
Pt is supposed to be scheduled for 4 Venofer appts  I am only able to schedule 3  Can you fix the treatment plan so that I can schedule the 4th one  Thank you!

## 2022-05-19 ENCOUNTER — HOSPITAL ENCOUNTER (OUTPATIENT)
Dept: INFUSION CENTER | Facility: CLINIC | Age: 62
Discharge: HOME | End: 2022-05-19
Payer: COMMERCIAL

## 2022-05-19 VITALS
HEART RATE: 67 BPM | DIASTOLIC BLOOD PRESSURE: 76 MMHG | SYSTOLIC BLOOD PRESSURE: 121 MMHG | TEMPERATURE: 97.9 F | RESPIRATION RATE: 18 BRPM

## 2022-05-19 DIAGNOSIS — K91.2 POSTSURGICAL MALABSORPTION: Primary | ICD-10-CM

## 2022-05-19 DIAGNOSIS — D50.9 IRON DEFICIENCY ANEMIA, UNSPECIFIED IRON DEFICIENCY ANEMIA TYPE: ICD-10-CM

## 2022-05-19 PROCEDURE — 96374 THER/PROPH/DIAG INJ IV PUSH: CPT

## 2022-05-19 RX ORDER — SODIUM CHLORIDE 9 MG/ML
20 INJECTION, SOLUTION INTRAVENOUS ONCE
Status: COMPLETED | OUTPATIENT
Start: 2022-05-19 | End: 2022-05-19

## 2022-05-19 RX ORDER — SODIUM CHLORIDE 9 MG/ML
20 INJECTION, SOLUTION INTRAVENOUS ONCE
Status: CANCELLED | OUTPATIENT
Start: 2022-06-16

## 2022-05-19 RX ADMIN — SODIUM CHLORIDE 20 ML/HR: 9 INJECTION, SOLUTION INTRAVENOUS at 11:58

## 2022-05-19 NOTE — PLAN OF CARE
Problem: Potential for Falls  Goal: Patient will remain free of falls  Description: INTERVENTIONS:  - Educate patient/family on patient safety including physical limitations  - Instruct patient to call for assistance with activity   - Consult OT/PT to assist with strengthening/mobility   - Keep Call bell within reach  - Keep bed low and locked with side rails adjusted as appropriate  - Keep care items and personal belongings within reach  - Initiate and maintain comfort rounds  - Ma- Apply yellow socks and bracelet for high fall risk patients  - Consider moving patient to room near nurses station  Outcome: Progressing

## 2022-05-23 ENCOUNTER — HOSPITAL ENCOUNTER (OUTPATIENT)
Dept: RADIOLOGY | Age: 62
Discharge: HOME | End: 2022-05-23

## 2022-05-23 DIAGNOSIS — Z13.820 OSTEOPOROSIS SCREENING: ICD-10-CM

## 2022-05-27 ENCOUNTER — LAB REQUISITION (OUTPATIENT)
Dept: LAB | Facility: HOSPITAL | Age: 62
End: 2022-05-27
Payer: COMMERCIAL

## 2022-05-27 ENCOUNTER — HOSPITAL ENCOUNTER (OUTPATIENT)
Dept: RADIOLOGY | Age: 62
Discharge: HOME | End: 2022-05-27
Payer: COMMERCIAL

## 2022-05-27 DIAGNOSIS — D48.5 NEOPLASM OF UNCERTAIN BEHAVIOR OF SKIN: ICD-10-CM

## 2022-05-27 PROCEDURE — 77080 DXA BONE DENSITY AXIAL: CPT

## 2022-05-27 PROCEDURE — 88305 TISSUE EXAM BY PATHOLOGIST: CPT | Performed by: PATHOLOGY

## 2022-06-07 DIAGNOSIS — F41.1 GENERALIZED ANXIETY DISORDER: ICD-10-CM

## 2022-06-07 RX ORDER — ALPRAZOLAM 0.25 MG/1
0.25 TABLET ORAL
Qty: 30 TABLET | Refills: 0 | Status: SHIPPED | OUTPATIENT
Start: 2022-06-07 | End: 2022-07-08 | Stop reason: SDUPTHER

## 2022-06-07 NOTE — TELEPHONE ENCOUNTER
Medication:  PDMP   05/06/2022 05/06/2022 ALPRAZolam (Tablet)  30 0 30 0 25 MG ABRAHAM JACOBS      Active agreement on file -No

## 2022-06-16 ENCOUNTER — HOSPITAL ENCOUNTER (OUTPATIENT)
Dept: INFUSION CENTER | Facility: CLINIC | Age: 62
Discharge: HOME | End: 2022-06-16
Payer: COMMERCIAL

## 2022-06-16 VITALS
SYSTOLIC BLOOD PRESSURE: 134 MMHG | RESPIRATION RATE: 18 BRPM | DIASTOLIC BLOOD PRESSURE: 90 MMHG | HEART RATE: 71 BPM | TEMPERATURE: 98 F

## 2022-06-16 DIAGNOSIS — K91.2 POSTSURGICAL MALABSORPTION: Primary | ICD-10-CM

## 2022-06-16 DIAGNOSIS — D50.9 IRON DEFICIENCY ANEMIA, UNSPECIFIED IRON DEFICIENCY ANEMIA TYPE: ICD-10-CM

## 2022-06-16 PROCEDURE — 96374 THER/PROPH/DIAG INJ IV PUSH: CPT

## 2022-06-16 RX ORDER — SODIUM CHLORIDE 9 MG/ML
20 INJECTION, SOLUTION INTRAVENOUS ONCE
Status: CANCELLED | OUTPATIENT
Start: 2022-07-14

## 2022-06-16 RX ORDER — SODIUM CHLORIDE 9 MG/ML
20 INJECTION, SOLUTION INTRAVENOUS ONCE
Status: COMPLETED | OUTPATIENT
Start: 2022-06-16 | End: 2022-06-16

## 2022-06-16 RX ADMIN — SODIUM CHLORIDE 20 ML/HR: 0.9 INJECTION, SOLUTION INTRAVENOUS at 11:55

## 2022-07-08 DIAGNOSIS — F41.1 GENERALIZED ANXIETY DISORDER: ICD-10-CM

## 2022-07-08 DIAGNOSIS — C44.41 BASAL CELL CARCINOMA (BCC) OF SCALP: Primary | ICD-10-CM

## 2022-07-08 RX ORDER — ALPRAZOLAM 0.25 MG/1
0.25 TABLET ORAL
Qty: 30 TABLET | Refills: 0 | Status: SHIPPED | OUTPATIENT
Start: 2022-07-08 | End: 2022-08-08 | Stop reason: SDUPTHER

## 2022-07-08 NOTE — TELEPHONE ENCOUNTER
Medication:  PDMP    06/07/2022 06/07/2022 ALPRAZolam (Tablet)  30 0 30 0 25 MG ABRAHAM JACOBS            Active agreement on file -No

## 2022-07-14 ENCOUNTER — HOSPITAL ENCOUNTER (OUTPATIENT)
Dept: INFUSION CENTER | Facility: CLINIC | Age: 62
Discharge: HOME | End: 2022-07-14
Payer: COMMERCIAL

## 2022-07-14 VITALS
RESPIRATION RATE: 16 BRPM | DIASTOLIC BLOOD PRESSURE: 81 MMHG | OXYGEN SATURATION: 100 % | SYSTOLIC BLOOD PRESSURE: 133 MMHG | HEART RATE: 74 BPM | TEMPERATURE: 97.4 F

## 2022-07-14 DIAGNOSIS — D50.9 IRON DEFICIENCY ANEMIA, UNSPECIFIED IRON DEFICIENCY ANEMIA TYPE: ICD-10-CM

## 2022-07-14 DIAGNOSIS — K91.2 POSTSURGICAL MALABSORPTION: Primary | ICD-10-CM

## 2022-07-14 PROCEDURE — 96374 THER/PROPH/DIAG INJ IV PUSH: CPT

## 2022-07-14 RX ORDER — SODIUM CHLORIDE 9 MG/ML
20 INJECTION, SOLUTION INTRAVENOUS ONCE
Status: CANCELLED | OUTPATIENT
Start: 2022-08-11

## 2022-07-14 RX ORDER — SODIUM CHLORIDE 9 MG/ML
20 INJECTION, SOLUTION INTRAVENOUS ONCE
Status: COMPLETED | OUTPATIENT
Start: 2022-07-14 | End: 2022-07-14

## 2022-07-14 RX ADMIN — SODIUM CHLORIDE 20 ML/HR: 0.9 INJECTION, SOLUTION INTRAVENOUS at 12:00

## 2022-07-14 NOTE — TELEPHONE ENCOUNTER
Telephone call to patient, In regards scheduling MOHS consult  Left voice message to patient to please return my call at there earliest convenience

## 2022-07-14 NOTE — PROGRESS NOTES
Patient arrived at infusion center for venofer push  Tolerated with no noted difficulties  BP elevated at beginning of appointment, systolic 417  Rechecked prior to end of appointment and systolic 691  PIV removed, patient aware of next appointment and left infusion center in baseline condition

## 2022-07-15 RX ORDER — CEPHALEXIN 500 MG/1
CAPSULE ORAL
Qty: 4 CAPSULE | Refills: 0 | Status: SHIPPED | OUTPATIENT
Start: 2022-07-15 | End: 2022-07-15

## 2022-07-15 NOTE — TELEPHONE ENCOUNTER
Pre- operative Mohs Telephone Scheduling Note    Do you have a pacemaker or defibrillator? no    Do you take antibiotics before skin or dental procedures? yes: Ceohalexin 500mg  If yes, will likely require pre-operative antibiotics  Ask  the patient why they take the antibiotics (usually because of joint replacement)  Do you have a history of a joint replacements within the past 2 years? yes: Hip replacement    If yes, will likely require pre-operative antibiotics  Ask if orthopaedic surgeon has prescribed pre-operative antibiotics to take before procedures/dental work? Do you take any OTC medications that thin your blood (Aspirin, Aleve, Ibuprofen) or supplements that thin your blood (fish oil, garlic, vitamin E, Ginko Biloba)? no    Do you take any prescribed medications that thin your blood (Coumadin, Plavix, Xarelto, Eliquis or another prescribed blood thinner)? no    Do you have an allergy to lidocaine or epinephrine? no    Do you have an allergy to shellfish? no    Do you smoke? no      If yes,  patient to try and stop 2 days before surgery and 7 days after the surgery  Minimizing smoking as much as possible during this time will improve healing and the cosmetic result after surgery  Date scheduled: 07/26/2022 with Dr Abagail Bamberger @ 9:15 am, BCC midline inferior forehead     Coordination of Care with other provider (Oculoplastics, Plastics, ENT) required? no   IF YES, PLEASE FORWARD TO APPROPRIATE PERSONNEL TO HELP COORDINATE  Are there remaining tumors to be scheduled? no    Was Prior Authorization obtained?  No (please use  [email or web address] to document prior auth)

## 2022-07-20 DIAGNOSIS — E66.9 CLASS 1 OBESITY: ICD-10-CM

## 2022-07-20 DIAGNOSIS — Z98.84 STATUS POST BARIATRIC SURGERY: ICD-10-CM

## 2022-07-20 RX ORDER — LIRAGLUTIDE 6 MG/ML
INJECTION, SOLUTION SUBCUTANEOUS
Qty: 45 ML | Refills: 0 | Status: SHIPPED | OUTPATIENT
Start: 2022-07-20 | End: 2022-07-25 | Stop reason: SDUPTHER

## 2022-07-25 ENCOUNTER — TELEMEDICINE (OUTPATIENT)
Dept: BARIATRICS | Facility: CLINIC | Age: 62
End: 2022-07-25
Payer: COMMERCIAL

## 2022-07-25 VITALS — BODY MASS INDEX: 29.29 KG/M2 | WEIGHT: 176 LBS

## 2022-07-25 DIAGNOSIS — E66.3 OVERWEIGHT: Primary | ICD-10-CM

## 2022-07-25 DIAGNOSIS — Z98.84 STATUS POST BARIATRIC SURGERY: ICD-10-CM

## 2022-07-25 DIAGNOSIS — R63.8 ABNORMAL CRAVING: ICD-10-CM

## 2022-07-25 DIAGNOSIS — F43.21 ADJUSTMENT DISORDER WITH DEPRESSED MOOD: ICD-10-CM

## 2022-07-25 DIAGNOSIS — K91.2 POSTSURGICAL MALABSORPTION: ICD-10-CM

## 2022-07-25 PROCEDURE — 99214 OFFICE O/P EST MOD 30 MIN: CPT | Performed by: PHYSICIAN ASSISTANT

## 2022-07-25 RX ORDER — LIRAGLUTIDE 6 MG/ML
3 INJECTION, SOLUTION SUBCUTANEOUS DAILY
Qty: 45 ML | Refills: 0 | Status: SHIPPED | OUTPATIENT
Start: 2022-07-25 | End: 2022-09-26 | Stop reason: ALTCHOICE

## 2022-07-25 RX ORDER — BUPROPION HYDROCHLORIDE 300 MG/1
300 TABLET ORAL EVERY MORNING
Qty: 30 TABLET | Refills: 3 | Status: SHIPPED | OUTPATIENT
Start: 2022-07-25

## 2022-07-25 RX ORDER — TOPIRAMATE 50 MG/1
TABLET, FILM COATED ORAL
Qty: 30 TABLET | Refills: 2 | Status: SHIPPED | OUTPATIENT
Start: 2022-07-25 | End: 2022-09-26 | Stop reason: SDUPTHER

## 2022-07-25 NOTE — PATIENT INSTRUCTIONS
Goals: Food log (ie ) www myfitnesspal com,sparkpeople  com,loseit com,calorieking  com,etc  baritastic  No sugary beverages  At least 64oz of water daily  Increase physical activity by 10 minutes daily   Gradually increase physical activity to a goal of 5 days per week for 30 minutes of MODERATE intensity PLUS 2 days per week of FULL BODY resistance training  Practice lesson plans 1-6 in bariatric manual  and Practice 30/60 rule  Goal protein intake of 60-80 grams per day  Alcohol and nicotine use is not recommended following bariatric surgery  NSAIDs (Motrin, Advil, Aleve, Naproxen, ibuprofen, etc) should be avoided following bariatric surgery  Insight timer  Recommend checking lab coverage before having labs drawn

## 2022-07-25 NOTE — PROGRESS NOTES
Assessment/Plan:  -s/p RYGB  -Patient is pursuing the [facility]  -Initial weight loss goal of 5-10% weight loss for improved health  -Denied hx of seizure and glaucoma  -Reviewed indications, mechanisms, and potential side effects of weight loss medications  Would be cautious with phentermine as she reports taking Benadryl has resulted in bigeminy     -Patient denies personal and family history of MEN2 tumors and medullary thyroid/thyroid carcinoma  Patient denies personal history of pancreatitis  (started 11/4/19- 205 lbs)  >5 % weight loss  Having positive effects on appetite and helping prevent weight gain  Can consider switch to Starlett Laws to see if this will help with additional weight loss  -Taking Wellbutrin  +effects on mood  -stop naltrexone  -add Topamax, denies kidney stone history  The potential side effects of topiramate may include numbness or tingling, fatigue, upper respiratory infection symptoms, depression/anxiety, changes in taste, confusion, abdominal upset/heartburn, and trouble sleeping  Notify the provider with any changes in mood  ER with thoughts of harming yourself/others  Reviewed with pt       Initial(Start of MWM 10/15/18): 201 5 lbs  Start of Saxenda: (205 lbs, per 11/8/19)  Current: 176 (-1 2 lbs since last visit)  Change: -29 lb  Goal: 160-170s lbs and maintain    No problem-specific Assessment & Plan notes found for this encounter  Follow up in approximately 2 months with Non-Surgical Physician/Advanced Practitioner  Goals:  Food log (ie ) www myfitnesspal com,sparkpeople  com,loseit com,calorieking  com,etc  baritastic  No sugary beverages  At least 64oz of water daily  Increase physical activity by 10 minutes daily   Gradually increase physical activity to a goal of 5 days per week for 30 minutes of MODERATE intensity PLUS 2 days per week of FULL BODY resistance training  Practice lesson plans 1-6 in bariatric manual  and Practice 30/60 rule  Goal protein intake of 60-80 grams per day  Alcohol and nicotine use is not recommended following bariatric surgery  NSAIDs (Motrin, Advil, Aleve, Naproxen, ibuprofen, etc) should be avoided following bariatric surgery  Insight timer  Recommend checking lab coverage before having labs drawn    Diagnoses and all orders for this visit:    Overweight  -     liraglutide (Saxenda) injection; Inject 0 5 mL (3 mg total) under the skin daily  -     buPROPion (WELLBUTRIN XL) 300 mg 24 hr tablet; Take 1 tablet (300 mg total) by mouth every morning  -     topiramate (Topamax) 50 MG tablet; Take 1/2 tab po daily between 3-4 pm for 2 weeks then 1 tab po daily between 3-4 pm  -     Comprehensive metabolic panel; Future  -     Copper Level; Future  -     Zinc; Future  -     Vitamin D 25 hydroxy; Future  -     Vitamin B1, whole blood; Future  -     Vitamin A; Future  -     PTH, intact; Future    Postsurgical malabsorption  -     topiramate (Topamax) 50 MG tablet; Take 1/2 tab po daily between 3-4 pm for 2 weeks then 1 tab po daily between 3-4 pm  -     Comprehensive metabolic panel; Future  -     Copper Level; Future  -     Zinc; Future  -     Vitamin D 25 hydroxy; Future  -     Vitamin B1, whole blood; Future  -     Vitamin A; Future  -     PTH, intact; Future    Status post bariatric surgery  -     liraglutide (Saxenda) injection; Inject 0 5 mL (3 mg total) under the skin daily  -     topiramate (Topamax) 50 MG tablet; Take 1/2 tab po daily between 3-4 pm for 2 weeks then 1 tab po daily between 3-4 pm  -     Comprehensive metabolic panel; Future  -     Copper Level; Future  -     Zinc; Future  -     Vitamin D 25 hydroxy; Future  -     Vitamin B1, whole blood; Future  -     Vitamin A; Future  -     PTH, intact; Future    Adjustment disorder with depressed mood  -     buPROPion (WELLBUTRIN XL) 300 mg 24 hr tablet; Take 1 tablet (300 mg total) by mouth every morning  -     topiramate (Topamax) 50 MG tablet;  Take 1/2 tab po daily between 3-4 pm for 2 weeks then 1 tab po daily between 3-4 pm  -     Comprehensive metabolic panel; Future  -     Copper Level; Future  -     Zinc; Future  -     Vitamin D 25 hydroxy; Future  -     Vitamin B1, whole blood; Future  -     Vitamin A; Future  -     PTH, intact; Future    Abnormal craving  -     buPROPion (WELLBUTRIN XL) 300 mg 24 hr tablet; Take 1 tablet (300 mg total) by mouth every morning  -     topiramate (Topamax) 50 MG tablet; Take 1/2 tab po daily between 3-4 pm for 2 weeks then 1 tab po daily between 3-4 pm  -     Comprehensive metabolic panel; Future  -     Copper Level; Future  -     Zinc; Future  -     Vitamin D 25 hydroxy; Future  -     Vitamin B1, whole blood; Future  -     Vitamin A; Future  -     PTH, intact; Future    Body mass index 29 0-29 9, adult        Subjective:   Chief Complaint   Patient presents with    Virtual Regular Visit     Patient ID: Joshua Barker  is a 60 y o  female with excess weight/obesity here to pursue weight management    Past Medical History:   Diagnosis Date    Anxiety disorder     Arthritis     osteoarthritis hip/poss knuckles"    Basal cell carcinoma     Cancer ([facility] [address] )     Cervical disc herniation     C7//sees chiropracter and no recent problems    Exercise involving walking     10-32855 steps per day    Full thickness rotator cuff tear 2020     1 para 1     History of bariatric surgery     History of non anemic vitamin B12 deficiency     History of prediabetes     before bariatric surgery    Hyperlipidemia     not on meds    Hypertension     not on meds    Irregular heart beat     "history bigeminy from taking benadryl, tries to avoid taking it"no recent issues"    Motion sickness     "on rides"    Neck pain     occas    Nondisplaced fracture of proximal phalanx of left lesser toe(s), initial encounter for closed fracture 2019    OA (osteoarthritis) of hip     Obesity     Oral herpes simplex infection     occas    Other insomnia 2020    Postmenopausal     Sciatica     Vertigo     Wears glasses      HPI:  The patient presents for MWM    Feels Saxenda is less effective  Feels able to maintain weight due to lifestyle changes    Doing well w/ Wellbutrin and naltrexone  Would like to trial off naltrexone due to back and would like option of pain medication if flares  Wellbutrin refilled  +effects on mood  Will hold naltrexone for now  Grieving passing of her ; exercise was decreased but back to walking     Craving, grazing at night    Sleep: waking 2-3x/night    The following portions of the patient's history were reviewed and updated as appropriate: allergies, current medications, past family history, past medical history, past social history, past surgical history and problem list     Objective:     Wt 79 8 kg (176 lb) Comment: Pt reported  LMP 05/01/2005 (Within Months)   BMI 29 29 kg/m²       Virtual Regular Visit    Verification of patient location:    Patient is located in the following state in which I hold an active license [place]      Assessment/Plan:    Problem List Items Addressed This Visit        Digestive    Postsurgical malabsorption    Relevant Medications    topiramate (Topamax) 50 MG tablet    Other Relevant Orders    Comprehensive metabolic panel    Copper Level    Zinc    Vitamin D 25 hydroxy    Vitamin B1, whole blood    Vitamin A    PTH, intact      Other Visit Diagnoses     Overweight    -  Primary    Relevant Medications    liraglutide (Saxenda) injection    buPROPion (WELLBUTRIN XL) 300 mg 24 hr tablet    topiramate (Topamax) 50 MG tablet    Other Relevant Orders    Comprehensive metabolic panel    Copper Level    Zinc    Vitamin D 25 hydroxy    Vitamin B1, whole blood    Vitamin A    PTH, intact    Status post bariatric surgery        Relevant Medications    liraglutide (Saxenda) injection    topiramate (Topamax) 50 MG tablet    Other Relevant Orders    Comprehensive metabolic panel    Copper Level    Zinc    Vitamin D 25 hydroxy    Vitamin B1, whole blood    Vitamin A    PTH, intact    Adjustment disorder with depressed mood        Relevant Medications    liraglutide (Saxenda) injection    buPROPion (WELLBUTRIN XL) 300 mg 24 hr tablet    topiramate (Topamax) 50 MG tablet    Other Relevant Orders    Comprehensive metabolic panel    Copper Level    Zinc    Vitamin D 25 hydroxy    Vitamin B1, whole blood    Vitamin A    PTH, intact    Abnormal craving        Relevant Medications    buPROPion (WELLBUTRIN XL) 300 mg 24 hr tablet    topiramate (Topamax) 50 MG tablet    Other Relevant Orders    Comprehensive metabolic panel    Copper Level    Zinc    Vitamin D 25 hydroxy    Vitamin B1, whole blood    Vitamin A    PTH, intact    Body mass index 29 0-29 9, adult                   Reason for visit is   Chief Complaint   Patient presents with    Virtual Regular Visit        Encounter provider Andreas Rojas PA-C    Provider located at [address] [place] [phone] [facility] [id number]      Recent Visits  No visits were found meeting these conditions  Showing recent visits within past 7 days and meeting all other requirements  Today's Visits  Date Type Provider Dept   07/25/22 Telemedicine Andreas Rojas PA-C [facility] today's visits and meeting all other requirements  Future Appointments  No visits were found meeting these conditions  Showing future appointments within next 150 days and meeting all other requirements       The patient was identified by name and date of birth  Erasto Rojas was informed that this is a telemedicine visit and that the visit is being conducted through Telephone and patient was informed that this is not a secure, HIPAA-compliant platform  She agrees to proceed     My office door was closed  No one else was in the room  She acknowledged consent and understanding of privacy and security of the video platform   The patient has agreed to participate and understands they can discontinue the visit at any time  Patient is aware this is a billable service  Subjective  Cailin Ruiz is a 60 y o  female for MWM   HPI     Past Medical History:   Diagnosis Date    Anxiety disorder     Arthritis     osteoarthritis hip/poss knuckles"    Basal cell carcinoma     Cancer ([address] )     Cervical disc herniation     C7//sees chiropracter and no recent problems    Exercise involving walking     10-11926 steps per day    Full thickness rotator cuff tear 2020     1 para 1     History of bariatric surgery     History of non anemic vitamin B12 deficiency     History of prediabetes     before bariatric surgery    Hyperlipidemia     not on meds    Hypertension     not on meds    Irregular heart beat     "history bigeminy from taking benadryl, tries to avoid taking it"no recent issues"    Motion sickness     "on rides"    Neck pain     occas    Nondisplaced fracture of proximal phalanx of left lesser toe(s), initial encounter for closed fracture 2019    OA (osteoarthritis) of hip     Obesity     Oral herpes simplex infection     occas    Other insomnia 2020    Postmenopausal     Sciatica     Vertigo     Wears glasses        Past Surgical History:   Procedure Laterality Date    BARIATRIC SURGERY  2004    COLPOSCOPY      Cervical Dysplasia    DENTAL SURGERY      one lower right    DENTAL SURGERY      Has dental implant; s/p tooth abscess    EGD      GASTRIC BYPASS      titanium staples    GYNECOLOGIC CRYOSURGERY      Cervical Dysplasia    LUMBAR LAMINECTOMY  1980    L5-S1; No Hardware    HI COLONOSCOPY FLX DX W/COLLJ SPEC WHEN PFRMD N/A 2016    Procedure: COLONOSCOPY;  Surgeon: Humberto Acevedo MD;  [facility];   Service: Colorectal    HI TOTAL HIP ARTHROPLASTY Right 2020    Procedure: ARTHROPLASTY HIP TOTAL ANTERIOR;  Surgeon: Priyank Green MD;  Location: [facility];  Service: Orthopedics    ROTATOR CUFF REPAIR Right 2010    SKIN CANCER EXCISION      s/p BCC Back and R Clavicle    WISDOM TOOTH EXTRACTION         Current Outpatient Medications   Medication Sig Dispense Refill    buPROPion (WELLBUTRIN XL) 300 mg 24 hr tablet Take 1 tablet (300 mg total) by mouth every morning 30 tablet 3    liraglutide (Saxenda) injection Inject 0 5 mL (3 mg total) under the skin daily 45 mL 0    topiramate (Topamax) 50 MG tablet Take 1/2 tab po daily between 3-4 pm for 2 weeks then 1 tab po daily between 3-4 pm 30 tablet 2    ALPRAZolam (XANAX) 0 25 mg tablet Take 1 tablet (0 25 mg total) by mouth daily at bedtime as needed for anxiety 30 tablet 0    Calcium Carbonate-Vitamin D (Calcium 600-D) 600-400 MG-UNIT per tablet Take 2 capsules by mouth 2 (two) times a day        cyclobenzaprine (FLEXERIL) 10 mg tablet Take 10 mg by mouth 3 (three) times a day as needed for muscle spasms      Insulin Pen Needle (NOVOFINE PLUS) 32G X 4 MM MISC by Does not apply route daily (Patient taking differently: Use daily Rx per Weight Management for Saxenda) 30 each 3    Meclizine HCl (ANTIVERT PO) Take 1 tablet by mouth as needed Rx per prior PCP      Multiple Vitamins-Minerals (BARIATRIC FUSION PO) Take 2 tablets by mouth 2 (two) times a day       traMADol (Ultram) 50 mg tablet Take 1 tablet (50 mg total) by mouth every 6 (six) hours as needed for moderate pain (Patient not taking: Reported on 5/19/2022) 30 tablet 0     No current facility-administered medications for this visit  Allergies   Allergen Reactions    Macrodantin [Nitrofurantoin]      myalgias    Sulfa Antibiotics Swelling     Tongue swelling       Review of Systems   Psychiatric/Behavioral:        Grieving loss of , positive effects f/ Wellbutrin     Video Exam    Vitals:    07/25/22 1314   Weight: 79 8 kg (176 lb)       Physical Exam  Vitals and nursing note reviewed           I spent 30 minutes with patient today in which greater than 50% of the time was spent in counseling/coordination of care regarding dietary and lifestyle changes for weight loss and weight loss maintenance     It was my intent to perform this visit via video technology but the patient was not able to do a video connection so the visit was completed via audio telephone only  VIRTUAL VISIT [address] verbally agrees to participate in [employer]  Pt is aware that [employer] could be limited without vital signs or the ability to perform a full hands-on physical Rock Noriega understands she or the provider may request at any time to terminate the video visit and request the patient to seek care or treatment in person

## 2022-07-26 ENCOUNTER — PROCEDURE VISIT (OUTPATIENT)
Dept: DERMATOLOGY | Facility: CLINIC | Age: 62
End: 2022-07-26
Payer: COMMERCIAL

## 2022-07-26 VITALS
HEART RATE: 79 BPM | BODY MASS INDEX: 30.12 KG/M2 | DIASTOLIC BLOOD PRESSURE: 64 MMHG | TEMPERATURE: 98.9 F | SYSTOLIC BLOOD PRESSURE: 136 MMHG | OXYGEN SATURATION: 92 % | WEIGHT: 181 LBS

## 2022-07-26 DIAGNOSIS — C44.91 NODULAR BASAL CELL CARCINOMA: Primary | ICD-10-CM

## 2022-07-26 PROCEDURE — 12052 INTMD RPR FACE/MM 2.6-5.0 CM: CPT | Performed by: DERMATOLOGY

## 2022-07-26 PROCEDURE — 17311 MOHS 1 STAGE H/N/HF/G: CPT | Performed by: DERMATOLOGY

## 2022-07-26 PROCEDURE — 17312 MOHS ADDL STAGE: CPT | Performed by: DERMATOLOGY

## 2022-07-26 NOTE — PROGRESS NOTES
MOHS Procedure Note    Patient: Naty Kathleen  : 1960  MRN: [id number]  Date: 2022    History of Present Illness: The patient is a 60 y o  female who presents with complaints of BCC in midline inferior forehead  Past Medical History:   Diagnosis Date    Anxiety disorder     Arthritis     osteoarthritis hip/poss knuckles"    Basal cell carcinoma 2022    midline inferior forehead    Cancer ([address]     Cervical disc herniation     C7//sees chiropracter and no recent problems    Exercise involving walking     10-35916 steps per day    Full thickness rotator cuff tear 2020     1 para 1     History of bariatric surgery     History of non anemic vitamin B12 deficiency     History of prediabetes     before bariatric surgery    Hyperlipidemia     not on meds    Hypertension     not on meds    Irregular heart beat     "history bigeminy from taking benadryl, tries to avoid taking it"no recent issues"    Motion sickness     "on rides"    Neck pain     occas    Nondisplaced fracture of proximal phalanx of left lesser toe(s), initial encounter for closed fracture 2019    OA (osteoarthritis) of hip     Obesity     Oral herpes simplex infection     occas    Other insomnia 2020    Postmenopausal     Sciatica     Vertigo     Wears glasses        Past Surgical History:   Procedure Laterality Date    BARIATRIC SURGERY  2004    COLPOSCOPY      Cervical Dysplasia    DENTAL SURGERY      one lower right    DENTAL SURGERY      Has dental implant; s/p tooth abscess    EGD      GASTRIC BYPASS      titanium staples    GYNECOLOGIC CRYOSURGERY      Cervical Dysplasia    LUMBAR LAMINECTOMY  1980    L5-S1; No Hardware    MOHS SURGERY  2022    midline inferior forehead    OR COLONOSCOPY FLX DX W/COLLJ SPEC WHEN PFRMD N/A 2016    Procedure: COLONOSCOPY;  Surgeon: Octavio Navas MD;  [facility];   Service: Colorectal    OR TOTAL HIP ARTHROPLASTY Right 12/16/2020    Procedure: ARTHROPLASTY HIP TOTAL ANTERIOR;  Surgeon: Ewa Miranda MD;  [facility];  Service: Orthopedics    ROTATOR CUFF REPAIR Right 2010    SKIN CANCER EXCISION      s/p BCC Back and R Clavicle    WISDOM TOOTH EXTRACTION           Current Outpatient Medications:     ALPRAZolam (XANAX) 0 25 mg tablet, Take 1 tablet (0 25 mg total) by mouth daily at bedtime as needed for anxiety, Disp: 30 tablet, Rfl: 0    buPROPion (WELLBUTRIN XL) 300 mg 24 hr tablet, Take 1 tablet (300 mg total) by mouth every morning, Disp: 30 tablet, Rfl: 3    Calcium Carbonate-Vitamin D (Calcium 600-D) 600-400 MG-UNIT per tablet, Take 2 capsules by mouth 2 (two) times a day  , Disp: , Rfl:     cyclobenzaprine (FLEXERIL) 10 mg tablet, Take 10 mg by mouth 3 (three) times a day as needed for muscle spasms, Disp: , Rfl:     Insulin Pen Needle (NOVOFINE PLUS) 32G X 4 MM MISC, by Does not apply route daily (Patient taking differently: Use daily Rx per Weight Management for Saxenda), Disp: 30 each, Rfl: 3    liraglutide (Saxenda) injection, Inject 0 5 mL (3 mg total) under the skin daily, Disp: 45 mL, Rfl: 0    Meclizine HCl (ANTIVERT PO), Take 1 tablet by mouth as needed Rx per prior PCP, Disp: , Rfl:     Multiple Vitamins-Minerals (BARIATRIC FUSION PO), Take 2 tablets by mouth 2 (two) times a day , Disp: , Rfl:     topiramate (Topamax) 50 MG tablet, Take 1/2 tab po daily between 3-4 pm for 2 weeks then 1 tab po daily between 3-4 pm, Disp: 30 tablet, Rfl: 2    traMADol (Ultram) 50 mg tablet, Take 1 tablet (50 mg total) by mouth every 6 (six) hours as needed for moderate pain (Patient not taking: Reported on 5/19/2022), Disp: 30 tablet, Rfl: 0    Allergies   Allergen Reactions    Macrodantin [Nitrofurantoin]      myalgias    Sulfa Antibiotics Swelling     Tongue swelling       Physical Exam:   Vitals:    07/26/22 0927   BP: 136/64   Pulse: 79   Temp: 98 9 °F (37 2 °C)   SpO2: 92%     General: Awake, Alert, Oriented x 3, Mood and affect appropriate  Respiratory: Respirations even and unlabored  Cardiovascular: Peripheral pulses intact; no edema  Musculoskeletal Exam: n/a    Assessment: 0 9 cm x 0 7 cm indurated pink scar, biopsy confirmed basal cell carcinoma, nodular type  Plan: MOHS today    MOHS Procedure Timeout    Flowsheet Row Most Recent Value   Timeout: 0562   Patient Identity Verified: Yes   Correct Site Verified: Yes   Correct Procedure Verified: Yes          MOHS Diagnosis/Indication/Location/ID    Flowsheet Row Most Recent Value   Pathology Type Basal cell carcinoma   Anatomic Site --  [Midline inferior forehead]   Indications for MOHS tumor location   MOHS ID [id number]          MOHS Site/Accession/Pre-Post    Flowsheet Row Most Recent Value   Original Site Identified (as submitted by referring clinician) Photo   Biopsy Accession/Specimen # (as submitted by referring clincian) [id number]   Pre-MOHS Size Length (cm) 0 9   Pre-MOHS Size Width (cm) 0 7   Post-MOHS Size-Length (cm) 1 1   Post MOHS Size-Width (cm) 1 2   Repair Type Intermediate layered closure   Suture Type Prolene, Vicryl   Prolene Suture Size 5   Vicryl Suture Size 5   Final repair length (cm): 3   Anesthetic Used 1% Lidocaine with epinephrine  [with bicarb]          MOHS Tumor Stage 1 Information    Flowsheet Row Most Recent Value   Tissue Sections (blocks) 2   Microscopic Exam Section 1: Irregularly shaped islands of basaloid keratinocytes with peripheral palisading and retraction artifact consistent with basal cell carcinoma were noted on microscopic analysis  The cells have scant cytoplasm and round dark nuclei , Emanating from the epidermis and infiltrating the dermis are irregularly shaped islands of basaloid keratinocytes  The nuclei at the periphery of the islands have a palisaded arrangement  [address] are associated with a fibromyxoid stroma and clefting  Microscopic Exam Section 2: No tumor identified in section  Tumor Clear After Stage I? No          MOHS Tumor Stage 2 Information    Flowsheet Row Most Recent Value   Tissue Sections (blocks) 1   Microscopic Exam Section 1: No tumor identified in section  Tumor Clear After Stage II? Yes                             Specimen B on accession#[id number] will be treated by [facility] in September  Patient identified, procedure verified, site identified and verified  Time out completed  Surgical removal of the lesion discussed with the patient (risks and benefits, including possibility of scarring, infection, recurrence or potential for further treatment)  I have specifically identified the site with the patient  I have discussed the fact that the patient will have a scar after the procedure regardless of granulation or repair with sutures  I have discussed that the repair options can range from granulation in some cases to linear or curvilinear closures to larger flaps or grafts  There are sometimes flaps or grafts used that require multiples stages of surgery and will not be completed today, rather be completed over a series of appointments  I have discussed that occasionally due to location, size or depth of the lesion I may recommend consultation with and transfer of care for further removal or the reconstruction to another provider such as ophthalmology surgery, plastic surgery, ENT surgery, or surgical oncology  There are cases in which other testing such as imaging with MRI or CT scan or testing of lymph nodes is recommended because of the nature/depth/location of tumor seen during the removal  There is a risk of injury to nerves causing temporary or permanent numbness or the inability to move muscles full such as the inability to lift eyebrows  Questions answered and verbal and written consent was obtained  The tumor qualifies for Mohs based on AUC criteria   Dr Andrea Sánchez served as the surgeon and pathologist during the procedure  With the patient in the supine position and under adequate local anesthesia with 1% lidocaine with epinephrine 1:100,000, the defect was scrubbed with Hibiclens  Sterile drapes were placed from the sterile tray  Because of the location of the surgical defect, an intermediate closure was judged to give the best possible cosmetic and functional result  The edges of the defect were carefully debrided removing any dead or coagulated tissue  Hemostasis was obtained by pinpoint electrocoagulation  Careful planning of removal of redundant tissue at either end of the defect was drawn out so that the suture lines would fall in the optimal orientation with regard to the relaxed skin tension lines  These were then removed with a #15 blade scalpel  The wound was then approximated by a deep layer of buried vertical mattress sutures and the cutaneous margins were approximated and closed by superficial sutures as noted above  Estimated blood loss was less than 5 mL  The patient tolerated the procedure well  The wound was dressed with petrolatum, a non-stick pad, and a compression dressing  Ely Mccoy MD served as the surgeon and pathologist during the procedure  Postoperative care: Wound care discussed at length  I urged the patient to call us if any problems or question should arise  Complications: none  Post-op medications: none  Patient condition after procedure: stable  Discharge plans: Plan for suture removal 7 days, at next scheduled appointment  BCC cleared with 2 stages of mohs and repaired with 3cm closure  Well tolerated  S/R in 1 week      Scribe Attestation    I,:  Hernando Mccain am acting as a scribe while in the presence of the attending physician :       I,:  Ely Mccoy MD personally performed the services described in this documentation    as scribed in my presence :

## 2022-07-26 NOTE — PATIENT INSTRUCTIONS
Mohs Microscopic Surgery After Care    What You Will Need to Do After the Procedure  Keep the area clean and dry the first day  Try NOT to remove the bandage for the first day  Gently clean the area with soap and water and apply Vaseline ointment (this is over the counter and not a prescription) to the excision site for up to 2 weeks  Apply a clean appropriately sized bandage to area  Gauze and paper tape are recommended for sensitive skin  Return for suture removal as instructed (generally 1 week for the face, 2 weeks for the body)  Take Acetaminophen (Tylenol) for discomfort, if no contraindications  Do NOT take Ibuprofen or aspirin unless specifically told to do so by your Dermatologist because these medications can make bleeding worse  Call our office immediately for signs of infection: fever, chills, increased redness, warmth, tenderness, discomfort/pain, or pus or foul smell coming from the wound  If bleeding is noticed, place a clean cloth over the area and apply firm pressure for thirty minutes  Check the wound ONLY after 30 minutes of direct pressure; do not cheat and sneak a peak, as that does not count  If bleeding persists after 30 minutes of legitimate direct pressure, then try one more round of direct pressure for an additional 10 minutes to the area  Should the bleeding become heavier or not stop after the second attempt, call [facility] directly at [phone] (SKIN) or, if after hours, go to your nearest Emergency Room or Urgent Care

## 2022-08-02 ENCOUNTER — OFFICE VISIT (OUTPATIENT)
Dept: DERMATOLOGY | Facility: CLINIC | Age: 62
End: 2022-08-02

## 2022-08-02 DIAGNOSIS — Z48.02 ENCOUNTER FOR REMOVAL OF SUTURES: Primary | ICD-10-CM

## 2022-08-02 PROCEDURE — 99024 POSTOP FOLLOW-UP VISIT: CPT | Performed by: DERMATOLOGY

## 2022-08-02 NOTE — PROGRESS NOTES
Suture removal    Date/Time: 8/2/2022 3:47 PM  Performed by: Matteo Birmingham RN  Authorized by: Yomaira Peterson MD   Universal Protocol:  Consent: Verbal consent obtained  Written consent not obtained  Risks and benefits: risks, benefits and alternatives were discussed  Consent given by: patient  Time out: Immediately prior to procedure a "time out" was called to verify the correct patient, procedure, equipment, support staff and site/side marked as required  Timeout called at: 8/2/2022 3:47 PM   Patient understanding: patient states understanding of the procedure being performed  Patient consent: the patient's understanding of the procedure matches consent given  Procedure consent: procedure consent matches procedure scheduled  Relevant documents: relevant documents present and verified  Test results: test results available and properly labeled  Site marked: the operative site was marked  Radiology Images displayed and confirmed  If images not available, report reviewed: imaging studies not available  Patient identity confirmed: verbally with patient        Patient location:  Clinic  Location:     Laterality:  Median    Location:  Head/neck    Head/neck location:  Forehead (Midline inferior forehead )  Procedure details: Tools used:  Suture removal kit    Wound appearance:  No sign(s) of infection, good wound healing, pink and nontender    Number of sutures removed:  7  Post-procedure details:     Post-removal:  No dressing applied (Vaseline applied)    Patient tolerance of procedure: Tolerated well, no immediate complications  Comments:      Patient instructed to follow up in 6 months for a full body skin exam  Pt stated she has an appt with her primary dermatologist in December

## 2022-08-08 DIAGNOSIS — F41.1 GENERALIZED ANXIETY DISORDER: ICD-10-CM

## 2022-08-08 RX ORDER — ALPRAZOLAM 0.25 MG/1
0.25 TABLET ORAL
Qty: 30 TABLET | Refills: 0 | Status: SHIPPED | OUTPATIENT
Start: 2022-08-08 | End: 2022-09-08 | Stop reason: SDUPTHER

## 2022-08-08 NOTE — TELEPHONE ENCOUNTER
Medication:oxycodone  PDMP    04/11/2022 04/11/2022 oxyCODONE HCL-ACETAMINOPHEN (Tablet)  10 0 2 325 MG-7 5 MG 56 25 MOHSEN JACOBS            Active agreement on file -No

## 2022-08-11 ENCOUNTER — HOSPITAL ENCOUNTER (OUTPATIENT)
Dept: INFUSION CENTER | Facility: CLINIC | Age: 62
Discharge: HOME | End: 2022-08-11
Payer: COMMERCIAL

## 2022-08-11 VITALS
SYSTOLIC BLOOD PRESSURE: 118 MMHG | HEART RATE: 73 BPM | DIASTOLIC BLOOD PRESSURE: 78 MMHG | TEMPERATURE: 98 F | RESPIRATION RATE: 18 BRPM

## 2022-08-11 DIAGNOSIS — D50.9 IRON DEFICIENCY ANEMIA, UNSPECIFIED IRON DEFICIENCY ANEMIA TYPE: ICD-10-CM

## 2022-08-11 DIAGNOSIS — K91.2 POSTSURGICAL MALABSORPTION: Primary | ICD-10-CM

## 2022-08-11 PROCEDURE — 96374 THER/PROPH/DIAG INJ IV PUSH: CPT

## 2022-08-11 RX ORDER — SODIUM CHLORIDE 9 MG/ML
20 INJECTION, SOLUTION INTRAVENOUS ONCE
Status: CANCELLED | OUTPATIENT
Start: 2022-09-08

## 2022-08-11 RX ORDER — SODIUM CHLORIDE 9 MG/ML
20 INJECTION, SOLUTION INTRAVENOUS ONCE
Status: COMPLETED | OUTPATIENT
Start: 2022-08-11 | End: 2022-08-11

## 2022-08-11 RX ADMIN — SODIUM CHLORIDE 20 ML/HR: 9 INJECTION, SOLUTION INTRAVENOUS at 11:41

## 2022-08-11 NOTE — PROGRESS NOTES
Pt arrived to unit without complaint  Pt tolerated IVP Venofer without incident  AVS declined, but aware of future appts  Pt left unit in stable condition

## 2022-08-23 ENCOUNTER — APPOINTMENT (OUTPATIENT)
Dept: LAB | Facility: HOSPITAL | Age: 62
End: 2022-08-23
Payer: COMMERCIAL

## 2022-08-23 DIAGNOSIS — Z98.84 HISTORY OF GASTRIC BYPASS: ICD-10-CM

## 2022-08-23 DIAGNOSIS — F43.21 ADJUSTMENT DISORDER WITH DEPRESSED MOOD: ICD-10-CM

## 2022-08-23 DIAGNOSIS — Z98.84 STATUS POST BARIATRIC SURGERY: ICD-10-CM

## 2022-08-23 DIAGNOSIS — K91.2 POSTSURGICAL MALABSORPTION: ICD-10-CM

## 2022-08-23 DIAGNOSIS — E66.3 OVERWEIGHT: ICD-10-CM

## 2022-08-23 DIAGNOSIS — R63.8 ABNORMAL CRAVING: ICD-10-CM

## 2022-08-23 DIAGNOSIS — D50.9 IRON DEFICIENCY ANEMIA, UNSPECIFIED IRON DEFICIENCY ANEMIA TYPE: ICD-10-CM

## 2022-08-23 DIAGNOSIS — Z11.4 SCREENING FOR HIV (HUMAN IMMUNODEFICIENCY VIRUS): ICD-10-CM

## 2022-08-23 LAB
25(OH)D3 SERPL-MCNC: 45.8 NG/ML (ref 30–100)
ALBUMIN SERPL BCP-MCNC: 3.6 G/DL (ref 3.5–5)
ALP SERPL-CCNC: 90 U/L (ref 46–116)
ALT SERPL W P-5'-P-CCNC: 27 U/L (ref 12–78)
ANION GAP SERPL CALCULATED.3IONS-SCNC: 10 MMOL/L (ref 4–13)
AST SERPL W P-5'-P-CCNC: 22 U/L (ref 5–45)
BILIRUB SERPL-MCNC: 0.36 MG/DL (ref 0.2–1)
BUN SERPL-MCNC: 17 MG/DL (ref 5–25)
CALCIUM SERPL-MCNC: 9.1 MG/DL (ref 8.3–10.1)
CHLORIDE SERPL-SCNC: 105 MMOL/L (ref 96–108)
CO2 SERPL-SCNC: 28 MMOL/L (ref 21–32)
CREAT SERPL-MCNC: 0.96 MG/DL (ref 0.6–1.3)
ERYTHROCYTE [DISTWIDTH] IN BLOOD BY AUTOMATED COUNT: 13.3 % (ref 11.6–15.1)
FERRITIN SERPL-MCNC: 322 NG/ML (ref 8–388)
GFR SERPL CREATININE-BSD FRML MDRD: 63 ML/MIN/1.73SQ M
GLUCOSE P FAST SERPL-MCNC: 84 MG/DL (ref 65–99)
HCT VFR BLD AUTO: 46.3 % (ref 34.8–46.1)
HGB BLD-MCNC: 14.8 G/DL (ref 11.5–15.4)
IRON SATN MFR SERPL: 29 % (ref 15–50)
IRON SERPL-MCNC: 75 UG/DL (ref 50–170)
MCH RBC QN AUTO: 31.6 PG (ref 26.8–34.3)
MCHC RBC AUTO-ENTMCNC: 32 G/DL (ref 31.4–37.4)
MCV RBC AUTO: 99 FL (ref 82–98)
PLATELET # BLD AUTO: 202 THOUSANDS/UL (ref 149–390)
PMV BLD AUTO: 10.7 FL (ref 8.9–12.7)
POTASSIUM SERPL-SCNC: 3.8 MMOL/L (ref 3.5–5.3)
PROT SERPL-MCNC: 7.6 G/DL (ref 6.4–8.4)
PTH-INTACT SERPL-MCNC: 92.9 PG/ML (ref 18.4–80.1)
RBC # BLD AUTO: 4.68 MILLION/UL (ref 3.81–5.12)
SODIUM SERPL-SCNC: 143 MMOL/L (ref 135–147)
TIBC SERPL-MCNC: 258 UG/DL (ref 250–450)
WBC # BLD AUTO: 5.78 THOUSAND/UL (ref 4.31–10.16)

## 2022-08-23 PROCEDURE — 83550 IRON BINDING TEST: CPT

## 2022-08-23 PROCEDURE — 80053 COMPREHEN METABOLIC PANEL: CPT

## 2022-08-23 PROCEDURE — 85027 COMPLETE CBC AUTOMATED: CPT

## 2022-08-23 PROCEDURE — 84630 ASSAY OF ZINC: CPT

## 2022-08-23 PROCEDURE — 83970 ASSAY OF PARATHORMONE: CPT

## 2022-08-23 PROCEDURE — 84425 ASSAY OF VITAMIN B-1: CPT

## 2022-08-23 PROCEDURE — 84590 ASSAY OF VITAMIN A: CPT

## 2022-08-23 PROCEDURE — 87389 HIV-1 AG W/HIV-1&-2 AB AG IA: CPT

## 2022-08-23 PROCEDURE — 83540 ASSAY OF IRON: CPT

## 2022-08-23 PROCEDURE — 36415 COLL VENOUS BLD VENIPUNCTURE: CPT

## 2022-08-23 PROCEDURE — 82306 VITAMIN D 25 HYDROXY: CPT

## 2022-08-23 PROCEDURE — 82525 ASSAY OF COPPER: CPT

## 2022-08-23 PROCEDURE — 82728 ASSAY OF FERRITIN: CPT

## 2022-08-24 ENCOUNTER — OFFICE VISIT (OUTPATIENT)
Dept: FAMILY MEDICINE CLINIC | Facility: CLINIC | Age: 62
End: 2022-08-24
Payer: COMMERCIAL

## 2022-08-24 VITALS
TEMPERATURE: 97.6 F | SYSTOLIC BLOOD PRESSURE: 124 MMHG | WEIGHT: 180.4 LBS | BODY MASS INDEX: 30.06 KG/M2 | HEIGHT: 65 IN | OXYGEN SATURATION: 98 % | HEART RATE: 77 BPM | RESPIRATION RATE: 16 BRPM | DIASTOLIC BLOOD PRESSURE: 80 MMHG

## 2022-08-24 DIAGNOSIS — F41.1 GENERALIZED ANXIETY DISORDER: ICD-10-CM

## 2022-08-24 DIAGNOSIS — Z11.4 SCREENING FOR HIV (HUMAN IMMUNODEFICIENCY VIRUS): ICD-10-CM

## 2022-08-24 DIAGNOSIS — K91.2 POSTGASTRECTOMY MALABSORPTION: ICD-10-CM

## 2022-08-24 DIAGNOSIS — Z90.3 POSTGASTRECTOMY MALABSORPTION: ICD-10-CM

## 2022-08-24 DIAGNOSIS — Z11.59 NEED FOR HEPATITIS C SCREENING TEST: ICD-10-CM

## 2022-08-24 DIAGNOSIS — Z01.818 PRE-OP EXAMINATION: Primary | ICD-10-CM

## 2022-08-24 PROBLEM — M85.80 OSTEOPENIA DETERMINED BY X-RAY: Status: ACTIVE | Noted: 2022-05-27

## 2022-08-24 PROBLEM — G89.4 CHRONIC PAIN SYNDROME: Status: RESOLVED | Noted: 2020-07-27 | Resolved: 2022-08-24

## 2022-08-24 PROCEDURE — 99214 OFFICE O/P EST MOD 30 MIN: CPT | Performed by: FAMILY MEDICINE

## 2022-08-24 NOTE — PROGRESS NOTES
Subjective:      Nayeli Olguin is a 60 y o  female who presents to the office today for a preoperative consultation at the request of surgeon anesthesiologist who plans on performing MRI with anesthesia on September 7, 2022  This consultation is requested for the specific conditions prompting preoperative evaluation (i e  because of potential affect on operative risk): age, obesity  Planned anesthesia is IV sedation  The patient has the following known anesthesia issues: none  Patient has a bleeding risk of: no recent abnormal bleeding  Patient does not have objections to receiving blood products if needed      The following portions of the patient's history were reviewed and updated as appropriate: allergies, current medications, past family history, past medical history, past social history, past surgical history and problem list     Review of Systems  Constitutional: negative  Eyes: negative  Ears, nose, mouth, throat, and face: negative  Respiratory: negative  Cardiovascular: negative  Gastrointestinal: negative  Genitourinary:negative  Integument/breast: negative  Hematologic/lymphatic: negative  Musculoskeletal:negative  Neurological: negative  Behavioral/Psych: negative  Endocrine: negative  Allergic/Immunologic: negative       Objective:      Physical Exam  /80 (BP Location: Left arm, Patient Position: Sitting, Cuff Size: Adult)   Pulse 77   Temp 97 6 °F (36 4 °C) (Skin)   Resp 16   Ht 5' 5" (1 651 m)   Wt 81 8 kg (180 lb 6 4 oz)   LMP 05/01/2005 (Within Months)   SpO2 98%   BMI 30 02 kg/m²     General Appearance:    Alert, cooperative, no distress, appears stated age   Head:    Normocephalic, without obvious abnormality, atraumatic   Eyes:    PERRL, conjunctiva/corneas clear, EOM's intact, fundi     benign, both eyes   Ears:    Normal TM's and external ear canals, both ears   Nose:   Nares normal, septum midline, mucosa normal, no drainage    or sinus tenderness   Throat:   Lips, mucosa, and tongue normal; teeth and gums normal   Neck:   Supple, symmetrical, trachea midline, no adenopathy;     thyroid:  no enlargement/tenderness/nodules; no carotid    bruit or JVD   Back:     Symmetric, no curvature, ROM normal, no CVA tenderness   Lungs:     Clear to auscultation bilaterally, respirations unlabored   Chest Wall:    No tenderness or deformity    Heart:    Regular rate and rhythm, S1 and S2 normal, no murmur, rub   or gallop       Abdomen:     Soft, non-tender, bowel sounds active all four quadrants,     no masses, no organomegaly           Extremities:   Extremities normal, atraumatic, no cyanosis or edema   Pulses:   2+ and symmetric all extremities   Skin:   Skin color, texture, turgor normal, no rashes or lesions   Lymph nodes:   Cervical, supraclavicular, and axillary nodes normal   Neurologic:   CNII-XII intact, normal strength, sensation and reflexes     throughout       Predictors of intubation difficulty:   Morbid obesity? no   Anatomically abnormal facies? no   Prominent incisors? no   Receding mandible? no   Short, thick neck? no   Neck range of motion: normal   Mallampati score: I (soft palate, uvula, fauces, and tonsillar pillars visible)   Thyromental distance: < 6cm     Dentition: No chipped, loose, or missing teeth      Cardiographics  ECG: NSR  Echocardiogram: not done    Imaging  Chest x-ray: not done    Lab Review   Appointment on 08/23/2022   Component Date Value    WBC 08/23/2022 5 78     RBC 08/23/2022 4 68     Hemoglobin 08/23/2022 14 8     Hematocrit 08/23/2022 46 3 (A)    MCV 08/23/2022 99 (A)    MCH 08/23/2022 31 6     MCHC 08/23/2022 32 0     RDW 08/23/2022 13 3     Platelets 96/52/8233 202     MPV 08/23/2022 10 7     Sodium 08/23/2022 143     Potassium 08/23/2022 3 8     Chloride 08/23/2022 105     CO2 08/23/2022 28     ANION GAP 08/23/2022 10     BUN 08/23/2022 17     Creatinine 08/23/2022 0 96     Glucose, Fasting 08/23/2022 84     Calcium 08/23/2022 9 1  AST 08/23/2022 22     ALT 08/23/2022 27     Alkaline Phosphatase 08/23/2022 90     Total Protein 08/23/2022 7 6     Albumin 08/23/2022 3 6     Total Bilirubin 08/23/2022 0 36     eGFR 08/23/2022 63     Vit D, 25-Hydroxy 08/23/2022 45 8     PTH 08/23/2022 92 9 (A)    Iron Saturation 08/23/2022 29     TIBC 08/23/2022 258     Iron 08/23/2022 75     Ferritin 08/23/2022 322           Assessment:      60 y o  female with planned surgery as above  Known risk factors for perioperative complications: None    Difficulty with intubation is not anticipated  Cardiac Risk Estimation: per the Revised Cardiac Risk Index (Circ  100:1043, 1999), the patient's risk factors for cardiac complications include age, putting her in: RCI RISK CLASS I (0 risk factors, risk of major cardiac compl  appr  0 5%)    Current medications which may produce withdrawal symptoms if withheld perioperatively: none  Plan:   Clay Casillas was seen today for pre-op clearance  Diagnoses and all orders for this visit:    Pre-op examination  Comments:  cleared for MRI brain with IV sedation     Need for hepatitis C screening test  -     Hepatitis C antibody; Future    Screening for HIV (human immunodeficiency virus)  -     HIV 1/2 Antigen/Antibody (4th Generation) w Reflex [facility]; Future    Generalized anxiety disorder    Postgastrectomy malabsorption  Comments:  continue current meds as directed     1  Preoperative workup as follows hemoglobin, hematocrit, electrolytes, creatinine, glucose, liver function studies  2  Change in medication regimen before surgery: none, continue medication regimen including morning of surgery, with sip of water  3  Prophylaxis for cardiac events with perioperative beta-blockers: not indicated    4  Invasive hemodynamic monitoring perioperatively: at the discretion of anesthesiologist   5  Deep vein thrombosis prophylaxis postoperatively:regimen to be chosen by surgical team   6  Surveillance for postoperative MI with ECG immediately postoperatively and on postoperative days 1 and 2 AND troponin levels 24 hours postoperatively and on day 4 or hospital discharge (whichever comes first): at the discretion of anesthesiologist   7  Other measures: Preoperative alcohol abstention x 30 days

## 2022-08-25 LAB
COPPER SERPL-MCNC: 147 UG/DL (ref 80–158)
HIV 1+2 AB+HIV1 P24 AG SERPL QL IA: NORMAL
ZINC SERPL-MCNC: 60 UG/DL (ref 44–115)

## 2022-08-25 RX ORDER — MULTIVIT-MIN/IRON/FOLIC ACID/K 18-600-40
CAPSULE ORAL DAILY
COMMUNITY

## 2022-08-25 NOTE — PRE-PROCEDURE INSTRUCTIONS
Pre-Surgery Instructions:   Medication Instructions    ALPRAZolam (XANAX) 0 25 mg tablet Hold day of surgery   buPROPion (WELLBUTRIN XL) 300 mg 24 hr tablet Take day of surgery   Calcium Carbonate-Vitamin D (Calcium 600-D) 600-400 MG-UNIT per tablet Hold day of surgery   Cholecalciferol (Vitamin D) 50 MCG (2000 UT) CAPS Hold day of surgery   cyclobenzaprine (FLEXERIL) 10 mg tablet Hold day of surgery   Insulin Pen Needle (NOVOFINE PLUS) 32G X 4 MM MISC Take day of surgery   liraglutide (Saxenda) injection Take day of surgery   Meclizine HCl (ANTIVERT PO) Hold day of surgery   Multiple Vitamins-Minerals (BARIATRIC FUSION PO) Hold day of surgery   topiramate (Topamax) 50 MG tablet Hold day of surgery  Pre MRI instructions given  Pt NPO at 12 mn on 9/6/22  Pt to take wellbutrin the am of the MRI at least 2 hours prior to arrival time , with up to 6 oz of water  Pt to arrive to the [facility] at the given time, wear a mask and proceed to the SDS unit 2nd floor    [employer]

## 2022-08-26 ENCOUNTER — OFFICE VISIT (OUTPATIENT)
Dept: HEMATOLOGY ONCOLOGY | Facility: CLINIC | Age: 62
End: 2022-08-26
Payer: COMMERCIAL

## 2022-08-26 VITALS
HEART RATE: 86 BPM | BODY MASS INDEX: 30.16 KG/M2 | TEMPERATURE: 98.5 F | RESPIRATION RATE: 18 BRPM | SYSTOLIC BLOOD PRESSURE: 110 MMHG | WEIGHT: 181 LBS | DIASTOLIC BLOOD PRESSURE: 64 MMHG | OXYGEN SATURATION: 98 % | HEIGHT: 65 IN

## 2022-08-26 DIAGNOSIS — K91.2 POSTSURGICAL MALABSORPTION: ICD-10-CM

## 2022-08-26 DIAGNOSIS — D50.9 IRON DEFICIENCY ANEMIA, UNSPECIFIED IRON DEFICIENCY ANEMIA TYPE: Primary | ICD-10-CM

## 2022-08-26 LAB — VIT B1 BLD-SCNC: 186.1 NMOL/L (ref 66.5–200)

## 2022-08-26 PROCEDURE — 99214 OFFICE O/P EST MOD 30 MIN: CPT | Performed by: INTERNAL MEDICINE

## 2022-08-26 NOTE — PROGRESS NOTES
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[place] [address] Road is a 60 y o  with history of gastric bypass (2004) here for follow-up with me today for iron deficiency anemia  She is currently being followed by weight management  She tolerated IV Venofer well on 6/15, 6/29, 7/6, 7/13, and 7/20  Her CBC shows borderline macrocytosis:   1/11/2021 6/5/2021 9/1/2021 1/7/2022    WBC 11 26 (H) 4 70 4 96 5 14   Hemoglobin 12 7 12 2 13 5 13 7   HCT 42 0 40 3 44 0 43 2   MCV 92 88 95 99 (H)   Platelet Count 628 329 202 193     Her iron panel shows continued resolution of iron deficiency:   5/17/2021 9/1/2021 1/7/2022    Iron 38 (L) 73 89   Ferritin 6 (L) 166 181   Iron Saturation 10 22 31   TIBC 386 339 284        5/17/2021 1/7/2022    Vitamin B-12 337 934 (H)      5/17/2021 1/7/2022    Folate >20 0 (H) >20 0 (H)     Her homocysteine has remained WNL:   6/5/2021 1/7/2022    HOMOCYST(E)INE, P/S 8 9 8 9   Her MMA is still in process  During her initial visit, she admitted to being non-compliant with her bariatric multivitamins due to increased stress recently  Her  became blind and is going through hemodialysis and her sister-in-law passed away suddenly from cancer  She also admitted to increased fatigue, cold intolerance, increased frequency of headaches, brittle nails, and facial pallor  This Visit    Denies any f/c/n/v/cp/ap/sob/cough  Denies diarrhea or skin rash    No blood loss anywhere  Doing well with iron therapy  I have reviewed the relevant past medical, surgical, social and family history  I have also reviewed allergies and medications for this patient  Review of Systems  Review of Systems   All other systems reviewed and are negative  OBJECTIVE     Physical Exam  Vitals:    08/26/22 1351   BP: 110/64   BP Location: Left arm   Pulse: 86   Resp: 18   Temp: 98 5 °F (36 9 °C)   TempSrc: Tympanic   SpO2: 98%   Weight: 82 1 kg (181 lb)   Height: 5' 5" (1 651 m)        Physical Exam  Vitals reviewed  Constitutional:       Appearance: Normal appearance  She is normal weight  Eyes:      General: No scleral icterus  Conjunctiva/sclera: Conjunctivae normal    Cardiovascular:      Rate and Rhythm: Normal rate  Pulmonary:      Effort: No respiratory distress  Abdominal:      General: There is no distension  Musculoskeletal:         General: No swelling  Normal range of motion  Neurological:      General: No focal deficit present  Mental Status: She is alert and oriented to person, place, and time  Mental status is at baseline  Imaging  Relevant imaging reviewed in chart    Labs  Relevant labs reviewed in chart     ASSESSMENT & PLAN      Diagnosis ICD-10-CM Associated Orders   1  Iron deficiency anemia, unspecified iron deficiency anemia type  D50 9 CBC     Iron Panel (Includes Ferritin, Iron Sat%, Iron, and TIBC)   2  Postsurgical malabsorption  K91 2 CBC     Iron Panel (Includes Ferritin, Iron Sat%, Iron, and TIBC)         60 y o  female with postsurgical malabsorption from history of gastric bypass with resolution of symptomatic iron deficiency with IV Venofer therapy  Discussion/Plan/Labs[de-identified]   Today, patient told me that she wants to stop intravenous iron therapy  She will continue with oral iron therapy   RTC with labs done beforehand      Follow up   3 months     All questions were answered to the patient's satisfaction during this encounter  They appreciated and thanked me for spending time with them  The patient knows the contact information for our office and know to reach out for any relevant concerns related to this encounter  For all other listed problems and medical diagnosis in his chart - they are managed by PCP and/or other specialists, which patient acknowledges          Hematology & Medical Oncology

## 2022-08-27 LAB — VIT A SERPL-MCNC: 59.7 UG/DL (ref 22–69.5)

## 2022-08-29 DIAGNOSIS — R79.89 HIGH SERUM PARATHYROID HORMONE (PTH): Primary | ICD-10-CM

## 2022-09-01 PROCEDURE — 88305 TISSUE EXAM BY PATHOLOGIST: CPT | Performed by: PATHOLOGY

## 2022-09-02 ENCOUNTER — LAB REQUISITION (OUTPATIENT)
Dept: LAB | Facility: HOSPITAL | Age: 62
End: 2022-09-02
Payer: COMMERCIAL

## 2022-09-02 DIAGNOSIS — C44.519 BASAL CELL CARCINOMA OF SKIN OF OTHER PART OF TRUNK: ICD-10-CM

## 2022-09-07 ENCOUNTER — HOSPITAL ENCOUNTER (OUTPATIENT)
Dept: RADIOLOGY | Facility: HOSPITAL | Age: 62
Discharge: HOME | End: 2022-09-07
Payer: COMMERCIAL

## 2022-09-07 ENCOUNTER — ANESTHESIA EVENT (OUTPATIENT)
Dept: RADIOLOGY | Facility: HOSPITAL | Age: 62
End: 2022-09-07

## 2022-09-07 ENCOUNTER — ANESTHESIA (OUTPATIENT)
Dept: RADIOLOGY | Facility: HOSPITAL | Age: 62
End: 2022-09-07

## 2022-09-07 VITALS
DIASTOLIC BLOOD PRESSURE: 66 MMHG | HEART RATE: 72 BPM | OXYGEN SATURATION: 100 % | BODY MASS INDEX: 29.79 KG/M2 | RESPIRATION RATE: 18 BRPM | SYSTOLIC BLOOD PRESSURE: 140 MMHG | HEIGHT: 65 IN | WEIGHT: 178.8 LBS

## 2022-09-07 DIAGNOSIS — H91.8X9 ASYMMETRICAL HEARING LOSS: ICD-10-CM

## 2022-09-07 PROCEDURE — 70553 MRI BRAIN STEM W/O & W/DYE: CPT

## 2022-09-07 PROCEDURE — A9585 GADOBUTROL INJECTION: HCPCS | Performed by: PHYSICIAN ASSISTANT

## 2022-09-07 RX ORDER — LIDOCAINE HYDROCHLORIDE 10 MG/ML
INJECTION, SOLUTION EPIDURAL; INFILTRATION; INTRACAUDAL; PERINEURAL AS NEEDED
Status: DISCONTINUED | OUTPATIENT
Start: 2022-09-07 | End: 2022-09-07

## 2022-09-07 RX ORDER — SODIUM CHLORIDE, SODIUM LACTATE, POTASSIUM CHLORIDE, CALCIUM CHLORIDE 600; 310; 30; 20 MG/100ML; MG/100ML; MG/100ML; MG/100ML
INJECTION, SOLUTION INTRAVENOUS CONTINUOUS PRN
Status: DISCONTINUED | OUTPATIENT
Start: 2022-09-07 | End: 2022-09-07

## 2022-09-07 RX ORDER — MIDAZOLAM HYDROCHLORIDE 2 MG/2ML
INJECTION, SOLUTION INTRAMUSCULAR; INTRAVENOUS AS NEEDED
Status: DISCONTINUED | OUTPATIENT
Start: 2022-09-07 | End: 2022-09-07

## 2022-09-07 RX ORDER — SODIUM CHLORIDE, SODIUM LACTATE, POTASSIUM CHLORIDE, CALCIUM CHLORIDE 600; 310; 30; 20 MG/100ML; MG/100ML; MG/100ML; MG/100ML
125 INJECTION, SOLUTION INTRAVENOUS CONTINUOUS
Status: DISCONTINUED | OUTPATIENT
Start: 2022-09-07 | End: 2022-09-07

## 2022-09-07 RX ORDER — SODIUM CHLORIDE, SODIUM LACTATE, POTASSIUM CHLORIDE, CALCIUM CHLORIDE 600; 310; 30; 20 MG/100ML; MG/100ML; MG/100ML; MG/100ML
125 INJECTION, SOLUTION INTRAVENOUS CONTINUOUS
Status: DISCONTINUED | OUTPATIENT
Start: 2022-09-07 | End: 2022-09-08 | Stop reason: HOSPADM

## 2022-09-07 RX ORDER — PROPOFOL 10 MG/ML
INJECTION, EMULSION INTRAVENOUS AS NEEDED
Status: DISCONTINUED | OUTPATIENT
Start: 2022-09-07 | End: 2022-09-07

## 2022-09-07 RX ORDER — PROPOFOL 10 MG/ML
INJECTION, EMULSION INTRAVENOUS CONTINUOUS PRN
Status: DISCONTINUED | OUTPATIENT
Start: 2022-09-07 | End: 2022-09-07

## 2022-09-07 RX ADMIN — PROPOFOL 50 MG: 10 INJECTION, EMULSION INTRAVENOUS at 14:06

## 2022-09-07 RX ADMIN — PROPOFOL 80 MCG/KG/MIN: 10 INJECTION, EMULSION INTRAVENOUS at 14:06

## 2022-09-07 RX ADMIN — SODIUM CHLORIDE, SODIUM LACTATE, POTASSIUM CHLORIDE, AND CALCIUM CHLORIDE: .6; .31; .03; .02 INJECTION, SOLUTION INTRAVENOUS at 13:58

## 2022-09-07 RX ADMIN — MIDAZOLAM 2 MG: 1 INJECTION INTRAMUSCULAR; INTRAVENOUS at 14:05

## 2022-09-07 RX ADMIN — SODIUM CHLORIDE, SODIUM LACTATE, POTASSIUM CHLORIDE, AND CALCIUM CHLORIDE 125 ML/HR: .6; .31; .03; .02 INJECTION, SOLUTION INTRAVENOUS at 12:56

## 2022-09-07 RX ADMIN — LIDOCAINE HYDROCHLORIDE 50 MG: 10 INJECTION, SOLUTION EPIDURAL; INFILTRATION; INTRACAUDAL; PERINEURAL at 14:05

## 2022-09-07 RX ADMIN — GADOBUTROL 8 ML: 604.72 INJECTION INTRAVENOUS at 14:16

## 2022-09-07 NOTE — ANESTHESIA PREPROCEDURE EVALUATION
Procedure:  MRI BRAIN IAC WO AND W CONTRAST    Relevant Problems   HEMATOLOGY   (+) Iron deficiency anemia      MUSCULOSKELETAL   (+) Lateral epicondylitis   (+) Lumbar spondylosis   (+) Primary osteoarthritis of one hip, right      NEURO/PSYCH   (+) Anxiety disorder        Physical Exam    Airway    Mallampati score: II  TM Distance: >3 FB  Neck ROM: full     Dental   No notable dental hx     Cardiovascular  Rhythm: regular, Rate: normal,     Pulmonary  Breath sounds clear to auscultation,     Other Findings        Anesthesia Plan  ASA Score- 2     Anesthesia Type- IV sedation with anesthesia with ASA Monitors  Additional Monitors:   Airway Plan:           Plan Factors-Exercise tolerance (METS): >4 METS  Chart reviewed  Existing labs reviewed  Patient summary reviewed  Patient is not a current smoker  Induction- intravenous  Postoperative Plan- Plan for postoperative opioid use  Informed Consent- Anesthetic plan and risks discussed with patient  I personally reviewed this patient with the CRNA  Discussed and agreed on the Anesthesia Plan with the CRNA  Peace Arreola

## 2022-09-08 ENCOUNTER — HOSPITAL ENCOUNTER (OUTPATIENT)
Dept: INFUSION CENTER | Facility: CLINIC | Age: 62
End: 2022-09-08

## 2022-09-08 DIAGNOSIS — F41.1 GENERALIZED ANXIETY DISORDER: ICD-10-CM

## 2022-09-08 RX ORDER — ALPRAZOLAM 0.25 MG/1
0.25 TABLET ORAL
Qty: 30 TABLET | Refills: 0 | Status: SHIPPED | OUTPATIENT
Start: 2022-09-08 | End: 2022-10-04 | Stop reason: SDUPTHER

## 2022-09-08 NOTE — TELEPHONE ENCOUNTER
Medication:  PDMP   08/08/2022 08/08/2022 ALPRAZolam (Tablet)  30 0 30 0 25 MG ABRAHAM JACOBS     Active agreement on file -No

## 2022-09-13 PROCEDURE — 88305 TISSUE EXAM BY PATHOLOGIST: CPT | Performed by: PATHOLOGY

## 2022-09-26 ENCOUNTER — TELEMEDICINE (OUTPATIENT)
Dept: BARIATRICS | Facility: CLINIC | Age: 62
End: 2022-09-26
Payer: COMMERCIAL

## 2022-09-26 ENCOUNTER — TELEPHONE (OUTPATIENT)
Dept: BARIATRICS | Facility: CLINIC | Age: 62
End: 2022-09-26

## 2022-09-26 VITALS — BODY MASS INDEX: 29.62 KG/M2 | WEIGHT: 178 LBS

## 2022-09-26 DIAGNOSIS — F43.21 ADJUSTMENT DISORDER WITH DEPRESSED MOOD: ICD-10-CM

## 2022-09-26 DIAGNOSIS — R63.8 ABNORMAL CRAVING: ICD-10-CM

## 2022-09-26 DIAGNOSIS — E66.3 OVERWEIGHT: Primary | ICD-10-CM

## 2022-09-26 DIAGNOSIS — Z98.84 STATUS POST BARIATRIC SURGERY: ICD-10-CM

## 2022-09-26 DIAGNOSIS — K91.2 POSTSURGICAL MALABSORPTION: ICD-10-CM

## 2022-09-26 PROCEDURE — 99214 OFFICE O/P EST MOD 30 MIN: CPT | Performed by: PHYSICIAN ASSISTANT

## 2022-09-26 RX ORDER — TOPIRAMATE 50 MG/1
50 TABLET, FILM COATED ORAL 2 TIMES DAILY
Qty: 60 TABLET | Refills: 2 | Status: SHIPPED | OUTPATIENT
Start: 2022-09-26

## 2022-09-26 NOTE — ASSESSMENT & PLAN NOTE
-s/p RYGB  -Patient is pursuing the Conservative Program  -Initial weight loss goal of 5-10% weight loss for improved health  -Denied hx of seizure and glaucoma  -Reviewed indications, mechanisms, and potential side effects of weight loss medications  Would be cautious with phentermine as she reports taking Benadryl has resulted in bigeminy     -Patient denies personal and family history of MEN2 tumors and medullary thyroid/thyroid carcinoma  Patient denies personal history of pancreatitis  (started 11/4/19- 205 lbs)  >5 % weight loss  Having positive effects on appetite and helping prevent weight gain  Can consider switch to [place] to see if this will help with additional weight loss  -Taking Wellbutrin  +effects on mood  -Tolerated naltrexone, but was having more pain so stopped to give option for pain medication prn  Started Topamax, will switch to bid dosing to help with compliance  CMP and vitamin labs reviewed from 8/23/22   Will recheck PTH      Initial(Start of MWM 10/15/18): 201 5 lbs  Start of Saxenda: (205 lbs, per 11/8/19)  Current: 178 (+2 lbs since last visit)  Change: -27 lb  Goal: 160-170s lbs and maintain

## 2022-09-26 NOTE — TELEPHONE ENCOUNTER
----- Message from Andreas Rojas PA-C sent at 9/26/2022  1:39 PM EDT -----  Please schedule pt for virtual visit sometime in December     Thank you,   Luz

## 2022-09-27 ENCOUNTER — TELEPHONE (OUTPATIENT)
Dept: OTOLARYNGOLOGY | Facility: CLINIC | Age: 62
End: 2022-09-27

## 2022-09-27 NOTE — TELEPHONE ENCOUNTER
I called [facility] and spoke with Marito and started prior authorization for wegovy  Can take up to 15 calender days for a response  Case # [id number]  [facility] ph # [phone]  [facility] fax # [phone]      ----- Message from Yoko [place], [place] sent at 9/26/2022  1:28 PM EDT -----  Pt will need prior auth for [facility]AILSINN AVELAR   Having more hunger and weight regain  Can't take Contrave (on Wellbutrin)  Can't take Qsymia/phentermine- bigeminy    Thank you,   ESTUARDO BecerrilC

## 2022-10-04 DIAGNOSIS — F41.1 GENERALIZED ANXIETY DISORDER: ICD-10-CM

## 2022-10-04 RX ORDER — ALPRAZOLAM 0.25 MG/1
0.25 TABLET ORAL
Qty: 30 TABLET | Refills: 0 | Status: SHIPPED | OUTPATIENT
Start: 2022-10-04

## 2022-10-04 NOTE — TELEPHONE ENCOUNTER
Medication:  PDMP   09/08/2022 09/08/2022 ALPRAZolam (Tablet)  30 0 30 0 25 MG ABRAHAM JACOBS     Active agreement on file -No

## 2022-10-05 NOTE — TELEPHONE ENCOUNTER
Received a fax from [facility] wegovy 1 7mg/0 75ml solution auto injector has been approved from 9/27/22-9/27/23

## 2022-10-12 PROBLEM — Z01.419 GYNECOLOGIC EXAM NORMAL: Status: RESOLVED | Noted: 2021-11-09 | Resolved: 2022-10-12

## 2022-10-28 DIAGNOSIS — E66.3 OVERWEIGHT: ICD-10-CM

## 2022-10-28 DIAGNOSIS — Z12.31 BREAST CANCER SCREENING BY MAMMOGRAM: Primary | ICD-10-CM

## 2022-10-30 RX ORDER — SEMAGLUTIDE 1.7 MG/.75ML
INJECTION, SOLUTION SUBCUTANEOUS
Qty: 3 ML | Refills: 0 | OUTPATIENT
Start: 2022-10-30

## 2022-10-30 RX ORDER — SEMAGLUTIDE 2.4 MG/.75ML
2.4 INJECTION, SOLUTION SUBCUTANEOUS WEEKLY
Qty: 3 ML | Refills: 2 | Status: SHIPPED | OUTPATIENT
Start: 2022-10-30

## 2022-10-31 DIAGNOSIS — F41.1 GENERALIZED ANXIETY DISORDER: ICD-10-CM

## 2022-10-31 RX ORDER — ALPRAZOLAM 0.25 MG/1
0.25 TABLET ORAL
Qty: 30 TABLET | Refills: 0 | Status: SHIPPED | OUTPATIENT
Start: 2022-10-31

## 2022-10-31 NOTE — TELEPHONE ENCOUNTER
Medication:  PDMP   10/04/2022  10/04/2022 ALPRAZolam (Tablet)  30 0 30 0 25 MG ABRAHAM JACOBS             Active agreement on file -No

## 2022-11-23 DIAGNOSIS — E66.3 OVERWEIGHT: ICD-10-CM

## 2022-11-23 DIAGNOSIS — F43.21 ADJUSTMENT DISORDER WITH DEPRESSED MOOD: ICD-10-CM

## 2022-11-23 DIAGNOSIS — R63.8 ABNORMAL CRAVING: ICD-10-CM

## 2022-11-23 RX ORDER — BUPROPION HYDROCHLORIDE 300 MG/1
300 TABLET ORAL EVERY MORNING
Qty: 30 TABLET | Refills: 2 | Status: SHIPPED | OUTPATIENT
Start: 2022-11-23

## 2022-11-28 ENCOUNTER — ANNUAL EXAM (OUTPATIENT)
Dept: OBGYN CLINIC | Facility: CLINIC | Age: 62
End: 2022-11-28

## 2022-11-28 VITALS — SYSTOLIC BLOOD PRESSURE: 128 MMHG | BODY MASS INDEX: 30.02 KG/M2 | DIASTOLIC BLOOD PRESSURE: 80 MMHG | WEIGHT: 180.4 LBS

## 2022-11-28 DIAGNOSIS — Z12.31 ENCOUNTER FOR SCREENING MAMMOGRAM FOR MALIGNANT NEOPLASM OF BREAST: Primary | ICD-10-CM

## 2022-11-28 RX ORDER — FLUOROURACIL 50 MG/G
CREAM TOPICAL
COMMUNITY
Start: 2022-09-19

## 2022-11-28 NOTE — PATIENT INSTRUCTIONS
Breast Self Exam for Women   AMBULATORY CARE:   A breast self-exam (BSE)  is a way to check your breasts for lumps and other changes  Regular BSEs can help you know how your breasts normally look and feel  Most breast lumps or changes are not cancer, but you should always have them checked by a healthcare provider  Why you should do a BSE:  Breast cancer is the most common type of cancer in women  Even if you have mammograms, you may still want to do a BSE regularly  If you know how your breasts normally feel and look, it may help you know when to contact your healthcare provider  Mammograms can miss some cancers  You may find a lump during a BSE that did not show up on a mammogram   When you should do a BSE:  If you have periods, you may want to do your BSE 1 week after your period ends  This is the time when your breasts may be the least swollen, lumpy, or tender  You can do regular BSEs even if you are breastfeeding or have breast implants  Call your doctor if:   You find any lumps or changes in your breasts  You have breast pain or fluid coming from your nipples  You have questions or concerns about your condition or care  How to do a BSE:       Look at your breasts in a mirror  Look at the size and shape of each breast and nipple  Check for swelling, lumps, dimpling, scaly skin, or other skin changes  Look for nipple changes, such as a nipple that is painful or beginning to pull inward  Gently squeeze both nipples and check to see if fluid (that is not breast milk) comes out of them  If you find any of these or other breast changes, contact your healthcare provider  Check your breasts while you sit or  the following 3 positions:    [place] your arms down at your sides  Raise your hands and join them behind your head  Put firm pressure with your hands on your hips  Bend slightly forward while you look at your breasts in the mirror  Lie down and feel your breasts    When you lie down, your breast tissue spreads out evenly over your chest  This makes it easier for you to feel for lumps and anything that may not be normal for your breasts  Do a BSE on one breast at a time  Place a small pillow or towel under your left shoulder  Put your left arm behind your head  Use the 3 middle fingers of your right hand  Use your fingertip pads, on the top of your fingers  Your fingertip pad is the most sensitive part of your finger  Use small circles to feel your breast tissue  Use your fingertip pads to make dime-sized, overlapping circles on your breast and armpits  Use light, medium, and firm pressure  First, press lightly  Second, press with medium pressure to feel a little deeper into the breast  Last, use firm pressure to feel deep within your breast     Examine your entire breast area  Examine the breast area from above the breast to below the breast where you feel only ribs  Make small circles with your fingertips, starting in the middle of your armpit  Make circles going up and down the breast area  Continue toward your breast and all the way across it  Examine the area from your armpit all the way over to the middle of your chest (breastbone)  Stop at the middle of your chest     Move the pillow or towel to your right shoulder, and put your right arm behind your head  Use the 3 fingertip pads of your left hand, and repeat the above steps to do a BSE on your right breast     What else you can do to check for breast problems or cancer:  Talk to your healthcare provider about mammograms  A mammogram is an x-ray of your breasts to screen for breast cancer or other problems  Your provider can tell you the benefits and risks of mammograms  The first mammogram is usually at age 39 or 48  Your provider may recommend you start at 36 or younger if your risk for breast cancer is high  Mammograms usually continue every 1 to 2 years until age 76         Follow up with your doctor as directed:  Write down your questions so you remember to ask them during your visits  © Copyright [employer] 2022 Information is for End User's use only and may not be sold, redistributed or otherwise used for commercial purposes  All illustrations and images included in CareNotes® are the copyrighted property of A D A M , Inc  or [employer]  The above information is an  only  It is not intended as medical advice for individual conditions or treatments  Talk to your doctor, nurse or pharmacist before following any medical regimen to see if it is safe and effective for you

## 2022-11-29 DIAGNOSIS — F41.1 GENERALIZED ANXIETY DISORDER: ICD-10-CM

## 2022-11-29 RX ORDER — ALPRAZOLAM 0.25 MG/1
0.25 TABLET ORAL
Qty: 30 TABLET | Refills: 0 | Status: SHIPPED | OUTPATIENT
Start: 2022-11-29

## 2022-11-29 NOTE — TELEPHONE ENCOUNTER
Medication:Alprazolam  PDMP   10/31/2022  10/31/2022 ALPRAZolam (Tablet)  30 0 30 0 25 MG ABRAHAM JACOBS      Active agreement on file -No

## 2022-11-30 ENCOUNTER — APPOINTMENT (OUTPATIENT)
Dept: LAB | Facility: HOSPITAL | Age: 62
End: 2022-11-30

## 2022-11-30 DIAGNOSIS — D50.9 IRON DEFICIENCY ANEMIA, UNSPECIFIED IRON DEFICIENCY ANEMIA TYPE: ICD-10-CM

## 2022-11-30 DIAGNOSIS — R79.89 HIGH SERUM PARATHYROID HORMONE (PTH): ICD-10-CM

## 2022-11-30 DIAGNOSIS — Z11.59 NEED FOR HEPATITIS C SCREENING TEST: ICD-10-CM

## 2022-11-30 DIAGNOSIS — K91.2 POSTSURGICAL MALABSORPTION: ICD-10-CM

## 2022-11-30 LAB
ERYTHROCYTE [DISTWIDTH] IN BLOOD BY AUTOMATED COUNT: 11.9 % (ref 11.6–15.1)
HCT VFR BLD AUTO: 42.1 % (ref 34.8–46.1)
HGB BLD-MCNC: 13.5 G/DL (ref 11.5–15.4)
MCH RBC QN AUTO: 30.6 PG (ref 26.8–34.3)
MCHC RBC AUTO-ENTMCNC: 32.1 G/DL (ref 31.4–37.4)
MCV RBC AUTO: 96 FL (ref 82–98)
PLATELET # BLD AUTO: 229 THOUSANDS/UL (ref 149–390)
PMV BLD AUTO: 11.1 FL (ref 8.9–12.7)
RBC # BLD AUTO: 4.41 MILLION/UL (ref 3.81–5.12)
WBC # BLD AUTO: 6.39 THOUSAND/UL (ref 4.31–10.16)

## 2022-12-01 LAB
FERRITIN SERPL-MCNC: 321 NG/ML (ref 8–388)
HCV AB SER QL: NORMAL
IRON SATN MFR SERPL: 24 % (ref 15–50)
IRON SERPL-MCNC: 70 UG/DL (ref 50–170)
PTH-INTACT SERPL-MCNC: 84.4 PG/ML (ref 18.4–80.1)
TIBC SERPL-MCNC: 290 UG/DL (ref 250–450)

## 2022-12-02 ENCOUNTER — OFFICE VISIT (OUTPATIENT)
Dept: HEMATOLOGY ONCOLOGY | Facility: CLINIC | Age: 62
End: 2022-12-02

## 2022-12-02 VITALS
BODY MASS INDEX: 29.99 KG/M2 | OXYGEN SATURATION: 97 % | RESPIRATION RATE: 16 BRPM | WEIGHT: 180 LBS | TEMPERATURE: 98.4 F | SYSTOLIC BLOOD PRESSURE: 124 MMHG | HEIGHT: 65 IN | DIASTOLIC BLOOD PRESSURE: 64 MMHG | HEART RATE: 83 BPM

## 2022-12-02 DIAGNOSIS — Z98.84 HISTORY OF GASTRIC BYPASS: ICD-10-CM

## 2022-12-02 DIAGNOSIS — K91.2 POSTSURGICAL MALABSORPTION: Primary | ICD-10-CM

## 2022-12-02 NOTE — PROGRESS NOTES
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[place] [address] Road is a 60 y o  with history of gastric bypass (2004) here for follow-up with me today for iron deficiency anemia  She is currently being followed by weight management  She tolerated IV Venofer well on 6/15, 6/29, 7/6, 7/13, and 7/20  Her CBC shows borderline macrocytosis:   1/11/2021 6/5/2021 9/1/2021 1/7/2022    WBC 11 26 (H) 4 70 4 96 5 14   Hemoglobin 12 7 12 2 13 5 13 7   HCT 42 0 40 3 44 0 43 2   MCV 92 88 95 99 (H)   Platelet Count 966 599 202 193     Her iron panel shows continued resolution of iron deficiency:   5/17/2021 9/1/2021 1/7/2022    Iron 38 (L) 73 89   Ferritin 6 (L) 166 181   Iron Saturation 10 22 31   TIBC 386 339 284        5/17/2021 1/7/2022    Vitamin B-12 337 934 (H)      5/17/2021 1/7/2022    Folate >20 0 (H) >20 0 (H)     Her homocysteine has remained WNL:   6/5/2021 1/7/2022    HOMOCYST(E)INE, P/S 8 9 8 9   Her MMA is still in process  During her initial visit, she admitted to being non-compliant with her bariatric multivitamins due to increased stress recently  Her  became blind and is going through hemodialysis and her sister-in-law passed away suddenly from cancer  She also admitted to increased fatigue, cold intolerance, increased frequency of headaches, brittle nails, and facial pallor  This Visit    Denies any f/c/n/v/cp/ap/sob/cough  Denies diarrhea or skin rash    No blood loss anywhere  Doing well with iron therapy  I have reviewed the relevant past medical, surgical, social and family history  I have also reviewed allergies and medications for this patient  Review of Systems  Review of Systems   All other systems reviewed and are negative  OBJECTIVE     Physical Exam  Vitals:    12/02/22 1357   BP: 124/64   BP Location: Left arm   Patient Position: Sitting   Cuff Size: Adult   Pulse: 83   Resp: 16   Temp: 98 4 °F (36 9 °C)   TempSrc: Temporal   SpO2: 97%   Weight: 81 6 kg (180 lb)   Height: 5' 5" (1 651 m)        Physical Exam  Vitals reviewed  Constitutional:       Appearance: Normal appearance  She is normal weight  Eyes:      General: No scleral icterus  Conjunctiva/sclera: Conjunctivae normal    Cardiovascular:      Rate and Rhythm: Normal rate  Pulmonary:      Effort: No respiratory distress  Abdominal:      General: There is no distension  Musculoskeletal:         General: No swelling  Normal range of motion  Neurological:      General: No focal deficit present  Mental Status: She is alert and oriented to person, place, and time  Mental status is at baseline  Imaging  Relevant imaging reviewed in chart    Labs  Relevant labs reviewed in chart     ASSESSMENT & PLAN      Diagnosis ICD-10-CM Associated Orders   1  Postsurgical malabsorption  K91 2 CBC     Iron Panel (Includes Ferritin, Iron Sat%, Iron, and TIBC)     Retic Count with Reticulocyte HGB     Soluble Transferrin Receptor      2  History of gastric bypass  Z98 84 CBC     Iron Panel (Includes Ferritin, Iron Sat%, Iron, and TIBC)     Retic Count with Reticulocyte HGB     Soluble Transferrin Receptor            60 y o  female with postsurgical malabsorption from history of gastric bypass with resolution of symptomatic iron deficiency with IV Venofer therapy  Discussion/Plan/Labs[de-identified]  • Reviewed labs and continue with current therapy      Follow up  • 6 months    All questions were answered to the patient's satisfaction during this encounter  They appreciated and thanked me for spending time with them  The patient knows the contact information for our office and know to reach out for any relevant concerns related to this encounter  For all other listed problems and medical diagnosis in his chart - they are managed by PCP and/or other specialists, which patient acknowledges          Hematology & Medical Oncology

## 2022-12-12 ENCOUNTER — OFFICE VISIT (OUTPATIENT)
Dept: FAMILY MEDICINE CLINIC | Facility: CLINIC | Age: 62
End: 2022-12-12

## 2022-12-12 ENCOUNTER — TELEMEDICINE (OUTPATIENT)
Dept: BARIATRICS | Facility: CLINIC | Age: 62
End: 2022-12-12

## 2022-12-12 VITALS
RESPIRATION RATE: 16 BRPM | BODY MASS INDEX: 30.22 KG/M2 | TEMPERATURE: 96.6 F | OXYGEN SATURATION: 98 % | HEART RATE: 65 BPM | DIASTOLIC BLOOD PRESSURE: 82 MMHG | WEIGHT: 177 LBS | SYSTOLIC BLOOD PRESSURE: 130 MMHG | HEIGHT: 64 IN

## 2022-12-12 VITALS — WEIGHT: 178 LBS | BODY MASS INDEX: 29.62 KG/M2

## 2022-12-12 DIAGNOSIS — D50.8 OTHER IRON DEFICIENCY ANEMIA: ICD-10-CM

## 2022-12-12 DIAGNOSIS — E66.3 OVERWEIGHT: Primary | ICD-10-CM

## 2022-12-12 DIAGNOSIS — G89.29 CHRONIC RIGHT-SIDED LOW BACK PAIN WITHOUT SCIATICA: ICD-10-CM

## 2022-12-12 DIAGNOSIS — F41.1 GENERALIZED ANXIETY DISORDER: ICD-10-CM

## 2022-12-12 DIAGNOSIS — M54.50 CHRONIC RIGHT-SIDED LOW BACK PAIN WITHOUT SCIATICA: ICD-10-CM

## 2022-12-12 DIAGNOSIS — F43.21 ADJUSTMENT DISORDER WITH DEPRESSED MOOD: ICD-10-CM

## 2022-12-12 DIAGNOSIS — R63.8 ABNORMAL CRAVING: ICD-10-CM

## 2022-12-12 DIAGNOSIS — B35.1 ONYCHOMYCOSIS: ICD-10-CM

## 2022-12-12 DIAGNOSIS — Z00.00 ANNUAL PHYSICAL EXAM: Primary | ICD-10-CM

## 2022-12-12 RX ORDER — BUPROPION HYDROCHLORIDE 300 MG/1
300 TABLET ORAL EVERY MORNING
Qty: 30 TABLET | Refills: 3 | Status: SHIPPED | OUTPATIENT
Start: 2022-12-12

## 2022-12-12 RX ORDER — SEMAGLUTIDE 2.4 MG/.75ML
2.4 INJECTION, SOLUTION SUBCUTANEOUS WEEKLY
Qty: 3 ML | Refills: 3 | Status: SHIPPED | OUTPATIENT
Start: 2022-12-12

## 2022-12-12 RX ORDER — ALPRAZOLAM 0.25 MG/1
0.25 TABLET ORAL
Qty: 30 TABLET | Refills: 3 | Status: SHIPPED | OUTPATIENT
Start: 2022-12-12

## 2022-12-12 RX ORDER — CYCLOBENZAPRINE HCL 10 MG
10 TABLET ORAL 3 TIMES DAILY PRN
Qty: 30 TABLET | Refills: 0 | Status: SHIPPED | OUTPATIENT
Start: 2022-12-12

## 2022-12-12 RX ORDER — TERBINAFINE HYDROCHLORIDE 250 MG/1
250 TABLET ORAL DAILY
Qty: 90 TABLET | Refills: 0 | Status: SHIPPED | OUTPATIENT
Start: 2022-12-12 | End: 2023-03-12

## 2022-12-12 NOTE — PATIENT INSTRUCTIONS

## 2022-12-12 NOTE — PROGRESS NOTES
Assessment/Plan:    Overweight  -s/p RYGB  -Patient is pursuing the Conservative Program  -Initial weight loss goal of 5-10% weight loss for improved health  -Denied hx of seizure and glaucoma  -Reviewed indications, mechanisms, and potential side effects of weight loss medications  Would be cautious with phentermine as she reports taking Benadryl has resulted in bigeminy     -Patient denies personal and family history of MEN2 tumors and medullary thyroid/thyroid carcinoma  Patient denies personal history of pancreatitis  (started 11/4/19- 205 lbs)  >5 % weight loss  Having positive effects on appetite and helping prevent weight gain  Switched to [facility]AISLINN AVELAR and doing well  -Taking Wellbutrin  +effects on mood  -Tolerated naltrexone, but was having more pain so stopped to give option for pain medication prn  Switched to Topamax, but had worsening in eye sx (see HPI)- she is going to see ophthalmology (She will also discuss Wellbutrin with them)  CMP and vitamin labs reviewed from 8/23/22  PTH reviewed from 11/30/22; due for annual vitamin labs 08/2023     Initial(Start of MWM 10/15/18): 201 5 lbs  Start of Saxenda: (205 lbs, per 11/8/19)  Current: 178 (-0 lbs since last visit)  Change: -27 lb  Goal: 160-170s lbs and maintain    Follow up in approximately 3 months with Non-Surgical Physician/Advanced Practitioner  Goals:  Food log (ie ) www myfitnesspal com,sparkpeople  com,loseit com,calorieking  com,etc  baritastic  No sugary beverages  At least 64oz of water daily  Increase physical activity by 10 minutes daily   Gradually increase physical activity to a goal of 5 days per week for 30 minutes of MODERATE intensity PLUS 2 days per week of FULL BODY resistance training  Practice lesson plans 1-6 in bariatric manual  and Practice 30/60 rule  Goal protein intake of 60-80 grams per day  Alcohol and nicotine use is not recommended following bariatric surgery  NSAIDs (Motrin, Advil, Aleve, Naproxen, ibuprofen, etc) should be avoided following bariatric surgery    Diagnoses and all orders for this visit:    Overweight  -     buPROPion (WELLBUTRIN XL) 300 mg 24 hr tablet; Take 1 tablet (300 mg total) by mouth every morning  -     Semaglutide-Weight Management [facility]) 2 4 MG/0 75ML; Inject 0 75 mL (2 4 mg total) under the skin once a week    Adjustment disorder with depressed mood  -     buPROPion (WELLBUTRIN XL) 300 mg 24 hr tablet; Take 1 tablet (300 mg total) by mouth every morning    Abnormal craving  -     buPROPion (WELLBUTRIN XL) 300 mg 24 hr tablet; Take 1 tablet (300 mg total) by mouth every morning    Body mass index 29 0-29 9, adult        Subjective:   Chief Complaint   Patient presents with   • Virtual Regular Visit     Patient ID: Cailin Ruiz  is a 60 y o  female with excess weight/obesity here to pursue weight management    Past Medical History:   Diagnosis Date   • Anxiety disorder    • Arthritis     osteoarthritis hip/poss knuckles"   • Basal cell carcinoma 2022    midline inferior forehead   • Cancer (HCC)    • Cervical disc herniation     C7//sees chiropracter and no recent problems   • Exercise involving walking     10-25221 steps per day   • Full thickness rotator cuff tear 2020   •  1 para 1    • Hearing loss     left ear   • History of bariatric surgery    • History of non anemic vitamin B12 deficiency    • History of prediabetes     before bariatric surgery   • Hyperlipidemia     not on meds   • Hypertension     not on meds   • Iron deficiency anemia     infusions determined by lab work   • Irregular heart beat     "history bigeminy from taking benadryl, tries to avoid taking it"no recent issues"   • Motion sickness     "on rides"   • Neck pain     occas   • Nondisplaced fracture of proximal phalanx of left lesser toe(s), initial encounter for closed fracture 2019   • OA (osteoarthritis) of hip    • Obesity    • Oral herpes simplex infection     occas   • Other insomnia 2020   • Postmenopausal    • Sciatica    • Vertigo    • Wears glasses    • Wears glasses      HPI:  The patient presents for [facility]    Saxenda --> Wegovy: initial switch resulted in nausea, but tolerated increase to 2 4 mg  Nausea is improving  Appetite decreased  Denies cravings  Refilled  Wellbutrin: doing well, refilled  Topamax: stopped this as she was having an increase flashes of light in lower eye  Now, since stopping is intermittent  Going to see ophthalmologist     Has improved intake of fruit, eating three balanced meals    Exercise: trying to get on treadmill more, trying to remain active   Hydration: increasing water- most days getting almost 16 oz before going to work  Noticed improvement in her headaches since increasing hydration   Sleep: The following portions of the patient's history were reviewed and updated as appropriate: allergies, current medications, past family history, past medical history, past social history, past surgical history and problem list     Objective: Wt 80 7 kg (178 lb) Comment: pt reported  LMP 05/01/2005 (Within Months)   BMI 29 62 kg/m²     Virtual Regular Visit    Verification of patient location:    Patient is located in the following [facility] in which I hold an active license [place]      Assessment/Plan:    Problem List Items Addressed This Visit        Other    Overweight - Primary     -s/p RYGB  -Patient is pursuing the Conservative Program  -Initial weight loss goal of 5-10% weight loss for improved health  -Denied hx of seizure and glaucoma  -Reviewed indications, mechanisms, and potential side effects of weight loss medications  Would be cautious with phentermine as she reports taking Benadryl has resulted in bigeminy     -Patient denies personal and family history of MEN2 tumors and medullary thyroid/thyroid carcinoma  Patient denies personal history of pancreatitis  (started 11/4/19- 205 lbs)  >5 % weight loss   Having positive effects on appetite and helping prevent weight gain  Switched to [facility]AISLINN AVELAR and doing well  -Taking Wellbutrin  +effects on mood  -Tolerated naltrexone, but was having more pain so stopped to give option for pain medication prn  Switched to Topamax, but had worsening in eye sx (see HPI)- she is going to see ophthalmology (She will also discuss Wellbutrin with them)  CMP and vitamin labs reviewed from 8/23/22  PTH reviewed from 11/30/22; due for annual vitamin labs 08/2023     Initial(Start of MWM 10/15/18): 201 5 lbs  Start of Saxenda: (205 lbs, per 11/8/19)  Current: 178 (-0 lbs since last visit)  Change: -27 lb  Goal: 160-170s lbs and maintain         Relevant Medications    buPROPion (WELLBUTRIN XL) 300 mg 24 hr tablet    Semaglutide-Weight Management [facility]) 2 4 MG/0 75ML   Other Visit Diagnoses     Adjustment disorder with depressed mood        Relevant Medications    buPROPion (WELLBUTRIN XL) 300 mg 24 hr tablet    Semaglutide-Weight Management [facility]) 2 4 MG/0 75ML    Abnormal craving        Relevant Medications    buPROPion (WELLBUTRIN XL) 300 mg 24 hr tablet    Body mass index 29 0-29 9, adult               Reason for visit is   Chief Complaint   Patient presents with   • Virtual Regular Visit        Encounter provider Marielle Diego PA-C    Provider located at [address] [place] [phone] [facility] [id number]      Recent Visits  No visits were found meeting these conditions  Showing recent visits within past 7 days and meeting all other requirements  Today's Visits  Date Type Provider Dept   12/12/22 Telemedicine Marielle Diego PA-C [facility] today's visits and meeting all other requirements  Future Appointments  No visits were found meeting these conditions  Showing future appointments within next 150 days and meeting all other requirements     The patient was identified by name and date of birth   Darien Hunt was informed that this is a telemedicine visit and that the visit is being conducted through the [employer]  She agrees to proceed     My office door was closed  No one else was in the room  She acknowledged consent and understanding of privacy and security of the video platform  The patient has agreed to participate and understands they can discontinue the visit at any time  Patient is aware this is a billable service  Subjective  Leeanna Rao is a 60 y o  female for MWM f/u       HPI     Past Medical History:   Diagnosis Date   • Anxiety disorder    • Arthritis     osteoarthritis hip/poss knuckles"   • Basal cell carcinoma 2022    midline inferior forehead   • Cancer (HCC)    • Cervical disc herniation     C7//sees chiropracter and no recent problems   • Exercise involving walking     10-62246 steps per day   • Full thickness rotator cuff tear 2020   •  1 para 1    • Hearing loss     left ear   • History of bariatric surgery    • History of non anemic vitamin B12 deficiency    • History of prediabetes     before bariatric surgery   • Hyperlipidemia     not on meds   • Hypertension     not on meds   • Iron deficiency anemia     infusions determined by lab work   • Irregular heart beat     "history bigeminy from taking benadryl, tries to avoid taking it"no recent issues"   • Motion sickness     "on rides"   • Neck pain     occas   • Nondisplaced fracture of proximal phalanx of left lesser toe(s), initial encounter for closed fracture 2019   • OA (osteoarthritis) of hip    • Obesity    • Oral herpes simplex infection     occas   • Other insomnia 2020   • Postmenopausal    • Sciatica    • Vertigo    • Wears glasses    • Wears glasses        Past Surgical History:   Procedure Laterality Date   • BARIATRIC SURGERY  2004   • COLPOSCOPY      Cervical Dysplasia   • DENTAL SURGERY      one lower right   • DENTAL SURGERY      Has dental implant; s/p tooth abscess   • EGD     • GASTRIC BYPASS      titanium staples   • GYNECOLOGIC CRYOSURGERY      Cervical Dysplasia   • LUMBAR LAMINECTOMY  1980    L5-S1; No Hardware   • MOHS SURGERY  07/26/2022    midline inferior forehead   • [place] COLONOSCOPY FLX DX W/COLLJ SPEC WHEN PFRMD N/A 06/01/2016    Procedure: COLONOSCOPY;  Surgeon: Nick Bedolla MD;  Location: [facility]; Service: Colorectal   • NY TOTAL HIP ARTHROPLASTY Right 12/16/2020    Procedure: ARTHROPLASTY HIP TOTAL ANTERIOR;  Surgeon: Kathryn Wade MD;  Location: [facility];  Service: Orthopedics   • ROTATOR CUFF REPAIR Right 2010   • SKIN CANCER EXCISION      s/p BCC Back and R Clavicle   • WISDOM TOOTH EXTRACTION         Current Outpatient Medications   Medication Sig Dispense Refill   • buPROPion (WELLBUTRIN XL) 300 mg 24 hr tablet Take 1 tablet (300 mg total) by mouth every morning 30 tablet 3   • Semaglutide-Weight Management (Wegovy) 2 4 MG/0 75ML Inject 0 75 mL (2 4 mg total) under the skin once a week 3 mL 3   • ALPRAZolam (XANAX) 0 25 mg tablet Take 1 tablet (0 25 mg total) by mouth daily at bedtime as needed for anxiety 30 tablet 0   • Calcium Carbonate-Vitamin D (Calcium 600-D) 600-400 MG-UNIT per tablet Take 2 capsules by mouth 2 (two) times a day       • Cholecalciferol (Vitamin D) 50 MCG (2000 UT) CAPS Take by mouth in the morning     • cyclobenzaprine (FLEXERIL) 10 mg tablet Take 10 mg by mouth 3 (three) times a day as needed for muscle spasms     • fluorouracil (EFUDEX) 5 % cream      • Meclizine HCl (ANTIVERT PO) Take 1 tablet by mouth as needed Rx per prior PCP     • Multiple Vitamins-Minerals (BARIATRIC FUSION PO) Take 1 tablet by mouth daily       No current facility-administered medications for this visit  Allergies   Allergen Reactions   • Macrodantin [Nitrofurantoin]      myalgias   • Sulfa Antibiotics Swelling     Tongue swelling   • Dilaudid [Hydromorphone] Vomiting       Review of Systems   Psychiatric/Behavioral:        Feels she is overall doing well with the Wellbutrin   Joined grief group, bible studies, and motivated to do things for enjoyment     Video Exam    Vitals:    12/12/22 1257   Weight: 80 7 kg (178 lb)       Physical Exam  Vitals and nursing note reviewed  Constitutional   General appearance: Abnormal   well developed and overweight  Pulmonary   Respiratory effort: No increased work of breathing or signs of respiratory distress      Musculoskeletal   Gait and station: Normal     Psychiatric   Orientation to person, place and time: Normal     Affect: appropriate     I spent 35 minutes with patient today in which greater than 50% of the time was spent in counseling/coordination of care regarding diet and lifestyle changes for weight loss and weight loss maintenance

## 2022-12-12 NOTE — PROGRESS NOTES
[address]    NAME: Louise Ruiz  AGE: 60 y o  SEX: female  : 1960     DATE: 2022     Assessment and Plan:     Problem List Items Addressed This Visit        Other    Anxiety disorder     Stable on wellbutrin          Relevant Medications    ALPRAZolam (XANAX) 0 25 mg tablet    Iron deficiency anemia     Stable   Continue to monitor         Other Visit Diagnoses     Annual physical exam    -  Primary    Chronic right-sided low back pain without sciatica        Relevant Medications    cyclobenzaprine (FLEXERIL) 10 mg tablet    Onychomycosis        Relevant Medications    terbinafine (LamISIL) 250 mg tablet    Other Relevant Orders    Comprehensive metabolic panel          Immunizations and preventive care screenings were discussed with patient today  Appropriate education was printed on patient's after visit summary  Counseling:  Alcohol/drug use: discussed moderation in alcohol intake, the recommendations for healthy alcohol use, and avoidance of illicit drug use  Dental Health: discussed importance of regular tooth brushing, flossing, and dental visits  Injury prevention: discussed safety/seat belts, safety helmets, smoke detectors, carbon dioxide detectors, and smoking near bedding or upholstery  Sexual health: discussed sexually transmitted diseases, partner selection, use of condoms, avoidance of unintended pregnancy, and contraceptive alternatives  Exercise: the importance of regular exercise/physical activity was discussed  Recommend exercise 3-5 times per week for at least 30 minutes  BMI Counseling: Body mass index is 30 78 kg/m²  The BMI is above normal  Nutrition recommendations include decreasing portion sizes and encouraging healthy choices of fruits and vegetables  Exercise recommendations include moderate physical activity 150 minutes/week  No pharmacotherapy was ordered   Rationale for BMI follow-up plan is due to patient being overweight or obese  No follow-ups on file  Chief Complaint:     Chief Complaint   Patient presents with   • Physical Exam     Patient is here today for an annual exam       History of Present Illness:     Adult Annual Physical   Patient here for a comprehensive physical exam  The patient reports c/o discolored toes for awhile and wants to try lamisil   Diet and Physical Activity  Diet/Nutrition: well balanced diet and consuming 3-5 servings of fruits/vegetables daily  Exercise: walking  Depression Screening  PHQ-2/9 Depression Screening         General Health  Sleep: sleeps well and gets 7-8 hours of sleep on average  Hearing: normal - bilateral   Vision: goes for regular eye exams and wears glasses  Dental: regular dental visits and brushes teeth twice daily  /GYN Health  Patient is: postmenopausal  Last menstrual period:   Contraceptive method: none  Review of Systems:     Review of Systems   Constitutional: Negative  HENT: Negative  Eyes: Negative  Respiratory: Negative  Cardiovascular: Negative  Gastrointestinal: Negative  Endocrine: Negative  Genitourinary: Negative  Musculoskeletal: Negative  Skin: Positive for rash  Allergic/Immunologic: Negative  Neurological: Negative  Hematological: Negative  Psychiatric/Behavioral: Negative         Past Medical History:     Past Medical History:   Diagnosis Date   • Anxiety disorder    • Arthritis     osteoarthritis hip/poss knuckles"   • Basal cell carcinoma 2022    midline inferior forehead   • Cancer (HCC)    • Cervical disc herniation     C7//sees chiropracter and no recent problems   • Exercise involving walking     [phone] steps per day   • Full thickness rotator cuff tear 2020   •  1 para 1    • Hearing loss     left ear   • History of bariatric surgery    • History of non anemic vitamin B12 deficiency    • History of prediabetes     before bariatric surgery   • Hyperlipidemia     not on meds   • Hypertension     not on meds   • Iron deficiency anemia     infusions determined by lab work   • Irregular heart beat     "history bigeminy from taking benadryl, tries to avoid taking it"no recent issues"   • Motion sickness     "on rides"   • Neck pain     occas   • Nondisplaced fracture of proximal phalanx of left lesser toe(s), initial encounter for closed fracture 06/02/2019   • OA (osteoarthritis) of hip    • Obesity    • Oral herpes simplex infection     occas   • Other insomnia 05/11/2020   • Postmenopausal    • Sciatica    • Vertigo    • Wears glasses    • Wears glasses       Past Surgical History:     Past Surgical History:   Procedure Laterality Date   • BARIATRIC SURGERY  05/09/2004   • COLPOSCOPY      Cervical Dysplasia   • DENTAL SURGERY      one lower right   • DENTAL SURGERY      Has dental implant; s/p tooth abscess   • EGD     • GASTRIC BYPASS  2004    titanium staples   • GYNECOLOGIC CRYOSURGERY      Cervical Dysplasia   • LUMBAR LAMINECTOMY  1980    L5-S1; No Hardware   • MOHS SURGERY  07/26/2022    midline inferior forehead   • MO ARTHRP ACETBLR/PROX FEM PROSTC AGRFT/ALGRFT Right 12/16/2020    Procedure: ARTHROPLASTY HIP TOTAL ANTERIOR;  Surgeon: Cyndi Conroy MD;  Location: [facility];  Service: Orthopedics   • MO COLONOSCOPY FLX DX W/COLLJ SPEC WHEN PFRMD N/A 06/01/2016    Procedure: COLONOSCOPY;  Surgeon: Maya Robbins MD;  Location: [facility]; Service: Colorectal   • ROTATOR CUFF REPAIR Right 2010   • SKIN CANCER EXCISION      s/p BCC Back and R Clavicle   • WISDOM TOOTH EXTRACTION        Social History:     Social History     Socioeconomic History   • Marital status:       Spouse name: None   • Number of children: None   • Years of education: None   • Highest education level: None   Occupational History   • None   Tobacco Use   • Smoking status: Never   • Smokeless tobacco: Never   Vaping Use   • Vaping Use: Never used Substance and Sexual Activity   • Alcohol use:  Yes     Alcohol/week: 1 0 standard drink     Types: 1 Shots of liquor per week     Comment: rarely/holidays   • Drug use: Not Currently     Types: Marijuana     Comment: Last marijuana use in college   • Sexual activity: Not Currently     Partners: Male     Birth control/protection: Post-menopausal   Other Topics Concern   • None   Social History Narrative        Lives with  who has ESRD, Son lives in [place] and stays at home every other weekend    Has 1 Child - 1 Son    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[employer] Strain: Not on file   Food Insecurity: Not on file   Transportation Needs: Not on file   Physical Activity: Not on file   Stress: Not on file   Social Connections: Not on file   Intimate Partner Violence: Not on file   Housing Stability: Not on file      Family History:     Family History   Problem Relation Age of Onset   • Diabetes Mother    • Hypertension Mother    • Heart failure Mother         s/p ICD   • Diabetes type II Mother 54   • Hypothyroidism Mother    • Heart attack Mother    • Hyperlipidemia Mother    • Depression Mother    • Heart disease Mother    • Arthritis Mother    • Cancer Father    • Heart disease Father    • Heart block Father         s/p ICD   • Hypertension Father    • Prostate cancer Father 79        c Mets   • Hyperlipidemia Father    • Diabetes Brother    • Leukemia Brother 72        CLL   • Hyperlipidemia Brother    • Basal cell carcinoma Brother    • Diabetes type II Brother 61   • Osteoarthritis Brother         s/p MVA   • No Known Problems Maternal Aunt    • No Known Problems Paternal Aunt    • Breast cancer Paternal Aunt 61   • No Known Problems Maternal Grandmother    • No Known Problems Maternal Grandfather    • No Known Problems Paternal Grandmother    • No Known Problems Paternal Grandfather    • OCD Son    • Anxiety disorder Son    • Hypertension Son • Obesity Son    • Breast cancer Cousin 61   • Diabetes Family    • Arthritis Family    • Breast cancer Family       Current Medications:     Current Outpatient Medications   Medication Sig Dispense Refill   • ALPRAZolam (XANAX) 0 25 mg tablet Take 1 tablet (0 25 mg total) by mouth daily at bedtime as needed for anxiety 30 tablet 3   • buPROPion (WELLBUTRIN XL) 300 mg 24 hr tablet Take 1 tablet (300 mg total) by mouth every morning 30 tablet 3   • Calcium Carbonate-Vitamin D (Calcium 600-D) 600-400 MG-UNIT per tablet Take 2 capsules by mouth 2 (two) times a day       • Cholecalciferol (Vitamin D) 50 MCG (2000 UT) CAPS Take by mouth in the morning     • cyclobenzaprine (FLEXERIL) 10 mg tablet Take 1 tablet (10 mg total) by mouth 3 (three) times a day as needed for muscle spasms 30 tablet 0   • Meclizine HCl (ANTIVERT PO) Take 1 tablet by mouth as needed Rx per prior PCP     • Multiple Vitamins-Minerals (BARIATRIC FUSION PO) Take 1 tablet by mouth daily     • Semaglutide-Weight Management (Wegovy) 2 4 MG/0 75ML Inject 0 75 mL (2 4 mg total) under the skin once a week 3 mL 3   • terbinafine (LamISIL) 250 mg tablet Take 1 tablet (250 mg total) by mouth daily 90 tablet 0   • fluorouracil (EFUDEX) 5 % cream  (Patient not taking: Reported on 12/12/2022)       No current facility-administered medications for this visit  Allergies: Allergies   Allergen Reactions   • Macrodantin [Nitrofurantoin]      myalgias   • Sulfa Antibiotics Swelling     Tongue swelling   • Dilaudid [Hydromorphone] Vomiting      Physical Exam:     /82 (BP Location: Left arm, Patient Position: Sitting, Cuff Size: Adult)   Pulse 65   Temp (!) 96 6 °F (35 9 °C) (Tympanic)   Resp 16   Ht 5' 3 58" (1 615 m)   Wt 80 3 kg (177 lb)   LMP 05/01/2005 (Within Months)   SpO2 98%   BMI 30 78 kg/m²     Physical Exam  Vitals and nursing note reviewed  Constitutional:       General: She is not in acute distress       Appearance: Normal appearance  She is well-developed  HENT:      Head: Normocephalic and atraumatic  Right Ear: Tympanic membrane normal       Left Ear: Tympanic membrane normal       Nose: Nose normal       Mouth/Throat:      Mouth: Mucous membranes are moist    Eyes:      Conjunctiva/sclera: Conjunctivae normal    Cardiovascular:      Rate and Rhythm: Normal rate and regular rhythm  Pulses: Normal pulses  Heart sounds: No murmur heard  Pulmonary:      Effort: Pulmonary effort is normal  No respiratory distress  Breath sounds: Normal breath sounds  Abdominal:      Palpations: Abdomen is soft  Tenderness: There is no abdominal tenderness  Musculoskeletal:         General: No swelling  Cervical back: Normal range of motion and neck supple  Skin:     General: Skin is warm and dry  Capillary Refill: Capillary refill takes less than 2 seconds  Comments: Discolored nails right 1st, 2nd and 3rd toes and left 1 st toe    Neurological:      General: No focal deficit present  Mental Status: She is alert and oriented to person, place, and time     Psychiatric:         Mood and Affect: Mood normal           Srini Colon MD  [id number] [facility]

## 2022-12-12 NOTE — ASSESSMENT & PLAN NOTE
-s/p RYGB  -Patient is pursuing the Conservative Program  -Initial weight loss goal of 5-10% weight loss for improved health  -Denied hx of seizure and glaucoma  -Reviewed indications, mechanisms, and potential side effects of weight loss medications  Would be cautious with phentermine as she reports taking Benadryl has resulted in bigeminy     -Patient denies personal and family history of MEN2 tumors and medullary thyroid/thyroid carcinoma  Patient denies personal history of pancreatitis  (started 11/4/19- 205 lbs)  >5 % weight loss  Having positive effects on appetite and helping prevent weight gain  Switched to [facility] and doing well  -Taking Wellbutrin  +effects on mood  -Tolerated naltrexone, but was having more pain so stopped to give option for pain medication prn  Switched to Topamax, but had worsening in eye sx (see HPI)- she is going to see ophthalmology (She will also discuss Wellbutrin with them)  CMP and vitamin labs reviewed from 8/23/22   PTH reviewed from 11/30/22; due for annual vitamin labs 08/2023     Initial(Start of MWM 10/15/18): 201 5 lbs  Start of Saxenda: (205 lbs, per 11/8/19)  Current: 178 (-0 lbs since last visit)  Change: -27 lb  Goal: 160-170s lbs and maintain

## 2023-01-17 ENCOUNTER — PROBLEM (OUTPATIENT)
Dept: URBAN - METROPOLITAN AREA CLINIC 6 | Facility: CLINIC | Age: 63
End: 2023-01-17

## 2023-01-17 ENCOUNTER — VBI (OUTPATIENT)
Dept: ADMINISTRATIVE | Facility: OTHER | Age: 63
End: 2023-01-17

## 2023-01-17 DIAGNOSIS — H53.8: ICD-10-CM

## 2023-01-17 DIAGNOSIS — H25.13: ICD-10-CM

## 2023-01-17 DIAGNOSIS — H35.361: ICD-10-CM

## 2023-01-17 PROCEDURE — 92014 COMPRE OPH EXAM EST PT 1/>: CPT

## 2023-01-17 ASSESSMENT — VISUAL ACUITY
OU_CC: J1+
OS_CC: 20/20
OD_CC: 20/20-2

## 2023-01-17 ASSESSMENT — TONOMETRY
OD_IOP_MMHG: 16
OS_IOP_MMHG: 8

## 2023-01-24 NOTE — PROGRESS NOTES
Assessment/Plan:    Overweight  -s/p RYGB  -Patient is pursuing the Conservative Program  -Initial weight loss goal of 5-10% weight loss for improved health  -Denied hx of seizure and glaucoma  -Reviewed indications, mechanisms, and potential side effects of weight loss medications  Would be cautious with phentermine as she reports taking Benadryl has resulted in bigeminy     -Patient denies personal and family history of MEN2 tumors and medullary thyroid/thyroid carcinoma  Patient denies personal history of pancreatitis  (started 11/4/19- 205 lbs)  >5 % weight loss  Having positive effects on appetite and helping prevent weight gain  Can consider switch to [facility]AISLINN AVELAR to see if this will help with additional weight loss  -Taking Wellbutrin  +effects on mood  -Tolerated naltrexone, but was having more pain so stopped to give option for pain medication prn  Started Topamax, will switch to bid dosing to help with compliance  CMP and vitamin labs reviewed from 8/23/22  Will recheck PTH      Initial(Start of MWM 10/15/18): 201 5 lbs  Start of Saxenda: (205 lbs, per 11/8/19)  Current: 178 (+2 lbs since last visit)  Change: -27 lb  Goal: 160-170s lbs and maintain    Follow up in approximately 3 months with Non-Surgical Physician/Advanced Practitioner  Goals:  Food log (ie ) www myfitnesspal com,sparkpeople  com,loseit com,calorieking  com,etc  baritastic  No sugary beverages  At least 64oz of water daily  Increase physical activity by 10 minutes daily   Gradually increase physical activity to a goal of 5 days per week for 30 minutes of MODERATE intensity PLUS 2 days per week of FULL BODY resistance training  Practice lesson plans 1-6 in bariatric manual  and Practice 30/60 rule  Goal protein intake of 60-80 grams per day  Alcohol and nicotine use is not recommended following bariatric surgery  NSAIDs (Motrin, Advil, Aleve, Naproxen, ibuprofen, etc) should be avoided following bariatric surgery    Diagnoses and all orders for this visit:    Overweight  -     Semaglutide-Weight Management (WEGOVY) 1 7 MG/0 75ML; Inject 0 75 mL (1 7 mg total) under the skin once a week For 4 weeks then increase to 2 4 mg weekly  -     topiramate (Topamax) 50 MG tablet; Take 1 tablet (50 mg total) by mouth 2 (two) times a day    Status post bariatric surgery  -     topiramate (Topamax) 50 MG tablet; Take 1 tablet (50 mg total) by mouth 2 (two) times a day    Postsurgical malabsorption  -     topiramate (Topamax) 50 MG tablet; Take 1 tablet (50 mg total) by mouth 2 (two) times a day    Adjustment disorder with depressed mood  -     topiramate (Topamax) 50 MG tablet; Take 1 tablet (50 mg total) by mouth 2 (two) times a day    Abnormal craving  -     topiramate (Topamax) 50 MG tablet; Take 1 tablet (50 mg total) by mouth 2 (two) times a day    Body mass index 29 0-29 9, adult        Subjective:   Chief Complaint   Patient presents with    Virtual Regular Visit     Patient ID: Nettie Ny  is a 60 y o  female with excess weight/obesity here to pursue weight management    Past Medical History:   Diagnosis Date    Anxiety disorder     Arthritis     osteoarthritis hip/poss knuckles"    Basal cell carcinoma 2022    midline inferior forehead    Cancer ([facility] [address]     Cervical disc herniation     C7//sees chiropracter and no recent problems    Exercise involving walking     10-75993 steps per day    Full thickness rotator cuff tear 2020     1 para 1     Hearing loss     left ear    History of bariatric surgery     History of non anemic vitamin B12 deficiency     History of prediabetes     before bariatric surgery    Hyperlipidemia     not on meds    Hypertension     not on meds    Iron deficiency anemia     infusions determined by lab work    Irregular heart beat     "history bigeminy from taking benadryl, tries to avoid taking it"no recent issues"    Motion sickness     "on rides"    Neck pain     occas    Nondisplaced fracture of proximal phalanx of left lesser toe(s), initial encounter for closed fracture 06/02/2019    OA (osteoarthritis) of hip     Obesity     Oral herpes simplex infection     occas    Other insomnia 05/11/2020    Postmenopausal     Sciatica     Vertigo     Wears glasses     Wears glasses      HPI:  The patient presents for MWM    Saxenda: doing well, but feels less effective, noticing less satiety  Wellbutrin: doing well, refilled   Topamax: tolerating, but only taking about 4x/week due to timing  Noticing minimal effects    Grief counseling at [employer] + bereavement through work  Getting more involved at [employer]/bible study    Walking more for exercise  Enjoys walking the trails near her  Walks 2x/week with a neighbor    Fruit/Vegetable servings: fruit is better in summer months, reports she can increase her veg intake  Hydration: has been increasing her hydration  Tries to get at least 32 oz of water per day  Becoming more mindful with snacking    The following portions of the patient's history were reviewed and updated as appropriate: allergies, current medications, past family history, past medical history, past social history, past surgical history and problem list     Objective: Wt 80 7 kg (178 lb) Comment: pt reported  LMP 05/01/2005 (Within Months)   BMI 29 62 kg/m²     Virtual Regular Visit    Verification of patient location:    Patient is located in the following state in which I hold an active license [place]    Assessment/Plan:    Problem List Items Addressed This Visit        Digestive    Postsurgical malabsorption    Relevant Medications    topiramate (Topamax) 50 MG tablet       Other    Overweight - Primary     -s/p RYGB  -Patient is pursuing the Conservative Program  -Initial weight loss goal of 5-10% weight loss for improved health  -Denied hx of seizure and glaucoma  -Reviewed indications, mechanisms, and potential side effects of weight loss medications   Would be cautious with phentermine as she reports taking Benadryl has resulted in bigeminy     -Patient denies personal and family history of MEN2 tumors and medullary thyroid/thyroid carcinoma  Patient denies personal history of pancreatitis  (started 11/4/19- 205 lbs)  >5 % weight loss  Having positive effects on appetite and helping prevent weight gain  Can consider switch to [facility]FORT ROSIO to see if this will help with additional weight loss  -Taking Wellbutrin  +effects on mood  -Tolerated naltrexone, but was having more pain so stopped to give option for pain medication prn  Started Topamax, will switch to bid dosing to help with compliance  CMP and vitamin labs reviewed from 8/23/22  Will recheck PTH      Initial(Start of MWM 10/15/18): 201 5 lbs  Start of Saxenda: (205 lbs, per 11/8/19)  Current: 178 (+2 lbs since last visit)  Change: -27 lb  Goal: 160-170s lbs and maintain         Relevant Medications    Semaglutide-Weight Management (WEGOVY) 1 7 MG/0 75ML    topiramate (Topamax) 50 MG tablet      Other Visit Diagnoses     Status post bariatric surgery        Relevant Medications    topiramate (Topamax) 50 MG tablet    Adjustment disorder with depressed mood        Relevant Medications    Semaglutide-Weight Management (WEGOVY) 1 7 MG/0 75ML    topiramate (Topamax) 50 MG tablet    Abnormal craving        Relevant Medications    topiramate (Topamax) 50 MG tablet    Body mass index 29 0-29 9, adult               Reason for visit is   Chief Complaint   Patient presents with    Virtual Regular Visit        Encounter provider Hodan Cooper PA-C    Provider located at [address] [place] [phone] [facility] [phone]      Recent Visits  No visits were found meeting these conditions    Showing recent visits within past 7 days and meeting all other requirements  Today's Visits  Date Type Provider Dept   09/26/22 Telemedicine Luz Colin PA-C [facility]   Showing today's visits and meeting all other requirements  Future Appointments  No visits were found meeting these conditions  Showing future appointments within next 150 days and meeting all other requirements       The patient was identified by name and date of birth  Alejandro Bird was informed that this is a telemedicine visit and that the visit is being conducted through telephone and patient was informed that this is not a secure, HIPAA-compliant platform  She agrees to proceed     My office door was closed  No one else was in the room  She acknowledged consent and understanding of privacy and security of the video platform  The patient has agreed to participate and understands they can discontinue the visit at any time  Patient is aware this is a billable service  Subjective  Alejandro Bird is a 60 y o  female for MWM f/u       HPI     Past Medical History:   Diagnosis Date    Anxiety disorder     Arthritis     osteoarthritis hip/poss knuckles"    Basal cell carcinoma 2022    midline inferior forehead    Cancer ([facility] [address]     Cervical disc herniation     C7//sees chiropracter and no recent problems    Exercise involving walking     10-19168 steps per day    Full thickness rotator cuff tear 2020     1 para 1     Hearing loss     left ear    History of bariatric surgery     History of non anemic vitamin B12 deficiency     History of prediabetes     before bariatric surgery    Hyperlipidemia     not on meds    Hypertension     not on meds    Iron deficiency anemia     infusions determined by lab work    Irregular heart beat     "history bigeminy from taking benadryl, tries to avoid taking it"no recent issues"    Motion sickness     "on rides"    Neck pain     occas    Nondisplaced fracture of proximal phalanx of left lesser toe(s), initial encounter for closed fracture 2019    OA (osteoarthritis) of hip     Obesity     Oral herpes simplex infection     occas    Other insomnia 05/11/2020    Postmenopausal     Sciatica     Vertigo     Wears glasses     Wears glasses        Past Surgical History:   Procedure Laterality Date    BARIATRIC SURGERY  05/09/2004    COLPOSCOPY      Cervical Dysplasia    DENTAL SURGERY      one lower right    DENTAL SURGERY      Has dental implant; s/p tooth abscess    EGD      GASTRIC BYPASS  2004    titanium staples    GYNECOLOGIC CRYOSURGERY      Cervical Dysplasia    LUMBAR LAMINECTOMY  1980    L5-S1; No Hardware    MOHS SURGERY  07/26/2022    midline inferior forehead    [place] COLONOSCOPY FLX DX W/COLLJ SPEC WHEN PFRMD N/A 06/01/2016    Procedure: COLONOSCOPY;  Surgeon: Michael Redding MD;  Location: [facility];   Service: Colorectal    [place] TOTAL HIP ARTHROPLASTY Right 12/16/2020    Procedure: ARTHROPLASTY HIP TOTAL ANTERIOR;  Surgeon: Cindy Carlos MD;  Location: [facility];  Service: Orthopedics    ROTATOR CUFF REPAIR Right 2010    SKIN CANCER EXCISION      s/p BCC Back and R Clavicle    WISDOM TOOTH EXTRACTION         Current Outpatient Medications   Medication Sig Dispense Refill    Semaglutide-Weight Management (WEGOVY) 1 7 MG/0 75ML Inject 0 75 mL (1 7 mg total) under the skin once a week For 4 weeks then increase to 2 4 mg weekly 3 mL 0    topiramate (Topamax) 50 MG tablet Take 1 tablet (50 mg total) by mouth 2 (two) times a day 60 tablet 2    ALPRAZolam (XANAX) 0 25 mg tablet Take 1 tablet (0 25 mg total) by mouth daily at bedtime as needed for anxiety 30 tablet 0    buPROPion (WELLBUTRIN XL) 300 mg 24 hr tablet Take 1 tablet (300 mg total) by mouth every morning 30 tablet 3    Calcium Carbonate-Vitamin D (Calcium 600-D) 600-400 MG-UNIT per tablet Take 2 capsules by mouth 2 (two) times a day        Cholecalciferol (Vitamin D) 50 MCG (2000 UT) CAPS Take by mouth in the morning      cyclobenzaprine (FLEXERIL) 10 mg tablet Take 10 mg by mouth 3 (three) times a day as needed for muscle spasms      Insulin Pen Needle (NOVOFINE PLUS) 32G X 4 MM MISC by Does not apply route daily (Patient taking differently: Use daily Rx per Weight Management for Saxenda) 30 each 3    Meclizine HCl (ANTIVERT PO) Take 1 tablet by mouth as needed Rx per prior PCP      Multiple Vitamins-Minerals (BARIATRIC FUSION PO) Take 1 tablet by mouth daily       No current facility-administered medications for this visit  Allergies   Allergen Reactions    Macrodantin [Nitrofurantoin]      myalgias    Sulfa Antibiotics Swelling     Tongue swelling    Dilaudid [Hydromorphone] Vomiting       Review of Systems   Psychiatric/Behavioral:        +grieving     Video Exam    Vitals:    09/26/22 1305   Weight: 80 7 kg (178 lb)       Physical Exam  Vitals and nursing note reviewed  Constitutional   General appearance: Abnormal   well developed and overweight  Pulmonary   Respiratory effort: No increased work of breathing or signs of respiratory distress  Musculoskeletal   Gait and station: Normal     Psychiatric   Orientation to person, place and time: Normal     Affect: appropriate    I spent 30 minutes with patient today in which greater than 50% of the time was spent in counseling/coordination of care regarding dietary and lifestyle changes for weight loss and weight loss maintenance    It was my intent to perform this visit via video technology but the patient was not able to do a video connection so the visit was completed via audio telephone only  Fluconazole Counseling:  Patient counseled regarding adverse effects of fluconazole including but not limited to headache, diarrhea, nausea, upset stomach, liver function test abnormalities, taste disturbance, and stomach pain.  There is a rare possibility of liver failure that can occur when taking fluconazole.  The patient understands that monitoring of LFTs and kidney function test may be required, especially at baseline. The patient verbalized understanding of the proper use and possible adverse effects of fluconazole.  All of the patient's questions and concerns were addressed.

## 2023-02-06 ENCOUNTER — TELEPHONE (OUTPATIENT)
Dept: FAMILY MEDICINE CLINIC | Facility: CLINIC | Age: 63
End: 2023-02-06

## 2023-02-06 NOTE — TELEPHONE ENCOUNTER
Patient will be dropping off paperwork for intermittent fmla 2/7/23  Forms were previously filled out for death of spouse

## 2023-03-06 DIAGNOSIS — G89.29 CHRONIC RIGHT-SIDED LOW BACK PAIN WITHOUT SCIATICA: ICD-10-CM

## 2023-03-06 DIAGNOSIS — M54.50 CHRONIC RIGHT-SIDED LOW BACK PAIN WITHOUT SCIATICA: ICD-10-CM

## 2023-03-06 RX ORDER — CYCLOBENZAPRINE HCL 10 MG
10 TABLET ORAL 3 TIMES DAILY PRN
Qty: 30 TABLET | Refills: 0 | Status: SHIPPED | OUTPATIENT
Start: 2023-03-06

## 2023-03-15 ENCOUNTER — TELEPHONE (OUTPATIENT)
Dept: HEMATOLOGY ONCOLOGY | Facility: CLINIC | Age: 63
End: 2023-03-15

## 2023-03-15 NOTE — TELEPHONE ENCOUNTER
EDOUARD  Route to [facility]                        Patient appointment rescheduled due to Transfer of care      Speaking with   Patient   If you not speaking with the patient, is the person listed on patients Medical Communication Consent? N/A   Physician patient is established with Dr Brissa Horn appointment date and time 06/08/2023 @8:40AM    [facility]   Physician patient is transferring care to   SHIVAM Landry appointment date and time 06/08/2023 @1:30PM    Reason for FRANCHESCA DAS  Provider is leaving network

## 2023-03-16 ENCOUNTER — HOSPITAL ENCOUNTER (OUTPATIENT)
Dept: RADIOLOGY | Age: 63
Discharge: HOME | End: 2023-03-16

## 2023-03-16 VITALS — WEIGHT: 177 LBS | HEIGHT: 63 IN | BODY MASS INDEX: 31.36 KG/M2

## 2023-03-16 DIAGNOSIS — Z12.31 SCREENING MAMMOGRAM FOR HIGH-RISK PATIENT: ICD-10-CM

## 2023-04-03 DIAGNOSIS — F41.1 GENERALIZED ANXIETY DISORDER: ICD-10-CM

## 2023-04-03 DIAGNOSIS — E66.3 OVERWEIGHT: ICD-10-CM

## 2023-04-03 RX ORDER — ALPRAZOLAM 0.25 MG/1
0.25 TABLET ORAL
Qty: 30 TABLET | Refills: 0 | Status: SHIPPED | OUTPATIENT
Start: 2023-04-03

## 2023-04-03 RX ORDER — SEMAGLUTIDE 2.4 MG/.75ML
INJECTION, SOLUTION SUBCUTANEOUS
Qty: 3 ML | Refills: 0 | Status: SHIPPED | OUTPATIENT
Start: 2023-04-03

## 2023-04-03 NOTE — TELEPHONE ENCOUNTER
Medication:  PDMP   03/07/2023 12/12/2022 ALPRAZolam (Tablet)  30 0 30 0 25 MG ABRAHAM JACOBS      Active agreement on file -No

## 2023-05-01 DIAGNOSIS — F41.1 GENERALIZED ANXIETY DISORDER: ICD-10-CM

## 2023-05-01 RX ORDER — ALPRAZOLAM 0.25 MG/1
0.25 TABLET ORAL
Qty: 30 TABLET | Refills: 0 | Status: SHIPPED | OUTPATIENT
Start: 2023-05-01

## 2023-05-01 NOTE — TELEPHONE ENCOUNTER
Medication:Alprazolam  PDMP    04/03/2023 04/03/2023 ALPRAZolam (Tablet)  30 0 30 0 25 MG NA MOHSEN RANGEL [facility]      Active agreement on file -No

## 2023-05-05 ENCOUNTER — APPOINTMENT (EMERGENCY)
Dept: CT IMAGING | Facility: HOSPITAL | Age: 63
End: 2023-05-05

## 2023-05-05 ENCOUNTER — APPOINTMENT (EMERGENCY)
Dept: ULTRASOUND IMAGING | Facility: HOSPITAL | Age: 63
End: 2023-05-05

## 2023-05-05 ENCOUNTER — HOSPITAL ENCOUNTER (EMERGENCY)
Facility: HOSPITAL | Age: 63
Discharge: HOME | End: 2023-05-05
Attending: EMERGENCY MEDICINE

## 2023-05-05 ENCOUNTER — ANESTHESIA EVENT (EMERGENCY)
Dept: PERIOP | Facility: HOSPITAL | Age: 63
End: 2023-05-05

## 2023-05-05 ENCOUNTER — HOSPITAL ENCOUNTER (OUTPATIENT)
Facility: HOSPITAL | Age: 63
Setting detail: OUTPATIENT SURGERY
End: 2023-05-05
Attending: SURGERY | Admitting: SURGERY

## 2023-05-05 ENCOUNTER — ANESTHESIA (EMERGENCY)
Dept: PERIOP | Facility: HOSPITAL | Age: 63
End: 2023-05-05

## 2023-05-05 VITALS
WEIGHT: 170.19 LBS | BODY MASS INDEX: 30.15 KG/M2 | SYSTOLIC BLOOD PRESSURE: 113 MMHG | DIASTOLIC BLOOD PRESSURE: 55 MMHG | OXYGEN SATURATION: 97 % | HEART RATE: 72 BPM | RESPIRATION RATE: 20 BRPM | TEMPERATURE: 97.5 F

## 2023-05-05 DIAGNOSIS — K80.20 CHOLELITHIASIS: ICD-10-CM

## 2023-05-05 DIAGNOSIS — R10.11 RIGHT UPPER QUADRANT ABDOMINAL PAIN: ICD-10-CM

## 2023-05-05 DIAGNOSIS — K81.9 CHOLECYSTITIS: ICD-10-CM

## 2023-05-05 DIAGNOSIS — K80.00 CALCULUS OF GALLBLADDER WITH ACUTE CHOLECYSTITIS WITHOUT OBSTRUCTION: Primary | ICD-10-CM

## 2023-05-05 LAB
2HR DELTA HS TROPONIN: 1 NG/L
4HR DELTA HS TROPONIN: 2 NG/L
ALBUMIN SERPL BCP-MCNC: 4.2 G/DL (ref 3.5–5)
ALP SERPL-CCNC: 96 U/L (ref 34–104)
ALT SERPL W P-5'-P-CCNC: 81 U/L (ref 7–52)
ANION GAP SERPL CALCULATED.3IONS-SCNC: 7 MMOL/L (ref 4–13)
APTT PPP: 26 SECONDS (ref 23–37)
AST SERPL W P-5'-P-CCNC: 191 U/L (ref 13–39)
BASOPHILS # BLD AUTO: 0.03 THOUSANDS/ÂΜL (ref 0–0.1)
BASOPHILS NFR BLD AUTO: 0 % (ref 0–1)
BILIRUB SERPL-MCNC: 0.75 MG/DL (ref 0.2–1)
BILIRUB UR QL STRIP: NEGATIVE
BUN SERPL-MCNC: 16 MG/DL (ref 5–25)
CALCIUM SERPL-MCNC: 10.2 MG/DL (ref 8.4–10.2)
CARDIAC TROPONIN I PNL SERPL HS: 2 NG/L
CARDIAC TROPONIN I PNL SERPL HS: 3 NG/L
CARDIAC TROPONIN I PNL SERPL HS: 4 NG/L
CHLORIDE SERPL-SCNC: 106 MMOL/L (ref 96–108)
CLARITY UR: CLEAR
CO2 SERPL-SCNC: 27 MMOL/L (ref 21–32)
COLOR UR: NORMAL
CREAT SERPL-MCNC: 0.78 MG/DL (ref 0.6–1.3)
EOSINOPHIL # BLD AUTO: 0.04 THOUSAND/ÂΜL (ref 0–0.61)
EOSINOPHIL NFR BLD AUTO: 1 % (ref 0–6)
ERYTHROCYTE [DISTWIDTH] IN BLOOD BY AUTOMATED COUNT: 12.1 % (ref 11.6–15.1)
GFR SERPL CREATININE-BSD FRML MDRD: 80 ML/MIN/1.73SQ M
GLUCOSE SERPL-MCNC: 100 MG/DL (ref 65–140)
GLUCOSE UR STRIP-MCNC: NEGATIVE MG/DL
HCT VFR BLD AUTO: 44.5 % (ref 34.8–46.1)
HGB BLD-MCNC: 14.4 G/DL (ref 11.5–15.4)
HGB UR QL STRIP.AUTO: NEGATIVE
IMM GRANULOCYTES # BLD AUTO: 0.02 THOUSAND/UL (ref 0–0.2)
IMM GRANULOCYTES NFR BLD AUTO: 0 % (ref 0–2)
INR PPP: 0.94 (ref 0.84–1.19)
KETONES UR STRIP-MCNC: NEGATIVE MG/DL
LACTATE SERPL-SCNC: 0.7 MMOL/L (ref 0.5–2)
LEUKOCYTE ESTERASE UR QL STRIP: NEGATIVE
LIPASE SERPL-CCNC: 202 U/L (ref 11–82)
LYMPHOCYTES # BLD AUTO: 1.27 THOUSANDS/ÂΜL (ref 0.6–4.47)
LYMPHOCYTES NFR BLD AUTO: 17 % (ref 14–44)
MCH RBC QN AUTO: 30.8 PG (ref 26.8–34.3)
MCHC RBC AUTO-ENTMCNC: 32.4 G/DL (ref 31.4–37.4)
MCV RBC AUTO: 95 FL (ref 82–98)
MONOCYTES # BLD AUTO: 0.54 THOUSAND/ÂΜL (ref 0.17–1.22)
MONOCYTES NFR BLD AUTO: 7 % (ref 4–12)
NEUTROPHILS # BLD AUTO: 5.46 THOUSANDS/ÂΜL (ref 1.85–7.62)
NEUTS SEG NFR BLD AUTO: 75 % (ref 43–75)
NITRITE UR QL STRIP: NEGATIVE
NRBC BLD AUTO-RTO: 0 /100 WBCS
PH UR STRIP.AUTO: 5.5 [PH]
PLATELET # BLD AUTO: 202 THOUSANDS/UL (ref 149–390)
PMV BLD AUTO: 10.5 FL (ref 8.9–12.7)
POTASSIUM SERPL-SCNC: 3.7 MMOL/L (ref 3.5–5.3)
PROT SERPL-MCNC: 7.1 G/DL (ref 6.4–8.4)
PROT UR STRIP-MCNC: NEGATIVE MG/DL
PROTHROMBIN TIME: 12.8 SECONDS (ref 11.6–14.5)
RBC # BLD AUTO: 4.67 MILLION/UL (ref 3.81–5.12)
SODIUM SERPL-SCNC: 140 MMOL/L (ref 135–147)
SP GR UR STRIP.AUTO: 1.01 (ref 1–1.03)
UROBILINOGEN UR STRIP-ACNC: <2 MG/DL
WBC # BLD AUTO: 7.36 THOUSAND/UL (ref 4.31–10.16)

## 2023-05-05 RX ORDER — INDOCYANINE GREEN AND WATER 25 MG
KIT INJECTION
Status: DISCONTINUED
Start: 2023-05-05 | End: 2023-05-05 | Stop reason: HOSPADM

## 2023-05-05 RX ORDER — MORPHINE SULFATE 10 MG/ML
4 INJECTION, SOLUTION INTRAMUSCULAR; INTRAVENOUS
Status: DISCONTINUED | OUTPATIENT
Start: 2023-05-05 | End: 2023-05-05 | Stop reason: HOSPADM

## 2023-05-05 RX ORDER — ONDANSETRON 2 MG/ML
4 INJECTION INTRAMUSCULAR; INTRAVENOUS EVERY 4 HOURS PRN
Status: DISCONTINUED | OUTPATIENT
Start: 2023-05-05 | End: 2023-05-05 | Stop reason: HOSPADM

## 2023-05-05 RX ORDER — HEPARIN SODIUM 5000 [USP'U]/ML
5000 INJECTION, SOLUTION INTRAVENOUS; SUBCUTANEOUS ONCE
Status: DISCONTINUED | OUTPATIENT
Start: 2023-05-05 | End: 2023-05-05 | Stop reason: HOSPADM

## 2023-05-05 RX ORDER — DEXAMETHASONE SODIUM PHOSPHATE 10 MG/ML
INJECTION, SOLUTION INTRAMUSCULAR; INTRAVENOUS AS NEEDED
Status: DISCONTINUED | OUTPATIENT
Start: 2023-05-05 | End: 2023-05-05

## 2023-05-05 RX ORDER — MIDAZOLAM HYDROCHLORIDE 2 MG/2ML
INJECTION, SOLUTION INTRAMUSCULAR; INTRAVENOUS AS NEEDED
Status: DISCONTINUED | OUTPATIENT
Start: 2023-05-05 | End: 2023-05-05

## 2023-05-05 RX ORDER — FAMOTIDINE 10 MG/ML
20 INJECTION, SOLUTION INTRAVENOUS ONCE
Status: COMPLETED | OUTPATIENT
Start: 2023-05-05 | End: 2023-05-05

## 2023-05-05 RX ORDER — KETOROLAC TROMETHAMINE 30 MG/ML
15 INJECTION, SOLUTION INTRAMUSCULAR; INTRAVENOUS ONCE
Status: COMPLETED | OUTPATIENT
Start: 2023-05-05 | End: 2023-05-05

## 2023-05-05 RX ORDER — OXYCODONE HYDROCHLORIDE 5 MG/1
5 TABLET ORAL EVERY 4 HOURS PRN
Qty: 10 TABLET | Refills: 0 | Status: SHIPPED | OUTPATIENT
Start: 2023-05-05 | End: 2023-05-15

## 2023-05-05 RX ORDER — SODIUM CHLORIDE, SODIUM LACTATE, POTASSIUM CHLORIDE, CALCIUM CHLORIDE 600; 310; 30; 20 MG/100ML; MG/100ML; MG/100ML; MG/100ML
125 INJECTION, SOLUTION INTRAVENOUS CONTINUOUS
Status: DISCONTINUED | OUTPATIENT
Start: 2023-05-05 | End: 2023-05-05 | Stop reason: HOSPADM

## 2023-05-05 RX ORDER — HYDROMORPHONE HCL/PF 1 MG/ML
0.5 SYRINGE (ML) INJECTION EVERY 4 HOURS PRN
Status: DISCONTINUED | OUTPATIENT
Start: 2023-05-05 | End: 2023-05-05 | Stop reason: HOSPADM

## 2023-05-05 RX ORDER — ROCURONIUM BROMIDE 10 MG/ML
INJECTION, SOLUTION INTRAVENOUS AS NEEDED
Status: DISCONTINUED | OUTPATIENT
Start: 2023-05-05 | End: 2023-05-05

## 2023-05-05 RX ORDER — HYDROMORPHONE HCL/PF 1 MG/ML
1 SYRINGE (ML) INJECTION EVERY 4 HOURS PRN
Status: DISCONTINUED | OUTPATIENT
Start: 2023-05-05 | End: 2023-05-05 | Stop reason: HOSPADM

## 2023-05-05 RX ORDER — ONDANSETRON 2 MG/ML
4 INJECTION INTRAMUSCULAR; INTRAVENOUS ONCE AS NEEDED
Status: DISCONTINUED | OUTPATIENT
Start: 2023-05-05 | End: 2023-05-05 | Stop reason: HOSPADM

## 2023-05-05 RX ORDER — MAGNESIUM HYDROXIDE 1200 MG/15ML
LIQUID ORAL AS NEEDED
Status: DISCONTINUED | OUTPATIENT
Start: 2023-05-05 | End: 2023-05-05 | Stop reason: HOSPADM

## 2023-05-05 RX ORDER — HYDROMORPHONE HCL/PF 1 MG/ML
0.4 SYRINGE (ML) INJECTION
Status: DISCONTINUED | OUTPATIENT
Start: 2023-05-05 | End: 2023-05-05 | Stop reason: HOSPADM

## 2023-05-05 RX ORDER — LIDOCAINE HYDROCHLORIDE 10 MG/ML
INJECTION, SOLUTION EPIDURAL; INFILTRATION; INTRACAUDAL; PERINEURAL AS NEEDED
Status: DISCONTINUED | OUTPATIENT
Start: 2023-05-05 | End: 2023-05-05

## 2023-05-05 RX ORDER — FENTANYL CITRATE 50 UG/ML
INJECTION, SOLUTION INTRAMUSCULAR; INTRAVENOUS AS NEEDED
Status: DISCONTINUED | OUTPATIENT
Start: 2023-05-05 | End: 2023-05-05

## 2023-05-05 RX ORDER — BUPIVACAINE HYDROCHLORIDE AND EPINEPHRINE 2.5; 5 MG/ML; UG/ML
INJECTION, SOLUTION EPIDURAL; INFILTRATION; INTRACAUDAL; PERINEURAL AS NEEDED
Status: DISCONTINUED | OUTPATIENT
Start: 2023-05-05 | End: 2023-05-05 | Stop reason: HOSPADM

## 2023-05-05 RX ORDER — PROMETHAZINE HYDROCHLORIDE 25 MG/ML
25 INJECTION, SOLUTION INTRAMUSCULAR; INTRAVENOUS ONCE AS NEEDED
Status: DISCONTINUED | OUTPATIENT
Start: 2023-05-05 | End: 2023-05-05 | Stop reason: HOSPADM

## 2023-05-05 RX ORDER — CEFAZOLIN SODIUM 2 G/50ML
2000 SOLUTION INTRAVENOUS
Status: COMPLETED | OUTPATIENT
Start: 2023-05-05 | End: 2023-05-05

## 2023-05-05 RX ORDER — METRONIDAZOLE 500 MG/100ML
500 INJECTION, SOLUTION INTRAVENOUS
Status: DISCONTINUED | OUTPATIENT
Start: 2023-05-05 | End: 2023-05-05 | Stop reason: HOSPADM

## 2023-05-05 RX ORDER — ONDANSETRON 2 MG/ML
4 INJECTION INTRAMUSCULAR; INTRAVENOUS ONCE
Status: COMPLETED | OUTPATIENT
Start: 2023-05-05 | End: 2023-05-05

## 2023-05-05 RX ORDER — ONDANSETRON 2 MG/ML
INJECTION INTRAMUSCULAR; INTRAVENOUS AS NEEDED
Status: DISCONTINUED | OUTPATIENT
Start: 2023-05-05 | End: 2023-05-05

## 2023-05-05 RX ORDER — FENTANYL CITRATE/PF 50 MCG/ML
50 SYRINGE (ML) INJECTION
Status: COMPLETED | OUTPATIENT
Start: 2023-05-05 | End: 2023-05-05

## 2023-05-05 RX ORDER — OXYCODONE HYDROCHLORIDE 5 MG/1
5 TABLET ORAL EVERY 4 HOURS PRN
Status: DISCONTINUED | OUTPATIENT
Start: 2023-05-05 | End: 2023-05-05 | Stop reason: HOSPADM

## 2023-05-05 RX ORDER — PROPOFOL 10 MG/ML
INJECTION, EMULSION INTRAVENOUS AS NEEDED
Status: DISCONTINUED | OUTPATIENT
Start: 2023-05-05 | End: 2023-05-05

## 2023-05-05 RX ORDER — INDOCYANINE GREEN AND WATER 25 MG
7.5 KIT INJECTION ONCE
Status: COMPLETED | OUTPATIENT
Start: 2023-05-05 | End: 2023-05-05

## 2023-05-05 RX ORDER — ACETAMINOPHEN 325 MG/1
650 TABLET ORAL EVERY 6 HOURS PRN
Status: DISCONTINUED | OUTPATIENT
Start: 2023-05-05 | End: 2023-05-05 | Stop reason: HOSPADM

## 2023-05-05 RX ORDER — MEPERIDINE HYDROCHLORIDE 25 MG/ML
12.5 INJECTION INTRAMUSCULAR; INTRAVENOUS; SUBCUTANEOUS
Status: DISCONTINUED | OUTPATIENT
Start: 2023-05-05 | End: 2023-05-05 | Stop reason: HOSPADM

## 2023-05-05 RX ORDER — KETOROLAC TROMETHAMINE 30 MG/ML
INJECTION, SOLUTION INTRAMUSCULAR; INTRAVENOUS AS NEEDED
Status: DISCONTINUED | OUTPATIENT
Start: 2023-05-05 | End: 2023-05-05

## 2023-05-05 RX ADMIN — SODIUM CHLORIDE, SODIUM LACTATE, POTASSIUM CHLORIDE, AND CALCIUM CHLORIDE 125 ML/HR: .6; .31; .03; .02 INJECTION, SOLUTION INTRAVENOUS at 11:28

## 2023-05-05 RX ADMIN — IOHEXOL 100 ML: 350 INJECTION, SOLUTION INTRAVENOUS at 08:48

## 2023-05-05 RX ADMIN — FENTANYL CITRATE 50 MCG: 50 INJECTION INTRAMUSCULAR; INTRAVENOUS at 15:02

## 2023-05-05 RX ADMIN — PROPOFOL 150 MG: 10 INJECTION, EMULSION INTRAVENOUS at 13:54

## 2023-05-05 RX ADMIN — FENTANYL CITRATE 50 MCG: 50 INJECTION, SOLUTION INTRAMUSCULAR; INTRAVENOUS at 14:11

## 2023-05-05 RX ADMIN — IOHEXOL 50 ML: 240 INJECTION, SOLUTION INTRATHECAL; INTRAVASCULAR; INTRAVENOUS; ORAL at 08:48

## 2023-05-05 RX ADMIN — KETOROLAC TROMETHAMINE 15 MG: 30 INJECTION, SOLUTION INTRAMUSCULAR; INTRAVENOUS at 07:56

## 2023-05-05 RX ADMIN — FENTANYL CITRATE 50 MCG: 50 INJECTION INTRAMUSCULAR; INTRAVENOUS at 15:13

## 2023-05-05 RX ADMIN — ONDANSETRON 4 MG: 2 INJECTION INTRAMUSCULAR; INTRAVENOUS at 14:28

## 2023-05-05 RX ADMIN — LIDOCAINE HYDROCHLORIDE 50 MG: 10 INJECTION, SOLUTION EPIDURAL; INFILTRATION; INTRACAUDAL at 13:54

## 2023-05-05 RX ADMIN — FENTANYL CITRATE 50 MCG: 50 INJECTION, SOLUTION INTRAMUSCULAR; INTRAVENOUS at 13:54

## 2023-05-05 RX ADMIN — OXYCODONE HYDROCHLORIDE 5 MG: 5 TABLET ORAL at 16:11

## 2023-05-05 RX ADMIN — SODIUM CHLORIDE 1000 ML: 0.9 INJECTION, SOLUTION INTRAVENOUS at 07:01

## 2023-05-05 RX ADMIN — DEXAMETHASONE SODIUM PHOSPHATE 10 MG: 10 INJECTION, SOLUTION INTRAMUSCULAR; INTRAVENOUS at 14:01

## 2023-05-05 RX ADMIN — INDOCYANINE GREEN AND WATER 7.5 MG: KIT at 12:36

## 2023-05-05 RX ADMIN — FENTANYL CITRATE 50 MCG: 50 INJECTION INTRAMUSCULAR; INTRAVENOUS at 15:09

## 2023-05-05 RX ADMIN — SUGAMMADEX 200 MG: 100 INJECTION, SOLUTION INTRAVENOUS at 14:36

## 2023-05-05 RX ADMIN — MORPHINE SULFATE 2 MG: 2 INJECTION, SOLUTION INTRAMUSCULAR; INTRAVENOUS at 06:59

## 2023-05-05 RX ADMIN — KETOROLAC TROMETHAMINE 15 MG: 30 INJECTION, SOLUTION INTRAMUSCULAR at 14:27

## 2023-05-05 RX ADMIN — FENTANYL CITRATE 50 MCG: 50 INJECTION INTRAMUSCULAR; INTRAVENOUS at 14:56

## 2023-05-05 RX ADMIN — ROCURONIUM BROMIDE 50 MG: 10 SOLUTION INTRAVENOUS at 13:54

## 2023-05-05 RX ADMIN — MORPHINE SULFATE 2 MG: 2 INJECTION, SOLUTION INTRAMUSCULAR; INTRAVENOUS at 09:37

## 2023-05-05 RX ADMIN — CEFAZOLIN SODIUM 2000 MG: 2 SOLUTION INTRAVENOUS at 13:49

## 2023-05-05 RX ADMIN — MIDAZOLAM HYDROCHLORIDE 2 MG: 1 INJECTION, SOLUTION INTRAMUSCULAR; INTRAVENOUS at 13:46

## 2023-05-05 RX ADMIN — MORPHINE SULFATE 2 MG: 2 INJECTION, SOLUTION INTRAMUSCULAR; INTRAVENOUS at 07:56

## 2023-05-05 RX ADMIN — KETOROLAC TROMETHAMINE 15 MG: 30 INJECTION, SOLUTION INTRAMUSCULAR; INTRAVENOUS at 06:59

## 2023-05-05 RX ADMIN — ONDANSETRON 4 MG: 2 INJECTION INTRAMUSCULAR; INTRAVENOUS at 07:00

## 2023-05-05 RX ADMIN — FAMOTIDINE 20 MG: 10 INJECTION, SOLUTION INTRAVENOUS at 06:59

## 2023-05-05 NOTE — ED CARE HANDOFF
Emergency Department Sign Out Note        Sign out and transfer of care from ***  See Separate Emergency Department note  The patient, Kristen Talley, was evaluated by the previous provider for ***  Workup Completed:  ***    ED Course / Workup Pending (followup):  ***                                     Procedures  Medical Decision Making  Hx gastric bypass  Waiting for PO contrast  Lipase elevated, AST/ALT elevated  Just given additional pain medications  Pending CT abd pelv with PO and IV contrast for diagnosis  Likely gallbladder/related structure pathology  Amount and/or Complexity of Data Reviewed  Labs: ordered  Radiology: ordered  Risk  Prescription drug management  Disposition  Final diagnoses:   None     ED Disposition     None      Follow-up Information    None       Patient's Medications   Discharge Prescriptions    No medications on file     No discharge procedures on file         ED Provider  Electronically Signed by abdominal pain       ED Disposition     ED Disposition   Send to OR    Condition   --    Date/Time   Fri May 5, 2023 10:49 AM    Comment   Patient will be managed by Surgery team for cholecystectomy and will be discharged from the PACU           Follow-up Information     Follow up With Specialties Details Why Contact Josette Espinoza DO General Surgery Schedule an appointment as soon as possible for a visit in 2 week(s)  [address]  [address] [place] [address]  [phone]          Discharge Medication List as of 5/5/2023  3:26 PM      START taking these medications    Details   oxyCODONE (ROXICODONE) 5 immediate release tablet Take 1 tablet (5 mg total) by mouth every 4 (four) hours as needed for moderate pain for up to 10 days Max Daily Amount: 30 mg, Starting Fri 5/5/2023, Until Mon 5/15/2023 at 2359, Normal         CONTINUE these medications which have NOT CHANGED    Details   ALPRAZolam (XANAX) 0 25 mg tablet Take 1 tablet (0 25 mg total) by mouth daily at bedtime as needed for anxiety, Starting Mon 5/1/2023, Normal      buPROPion (WELLBUTRIN XL) 300 mg 24 hr tablet Take 1 tablet (300 mg total) by mouth every morning, Starting Mon 4/17/2023, Normal      Calcium Carbonate-Vitamin D (Calcium 600-D) 600-400 MG-UNIT per tablet Take 2 capsules by mouth 2 (two) times a day  , Historical Med      Cholecalciferol (Vitamin D) 50 MCG (2000 UT) CAPS Take by mouth in the morning, Historical Med      cyclobenzaprine (FLEXERIL) 10 mg tablet Take 1 tablet (10 mg total) by mouth 3 (three) times a day as needed for muscle spasms, Starting Mon 3/6/2023, Normal      Meclizine HCl (ANTIVERT PO) Take 1 tablet by mouth as needed Rx per prior PCP, Historical Med      Multiple Vitamins-Minerals (BARIATRIC FUSION PO) Take 1 tablet by mouth daily, Historical Med      Semaglutide-Weight Management [facility]) 2 4 MG/0 75ML Inject 0 75 mL (2 4 mg total) under the skin once a week, Starting Mon 4/17/2023, Normal No discharge procedures on file         ED Provider  Electronically Signed by     Maynor Lynch PA-C  05/11/23 5539

## 2023-05-05 NOTE — ANESTHESIA POSTPROCEDURE EVALUATION
Post-Op Assessment Note    CV Status:  Stable  Pain Score: 0    Pain management: adequate     Mental Status:  Alert and awake   Hydration Status:  Euvolemic   PONV Controlled:  Controlled   Airway Patency:  Patent      Post Op Vitals Reviewed: Yes      Staff: CRNA         No notable events documented      BP  183/89   Temp      Pulse  71   Resp      SpO2   96 RA

## 2023-05-05 NOTE — ED PROVIDER NOTES
History  Chief Complaint   Patient presents with    Abdominal Pain     Patient reports having pain in back and then started wrapping around to under rib cage  Patient denies N/V, but states she did have some dry heaving  Patient is a 78-year-old female with a history of very gastric surgery, gastric bypass approximately 2005, no significant past surgical history that presents to the emergency department with gradual onset dull aching for back pain that migrated to upper abdominal region for 1 day  Patient reports associated symptoms of nausea beginning with the current presentation of upper abdominal pain symptoms  Patient denies history of abdominal pain at present  Patient denies palliative factors or provocative factors of leaning forward  Patient denies noneffective treatment  Patient denies fevers, chills, vomiting, diarrhea, constipation and urinary symptoms  Patient does report some loose stools and denies antibiotic use  Patient denies new rash or skin changes  Patient denies questionable dietary item intake  Patient denies occasions  Patient denies Pottle factors or provocative factors of pressure in to the upper abdomen and right side of upper abdomen  Patient denies upper and lower extremity numbness, tingling, or loss of power  Patient denies noneffective treatment  Patient denies fevers, chills, vomiting, diarrhea, constipation and urinary symptoms  Patient has recent full recent trauma  Patient denies sick contacts recent travel  Patient was chest pain and shortness of breath  History provided by:  Patient   used: No    Abdominal Pain  Associated symptoms: nausea    Associated symptoms: no chest pain, no chills, no constipation, no cough, no diarrhea, no dysuria, no fever, no shortness of breath, no sore throat and no vomiting        Prior to Admission Medications   Prescriptions Last Dose Informant Patient Reported? Taking?    ALPRAZolam (XANAX) 0 25 mg "tablet   No No   Sig: Take 1 tablet (0 25 mg total) by mouth daily at bedtime as needed for anxiety   Calcium Carbonate-Vitamin D (Calcium 600-D) 600-400 MG-UNIT per tablet   Yes No   Sig: Take 2 capsules by mouth 2 (two) times a day     Cholecalciferol (Vitamin D) 50 MCG ( UT) CAPS   Yes No   Sig: Take by mouth in the morning   Meclizine HCl (ANTIVERT PO)   Yes No   Sig: Take 1 tablet by mouth as needed Rx per prior PCP   Multiple Vitamins-Minerals (BARIATRIC FUSION PO)   Yes No   Sig: Take 1 tablet by mouth daily   Semaglutide-Weight Management [facility]) 2 4 MG/0 75ML   No No   Sig: Inject 0 75 mL (2 4 mg total) under the skin once a week   buPROPion (WELLBUTRIN XL) 300 mg 24 hr tablet   No No   Sig: Take 1 tablet (300 mg total) by mouth every morning   cyclobenzaprine (FLEXERIL) 10 mg tablet   No No   Sig: Take 1 tablet (10 mg total) by mouth 3 (three) times a day as needed for muscle spasms      Facility-Administered Medications: None       Past Medical History:   Diagnosis Date    Anxiety disorder     Arthritis     osteoarthritis hip/poss knuckles\"    Basal cell carcinoma 2022    midline inferior forehead    Cancer (HCC)     Cervical disc herniation     C7//sees chiropracter and no recent problems    Exercise involving walking     [phone] steps per day    Full thickness rotator cuff tear 2020     1 para 1     Hearing loss     left ear    History of bariatric surgery     History of non anemic vitamin B12 deficiency     History of prediabetes     before bariatric surgery    Hyperlipidemia     not on meds    Hypertension     not on meds    Iron deficiency anemia     infusions determined by lab work    Irregular heart beat     \"history bigeminy from taking benadryl, tries to avoid taking it\"no recent issues\"    Motion sickness     \"on rides\"    Neck pain     occas    Nondisplaced fracture of proximal phalanx of left lesser toe(s), initial encounter for closed fracture " 06/02/2019    OA (osteoarthritis) of hip     Obesity     Oral herpes simplex infection     occas    Other insomnia 05/11/2020    Postmenopausal     Sciatica     Vertigo     Wears glasses     Wears glasses        Past Surgical History:   Procedure Laterality Date    BARIATRIC SURGERY  05/09/2004    COLPOSCOPY      Cervical Dysplasia    DENTAL SURGERY      one lower right    DENTAL SURGERY      Has dental implant; s/p tooth abscess    EGD      GASTRIC BYPASS  2004    titanium staples    GYNECOLOGIC CRYOSURGERY      Cervical Dysplasia    LUMBAR LAMINECTOMY  1980    L5-S1; No Hardware    MOHS SURGERY  07/26/2022    midline inferior forehead    DC ARTHRP ACETBLR/PROX FEM PROSTC AGRFT/ALGRFT Right 12/16/2020    Procedure: ARTHROPLASTY HIP TOTAL ANTERIOR;  Surgeon: Kaykay Boswell MD;  Location: [facility];  Service: Orthopedics    DC COLONOSCOPY FLX DX W/COLLJ SPEC WHEN PFRMD N/A 06/01/2016    Procedure: COLONOSCOPY;  Surgeon: Travis Snellen, MD;  Location: [facility];   Service: Colorectal    ROTATOR CUFF REPAIR Right 2010    SKIN CANCER EXCISION      s/p BCC Back and R Clavicle    WISDOM TOOTH EXTRACTION         Family History   Problem Relation Age of Onset    Diabetes Mother     Hypertension Mother     Heart failure Mother         s/p ICD    Diabetes type II Mother 54    Hypothyroidism Mother     Heart attack Mother     Hyperlipidemia Mother     Depression Mother     Heart disease Mother     Arthritis Mother     Cancer Father     Heart disease Father     Heart block Father         s/p ICD    Hypertension Father     Prostate cancer Father 79        c Mets    Hyperlipidemia Father     Diabetes Brother     Leukemia Brother 72        CLL    Hyperlipidemia Brother     Basal cell carcinoma Brother     Diabetes type II Brother 61    Osteoarthritis Brother         s/p MVA    No Known Problems Maternal Aunt     No Known Problems Paternal Aunt     Breast cancer Paternal Aunt 61    No Known Problems Maternal Grandmother     No Known Problems Maternal Grandfather     No Known Problems Paternal Grandmother     No Known Problems Paternal Grandfather     OCD Son     Anxiety disorder Son    Donna [place] Hypertension Son     Obesity Son     Breast cancer Cousin 61    Diabetes Family     Arthritis Family     Breast cancer Family      I have reviewed and agree with the history as documented  E-Cigarette/Vaping    E-Cigarette Use Never User      E-Cigarette/Vaping Substances    Nicotine No     THC No     CBD No     Flavoring No     Other No     Unknown No      Social History     Tobacco Use    Smoking status: Never    Smokeless tobacco: Never   Vaping Use    Vaping Use: Never used   Substance Use Topics    Alcohol use: Yes     Alcohol/week: 1 0 standard drink     Types: 1 Shots of liquor per week     Comment: rarely/holidays    Drug use: Not Currently     Types: Marijuana     Comment: Last marijuana use in college       Review of Systems   Constitutional: Negative for activity change, appetite change, chills and fever  HENT: Negative for congestion, postnasal drip, rhinorrhea, sinus pressure, sinus pain, sore throat and tinnitus  Eyes: Negative for photophobia and visual disturbance  Respiratory: Negative for cough, chest tightness and shortness of breath  Cardiovascular: Negative for chest pain and palpitations  Gastrointestinal: Positive for abdominal pain and nausea  Negative for constipation, diarrhea and vomiting  Genitourinary: Negative for difficulty urinating, dysuria, flank pain, frequency and urgency  Musculoskeletal: Negative for back pain, gait problem, neck pain and neck stiffness  Skin: Negative for pallor and rash  Allergic/Immunologic: Negative for environmental allergies and food allergies  Neurological: Negative for dizziness, weakness, numbness and headaches  Psychiatric/Behavioral: Negative for confusion     All other systems reviewed and are negative  Physical Exam  Physical Exam  Vitals and nursing note reviewed  Constitutional:       General: She is awake  Appearance: Normal appearance  She is well-developed  She is not ill-appearing, toxic-appearing or diaphoretic  Comments: BP (!) 175/84 (BP Location: Right arm)   Pulse 94   Temp 97 5 °F (36 4 °C) (Oral)   Resp 16   Wt 77 2 kg (170 lb 3 1 oz)   LMP 05/01/2005 (Within Months)   SpO2 100%   BMI 30 15 kg/m²      HENT:      Head: Normocephalic and atraumatic  Right Ear: Hearing and external ear normal  No decreased hearing noted  No drainage, swelling or tenderness  No mastoid tenderness  Left Ear: Hearing and external ear normal  No decreased hearing noted  No drainage, swelling or tenderness  No mastoid tenderness  Nose: Nose normal       Mouth/Throat:      Lips: Pink  Mouth: Mucous membranes are moist       Pharynx: Oropharynx is clear  Uvula midline  Eyes:      General: Lids are normal  Vision grossly intact  Right eye: No discharge  Left eye: No discharge  Extraocular Movements: Extraocular movements intact  Conjunctiva/sclera: Conjunctivae normal       Pupils: Pupils are equal, round, and reactive to light  Neck:      Vascular: No JVD  Trachea: Trachea and phonation normal  No tracheal tenderness or tracheal deviation  Cardiovascular:      Rate and Rhythm: Normal rate and regular rhythm  Pulses: Normal pulses  Radial pulses are 2+ on the right side and 2+ on the left side  Posterior tibial pulses are 2+ on the right side and 2+ on the left side  Heart sounds: Normal heart sounds  Pulmonary:      Effort: Pulmonary effort is normal       Breath sounds: Normal breath sounds  No stridor  No decreased breath sounds, wheezing, rhonchi or rales  Chest:      Chest wall: No tenderness  Abdominal:      General: Abdomen is flat  Bowel sounds are normal  There is no distension        Palpations: Abdomen is soft  Abdomen is not rigid  Tenderness: There is abdominal tenderness in the right upper quadrant and epigastric area  There is no right CVA tenderness, left CVA tenderness, guarding or rebound  Musculoskeletal:         General: Normal range of motion  Cervical back: Full passive range of motion without pain, normal range of motion and neck supple  No rigidity  No spinous process tenderness or muscular tenderness  Normal range of motion  Lymphadenopathy:      Head:      Right side of head: No submental, submandibular, tonsillar, preauricular, posterior auricular or occipital adenopathy  Left side of head: No submental, submandibular, tonsillar, preauricular, posterior auricular or occipital adenopathy  Cervical: No cervical adenopathy  Right cervical: No superficial, deep or posterior cervical adenopathy  Left cervical: No superficial, deep or posterior cervical adenopathy  Skin:     General: Skin is warm  Capillary Refill: Capillary refill takes less than 2 seconds  Neurological:      General: No focal deficit present  Mental Status: She is alert and oriented to person, place, and time  GCS: GCS eye subscore is 4  GCS verbal subscore is 5  GCS motor subscore is 6  Sensory: No sensory deficit  Deep Tendon Reflexes: Reflexes are normal and symmetric  Reflex Scores:       Patellar reflexes are 2+ on the right side and 2+ on the left side  Psychiatric:         Mood and Affect: Mood normal          Speech: Speech normal          Behavior: Behavior normal  Behavior is cooperative  Thought Content:  Thought content normal          Judgment: Judgment normal          Vital Signs  ED Triage Vitals [05/05/23 0628]   Temperature Pulse Respirations Blood Pressure SpO2   97 5 °F (36 4 °C) 94 16 (!) 175/84 100 %      Temp Source Heart Rate Source Patient Position - Orthostatic VS BP Location FiO2 (%)   Oral Monitor Sitting Right arm --      Pain Score       8           Vitals:    05/05/23 0628   BP: (!) 175/84   Pulse: 94   Patient Position - Orthostatic VS: Sitting         Visual Acuity      ED Medications  Medications   sodium chloride 0 9 % bolus 1,000 mL (0 mL Intravenous Stopped 5/5/23 0801)   ketorolac (TORADOL) injection 15 mg (15 mg Intravenous Given 5/5/23 0659)   ondansetron (ZOFRAN) injection 4 mg (4 mg Intravenous Given 5/5/23 0700)   Famotidine (PF) (PEPCID) injection 20 mg (20 mg Intravenous Given 5/5/23 0659)   morphine injection 2 mg (2 mg Intravenous Given 5/5/23 0659)   morphine injection 2 mg (2 mg Intravenous Given 5/5/23 0756)   ketorolac (TORADOL) injection 15 mg (15 mg Intravenous Given 5/5/23 0756)       Diagnostic Studies  Results Reviewed     Procedure Component Value Units Date/Time    HS Troponin I 4hr [[id number]]     Lab Status: No result Specimen: Blood     HS Troponin 0hr (reflex protocol) [[id number]]  (Normal) Collected: 05/05/23 0702    Lab Status: Final result Specimen: Blood from Arm, Right Updated: 05/05/23 0739     hs TnI 0hr 2 ng/L     HS Troponin I 2hr [[id number]]     Lab Status: No result Specimen: Blood     Lipase [[id number]]  (Abnormal) Collected: 05/05/23 0702    Lab Status: Final result Specimen: Blood from Arm, Right Updated: 05/05/23 0731     Lipase 202 u/L     Lactic acid, plasma (w/reflex if result > 2 0) [[id number]]  (Normal) Collected: 05/05/23 0702    Lab Status: Final result Specimen: Blood from Arm, Right Updated: 05/05/23 0731     LACTIC ACID 0 7 mmol/L     Narrative:      Result may be elevated if tourniquet was used during collection      Comprehensive metabolic panel [[id number]]  (Abnormal) Collected: 05/05/23 0702    Lab Status: Final result Specimen: Blood from Arm, Right Updated: 05/05/23 0731     Sodium 140 mmol/L      Potassium 3 7 mmol/L      Chloride 106 mmol/L      CO2 27 mmol/L      ANION GAP 7 mmol/L      BUN 16 mg/dL      Creatinine 0 78 mg/dL      Glucose 100 mg/dL      Calcium 10 2 mg/dL  U/L      ALT 81 U/L      Alkaline Phosphatase 96 U/L      Total Protein 7 1 g/dL      Albumin 4 2 g/dL      Total Bilirubin 0 75 mg/dL      eGFR 80 ml/min/1 73sq m     Narrative:      National Kid[employer] guidelines for Chronic Kidney Disease (CKD):     Stage 1 with normal or high GFR (GFR > 90 mL/min/1 73 square meters)    Stage 2 Mild CKD (GFR = 60-89 mL/min/1 73 square meters)    Stage 3A Moderate CKD (GFR = 45-59 mL/min/1 73 square meters)    Stage 3B Moderate CKD (GFR = 30-44 mL/min/1 73 square meters)    Stage 4 Severe CKD (GFR = 15-29 mL/min/1 73 square meters)    Stage 5 End Stage CKD (GFR <15 mL/min/1 73 square meters)  Note: GFR calculation is accurate only with a steady state creatinine    Protime-INR [[id number]]  (Normal) Collected: 05/05/23 0702    Lab Status: Final result Specimen: Blood from Arm, Right Updated: 05/05/23 0729     Protime 12 8 seconds      INR 0 94    APTT [[id number]]  (Normal) Collected: 05/05/23 0702    Lab Status: Final result Specimen: Blood from Arm, Right Updated: 05/05/23 0729     PTT 26 seconds     CBC and differential [[id number]] Collected: 05/05/23 0702    Lab Status: Final result Specimen: Blood from Arm, Right Updated: 05/05/23 0709     WBC 7 36 Thousand/uL      RBC 4 67 Million/uL      Hemoglobin 14 4 g/dL      Hematocrit 44 5 %      MCV 95 fL      MCH 30 8 pg      MCHC 32 4 g/dL      RDW 12 1 %      MPV 10 5 fL      Platelets 165 Thousands/uL      nRBC 0 /100 WBCs      Neutrophils Relative 75 %      Immat GRANS % 0 %      Lymphocytes Relative 17 %      Monocytes Relative 7 %      Eosinophils Relative 1 %      Basophils Relative 0 %      Neutrophils Absolute 5 46 Thousands/µL      Immature Grans Absolute 0 02 Thousand/uL      Lymphocytes Absolute 1 27 Thousands/µL      Monocytes Absolute 0 54 Thousand/µL      Eosinophils Absolute 0 04 Thousand/µL      Basophils Absolute 0 03 Thousands/µL     UA w Reflex to Microscopic w Reflex to Culture [[id number]]     Lab Status: No result Specimen: Urine, Clean Catch                  CT abdomen pelvis with contrast    (Results Pending)              Procedures  ECG 12 Lead Documentation Only    Date/Time: 5/5/2023 7:31 AM  Performed by: Monika Cannon PA-C  Authorized by: Monika Cannon PA-C     Indications / Diagnosis:  Upper abdominal pain, nausea  ECG reviewed by me, the ED Provider: yes    Patient location:  ED  Previous ECG:     Previous ECG:  Compared to current    Comparison ECG info: When compared with ECG of January 11, 2021 no significant changes are noted  Similarity:  No change    Comparison to cardiac monitor: Yes    Interpretation:     Interpretation: normal    Rate:     ECG rate:  68    ECG rate assessment: normal    Rhythm:     Rhythm: sinus rhythm    Ectopy:     Ectopy: none    QRS:     QRS axis:  Normal    QRS intervals:  Normal  Conduction:     Conduction: normal    ST segments:     ST segments:  Normal  T waves:     T waves: normal               ED Course  ED Course as of 05/05/23 0815   Fri May 05, 2023   0807 Sign out to [place], [place] with history and bedside signout, will follow-up with CT abdomen pelvis and likely ultrasound gallbladder                                             Medical Decision Making    Patient is a 28-year-old female with a history of very gastric surgery, gastric bypass approximately 2005, no significant past surgical history that presents to the emergency department with gradual onset dull aching for back pain that migrated to upper abdominal region for 1 day  Patient reports associated symptoms of nausea beginning with the current presentation of upper abdominal pain symptoms    Patient hemodynamically stable and afebrile  Upper abdominal tenderness  ECG with normal sinus rhythm, negative troponin  Elevated transaminases, elevated lipase  No leukocytosis, no banding  Normal kidney function  Normal electrolytes, no anion gap  Delivered Toradol, morphine, Zofran patient verbalized decrease in abdominal pain and nausea symptoms status post medication delivery  Abdominal/pelvic CT with contrast ordered  Signout to SHIVAM Pinedo with CAT scan abdomen pelvis and likely ultrasound gallbladder  Patient hemodynamically stable and afebrile at time of final signout  Patient with verbal understandable clinical laboratory findings at this time  Amount and/or Complexity of Data Reviewed  Labs: ordered  Radiology: ordered  Decision-making details documented in ED Course  ECG/medicine tests:  Decision-making details documented in ED Course  Risk  Prescription drug management  Disposition  Final diagnoses:   None     ED Disposition     None      Follow-up Information    None         Patient's Medications   Discharge Prescriptions    No medications on file       No discharge procedures on file      PDMP Review       Value Time User    PDMP Reviewed  Yes 5/1/2023  3:10 PM Yusuf Stanton MD          ED Provider  Electronically Signed by           Giorgi Small PA-C  05/05/23 4378

## 2023-05-05 NOTE — H&P
"Acute Care Surgery  History and Physical  Addy Buchanan 58 y o  female MRN: [id number]  Unit/Bed#: ED-05 Encounter: [id number]    Assessment and Plan:  59 yo F with abd pain c/f early acute cholecystitis  AVSS, Mild LFT elevation  Lipase 200  WBC 7    - OR for lap karen  - ancef/flagyl  - NPO/IVF  - dvt ppx  - prn pain, antiemetic regimen      History of Present Illness   History, ROS and PFSH unobtainable from any source due to none  HPI:  Addy Buchanan is a 58 y o  female PMH anxiety, HTN, HLD, hx of gastric bypass () who presents with abdominal pain  States at 5 am this morning had epigastric pain radiating across upper abdomen and into back  Pain intermittently improves but does not ever resolve  Admits to some nausea, denies vomiting, fevers, chills, cp, sob  No prior episode of this in the past     Review of Systems   Constitutional: Negative for chills and fever  Respiratory: Negative  Cardiovascular: Negative  Gastrointestinal: Positive for abdominal pain and nausea  Negative for vomiting  Genitourinary: Negative  Neurological: Negative  All other systems reviewed and are negative        Historical Information   Past Medical History:   Diagnosis Date    Anxiety disorder     Arthritis     osteoarthritis hip/poss knuckles\"    Basal cell carcinoma 2022    midline inferior forehead    Cancer ([address] )     Cervical disc herniation     C7//sees chiropracter and no recent problems    Exercise involving walking     [phone] steps per day    Full thickness rotator cuff tear 2020     1 para 1     Hearing loss     left ear    History of bariatric surgery     History of non anemic vitamin B12 deficiency     History of prediabetes     before bariatric surgery    Hyperlipidemia     not on meds    Hypertension     not on meds    Iron deficiency anemia     infusions determined by lab work    Irregular heart beat     \"history bigeminy from taking benadryl, tries to avoid " "taking it\"no recent issues\"    Motion sickness     \"on rides\"    Neck pain     occas    Nondisplaced fracture of proximal phalanx of left lesser toe(s), initial encounter for closed fracture 06/02/2019    OA (osteoarthritis) of hip     Obesity     Oral herpes simplex infection     occas    Other insomnia 05/11/2020    Postmenopausal     Sciatica     Vertigo     Wears glasses     Wears glasses      Past Surgical History:   Procedure Laterality Date    BARIATRIC SURGERY  05/09/2004    COLPOSCOPY      Cervical Dysplasia    DENTAL SURGERY      one lower right    DENTAL SURGERY      Has dental implant; s/p tooth abscess    EGD      GASTRIC BYPASS  2004    titanium staples    GYNECOLOGIC CRYOSURGERY      Cervical Dysplasia    LUMBAR LAMINECTOMY  1980    L5-S1; No Hardware    MOHS SURGERY  07/26/2022    midline inferior forehead    MO ARTHRP ACETBLR/PROX FEM PROSTC AGRFT/ALGRFT Right 12/16/2020    Procedure: ARTHROPLASTY HIP TOTAL ANTERIOR;  Surgeon: Rocky Solorzano MD;  [facility];  Service: Orthopedics    MO COLONOSCOPY FLX DX W/COLLJ SPEC WHEN PFRMD N/A 06/01/2016    Procedure: COLONOSCOPY;  Surgeon: Jessy Dodge MD;  [facility]: [facility];   Service: Colorectal    ROTATOR CUFF REPAIR Right 2010    SKIN CANCER EXCISION      s/p BCC Back and R Clavicle    WISDOM TOOTH EXTRACTION       Social History   Social History     Substance and Sexual Activity   Alcohol Use Yes    Alcohol/week: 1 0 standard drink    Types: 1 Shots of liquor per week    Comment: rarely/holidays     Social History     Substance and Sexual Activity   Drug Use Not Currently    Types: Marijuana    Comment: Last marijuana use in college     Social History     Tobacco Use   Smoking Status Never   Smokeless Tobacco Never     Family History:   Family History   Problem Relation Age of Onset    Diabetes Mother     Hypertension Mother     Heart failure Mother         s/p ICD    Diabetes type II Mother 54    " Hypothyroidism Mother     Heart attack Mother     Hyperlipidemia Mother     Depression Mother     Heart disease Mother     Arthritis Mother     Cancer Father     Heart disease Father     Heart block Father         s/p ICD    Hypertension Father     Prostate cancer Father 79        c Mets    Hyperlipidemia Father     Diabetes Brother     Leukemia Brother 72        CLL    Hyperlipidemia Brother     Basal cell carcinoma Brother     Diabetes type II Brother 61    Osteoarthritis Brother         s/p MVA    No Known Problems Maternal Aunt     No Known Problems Paternal Aunt     Breast cancer Paternal Aunt 61    No Known Problems Maternal Grandmother     No Known Problems Maternal Grandfather     No Known Problems Paternal Grandmother     No Known Problems Paternal Grandfather     OCD Son     Anxiety disorder Son     Hypertension Son     Obesity Son     Breast cancer Cousin 61    Diabetes Family     Arthritis Family     Breast cancer Family        Meds/Allergies   PTA meds:   Prior to Admission Medications   Prescriptions Last Dose Informant Patient Reported? Taking?    ALPRAZolam (XANAX) 0 25 mg tablet   No No   Sig: Take 1 tablet (0 25 mg total) by mouth daily at bedtime as needed for anxiety   Calcium Carbonate-Vitamin D (Calcium 600-D) 600-400 MG-UNIT per tablet   Yes No   Sig: Take 2 capsules by mouth 2 (two) times a day     Cholecalciferol (Vitamin D) 50 MCG (2000 UT) CAPS   Yes No   Sig: Take by mouth in the morning   Meclizine HCl (ANTIVERT PO)   Yes No   Sig: Take 1 tablet by mouth as needed Rx per prior PCP   Multiple Vitamins-Minerals (BARIATRIC FUSION PO)   Yes No   Sig: Take 1 tablet by mouth daily   Semaglutide-Weight Management [facility]) 2 4 MG/0 75ML   No No   Sig: Inject 0 75 mL (2 4 mg total) under the skin once a week   buPROPion (WELLBUTRIN XL) 300 mg 24 hr tablet   No No   Sig: Take 1 tablet (300 mg total) by mouth every morning   cyclobenzaprine (FLEXERIL) 10 mg tablet No No   Sig: Take 1 tablet (10 mg total) by mouth 3 (three) times a day as needed for muscle spasms      Facility-Administered Medications: None     Allergies   Allergen Reactions    Macrodantin [Nitrofurantoin]      myalgias    Sulfa Antibiotics Swelling     Tongue swelling    Dilaudid [Hydromorphone] Vomiting       Objective   First Vitals:   Blood Pressure: (!) 175/84 (05/05/23 0628)  Pulse: 94 (05/05/23 0628)  Temperature: 97 5 °F (36 4 °C) (05/05/23 0628)  Temp Source: Oral (05/05/23 9038)  Respirations: 16 (05/05/23 0628)  Weight - Scale: 77 2 kg (170 lb 3 1 oz) (05/05/23 0628)  SpO2: 100 % (05/05/23 0628)    Current Vitals:   Blood Pressure: 141/68 (05/05/23 1021)  Pulse: 72 (05/05/23 1021)  Temperature: 97 5 °F (36 4 °C) (05/05/23 0628)  Temp Source: Oral (05/05/23 1075)  Respirations: 16 (05/05/23 1021)  Weight - Scale: 77 2 kg (170 lb 3 1 oz) (05/05/23 0628)  SpO2: 99 % (05/05/23 1021)      Intake/Output Summary (Last 24 hours) at 5/5/2023 1111  Last data filed at 5/5/2023 0801  Gross per 24 hour   Intake 1000 ml   Output --   Net 1000 ml       Invasive Devices     Peripheral Intravenous Line  Duration           Peripheral IV 05/05/23 Proximal;Right;Ventral (anterior) Forearm <1 day                Physical Exam  Vitals and nursing note reviewed  Constitutional:       General: She is not in acute distress  Appearance: Normal appearance  She is normal weight  She is not ill-appearing, toxic-appearing or diaphoretic  HENT:      Head: Normocephalic and atraumatic  Eyes:      Extraocular Movements: Extraocular movements intact  Cardiovascular:      Rate and Rhythm: Normal rate  Pulmonary:      Effort: Pulmonary effort is normal  No respiratory distress  Abdominal:      General: Abdomen is flat  There is no distension  Palpations: Abdomen is soft  Tenderness: There is abdominal tenderness (RUQ, epigastrium)  There is no guarding or rebound     Musculoskeletal:         General: No swelling or tenderness  Skin:     General: Skin is warm and dry  Capillary Refill: Capillary refill takes less than 2 seconds  Neurological:      General: No focal deficit present  Mental Status: She is alert and oriented to person, place, and time  Psychiatric:         Mood and Affect: Mood normal          Behavior: Behavior normal          Lab Results:   I have personally reviewed pertinent lab results  , CBC:   Lab Results   Component Value Date    WBC 7 36 05/05/2023    HGB 14 4 05/05/2023    HCT 44 5 05/05/2023    MCV 95 05/05/2023     05/05/2023    MCH 30 8 05/05/2023    MCHC 32 4 05/05/2023    RDW 12 1 05/05/2023    MPV 10 5 05/05/2023    NRBC 0 05/05/2023   , CMP:   Lab Results   Component Value Date    SODIUM 140 05/05/2023    K 3 7 05/05/2023     05/05/2023    CO2 27 05/05/2023    BUN 16 05/05/2023    CREATININE 0 78 05/05/2023    CALCIUM 10 2 05/05/2023     (H) 05/05/2023    ALT 81 (H) 05/05/2023    ALKPHOS 96 05/05/2023    EGFR 80 05/05/2023   , Coagulation:   Lab Results   Component Value Date    INR 0 94 05/05/2023   , Urinalysis:   Lab Results   Component Value Date    COLORU Light Yellow 05/05/2023    CLARITYU Clear 05/05/2023    SPECGRAV 1 012 05/05/2023    PHUR 5 5 05/05/2023    LEUKOCYTESUR Negative 05/05/2023    NITRITE Negative 05/05/2023    GLUCOSEU Negative 05/05/2023    KETONESU Negative 05/05/2023    BILIRUBINUR Negative 05/05/2023    BLOODU Negative 05/05/2023     Imaging: I have personally reviewed pertinent reports  5/5 CTAP: IMPRESSION:  1  Cholelithiasis and distended gallbladder  If there is suspicion for cholecystitis, consider ultrasound  2  Enteroenteric small bowel intussusception  No abnormal distention of the proximal small bowel loops  Findings may be consistent with a transient intussusception  5/5: RUQ u/s: IMPRESSION:  Distended gallbladder  Sludge is present   While no abnormal wall thickening of the gallbladder was seen, a positive sonographic Steele's sign was elicited  Cannot exclude acute cholecystitis  EKG, Pathology, and Other Studies: I have personally reviewed pertinent reports  Code Status: Prior  Advance Directive and Living Will:      Power of :    POLST:      Counseling / Coordination of Care  Total floor / unit time spent today 30 minutes  This involved direct patient contact where I performed a full history and physical, reviewed previous records, and reviewed laboratory and other diagnostic studies  Greater than 50% of total time was spent with the patient and / or family counseling and / or coordination of care      Artur Joseph PA-C  5/5/2023

## 2023-05-05 NOTE — ANESTHESIA PREPROCEDURE EVALUATION
Procedure:  CHOLECYSTECTOMY LAPAROSCOPIC (Abdomen)    Relevant Problems   HEMATOLOGY   (+) Iron deficiency anemia      MUSCULOSKELETAL   (+) Lateral epicondylitis   (+) Lumbar spondylosis   (+) Primary osteoarthritis of one hip, right      NEURO/PSYCH   (+) Anxiety disorder        Physical Exam    Airway    Mallampati score: I  TM Distance: >3 FB  Neck ROM: full     Dental   No notable dental hx     Cardiovascular      Pulmonary      Other Findings        Anesthesia Plan  ASA Score- 2     Anesthesia Type- general with ASA Monitors  Additional Monitors:   Airway Plan: ETT  Plan Factors-Exercise tolerance (METS): >4 METS  Chart reviewed  Existing labs reviewed  Patient summary reviewed  Induction- intravenous  Postoperative Plan- Plan for postoperative opioid use  Informed Consent- Anesthetic plan and risks discussed with patient  I personally reviewed this patient with the CRNA  Discussed and agreed on the Anesthesia Plan with the CRNA  Larry Lorenz

## 2023-05-05 NOTE — OP NOTE
OPERATIVE REPORT  PATIENT NAME: Lawanda Prado    :  1960  MRN: [id number]  Pt Location: [facility]    SURGERY DATE: 2023    Surgeon(s) and Role:     * Breonna Menjivar DO - Primary     * Cheryle Belcher MD - Assisting    Preop Diagnosis:  Calculus of gallbladder with acute cholecystitis without obstruction [K80 00]    Post-Op Diagnosis Codes:     * Calculus of gallbladder with acute cholecystitis without obstruction [K80 00]    Procedure(s):  CHOLECYSTECTOMY LAPAROSCOPIC    Specimen(s):  ID Type Source Tests Collected by Time Destination   1 : gallbladder Tissue Gallbladder TISSUE EXAM Heber Lara DO 2023 1426        Estimated Blood Loss:   Minimal    Drains:  * No LDAs found *    Anesthesia Type:   General/local    Operative Indications:  Calculus of gallbladder with acute cholecystitis without obstruction [K80 00]      Operative Findings:  Distended inflamed gallbladder consistent with an acute process  Height 63 inches weight 77 kg / 170 pounds  BMI 30  ASA 2  Wound class II    Complications:   None    Procedure and Technique:  Patient was brought the operative suite and identified by visualization, conversation, by armband  Sequential compression pumps were placed  He was given ICG in the preop holding area  She was also given Ancef perioperatively  Once under general anesthesia abdomen was then prepped and draped in a sterile fashion  Timeout was performed was assured that the prep was dry  Local was instilled at the infraumbilical fold  Small vertical skin incision was made and the subcutaneous tissues were bluntly dissected with Nick clamps down the fascia fascia was lifted up and divided the midline  Poked the underlying peritoneum gaining access into the abdominal cavity  11 mm trocar was placed under direct visualization  CO2 was attached crating a pneumoperitoneum 3 other 5 mm bladeless trocars were placed in the right upper quadrant    She was placed in reverse Trendelenburg and tilted to the left  Gallbladder lifted cephalad  As we lifted it up there was a small puncture in the gallbladder spilling dark green bile which was suctioned out  This however did make it more manageable  There was plenty of edema around the neck of the gallbladder  This made dissection of the cystic duct artery quite straightforward  We these adequately dissected out the [employer] ICG camera was utilized to identify cystic duct and cystic artery structures  These were divided to ligaclips  Gallbladder was then taken off the liver using variable speed of the harmonic scalpel  There was excellent hemostasis  Gallbladder is placed into an Endo Catch bag  Copious irrigation was carried out  Pneumoperitoneum was evacuated  Gallbladder is brought to the umbilical port site  Other trocars removed  0 Vicryl was then used to close the fascia at the umbilical site in a figure-of-eight fashion  Local was instilled  4-0 Monocryl was used to close skin incisions in a subcuticular fashion  Wounds were washed and dried  Sterile skin glue was applied  She was awakened in the operating returned to the recovery area in stable condition having tolerated the procedure well  I was present for the entire procedure      Patient Disposition:  PACU         SIGNATURE: Author Renetta DO  DATE: May 5, 2023  TIME: 2:34 PM

## 2023-05-06 LAB
ATRIAL RATE: 68 BPM
P AXIS: 39 DEGREES
PR INTERVAL: 146 MS
QRS AXIS: 37 DEGREES
QRSD INTERVAL: 78 MS
QT INTERVAL: 406 MS
QTC INTERVAL: 431 MS
T WAVE AXIS: 36 DEGREES
VENTRICULAR RATE: 68 BPM

## 2023-05-18 ENCOUNTER — OFFICE VISIT (OUTPATIENT)
Dept: SURGERY | Facility: CLINIC | Age: 63
End: 2023-05-18

## 2023-05-18 DIAGNOSIS — K80.00 CALCULUS OF GALLBLADDER WITH ACUTE CHOLECYSTITIS: Primary | ICD-10-CM

## 2023-05-18 NOTE — PROGRESS NOTES
Assessment/Plan:    Diagnoses and all orders for this visit:    Calculus of gallbladder with acute cholecystitis    Status post laparoscopic cholecystectomy  Overall doing quite well  May resume typical diet and activity    Pathology: Reviewed with patient, all questions answered  Postoperative restrictions reviewed  All questions answered  ______________________________________________________  HPI: Patient is s/p lap cholecystectomy on 5/5/23  Final Diagnosis  A  Gallbladder (cholecystectomy):  - Cholelithiasis, chronic cholecystitis  - Negative for dysplasia  - One (1) lymph node negative for carcinoma (0/1)             ROS:  General ROS: negative for - chills, fatigue, fever or night sweats, weight loss  Respiratory ROS: no cough, shortness of breath, or wheezing  Cardiovascular ROS: no chest pain or dyspnea on exertion  Genito-Urinary ROS: no dysuria, trouble voiding, or hematuria  Musculoskeletal ROS: negative for - gait disturbance, joint pain or muscle pain  Neurological ROS: no TIA or stroke symptoms  GI ROS: see HPI  Skin ROS: no new rashes or lesions   Lymphatic ROS: no new adenopathy noted by pt     GYN ROS: see HPI, no new GYN history or bleeding noted  Psy ROS: no new mental or behavioral disturbances         Patient Active Problem List   Diagnosis   • Anxiety disorder   • Vitamin D deficiency   • History of gastric bypass   • Class 1 obesity   • Vertigo   • Postgastrectomy malabsorption   • Weight gain following gastric bypass surgery   • Cervical disc herniation   • Primary osteoarthritis of one hip, right   • Other insomnia   • Lumbar spondylosis   • Disorder of bursae of shoulder region   • Lateral epicondylitis   • History of total right hip replacement   • Iron deficiency anemia   • Postsurgical malabsorption   • Osteopenia determined by x-ray   • Calculus of gallbladder with acute cholecystitis       Allergies:  Macrodantin [nitrofurantoin], Sulfa antibiotics, and Dilaudid "[hydromorphone]      Current Outpatient Medications:   •  ALPRAZolam (XANAX) 0 25 mg tablet, Take 1 tablet (0 25 mg total) by mouth daily at bedtime as needed for anxiety, Disp: 30 tablet, Rfl: 0  •  buPROPion (WELLBUTRIN XL) 300 mg 24 hr tablet, Take 1 tablet (300 mg total) by mouth every morning, Disp: 30 tablet, Rfl: 2  •  Calcium Carbonate-Vitamin D (Calcium 600-D) 600-400 MG-UNIT per tablet, Take 2 capsules by mouth 2 (two) times a day  , Disp: , Rfl:   •  Cholecalciferol (Vitamin D) 50 MCG (2000 UT) CAPS, Take by mouth in the morning, Disp: , Rfl:   •  cyclobenzaprine (FLEXERIL) 10 mg tablet, Take 1 tablet (10 mg total) by mouth 3 (three) times a day as needed for muscle spasms, Disp: 30 tablet, Rfl: 0  •  Meclizine HCl (ANTIVERT PO), Take 1 tablet by mouth as needed Rx per prior PCP, Disp: , Rfl:   •  Multiple Vitamins-Minerals (BARIATRIC FUSION PO), Take 1 tablet by mouth daily, Disp: , Rfl:   •  Semaglutide-Weight Management (Wegovy) 2 4 MG/0 75ML, Inject 0 75 mL (2 4 mg total) under the skin once a week, Disp: 3 mL, Rfl: 2    Past Medical History:   Diagnosis Date   • Anxiety disorder    • Arthritis     osteoarthritis hip/poss knuckles\"   • Basal cell carcinoma 2022    midline inferior forehead   • Cancer (HCC)    • Cervical disc herniation     C7//sees chiropracter and no recent problems   • Exercise involving walking     [phone] steps per day   • Full thickness rotator cuff tear 2020   •  1 para 1    • Hearing loss     left ear   • History of bariatric surgery    • History of non anemic vitamin B12 deficiency    • History of prediabetes     before bariatric surgery   • Hyperlipidemia     not on meds   • Hypertension     not on meds   • Iron deficiency anemia     infusions determined by lab work   • Irregular heart beat     \"history bigeminy from taking benadryl, tries to avoid taking it\"no recent issues\"   • Motion sickness     \"on rides\"   • Neck pain     occas   • Nondisplaced " fracture of proximal phalanx of left lesser toe(s), initial encounter for closed fracture 06/02/2019   • OA (osteoarthritis) of hip    • Obesity    • Oral herpes simplex infection     occas   • Other insomnia 05/11/2020   • Postmenopausal    • Sciatica    • Vertigo    • Wears glasses    • Wears glasses        Past Surgical History:   Procedure Laterality Date   • BARIATRIC SURGERY  05/09/2004   • CHOLECYSTECTOMY LAPAROSCOPIC N/A 5/5/2023    Procedure: CHOLECYSTECTOMY LAPAROSCOPIC;  Surgeon: Gabe Lara DO;  [facility];  Service: General   • COLPOSCOPY      Cervical Dysplasia   • DENTAL SURGERY      one lower right   • DENTAL SURGERY      Has dental implant; s/p tooth abscess   • EGD     • GASTRIC BYPASS  2004    titanium staples   • GYNECOLOGIC CRYOSURGERY      Cervical Dysplasia   • LUMBAR LAMINECTOMY  1980    L5-S1; No Hardware   • MOHS SURGERY  07/26/2022    midline inferior forehead   • [place] ARTHRP ACETBLR/PROX FEM PROSTC AGRFT/ALGRFT Right 12/16/2020    Procedure: ARTHROPLASTY HIP TOTAL ANTERIOR;  Surgeon: Salvatore Briceno MD;  [facility];  Service: Orthopedics   • IL COLONOSCOPY FLX DX W/COLLJ SPEC WHEN PFRMD N/A 06/01/2016    Procedure: COLONOSCOPY;  Surgeon: Cynthia Guajardo MD;  Location[facility];   Service: Colorectal   • ROTATOR CUFF REPAIR Right 2010   • SKIN CANCER EXCISION      s/p BCC Back and R Clavicle   • WISDOM TOOTH EXTRACTION         Family History   Problem Relation Age of Onset   • Diabetes Mother    • Hypertension Mother    • Heart failure Mother         s/p ICD   • Diabetes type II Mother 54   • Hypothyroidism Mother    • Heart attack Mother    • Hyperlipidemia Mother    • Depression Mother    • Heart disease Mother    • Arthritis Mother    • Cancer Father    • Heart disease Father    • Heart block Father         s/p ICD   • Hypertension Father    • Prostate cancer Father 79        c Mets   • Hyperlipidemia Father    • Diabetes Brother    • Leukemia Brother 72 CLL   • Hyperlipidemia Brother    • Basal cell carcinoma Brother    • Diabetes type II Brother 61   • Osteoarthritis Brother         s/p MVA   • No Known Problems Maternal Aunt    • No Known Problems Paternal Aunt    • Breast cancer Paternal Aunt 61   • No Known Problems Maternal Grandmother    • No Known Problems Maternal Grandfather    • No Known Problems Paternal Grandmother    • No Known Problems Paternal Grandfather    • OCD Son    • Anxiety disorder Son    • Hypertension Son    • Obesity Son    • Breast cancer Cousin 61   • Diabetes Family    • Arthritis Family    • Breast cancer Family         reports that she has never smoked  She has never used smokeless tobacco  She reports current alcohol use of about 1 0 standard drink per week  She reports that she does not currently use drugs after having used the following drugs: Marijuana      PHYSICAL EXAM    LMP 05/01/2005 (Within Months)     General: normal, cooperative, no distress  Abdominal: soft, nondistended or nontender  Incision: clean, dry, and intact and healing well      Ana Maria Maher DO    Date: 5/18/2023 Time: 1:55 PM

## 2023-05-30 DIAGNOSIS — F41.1 GENERALIZED ANXIETY DISORDER: ICD-10-CM

## 2023-05-30 DIAGNOSIS — G89.29 CHRONIC RIGHT-SIDED LOW BACK PAIN WITHOUT SCIATICA: ICD-10-CM

## 2023-05-30 DIAGNOSIS — M54.50 CHRONIC RIGHT-SIDED LOW BACK PAIN WITHOUT SCIATICA: ICD-10-CM

## 2023-05-30 RX ORDER — CYCLOBENZAPRINE HCL 10 MG
10 TABLET ORAL 3 TIMES DAILY PRN
Qty: 30 TABLET | Refills: 0 | Status: SHIPPED | OUTPATIENT
Start: 2023-05-30

## 2023-05-30 RX ORDER — ALPRAZOLAM 0.25 MG/1
0.25 TABLET ORAL
Qty: 30 TABLET | Refills: 0 | Status: SHIPPED | OUTPATIENT
Start: 2023-05-30

## 2023-05-30 NOTE — TELEPHONE ENCOUNTER
Medication:  PDMP 05/01/2023 05/01/2023 ALPRAZolam (Tablet) 30 0 30 0 25 MG ABRAHAM JACOBS  Active agreement on file -No

## 2023-06-01 ENCOUNTER — TELEPHONE (OUTPATIENT)
Dept: HEMATOLOGY ONCOLOGY | Facility: CLINIC | Age: 63
End: 2023-06-01

## 2023-06-03 ENCOUNTER — APPOINTMENT (OUTPATIENT)
Dept: LAB | Facility: CLINIC | Age: 63
End: 2023-06-03
Payer: COMMERCIAL

## 2023-06-03 DIAGNOSIS — K91.2 POSTSURGICAL MALABSORPTION: ICD-10-CM

## 2023-06-03 DIAGNOSIS — R53.83 OTHER FATIGUE: ICD-10-CM

## 2023-06-03 DIAGNOSIS — Z98.84 HISTORY OF GASTRIC BYPASS: ICD-10-CM

## 2023-06-03 LAB
ALBUMIN SERPL BCP-MCNC: 4 G/DL (ref 3.5–5)
ALP SERPL-CCNC: 90 U/L (ref 34–104)
ALT SERPL W P-5'-P-CCNC: 14 U/L (ref 7–52)
ANION GAP SERPL CALCULATED.3IONS-SCNC: 6 MMOL/L (ref 4–13)
AST SERPL W P-5'-P-CCNC: 16 U/L (ref 13–39)
BILIRUB SERPL-MCNC: 0.5 MG/DL (ref 0.2–1)
BUN SERPL-MCNC: 14 MG/DL (ref 5–25)
CALCIUM SERPL-MCNC: 9.6 MG/DL (ref 8.4–10.2)
CHLORIDE SERPL-SCNC: 106 MMOL/L (ref 96–108)
CO2 SERPL-SCNC: 29 MMOL/L (ref 21–32)
CREAT SERPL-MCNC: 0.8 MG/DL (ref 0.6–1.3)
ERYTHROCYTE [DISTWIDTH] IN BLOOD BY AUTOMATED COUNT: 12.7 % (ref 11.6–15.1)
FERRITIN SERPL-MCNC: 252 NG/ML (ref 11–307)
GFR SERPL CREATININE-BSD FRML MDRD: 78 ML/MIN/1.73SQ M
GLUCOSE P FAST SERPL-MCNC: 73 MG/DL (ref 65–99)
HCT VFR BLD AUTO: 40.6 % (ref 34.8–46.1)
HGB BLD-MCNC: 12.8 G/DL (ref 11.5–15.4)
HGB RETIC QN AUTO: 34.4 PG (ref 30–38.3)
IMM RETICS NFR: 13.2 % (ref 0–14)
IRON SATN MFR SERPL: 34 % (ref 15–50)
IRON SERPL-MCNC: 92 UG/DL (ref 50–170)
MCH RBC QN AUTO: 30.5 PG (ref 26.8–34.3)
MCHC RBC AUTO-ENTMCNC: 31.5 G/DL (ref 31.4–37.4)
MCV RBC AUTO: 97 FL (ref 82–98)
PLATELET # BLD AUTO: 205 THOUSANDS/UL (ref 149–390)
PMV BLD AUTO: 10.8 FL (ref 8.9–12.7)
POTASSIUM SERPL-SCNC: 3.8 MMOL/L (ref 3.5–5.3)
PROT SERPL-MCNC: 6.5 G/DL (ref 6.4–8.4)
RBC # BLD AUTO: 4.19 MILLION/UL (ref 3.81–5.12)
RETICS # AUTO: NORMAL 10*3/UL (ref 14097–95744)
RETICS # CALC: 1.67 % (ref 0.37–1.87)
SODIUM SERPL-SCNC: 141 MMOL/L (ref 135–147)
TIBC SERPL-MCNC: 271 UG/DL (ref 250–450)
TSH SERPL DL<=0.05 MIU/L-ACNC: 0.86 UIU/ML (ref 0.45–4.5)
WBC # BLD AUTO: 4.98 THOUSAND/UL (ref 4.31–10.16)

## 2023-06-03 PROCEDURE — 36415 COLL VENOUS BLD VENIPUNCTURE: CPT

## 2023-06-03 PROCEDURE — 83550 IRON BINDING TEST: CPT

## 2023-06-03 PROCEDURE — 83540 ASSAY OF IRON: CPT

## 2023-06-03 PROCEDURE — 85046 RETICYTE/HGB CONCENTRATE: CPT

## 2023-06-03 PROCEDURE — 84443 ASSAY THYROID STIM HORMONE: CPT

## 2023-06-03 PROCEDURE — 85027 COMPLETE CBC AUTOMATED: CPT

## 2023-06-03 PROCEDURE — 82728 ASSAY OF FERRITIN: CPT

## 2023-06-03 PROCEDURE — 83520 IMMUNOASSAY QUANT NOS NONAB: CPT

## 2023-06-05 LAB — STFR SERPL-SCNC: 13.8 NMOL/L (ref 12.2–27.3)

## 2023-06-08 ENCOUNTER — OFFICE VISIT (OUTPATIENT)
Dept: HEMATOLOGY ONCOLOGY | Facility: CLINIC | Age: 63
End: 2023-06-08
Payer: COMMERCIAL

## 2023-06-08 VITALS
TEMPERATURE: 98.1 F | WEIGHT: 170 LBS | HEIGHT: 63 IN | DIASTOLIC BLOOD PRESSURE: 60 MMHG | SYSTOLIC BLOOD PRESSURE: 114 MMHG | OXYGEN SATURATION: 98 % | HEART RATE: 77 BPM | RESPIRATION RATE: 18 BRPM | BODY MASS INDEX: 30.12 KG/M2

## 2023-06-08 DIAGNOSIS — K91.2 POSTSURGICAL MALABSORPTION: Primary | ICD-10-CM

## 2023-06-08 DIAGNOSIS — Z98.84 HISTORY OF GASTRIC BYPASS: ICD-10-CM

## 2023-06-08 PROCEDURE — 99214 OFFICE O/P EST MOD 30 MIN: CPT | Performed by: PHYSICIAN ASSISTANT

## 2023-06-08 NOTE — PROGRESS NOTES
[address] [id number]  Hematology Ambulatory Follow-Up  Sherie Keen, 1960, [id number]  6/8/2023      Assessment and Plan   1  Postsurgical malabsorption  2  History of gastric bypass  This is a 76-year-old female with history of Graciela-en-Y gastric bypass in 2004, who has had iron deficiency in the past   Patient has been under observation with hematology every 6 months  Patient continues to follow-up with bariatric clinic once a year in July  Iron studies are stable  With the patient taking multivitamin regularly, follow-up with hematology may not need to be as frequent  We discussed moving to 1 year follow-ups  This will be discussed again on follow-up in March 2023  Patient will continue to go for CBC, retic count and iron studies     - Iron Panel (Includes Ferritin, Iron Sat%, Iron, and TIBC); Future  - CBC and differential; Future  - Reticulocytes; Future      The patient is scheduled for follow-up in approximately 9 months  Patient voiced agreement and understanding to the above  Patient advised to call the Hematology/Oncology office with any questions and concerns regarding the above  Barrier(s) to care: None  The patient is able to self care  Chani Sahu PA-C  Medical Oncology/Hematology  [address]    Subjective     Chief Complaint   Patient presents with   • Follow-up       History of present illness: This is a 76-year-old female with past medical history of bariatric bypass in 2004  Patient was initially referred to hematology in 2021 secondary to iron deficiency anemia      Trends:  5/17/2021 ferritin = 6, iron saturation 10%  IV iron treatments tolerated without significant toxicity-Venofer 300 mg x 5 doses  9/1/2021 ferritin = 166, iron saturation 22%, white blood cell count 4 96, hemoglobin 13 5  1/7/2022 hemoglobin = 13 7, MCV 99, B12 934, ferritin "181  2022 ferritin = 321, iron saturation 24%, white blood cell count = 6 36, hemoglobin 13 5, MCV 96, platelet count 345  8468 ferritin = 252, iron saturation 34%, WBC = 4 9, hemoglobin 12 8, platelet count 746, MCV 97    Last colonoscopy:2022  Last Pap smear:   Last mammogram: 2023  Last Dexa: May 2022, new osteopenia  Interval history: No major changes  Patient never really felt symptoms of iron deficiency in the past     Review of Systems   Constitutional: Positive for fatigue  All other systems reviewed and are negative        Patient Active Problem List   Diagnosis   • Anxiety disorder   • Vitamin D deficiency   • History of gastric bypass   • Class 1 obesity   • Vertigo   • Postgastrectomy malabsorption   • Weight gain following gastric bypass surgery   • Cervical disc herniation   • Primary osteoarthritis of one hip, right   • Other insomnia   • Lumbar spondylosis   • Disorder of bursae of shoulder region   • Lateral epicondylitis   • History of total right hip replacement   • Iron deficiency anemia   • Postsurgical malabsorption   • Osteopenia determined by x-ray   • Calculus of gallbladder with acute cholecystitis     Past Medical History:   Diagnosis Date   • Anxiety disorder    • Arthritis     osteoarthritis hip/poss knuckles\"   • Basal cell carcinoma 2022    midline inferior forehead   • Cancer (HCC)    • Cervical disc herniation     C7//sees chiropracter and no recent problems   • Exercise involving walking     [phone] steps per day   • Full thickness rotator cuff tear 2020   •  1 para 1    • Hearing loss     left ear   • History of bariatric surgery    • History of non anemic vitamin B12 deficiency    • History of prediabetes     before bariatric surgery   • Hyperlipidemia     not on meds   • Hypertension     not on meds   • Iron deficiency anemia     infusions determined by lab work   • Irregular heart beat     \"history bigeminy from taking " "benadryl, tries to avoid taking it\"no recent issues\"   • Motion sickness     \"on rides\"   • Neck pain     occas   • Nondisplaced fracture of proximal phalanx of left lesser toe(s), initial encounter for closed fracture 06/02/2019   • OA (osteoarthritis) of hip    • Obesity    • Oral herpes simplex infection     occas   • Other insomnia 05/11/2020   • Postmenopausal    • Sciatica    • Vertigo    • Wears glasses    • Wears glasses      Past Surgical History:   Procedure Laterality Date   • BARIATRIC SURGERY  05/09/2004   • CHOLECYSTECTOMY LAPAROSCOPIC N/A 5/5/2023    Procedure: CHOLECYSTECTOMY LAPAROSCOPIC;  Surgeon: Bety Lara DO;  Location: [facility];  Service: General   • COLPOSCOPY      Cervical Dysplasia   • DENTAL SURGERY      one lower right   • DENTAL SURGERY      Has dental implant; s/p tooth abscess   • EGD     • GASTRIC BYPASS  2004    titanium staples   • GYNECOLOGIC CRYOSURGERY      Cervical Dysplasia   • LUMBAR LAMINECTOMY  1980    L5-S1; No Hardware   • MOHS SURGERY  07/26/2022    midline inferior forehead   • AZ ARTHRP ACETBLR/PROX FEM PROSTC AGRFT/ALGRFT Right 12/16/2020    Procedure: ARTHROPLASTY HIP TOTAL ANTERIOR;  Surgeon: Lynnette Lundborg, MD;  Location: [facility];  Service: Orthopedics   • AZ COLONOSCOPY FLX DX W/COLLJ SPEC WHEN PFRMD N/A 06/01/2016    Procedure: COLONOSCOPY;  Surgeon: Dewey Todd MD;  Location: [facility];   Service: Colorectal   • ROTATOR CUFF REPAIR Right 2010   • SKIN CANCER EXCISION      s/p BCC Back and R Clavicle   • WISDOM TOOTH EXTRACTION       Family History   Problem Relation Age of Onset   • Diabetes Mother    • Hypertension Mother    • Heart failure Mother         s/p ICD   • Diabetes type II Mother 54   • Hypothyroidism Mother    • Heart attack Mother    • Hyperlipidemia Mother    • Depression Mother    • Heart disease Mother    • Arthritis Mother    • Cancer Father    • Heart disease Father    • Heart block Father         s/p ICD   • Hypertension " Father    • Prostate cancer Father 79        c Mets   • Hyperlipidemia Father    • Diabetes Brother    • Leukemia Brother 72        CLL   • Hyperlipidemia Brother    • Basal cell carcinoma Brother    • Diabetes type II Brother 61   • Osteoarthritis Brother         s/p MVA   • No Known Problems Maternal Aunt    • No Known Problems Paternal Aunt    • Breast cancer Paternal Aunt 61   • No Known Problems Maternal Grandmother    • No Known Problems Maternal Grandfather    • No Known Problems Paternal Grandmother    • No Known Problems Paternal Grandfather    • OCD Son    • Anxiety disorder Son    • Hypertension Son    • Obesity Son    • Breast cancer Cousin 61   • Diabetes Family    • Arthritis Family    • Breast cancer Family      Social History     Socioeconomic History   • Marital status:      Spouse name: None   • Number of children: None   • Years of education: None   • Highest education level: None   Occupational History   • None   Tobacco Use   • Smoking status: Never     Passive exposure: Never   • Smokeless tobacco: Never   Vaping Use   • Vaping Use: Never used   Substance and Sexual Activity   • Alcohol use:  Yes     Alcohol/week: 1 0 standard drink of alcohol     Types: 1 Shots of liquor per week     Comment: rarely/holidays   • Drug use: Not Currently     Types: Marijuana     Comment: Last marijuana use in college   • Sexual activity: Not Currently     Partners: Male     Birth control/protection: Post-menopausal   Other Topics Concern   • None   Social History Narrative        Lives with  who has ESRD, Son lives in [place] and stays at home every other weekend    Has 1 Child - 1 Son    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[employer]     Financial Resource Strain: Not on file   Food Insecurity: Not on file   Transportation Needs: Not on file   Physical Activity: Not on file   Stress: Not on file   Social Connections: Not on file   Intimate Partner Violence: Not on "file   Housing Stability: Not on file       Current Outpatient Medications:   •  ALPRAZolam (XANAX) 0 25 mg tablet, Take 1 tablet (0 25 mg total) by mouth daily at bedtime as needed for anxiety, Disp: 30 tablet, Rfl: 0  •  buPROPion (WELLBUTRIN XL) 300 mg 24 hr tablet, Take 1 tablet (300 mg total) by mouth every morning, Disp: 30 tablet, Rfl: 2  •  Calcium Carbonate-Vitamin D (Calcium 600-D) 600-400 MG-UNIT per tablet, Take 2 capsules by mouth 2 (two) times a day  , Disp: , Rfl:   •  Cholecalciferol (Vitamin D) 50 MCG (2000 UT) CAPS, Take by mouth in the morning, Disp: , Rfl:   •  cyclobenzaprine (FLEXERIL) 10 mg tablet, Take 1 tablet (10 mg total) by mouth 3 (three) times a day as needed for muscle spasms, Disp: 30 tablet, Rfl: 0  •  Meclizine HCl (ANTIVERT PO), Take 25 mg by mouth as needed Rx per prior PCP, Disp: , Rfl:   •  Multiple Vitamins-Minerals (BARIATRIC FUSION PO), Take 1 tablet by mouth daily, Disp: , Rfl:   •  Semaglutide-Weight Management (Wegovy) 2 4 MG/0 75ML, Inject 0 75 mL (2 4 mg total) under the skin once a week, Disp: 3 mL, Rfl: 2  Allergies   Allergen Reactions   • Macrodantin [Nitrofurantoin]      myalgias   • Sulfa Antibiotics Swelling     Tongue swelling   • Dilaudid [Hydromorphone] Vomiting       Objective   /60 (BP Location: Left arm, Patient Position: Sitting, Cuff Size: Adult)   Pulse 77   Temp 98 1 °F (36 7 °C) (Temporal)   Resp 18   Ht 5' 3\" (1 6 m)   Wt 77 1 kg (170 lb)   LMP 05/01/2005 (Within Months)   SpO2 98%   BMI 30 11 kg/m²    Physical Exam  Constitutional:       General: She is not in acute distress  Appearance: She is well-developed  HENT:      Head: Normocephalic and atraumatic  Mouth/Throat:      Pharynx: No oropharyngeal exudate  Eyes:      General: No scleral icterus  Pupils: Pupils are equal, round, and reactive to light  Cardiovascular:      Rate and Rhythm: Normal rate and regular rhythm  Heart sounds: No murmur heard    Pulmonary: " Effort: Pulmonary effort is normal  No respiratory distress  Breath sounds: Normal breath sounds  Abdominal:      General: Bowel sounds are normal  There is no distension  Palpations: Abdomen is soft  Tenderness: There is no abdominal tenderness  Musculoskeletal:         General: Normal range of motion  Cervical back: Normal range of motion  Lymphadenopathy:      Cervical: No cervical adenopathy  Skin:     General: Skin is warm  Coloration: Skin is not pale  Findings: No rash  Neurological:      Mental Status: She is alert and oriented to person, place, and time  Cranial Nerves: No cranial nerve deficit  Psychiatric:         Thought Content:  Thought content normal          Result Review  Labs:  Appointment on 06/03/2023   Component Date Value Ref Range Status   • WBC 06/03/2023 4 98  4 31 - 10 16 Thousand/uL Final   • RBC 06/03/2023 4 19  3 81 - 5 12 Million/uL Final   • Hemoglobin 06/03/2023 12 8  11 5 - 15 4 g/dL Final   • Hematocrit 06/03/2023 40 6  34 8 - 46 1 % Final   • MCV 06/03/2023 97  82 - 98 fL Final   • MCH 06/03/2023 30 5  26 8 - 34 3 pg Final   • MCHC 06/03/2023 31 5  31 4 - 37 4 g/dL Final   • RDW 06/03/2023 12 7  11 6 - 15 1 % Final   • Platelets 13/12/5793 205  149 - 390 Thousands/uL Final   • MPV 06/03/2023 10 8  8 9 - 12 7 fL Final   • Immature Retic Fract 06/03/2023 13 2  0 0 - 14 0 % Final   • Retic Ct Pct 06/03/2023 1 67  0 37 - 1 87 % Final   • Retic Ct Abs 06/03/2023 70,000  14,097 - 95,744 Final   • RETIC HGB 06/03/2023 34 4  30 0 - 38 3 pg Final   • Soluble Transferrin Receptor 06/03/2023 13 8  12 2 - 27 3 nmol/L Final   • TSH 3RD GENERATON 06/03/2023 0 858  0 450 - 4 500 uIU/mL Final    The recommended reference ranges for TSH during pregnancy are as follows:   First trimester 0 1 to 2 5 uIU/mL   Second trimester  0 2 to 3 0 uIU/mL   Third trimester 0 3 to 3 0 uIU/m    Note: Normal ranges may not apply to patients who are transgender, non-binary, or whose legal sex, sex at birth, and gender identity differ  Adult TSH (3rd generation) reference range follows the recommended guidelines of the American Thy[employer] Association, January, 2020  • Iron Saturation 06/03/2023 34  15 - 50 % Final   • TIBC 06/03/2023 271  250 - 450 ug/dL Final   • Iron 06/03/2023 92  50 - 170 ug/dL Final    Patients treated with metal-binding drugs (ie  Deferoxamine) may have depressed iron values  • Ferritin 06/03/2023 252  11 - 307 ng/mL Final       Please note: This report has been generated by a voice recognition software system  Therefore there may be syntax, spelling, and/or grammatical errors  Please call if you have any questions

## 2023-06-09 ENCOUNTER — APPOINTMENT (OUTPATIENT)
Dept: LAB | Facility: HOSPITAL | Age: 63
End: 2023-06-09

## 2023-06-09 DIAGNOSIS — Z00.8 HEALTH EXAMINATION IN POPULATION SURVEYS: ICD-10-CM

## 2023-06-09 LAB
CHOLEST SERPL-MCNC: 199 MG/DL
EST. AVERAGE GLUCOSE BLD GHB EST-MCNC: 88 MG/DL
HBA1C MFR BLD: 4.7 %
HDLC SERPL-MCNC: 92 MG/DL
LDLC SERPL CALC-MCNC: 93 MG/DL (ref 0–100)
NONHDLC SERPL-MCNC: 107 MG/DL
TRIGL SERPL-MCNC: 72 MG/DL

## 2023-06-09 PROCEDURE — 36415 COLL VENOUS BLD VENIPUNCTURE: CPT

## 2023-06-09 PROCEDURE — 83036 HEMOGLOBIN GLYCOSYLATED A1C: CPT

## 2023-06-09 PROCEDURE — 80061 LIPID PANEL: CPT

## 2023-06-13 ENCOUNTER — OFFICE VISIT (OUTPATIENT)
Dept: FAMILY MEDICINE CLINIC | Facility: CLINIC | Age: 63
End: 2023-06-13
Payer: COMMERCIAL

## 2023-06-13 VITALS
SYSTOLIC BLOOD PRESSURE: 138 MMHG | DIASTOLIC BLOOD PRESSURE: 82 MMHG | WEIGHT: 166.4 LBS | BODY MASS INDEX: 29.48 KG/M2 | HEART RATE: 69 BPM | TEMPERATURE: 98.4 F | OXYGEN SATURATION: 98 % | HEIGHT: 63 IN | RESPIRATION RATE: 16 BRPM

## 2023-06-13 DIAGNOSIS — F41.1 GENERALIZED ANXIETY DISORDER: ICD-10-CM

## 2023-06-13 DIAGNOSIS — D50.8 OTHER IRON DEFICIENCY ANEMIA: ICD-10-CM

## 2023-06-13 DIAGNOSIS — K91.2 POSTGASTRECTOMY MALABSORPTION: ICD-10-CM

## 2023-06-13 DIAGNOSIS — K80.00 CALCULUS OF GALLBLADDER WITH ACUTE CHOLECYSTITIS WITHOUT OBSTRUCTION: ICD-10-CM

## 2023-06-13 DIAGNOSIS — E66.9 CLASS 1 OBESITY: Primary | ICD-10-CM

## 2023-06-13 DIAGNOSIS — Z90.3 POSTGASTRECTOMY MALABSORPTION: ICD-10-CM

## 2023-06-13 PROCEDURE — 99214 OFFICE O/P EST MOD 30 MIN: CPT | Performed by: FAMILY MEDICINE

## 2023-06-13 RX ORDER — ONDANSETRON 4 MG/1
4 TABLET, ORALLY DISINTEGRATING ORAL EVERY 6 HOURS PRN
Qty: 20 TABLET | Refills: 0 | Status: SHIPPED | OUTPATIENT
Start: 2023-06-13

## 2023-06-13 RX ORDER — ALPRAZOLAM 0.5 MG/1
0.5 TABLET ORAL
Qty: 30 TABLET | Refills: 0 | Status: SHIPPED | OUTPATIENT
Start: 2023-06-13

## 2023-06-13 NOTE — PROGRESS NOTES
Name: Carolynn Rodriges      : 1960      MRN: [id number]  Encounter Provider: Andreia Richard MD  Encounter Date: 2023   Encounter department: [place] [address]  Class 1 obesity  Assessment & Plan:  S/p RYGP  Stable on Wegovy      2  Calculus of gallbladder with acute cholecystitis without obstruction  Assessment & Plan:  S/p surgery 2023      Orders:  -     ondansetron (Zofran ODT) 4 mg disintegrating tablet; Take 1 tablet (4 mg total) by mouth every 6 (six) hours as needed for nausea or vomiting    3  Postgastrectomy malabsorption  Assessment & Plan:  Continue Multi-vitamin      4  Generalized anxiety disorder  Comments:  Xanax PRN  Assessment & Plan:  Doing well on wellbutrin     Orders:  -     ALPRAZolam (XANAX) 0 5 mg tablet; Take 1 tablet (0 5 mg total) by mouth daily at bedtime as needed for anxiety    5  Other iron deficiency anemia  Assessment & Plan:  Due for labs             Subjective      HPI     Here for follow up  Feels well overall  Taking xanax 0 25 mg 1 1/2 tab at bedtime which is helping her sleep  No side effects from the medication       Review of Systems   Constitutional: Negative  HENT: Negative  Eyes: Negative  Respiratory: Negative  Cardiovascular: Negative  Genitourinary: Negative  Musculoskeletal: Negative  Hematological: Negative  Psychiatric/Behavioral: Negative          Current Outpatient Medications on File Prior to Visit   Medication Sig   • buPROPion (WELLBUTRIN XL) 300 mg 24 hr tablet Take 1 tablet (300 mg total) by mouth every morning   • Calcium Carbonate-Vitamin D (Calcium 600-D) 600-400 MG-UNIT per tablet Take 2 capsules by mouth 2 (two) times a day     • Cholecalciferol (Vitamin D) 50 MCG (2000 UT) CAPS Take by mouth in the morning   • cyclobenzaprine (FLEXERIL) 10 mg tablet Take 1 tablet (10 mg total) by mouth 3 (three) times a day as needed for muscle spasms   • Meclizine HCl (ANTIVERT PO) Take 25 mg by "mouth as needed Rx per prior PCP   • Multiple Vitamins-Minerals (BARIATRIC FUSION PO) Take 1 tablet by mouth daily   • Semaglutide-Weight Management [facility]) 2 4 MG/0 75ML Inject 0 75 mL (2 4 mg total) under the skin once a week   • [DISCONTINUED] ALPRAZolam (XANAX) 0 25 mg tablet Take 1 tablet (0 25 mg total) by mouth daily at bedtime as needed for anxiety       Objective     /82 (BP Location: Left arm, Patient Position: Sitting, Cuff Size: Standard)   Pulse 69   Temp 98 4 °F (36 9 °C) (Tympanic)   Resp 16   Ht 5' 3\" (1 6 m)   Wt 75 5 kg (166 lb 6 4 oz)   LMP 05/01/2005 (Within Months)   SpO2 98%   BMI 29 48 kg/m²     Physical Exam  Vitals reviewed  Constitutional:       Appearance: Normal appearance  HENT:      Right Ear: Tympanic membrane normal       Left Ear: Tympanic membrane normal       Nose: Nose normal       Mouth/Throat:      Mouth: Mucous membranes are moist    Cardiovascular:      Rate and Rhythm: Normal rate and regular rhythm  Pulses: Normal pulses  Heart sounds: Normal heart sounds  Pulmonary:      Effort: Pulmonary effort is normal       Breath sounds: Normal breath sounds  Musculoskeletal:         General: Normal range of motion  Neurological:      General: No focal deficit present  Mental Status: She is alert and oriented to person, place, and time         Shelbi Fernandez MD  "

## 2023-07-07 DIAGNOSIS — F41.1 GENERALIZED ANXIETY DISORDER: ICD-10-CM

## 2023-07-07 RX ORDER — ALPRAZOLAM 0.5 MG/1
0.5 TABLET ORAL
Qty: 30 TABLET | Refills: 0 | Status: SHIPPED | OUTPATIENT
Start: 2023-07-07

## 2023-07-07 NOTE — TELEPHONE ENCOUNTER
Medication:  PDMP 06/13/2023 06/13/2023 ALPRAZolam (Tablet) 30.0 30 0.5 MG NA MOHSEN JACOBS  Active agreement on file -No

## 2023-07-24 ENCOUNTER — TELEMEDICINE (OUTPATIENT)
Dept: BARIATRICS | Facility: CLINIC | Age: 63
End: 2023-07-24
Payer: COMMERCIAL

## 2023-07-24 ENCOUNTER — CLINICAL SUPPORT (OUTPATIENT)
Dept: BARIATRICS | Facility: CLINIC | Age: 63
End: 2023-07-24

## 2023-07-24 VITALS
HEIGHT: 63 IN | HEART RATE: 74 BPM | DIASTOLIC BLOOD PRESSURE: 70 MMHG | WEIGHT: 167.8 LBS | RESPIRATION RATE: 16 BRPM | BODY MASS INDEX: 29.73 KG/M2 | SYSTOLIC BLOOD PRESSURE: 110 MMHG

## 2023-07-24 DIAGNOSIS — Z98.84 S/P BARIATRIC SURGERY: ICD-10-CM

## 2023-07-24 DIAGNOSIS — R63.5 ABNORMAL WEIGHT GAIN: Primary | ICD-10-CM

## 2023-07-24 DIAGNOSIS — R63.8 ABNORMAL CRAVING: ICD-10-CM

## 2023-07-24 DIAGNOSIS — F43.21 ADJUSTMENT DISORDER WITH DEPRESSED MOOD: ICD-10-CM

## 2023-07-24 DIAGNOSIS — E21.3 HYPERPARATHYROIDISM (HCC): ICD-10-CM

## 2023-07-24 DIAGNOSIS — K91.2 POSTSURGICAL MALABSORPTION: ICD-10-CM

## 2023-07-24 DIAGNOSIS — E66.3 OVERWEIGHT: Primary | ICD-10-CM

## 2023-07-24 PROCEDURE — RECHECK

## 2023-07-24 PROCEDURE — 99214 OFFICE O/P EST MOD 30 MIN: CPT | Performed by: PHYSICIAN ASSISTANT

## 2023-07-24 RX ORDER — SEMAGLUTIDE 2.4 MG/.75ML
2.4 INJECTION, SOLUTION SUBCUTANEOUS WEEKLY
Qty: 3 ML | Refills: 2 | Status: SHIPPED | OUTPATIENT
Start: 2023-07-24

## 2023-07-24 NOTE — PROGRESS NOTES
Assessment/Plan:     Overweight  -s/p RYGB  -Patient is pursuing the Conservative Program  -Initial weight loss goal of 5-10% weight loss for improved health  -Denied hx of seizure and glaucoma. -Did not tolerate Topamax  -Reviewed indications, mechanisms, and potential side effects of weight loss medications. Would be cautious with phentermine as she reports taking Benadryl has resulted in bigeminy.    -Patient denies personal and family history of MEN2 tumors and medullary thyroid/thyroid carcinoma. Patient denies personal history of pancreatitis. (started 11/4/19- 205 lbs). >5 % weight loss. Having positive effects on appetite and helping prevent weight gain. Switched to Wegovy (178) and doing well  -Taking Wellbutrin. +effects on mood  -Tolerated naltrexone, but was having more pain so stopped to give option for pain medication prn. CMP reviewed from 6/2/23. PTH reviewed from 11/30/22; due for annual vitamin labs 08/2023     Initial(Start of MW 10/15/18): 201.5 lbs  Start of Vernida Slates (now on Wegovy): (205 lbs, per 11/8/19)  Current: 167.9 (-1.4 lbs since last visit)  Change: -37. 1  lb  Goal: 160-170s lbs and maintain    Follow up in approximately 4-5 months with Non-Surgical Physician/Advanced Practitioner. Goals:  Food log (ie.) [email or web address] [email or web address] com,etc. baritastic  No sugary beverages. At least 64oz of water daily. Increase physical activity by 10 minutes daily. Gradually increase physical activity to a goal of 5 days per week for 30 minutes of MODERATE intensity PLUS 2 days per week of FULL BODY resistance training  5-10 servings of fruits and vegetables per day and 25-35 grams of dietary fiber per day, gradually increasing     Diagnoses and all orders for this visit:    Overweight  -     Copper Level; Future  -     Folate; Future  -     PTH, intact; Future  -     Vitamin A; Future  -     Zinc; Future  -     Vitamin D 25 hydroxy;  Future  -     Vitamin B12; Future  -     Vitamin B1, whole blood; Future  -     Semaglutide-Weight Management [facility]) 2.4 MG/0.75ML; Inject 0.75 mL (2.4 mg total) under the skin once a week    Postsurgical malabsorption  -     Copper Level; Future  -     Folate; Future  -     PTH, intact; Future  -     Vitamin A; Future  -     Zinc; Future  -     Vitamin D 25 hydroxy; Future  -     Vitamin B12; Future  -     Vitamin B1, whole blood; Future    Hyperparathyroidism ([address])  -     Copper Level; Future  -     Folate; Future  -     PTH, intact; Future  -     Vitamin A; Future  -     Zinc; Future  -     Vitamin D 25 hydroxy; Future  -     Vitamin B12; Future  -     Vitamin B1, whole blood; Future    S/P bariatric surgery  -     Copper Level; Future  -     Folate; Future  -     PTH, intact; Future  -     Vitamin A; Future  -     Zinc; Future  -     Vitamin D 25 hydroxy; Future  -     Vitamin B12; Future  -     Vitamin B1, whole blood; Future    Adjustment disorder with depressed mood    Abnormal craving    Body mass index 29.0-29.9, adult        Subjective:   Chief Complaint   Patient presents with   • Virtual Regular Visit     Patient ID: Christian Olson  is a 58 y.o. female with excess weight/obesity here to pursue weight management. Patient is pursuing Conservative Program.     HPI  The patient presents for MW follow up. Pt vitals done today at [facility] office 167.9 lbs, 110/70, 74 bpm     Continues on Lydia crest and Wellbutrin, no concerns. Refilled    Since having cholesterol checked has cut back on dining out. Staying consistent. Exercise was low, now adding more steps.     The following portions of the patient's history were reviewed and updated as appropriate: allergies, current medications, past family history, past medical history, past social history, past surgical history and problem list.      Objective:    [facility] 05/01/2005 (Within Months)       Virtual Regular Visit    Verification of patient location:    Patient is located at Other in the following state in which I hold an active license [place]      Assessment/Plan:    Problem List Items Addressed This Visit        Digestive    Postsurgical malabsorption    Relevant Orders    Copper Level    Folate    PTH, intact    Vitamin A    Zinc    Vitamin D 25 hydroxy    Vitamin B12    Vitamin B1, whole blood       Other    Overweight - Primary     -s/p RYGB  -Patient is pursuing the Conservative Program  -Initial weight loss goal of 5-10% weight loss for improved health  -Denied hx of seizure and glaucoma. -Did not tolerate Topamax  -Reviewed indications, mechanisms, and potential side effects of weight loss medications. Would be cautious with phentermine as she reports taking Benadryl has resulted in bigeminy.    -Patient denies personal and family history of MEN2 tumors and medullary thyroid/thyroid carcinoma. Patient denies personal history of pancreatitis. (started 11/4/19- 205 lbs). >5 % weight loss. Having positive effects on appetite and helping prevent weight gain. Switched to Wegovy (178) and doing well  -Taking Wellbutrin. +effects on mood  -Tolerated naltrexone, but was having more pain so stopped to give option for pain medication prn. CMP reviewed from 6/2/23. PTH reviewed from 11/30/22; due for annual vitamin labs 08/2023     Initial(Start of MWM 10/15/18): 201.5 lbs  Start of Opal Sullivansherman (now on Wegovy): (205 lbs, per 11/8/19)  Current: 167.9 (-1.4 lbs since last visit)  Change: -37. 1  lb  Goal: 160-170s lbs and maintain         Relevant Medications    Semaglutide-Weight Management [facility]) 2.4 MG/0.75ML    Other Relevant Orders    Copper Level    Folate    PTH, intact    Vitamin A    Zinc    Vitamin D 25 hydroxy    Vitamin B12    Vitamin B1, whole blood   Other Visit Diagnoses     Hyperparathyroidism ([address])        Relevant Orders    Copper Level    Folate    PTH, intact    Vitamin A    Zinc    Vitamin D 25 hydroxy    Vitamin B12    Vitamin B1, whole blood    S/P bariatric surgery        Relevant Orders    Copper Level    Folate    PTH, intact    Vitamin A    Zinc    Vitamin D 25 hydroxy    Vitamin B12    Vitamin B1, whole blood    Adjustment disorder with depressed mood        Relevant Medications    Semaglutide-Weight Management [facility]) 2.4 MG/0.75ML    Abnormal craving        Body mass index 29.0-29.9, adult                   Reason for visit is   Chief Complaint   Patient presents with   • Virtual Regular Visit        Encounter provider Akhil Fritz PA-C    Provider located at [place] [address] [place] [address]      Recent Visits  No visits were found meeting these conditions. Showing recent visits within past 7 days and meeting all other requirements  Today's Visits  Date Type Provider Dept   07/24/23 Telemedicine Akhil Fritz PA-C [facility] Males today's visits and meeting all other requirements  Future Appointments  No visits were found meeting these conditions. Showing future appointments within next 150 days and meeting all other requirements       The patient was identified by name and date of birth. Zoe Camargo was informed that this is a telemedicine visit and that the visit is being conducted through the MyPrintCloud. She agrees to proceed. .  My office door was closed. No one else was in the room. She acknowledged consent and understanding of privacy and security of the video platform. The patient has agreed to participate and understands they can discontinue the visit at any time. Patient is aware this is a billable service. Subjective  Zoe Camargo is a 58 y.o. female for [facility] f/u .     HPI     Past Medical History:   Diagnosis Date   • Anemia 3/2021   • Anxiety 2004   • Anxiety disorder    • Arthritis     osteoarthritis hip/poss knuckles"   • Basal cell carcinoma 05/27/2022    midline inferior forehead   • Cancer (HCC)    • Cervical disc herniation     C7//sees chiropracter and no recent problems   • Exercise involving walking     [phone] steps per day   • Full thickness rotator cuff tear 2020   •  1 para 1    • Hearing loss     left ear   • History of bariatric surgery    • History of non anemic vitamin B12 deficiency    • History of prediabetes     before bariatric surgery   • Hyperlipidemia     not on meds   • Hypertension     not on meds   • Iron deficiency anemia     infusions determined by lab work   • Irregular heart beat     "history bigeminy from taking benadryl, tries to avoid taking it"no recent issues"   • Motion sickness     "on rides"   • Neck pain     occas   • Nondisplaced fracture of proximal phalanx of left lesser toe(s), initial encounter for closed fracture 2019   • OA (osteoarthritis) of hip    • Obesity    • Oral herpes simplex infection     occas   • Other insomnia 2020   • Postmenopausal    • Sciatica    • Vertigo    • Wears glasses    • Wears glasses        Past Surgical History:   Procedure Laterality Date   • BARIATRIC SURGERY  2004   • CHOLECYSTECTOMY LAPAROSCOPIC N/A 2023    Procedure: CHOLECYSTECTOMY LAPAROSCOPIC;  Surgeon: Brittni Lara DO;  [facility];  Service: General   • COLPOSCOPY      Cervical Dysplasia   • DENTAL SURGERY      one lower right   • DENTAL SURGERY      Has dental implant; s/p tooth abscess   • EGD     • GASTRIC BYPASS      titanium staples   • GYNECOLOGIC CRYOSURGERY      Cervical Dysplasia   • JOINT REPLACEMENT      R hip arthroplasty   • LUMBAR LAMINECTOMY  1980    L5-S1;  No Hardware   • MOHS SURGERY  2022    midline inferior forehead   • [place] ARTHRP ACETBLR/PROX FEM PROSTC AGRFT/ALGRFT Right 2020    Procedure: ARTHROPLASTY HIP TOTAL ANTERIOR;  Surgeon: Emeka Mustafa MD;  [facility];  Service: Orthopedics   • DC COLONOSCOPY FLX DX W/COLLJ [facility] WHEN PFRMD N/A 2016    Procedure: COLONOSCOPY;  Surgeon: Paula Aguilera MD;  [facility]: [facility]; Service: Colorectal   • ROTATOR CUFF REPAIR Right 2010   • SKIN CANCER EXCISION      s/p BCC Back and R Clavicle   • SMALL INTESTINE SURGERY  2004 [facility]   • SPINE SURGERY  1990    L5-S1 laminectomy   • WISDOM TOOTH EXTRACTION         Current Outpatient Medications   Medication Sig Dispense Refill   • Semaglutide-Weight Management (Wegovy) 2.4 MG/0.75ML Inject 0.75 mL (2.4 mg total) under the skin once a week 3 mL 2   • ALPRAZolam (XANAX) 0.5 mg tablet Take 1 tablet (0.5 mg total) by mouth daily at bedtime as needed for anxiety 30 tablet 0   • buPROPion (WELLBUTRIN XL) 300 mg 24 hr tablet Take 1 tablet (300 mg total) by mouth every morning 30 tablet 2   • Calcium Carbonate-Vitamin D (Calcium 600-D) 600-400 MG-UNIT per tablet Take 2 capsules by mouth 2 (two) times a day       • Cholecalciferol (Vitamin D) 50 MCG (2000 UT) CAPS Take by mouth in the morning     • cyclobenzaprine (FLEXERIL) 10 mg tablet Take 1 tablet (10 mg total) by mouth 3 (three) times a day as needed for muscle spasms 30 tablet 0   • Meclizine HCl (ANTIVERT PO) Take 25 mg by mouth as needed Rx per prior PCP     • Multiple Vitamins-Minerals (BARIATRIC FUSION PO) Take 1 tablet by mouth daily     • ondansetron (Zofran ODT) 4 mg disintegrating tablet Take 1 tablet (4 mg total) by mouth every 6 (six) hours as needed for nausea or vomiting 20 tablet 0     No current facility-administered medications for this visit. Allergies   Allergen Reactions   • Macrodantin [Nitrofurantoin]      myalgias   • Sulfa Antibiotics Swelling and Anaphylaxis     Tongue swelling   • Dilaudid [Hydromorphone] Vomiting       Review of Systems   Psychiatric/Behavioral: Negative. Video Exam    There were no vitals filed for this visit. Physical Exam  Vitals and nursing note reviewed. Constitutional   General appearance: Abnormal.  well developed  Pulmonary   Respiratory effort: No increased work of breathing or signs of respiratory distress. Musculoskeletal   Gait and station: Normal.    Psychiatric   Orientation to person, place and time: Normal.    Affect: appropriate        Visit Time  Total Visit Duration: 21

## 2023-07-24 NOTE — ASSESSMENT & PLAN NOTE
-s/p RYGB  -Patient is pursuing the Conservative Program  -Initial weight loss goal of 5-10% weight loss for improved health  -Denied hx of seizure and glaucoma. -Did not tolerate Topamax  -Reviewed indications, mechanisms, and potential side effects of weight loss medications. Would be cautious with phentermine as she reports taking Benadryl has resulted in bigeminy.    -Patient denies personal and family history of MEN2 tumors and medullary thyroid/thyroid carcinoma. Patient denies personal history of pancreatitis. (started 11/4/19- 205 lbs). >5 % weight loss. Having positive effects on appetite and helping prevent weight gain. Switched to Wegovy (178) and doing well  -Taking Wellbutrin. +effects on mood  -Tolerated naltrexone, but was having more pain so stopped to give option for pain medication prn. CMP reviewed from 6/2/23. PTH reviewed from 11/30/22; due for annual vitamin labs 08/2023     Initial(Start of MW 10/15/18): 201.5 lbs  Start of Matias Hernandez (now on Wegovy): (205 lbs, per 11/8/19)  Current: 167.9 (-1.4 lbs since last visit)  Change: -37. 1  lb  Goal: 160-170s lbs and maintain

## 2023-07-24 NOTE — PROGRESS NOTES
Patient last visit weight:  Patient current visit weight:    If you are taking phentermine or other oral weight loss medications, are you experiencing any of the following symptoms:  Headache:   Blurred Vision:   Chest Pain:   Palpitations: Insomnia:   SPECIFY ORAL MEDICATION AND DOSAGE:     If you are taking an injectable medication,  are you experiencing any of the following symptoms:  Bloating: No  Nausea: No  Vomiting: No   Constipation: No  Diarrhea: No  SPECIFY INJECTABLE MEDICATION AND CURRENT DOSAGE: Wegovy      Vitals:    - Is BP less than 100/60? No  - Is BP greater than 140/90? No  - Is HR greater than 100? No  **If yes to any of the above, have patient relax and repeat in 5-10 minutes**    Repeat values:    - Is BP less than 100/60?  - Is BP greater than 140/90?  - Is HR greater than 100?   **If values remain outside of ranges above, please consult provider for next steps**

## 2023-08-01 DIAGNOSIS — F41.1 GENERALIZED ANXIETY DISORDER: ICD-10-CM

## 2023-08-01 RX ORDER — ALPRAZOLAM 0.5 MG/1
0.5 TABLET ORAL
Qty: 21 TABLET | Refills: 0 | Status: SHIPPED | OUTPATIENT
Start: 2023-08-01 | End: 2023-09-08 | Stop reason: SDUPTHER

## 2023-08-01 NOTE — TELEPHONE ENCOUNTER
Medication:  PDMP [id number] 07/07/2023 07/07/2023 ALPRAZolam (Tablet) 30.0 30 0.5 MG NA MOHSEN JACOBS  Active agreement on file -No

## 2023-09-08 DIAGNOSIS — F41.1 GENERALIZED ANXIETY DISORDER: ICD-10-CM

## 2023-09-08 RX ORDER — ALPRAZOLAM 0.5 MG/1
0.5 TABLET ORAL
Qty: 30 TABLET | Refills: 0 | Status: SHIPPED | OUTPATIENT
Start: 2023-09-08

## 2023-09-08 NOTE — TELEPHONE ENCOUNTER
Medication:  PDMP 08/01/2023 08/01/2023 ALPRAZolam (Tablet) 21.0 21 0.5 MG NA GARDIA GERMIN  Active agreement on file -No

## 2023-09-28 PROCEDURE — 88305 TISSUE EXAM BY PATHOLOGIST: CPT | Performed by: PATHOLOGY

## 2023-09-28 PROCEDURE — 88342 IMHCHEM/IMCYTCHM 1ST ANTB: CPT | Performed by: PATHOLOGY

## 2023-09-29 ENCOUNTER — LAB REQUISITION (OUTPATIENT)
Dept: LAB | Facility: HOSPITAL | Age: 63
End: 2023-09-29
Payer: COMMERCIAL

## 2023-09-29 DIAGNOSIS — D48.5 NEOPLASM OF UNCERTAIN BEHAVIOR OF SKIN: ICD-10-CM

## 2023-10-05 PROCEDURE — 88342 IMHCHEM/IMCYTCHM 1ST ANTB: CPT | Performed by: PATHOLOGY

## 2023-10-05 PROCEDURE — 88305 TISSUE EXAM BY PATHOLOGIST: CPT | Performed by: PATHOLOGY

## 2023-10-10 DIAGNOSIS — F41.1 GENERALIZED ANXIETY DISORDER: ICD-10-CM

## 2023-10-10 RX ORDER — ALPRAZOLAM 0.5 MG/1
0.5 TABLET ORAL
Qty: 30 TABLET | Refills: 0 | Status: SHIPPED | OUTPATIENT
Start: 2023-10-10

## 2023-10-10 NOTE — TELEPHONE ENCOUNTER
Medication:  [facility] [id number] 09/08/2023 09/08/2023 ALPRAZolam (Tablet) 30.0 30 0.5 MG NA MOHSEN JACOBS  Active agreement on file -No

## 2023-10-25 DIAGNOSIS — F43.21 ADJUSTMENT DISORDER WITH DEPRESSED MOOD: ICD-10-CM

## 2023-10-25 DIAGNOSIS — E66.3 OVERWEIGHT: ICD-10-CM

## 2023-10-25 DIAGNOSIS — R63.8 ABNORMAL CRAVING: ICD-10-CM

## 2023-10-25 DIAGNOSIS — E66.9 CLASS 1 OBESITY: ICD-10-CM

## 2023-10-25 DIAGNOSIS — M54.50 CHRONIC RIGHT-SIDED LOW BACK PAIN WITHOUT SCIATICA: ICD-10-CM

## 2023-10-25 DIAGNOSIS — G89.29 CHRONIC RIGHT-SIDED LOW BACK PAIN WITHOUT SCIATICA: ICD-10-CM

## 2023-10-25 RX ORDER — CYCLOBENZAPRINE HCL 10 MG
10 TABLET ORAL 3 TIMES DAILY PRN
Qty: 30 TABLET | Refills: 0 | Status: SHIPPED | OUTPATIENT
Start: 2023-10-25

## 2023-10-26 RX ORDER — SEMAGLUTIDE 2.4 MG/.75ML
2.4 INJECTION, SOLUTION SUBCUTANEOUS WEEKLY
Qty: 3 ML | Refills: 2 | Status: SHIPPED | OUTPATIENT
Start: 2023-10-26

## 2023-10-26 RX ORDER — BUPROPION HYDROCHLORIDE 300 MG/1
300 TABLET ORAL EVERY MORNING
Qty: 90 TABLET | Refills: 0 | Status: SHIPPED | OUTPATIENT
Start: 2023-10-26

## 2023-10-27 ENCOUNTER — TELEPHONE (OUTPATIENT)
Dept: BARIATRICS | Facility: CLINIC | Age: 63
End: 2023-10-27

## 2023-10-27 ENCOUNTER — OFFICE VISIT (OUTPATIENT)
Dept: FAMILY MEDICINE CLINIC | Facility: CLINIC | Age: 63
End: 2023-10-27
Payer: COMMERCIAL

## 2023-10-27 VITALS
OXYGEN SATURATION: 98 % | TEMPERATURE: 98.2 F | WEIGHT: 165.8 LBS | DIASTOLIC BLOOD PRESSURE: 80 MMHG | BODY MASS INDEX: 29.38 KG/M2 | RESPIRATION RATE: 18 BRPM | SYSTOLIC BLOOD PRESSURE: 120 MMHG | HEIGHT: 63 IN | HEART RATE: 74 BPM

## 2023-10-27 DIAGNOSIS — F41.1 GENERALIZED ANXIETY DISORDER: ICD-10-CM

## 2023-10-27 DIAGNOSIS — K91.2 POSTSURGICAL MALABSORPTION: ICD-10-CM

## 2023-10-27 DIAGNOSIS — B02.9 HERPES ZOSTER WITHOUT COMPLICATION: Primary | ICD-10-CM

## 2023-10-27 PROBLEM — K80.00 CALCULUS OF GALLBLADDER WITH ACUTE CHOLECYSTITIS: Status: RESOLVED | Noted: 2023-05-05 | Resolved: 2023-10-27

## 2023-10-27 PROCEDURE — 99214 OFFICE O/P EST MOD 30 MIN: CPT | Performed by: FAMILY MEDICINE

## 2023-10-27 RX ORDER — VALACYCLOVIR HYDROCHLORIDE 1 G/1
1000 TABLET, FILM COATED ORAL 2 TIMES DAILY
Qty: 20 TABLET | Refills: 0 | Status: SHIPPED | OUTPATIENT
Start: 2023-10-27 | End: 2023-11-06

## 2023-10-27 RX ORDER — GABAPENTIN 100 MG/1
100 CAPSULE ORAL
Qty: 30 CAPSULE | Refills: 0 | Status: SHIPPED | OUTPATIENT
Start: 2023-10-27

## 2023-10-27 NOTE — TELEPHONE ENCOUNTER
Patient and pharm was informed regarding her [facility] approval from 10/27/2023-10/27/2024. She was also informed about the 4/5% wt loss requirement for renewal purposes.

## 2023-10-27 NOTE — PROGRESS NOTES
Name: Ricardo Dean      : 1960      MRN: [id number]  Encounter Provider: Eva Bertrand MD  Encounter Date: 10/27/2023   Encounter department: [facility]     1. Herpes zoster without complication  Comments:  no rash on exam   will treat based on history and clinical symptoms  Orders:  -     valACYclovir (VALTREX) 1,000 mg tablet; Take 1 tablet (1,000 mg total) by mouth 2 (two) times a day for 10 days  -     gabapentin (Neurontin) 100 mg capsule; Take 1 capsule (100 mg total) by mouth daily at bedtime    2. Generalized anxiety disorder  Assessment & Plan:  Stable on current meds      3. Postsurgical malabsorption  Assessment & Plan:  Takes Multi-vitamins daily              Subjective     HPI  Here for burning sensation over left inner thigh for the 1-2 weeks   No fever/chills  + h/o shingles   More tired than her usual      Review of Systems   Constitutional: Negative. Negative for fatigue and fever. HENT: Negative. Respiratory: Negative. Cardiovascular: Negative. Skin:  Negative for rash.    Neurological:         Nerve pain       Past Medical History:   Diagnosis Date   • Anemia 3/2021   • Anxiety    • Anxiety disorder    • Arthritis     osteoarthritis hip/poss knuckles"   • Basal cell carcinoma 2022    midline inferior forehead   • Calculus of gallbladder with acute cholecystitis 2023   • Cancer ([address])    • Cervical disc herniation     C7//sees chiropracter and no recent problems   • Exercise involving walking     [phone] steps per day   • Full thickness rotator cuff tear 2020   •  1 para 1    • Hearing loss     left ear   • History of bariatric surgery    • History of non anemic vitamin B12 deficiency    • History of prediabetes     before bariatric surgery   • Hyperlipidemia     not on meds   • Hypertension     not on meds   • Iron deficiency anemia     infusions determined by lab work   • Irregular heart beat     "history bigeminy from taking benadryl, tries to avoid taking it"no recent issues"   • Motion sickness     "on rides"   • Neck pain     occas   • Nondisplaced fracture of proximal phalanx of left lesser toe(s), initial encounter for closed fracture 06/02/2019   • OA (osteoarthritis) of hip    • Obesity    • Oral herpes simplex infection     occas   • Other insomnia 05/11/2020   • Postmenopausal    • Sciatica    • Vertigo    • Wears glasses    • Wears glasses      Past Surgical History:   Procedure Laterality Date   • BARIATRIC SURGERY  05/09/2004   • CHOLECYSTECTOMY LAPAROSCOPIC N/A 05/05/2023    Procedure: CHOLECYSTECTOMY LAPAROSCOPIC;  Surgeon: Jorge Lara DO;  [facility];  Service: General   • COLPOSCOPY      Cervical Dysplasia   • DENTAL SURGERY      one lower right   • DENTAL SURGERY      Has dental implant; s/p tooth abscess   • EGD     • GASTRIC BYPASS  2004    titanium staples   • GYNECOLOGIC CRYOSURGERY      Cervical Dysplasia   • JOINT REPLACEMENT  2020    R hip arthroplasty   • LUMBAR LAMINECTOMY  1980    L5-S1; No Hardware   • MOHS SURGERY  07/26/2022    midline inferior forehead   • [place] ARTHRP ACETBLR/PROX FEM PROSTC AGRFT/ALGRFT Right 12/16/2020    Procedure: ARTHROPLASTY HIP TOTAL ANTERIOR;  Surgeon: Alexsander Roldan MD;  [facility];  Service: Orthopedics   • [place] COLONOSCOPY FLX DX W/COLLJ SPEC WHEN PFRMD N/A 06/01/2016    Procedure: COLONOSCOPY;  Surgeon: Missy Sinclair MD;  [facility];   Service: Colorectal   • ROTATOR CUFF REPAIR Right 2010   • SKIN CANCER EXCISION      s/p BCC Back and R Clavicle   • SMALL INTESTINE SURGERY  2004 [facility]   • SPINE SURGERY  1990    L5-S1 laminectomy   • WISDOM TOOTH EXTRACTION       Family History   Problem Relation Age of Onset   • Diabetes Mother    • Hypertension Mother    • Heart failure Mother         s/p ICD   • Diabetes type II Mother 54   • Hypothyroidism Mother    • Heart attack Mother    • Hyperlipidemia Mother    • Depression Mother • Heart disease Mother    • Arthritis Mother    • Cancer Father    • Heart disease Father    • Heart block Father         s/p ICD   • Hypertension Father    • Prostate cancer Father         c Mets   • Hyperlipidemia Father    • Diabetes Brother    • Leukemia Brother 72        CLL   • Hyperlipidemia Brother    • Basal cell carcinoma Brother    • Diabetes type II Brother 61   • Osteoarthritis Brother         s/p MVA   • No Known Problems Maternal Aunt    • No Known Problems Paternal Aunt    • Breast cancer Paternal Aunt    • No Known Problems Maternal Grandmother    • No Known Problems Maternal Grandfather    • No Known Problems Paternal Grandmother    • No Known Problems Paternal Grandfather    • OCD Son    • Anxiety disorder Son    • Hypertension Son    • Obesity Son    • Breast cancer Cousin    • Diabetes Family    • Arthritis Family    • Breast cancer Family      Social History     Socioeconomic History   • Marital status:      Spouse name: None   • Number of children: None   • Years of education: None   • Highest education level: None   Occupational History   • None   Tobacco Use   • Smoking status: Never     Passive exposure: Never   • Smokeless tobacco: Never   Vaping Use   • Vaping Use: Never used   Substance and Sexual Activity   • Alcohol use:  Yes     Alcohol/week: 1.0 standard drink of alcohol     Types: 1 Shots of liquor per week     Comment: rarely/holidays   • Drug use: Not Currently     Types: Marijuana     Comment: During college years [id number]   • Sexual activity: Not Currently     Partners: Male     Birth control/protection: Post-menopausal   Other Topics Concern   • None   Social History Narrative        Lives with  who has ESRD, Son lives in [facility] and stays at home every other weekend    Has 1 Child - 1 Son    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[employer]     Financial Resource Strain: Not on file   Food Insecurity: Not on file   Transportation Needs: Not on file   Physical Activity: Not on file   Stress: Not on file   Social Connections: Not on file   Intimate Partner Violence: Not on file   Housing Stability: Not on file     Current Outpatient Medications on File Prior to Visit   Medication Sig   • ALPRAZolam (XANAX) 0.5 mg tablet Take 1 tablet (0.5 mg total) by mouth daily at bedtime as needed for anxiety   • buPROPion (WELLBUTRIN XL) 300 mg 24 hr tablet Take 1 tablet (300 mg total) by mouth every morning   • Calcium Carbonate-Vitamin D (Calcium 600-D) 600-400 MG-UNIT per tablet Take 2 capsules by mouth 2 (two) times a day     • Cholecalciferol (Vitamin D) 50 MCG (2000 UT) CAPS Take by mouth in the morning   • cyclobenzaprine (FLEXERIL) 10 mg tablet Take 1 tablet (10 mg total) by mouth 3 (three) times a day as needed for muscle spasms   • Meclizine HCl (ANTIVERT PO) Take 25 mg by mouth as needed Rx per prior PCP   • Multiple Vitamins-Minerals (BARIATRIC FUSION PO) Take 1 tablet by mouth daily   • ondansetron (Zofran ODT) 4 mg disintegrating tablet Take 1 tablet (4 mg total) by mouth every 6 (six) hours as needed for nausea or vomiting   • Semaglutide-Weight Management (Wegovy) 2.4 MG/0.75ML Inject 0.75 mL (2.4 mg total) under the skin once a week     Allergies   Allergen Reactions   • Macrodantin [Nitrofurantoin]      myalgias   • Sulfa Antibiotics Swelling and Anaphylaxis     Tongue swelling   • Dilaudid [Hydromorphone] Vomiting     Immunization History   Administered Date(s) Administered   • COVID-19 PFIZER VACCINE 0.3 ML IM 01/22/2021, 02/12/2021, 10/23/2021   • INFLUENZA 10/15/2016, 10/15/2016, 10/10/2017, 10/10/2017, 10/22/2018, 10/22/2018, 10/09/2020, 10/09/2021, 10/24/2022   • Influenza, seasonal, injectable 10/01/2012   • Tdap 07/21/2014   • Zoster Vaccine Recombinant 06/23/2020, 09/01/2020       Objective     /80 (BP Location: Left arm, Patient Position: Sitting, Cuff Size: Standard)   Pulse 74   Temp 98.2 °F (36.8 °C) (Tympanic) Resp 18   Ht 5' 3" (1.6 m)   Wt 75.2 kg (165 lb 12.8 oz)   LMP 05/01/2005 (Within Months)   SpO2 98%   BMI 29.37 kg/m²     Physical Exam  Vitals and nursing note reviewed. Constitutional:       Appearance: Normal appearance. Cardiovascular:      Rate and Rhythm: Normal rate and regular rhythm. Pulses: Normal pulses. Heart sounds: Normal heart sounds. Skin:     Capillary Refill: Capillary refill takes less than 2 seconds. Findings: No erythema or rash. Neurological:      General: No focal deficit present. Mental Status: She is alert and oriented to person, place, and time.        Doug Gill MD

## 2023-11-13 DIAGNOSIS — F41.1 GENERALIZED ANXIETY DISORDER: ICD-10-CM

## 2023-11-14 RX ORDER — ALPRAZOLAM 0.5 MG/1
0.5 TABLET ORAL
Qty: 30 TABLET | Refills: 0 | Status: SHIPPED | OUTPATIENT
Start: 2023-11-14

## 2023-11-14 NOTE — TELEPHONE ENCOUNTER
Medication:  PDMP   10/10/2023 10/10/2023 ALPRAZolam (Tablet) 30.0 30 0.5 MG NA MOHSEN JACOBS     Active agreement on file -No

## 2023-12-12 DIAGNOSIS — F41.1 GENERALIZED ANXIETY DISORDER: ICD-10-CM

## 2023-12-13 RX ORDER — ALPRAZOLAM 0.5 MG/1
0.5 TABLET ORAL
Qty: 30 TABLET | Refills: 0 | Status: SHIPPED | OUTPATIENT
Start: 2023-12-13

## 2023-12-13 NOTE — TELEPHONE ENCOUNTER
Medication: xanax 0.5mg tablet  PDMP  11/14/2023 11/14/2023 ALPRAZolam (Tablet) 30.0 30 0.5 MG NA MOHSEN JACOBS  Active agreement on file -No

## 2023-12-16 ENCOUNTER — APPOINTMENT (OUTPATIENT)
Dept: LAB | Facility: CLINIC | Age: 63
End: 2023-12-16
Payer: COMMERCIAL

## 2023-12-16 DIAGNOSIS — K91.2 POSTSURGICAL MALABSORPTION: ICD-10-CM

## 2023-12-16 DIAGNOSIS — E21.3 HYPERPARATHYROIDISM (HCC): ICD-10-CM

## 2023-12-16 DIAGNOSIS — Z98.84 S/P BARIATRIC SURGERY: ICD-10-CM

## 2023-12-16 DIAGNOSIS — E66.3 OVERWEIGHT: ICD-10-CM

## 2023-12-16 LAB
25(OH)D3 SERPL-MCNC: 78.6 NG/ML (ref 30–100)
FOLATE SERPL-MCNC: 19.8 NG/ML
PTH-INTACT SERPL-MCNC: 105.3 PG/ML (ref 12–88)
VIT B12 SERPL-MCNC: 697 PG/ML (ref 180–914)

## 2023-12-16 PROCEDURE — 82746 ASSAY OF FOLIC ACID SERUM: CPT

## 2023-12-16 PROCEDURE — 82525 ASSAY OF COPPER: CPT

## 2023-12-16 PROCEDURE — 36415 COLL VENOUS BLD VENIPUNCTURE: CPT

## 2023-12-16 PROCEDURE — 84590 ASSAY OF VITAMIN A: CPT

## 2023-12-16 PROCEDURE — 84630 ASSAY OF ZINC: CPT

## 2023-12-16 PROCEDURE — 84425 ASSAY OF VITAMIN B-1: CPT

## 2023-12-16 PROCEDURE — 82607 VITAMIN B-12: CPT

## 2023-12-16 PROCEDURE — 83970 ASSAY OF PARATHORMONE: CPT

## 2023-12-16 PROCEDURE — 82306 VITAMIN D 25 HYDROXY: CPT

## 2023-12-19 DIAGNOSIS — Z00.6 ENCOUNTER FOR EXAMINATION FOR NORMAL COMPARISON OR CONTROL IN CLINICAL RESEARCH PROGRAM: ICD-10-CM

## 2023-12-20 ENCOUNTER — OFFICE VISIT (OUTPATIENT)
Dept: FAMILY MEDICINE CLINIC | Facility: CLINIC | Age: 63
End: 2023-12-20
Payer: COMMERCIAL

## 2023-12-20 VITALS
BODY MASS INDEX: 27.72 KG/M2 | RESPIRATION RATE: 17 BRPM | OXYGEN SATURATION: 97 % | HEART RATE: 74 BPM | WEIGHT: 162.4 LBS | TEMPERATURE: 97.4 F | HEIGHT: 64 IN | SYSTOLIC BLOOD PRESSURE: 120 MMHG | DIASTOLIC BLOOD PRESSURE: 80 MMHG

## 2023-12-20 DIAGNOSIS — G89.29 CHRONIC RIGHT-SIDED LOW BACK PAIN WITHOUT SCIATICA: ICD-10-CM

## 2023-12-20 DIAGNOSIS — E66.3 OVERWEIGHT: ICD-10-CM

## 2023-12-20 DIAGNOSIS — F41.1 GENERALIZED ANXIETY DISORDER: ICD-10-CM

## 2023-12-20 DIAGNOSIS — M54.50 CHRONIC RIGHT-SIDED LOW BACK PAIN WITHOUT SCIATICA: ICD-10-CM

## 2023-12-20 DIAGNOSIS — Z00.00 ANNUAL PHYSICAL EXAM: Primary | ICD-10-CM

## 2023-12-20 PROBLEM — K91.2 POSTGASTRECTOMY MALABSORPTION: Status: RESOLVED | Noted: 2018-10-15 | Resolved: 2023-12-20

## 2023-12-20 PROBLEM — Z90.3 POSTGASTRECTOMY MALABSORPTION: Status: RESOLVED | Noted: 2018-10-15 | Resolved: 2023-12-20

## 2023-12-20 LAB — VIT A SERPL-MCNC: 44.8 UG/DL (ref 22–69.5)

## 2023-12-20 PROCEDURE — 99396 PREV VISIT EST AGE 40-64: CPT | Performed by: FAMILY MEDICINE

## 2023-12-20 RX ORDER — CYCLOBENZAPRINE HCL 10 MG
10 TABLET ORAL 3 TIMES DAILY PRN
Qty: 30 TABLET | Refills: 3 | Status: SHIPPED | OUTPATIENT
Start: 2023-12-20

## 2023-12-20 NOTE — PROGRESS NOTES
ADULT ANNUAL PHYSICAL  [facility]    NAME: Mercy Garcia  AGE: 63 y.o. SEX: female  : 1960     DATE: 2023     Assessment and Plan:     Problem List Items Addressed This Visit        Other    Anxiety disorder     Stable          Overweight     Stable   Continue care per weight management         Other Visit Diagnoses     Annual physical exam    -  Primary    Relevant Orders    CBC and differential    Comprehensive metabolic panel    Lipid panel    Hemoglobin A1C    Chronic right-sided low back pain without sciatica        Relevant Medications    cyclobenzaprine (FLEXERIL) 10 mg tablet          Immunizations and preventive care screenings were discussed with patient today. Appropriate education was printed on patient's after visit summary.    Counseling:  Alcohol/drug use: discussed moderation in alcohol intake, the recommendations for healthy alcohol use, and avoidance of illicit drug use.  Dental Health: discussed importance of regular tooth brushing, flossing, and dental visits.  Injury prevention: discussed safety/seat belts, safety helmets, smoke detectors, carbon dioxide detectors, and smoking near bedding or upholstery.  Sexual health: discussed sexually transmitted diseases, partner selection, use of condoms, avoidance of unintended pregnancy, and contraceptive alternatives.  Exercise: the importance of regular exercise/physical activity was discussed. Recommend exercise 3-5 times per week for at least 30 minutes.       Depression Screening and Follow-up Plan: Patient was screened for depression during today's encounter. They screened negative with a PHQ-2 score of 1.        No follow-ups on file.     Chief Complaint:     Chief Complaint   Patient presents with   • Physical Exam     Patient being seen for Physical Exam      History of Present Illness:     Adult Annual Physical   Patient here for a comprehensive physical exam. The patient  "reports no problems.    Diet and Physical Activity  Diet/Nutrition: well balanced diet and consuming 3-5 servings of fruits/vegetables daily.   Exercise: walking.      Depression Screening  PHQ-2/9 Depression Screening    Little interest or pleasure in doing things: 0 - not at all  Feeling down, depressed, or hopeless: 1 - several days  PHQ-2 Score: 1  PHQ-2 Interpretation: Negative depression screen       General Health  Sleep: sleeps well.   Hearing: normal - bilateral.  Vision: goes for regular eye exams.   Dental: regular dental visits.       /GYN Health  Follows with gynecology? no   Patient is: postmenopausal  Last menstrual period: 20 years ago   Contraceptive method:  none .    Advanced Care Planning  Do you have an advanced directive? yes  Do you have a durable medical power of ? no     Review of Systems:     Review of Systems   Constitutional: Negative.    HENT: Negative.     Eyes: Negative.    Respiratory: Negative.     Cardiovascular: Negative.    Gastrointestinal: Negative.    Endocrine: Negative.    Genitourinary: Negative.    Musculoskeletal: Negative.    Skin: Negative.    Allergic/Immunologic: Negative.    Neurological: Negative.    Hematological: Negative.    Psychiatric/Behavioral: Negative.        Past Medical History:     Past Medical History:   Diagnosis Date   • Anemia 3/2021   • Anxiety    • Anxiety disorder    • Arthritis     osteoarthritis hip/poss knuckles\"   • Basal cell carcinoma 2022    midline inferior forehead   • Calculus of gallbladder with acute cholecystitis 2023   • Cancer (HCC)    • Cervical disc herniation     C7//sees chiropracter and no recent problems   • Exercise involving walking     [phone] steps per day   • Full thickness rotator cuff tear 2020   •  1 para 1    • Hearing loss     left ear   • History of bariatric surgery    • History of non anemic vitamin B12 deficiency    • History of prediabetes     before bariatric surgery   • " "Hyperlipidemia     not on meds   • Hypertension     not on meds   • Iron deficiency anemia     infusions determined by lab work   • Irregular heart beat     \"history bigeminy from taking benadryl, tries to avoid taking it\"no recent issues\"   • Motion sickness     \"on rides\"   • Neck pain     occas   • Nondisplaced fracture of proximal phalanx of left lesser toe(s), initial encounter for closed fracture 06/02/2019   • OA (osteoarthritis) of hip    • Obesity    • Oral herpes simplex infection     occas   • Other insomnia 05/11/2020   • Postmenopausal    • Sciatica    • Vertigo    • Wears glasses    • Wears glasses       Past Surgical History:     Past Surgical History:   Procedure Laterality Date   • BARIATRIC SURGERY  05/09/2004   • CHOLECYSTECTOMY LAPAROSCOPIC N/A 05/05/2023    Procedure: CHOLECYSTECTOMY LAPAROSCOPIC;  Surgeon: Mo Lara DO;  Location: [facility];  Service: General   • COLPOSCOPY      Cervical Dysplasia   • DENTAL SURGERY      one lower right   • DENTAL SURGERY      Has dental implant; s/p tooth abscess   • EGD     • GASTRIC BYPASS  2004    titanium staples   • GYNECOLOGIC CRYOSURGERY      Cervical Dysplasia   • JOINT REPLACEMENT  2020    R hip arthroplasty   • LUMBAR LAMINECTOMY  1980    L5-S1; No Hardware   • MOHS SURGERY  07/26/2022    midline inferior forehead   • VT ARTHRP ACETBLR/PROX FEM PROSTC AGRFT/ALGRFT Right 12/16/2020    Procedure: ARTHROPLASTY HIP TOTAL ANTERIOR;  Surgeon: Inder Rhodes MD;  Location: [facility];  Service: Orthopedics   • VT COLONOSCOPY FLX DX W/COLLJ SPEC WHEN PFRMD N/A 06/01/2016    Procedure: COLONOSCOPY;  Surgeon: JOAQUÍN Pina MD;  Location: [facility];  Service: Colorectal   • ROTATOR CUFF REPAIR Right 2010   • SKIN CANCER EXCISION      s/p BCC Back and R Clavicle   • SMALL INTESTINE SURGERY  2004 [facility]   • SPINE SURGERY  1990    L5-S1 laminectomy   • WISDOM TOOTH EXTRACTION        Social History:     Social History     Socioeconomic History   • " Marital status:      Spouse name: None   • Number of children: None   • Years of education: None   • Highest education level: None   Occupational History   • None   Tobacco Use   • Smoking status: Never     Passive exposure: Never   • Smokeless tobacco: Never   Vaping Use   • Vaping status: Never Used   Substance and Sexual Activity   • Alcohol use: Yes     Alcohol/week: 1.0 standard drink of alcohol     Types: 1 Shots of liquor per week     Comment: rarely/holidays   • Drug use: Not Currently     Types: Marijuana     Comment: During college years [id number]   • Sexual activity: Not Currently     Partners: Male     Birth control/protection: Post-menopausal   Other Topics Concern   • None   Social History Narrative        Lives with  who has ESRD, Son lives in [place] and stays at home every other weekend    Has 1 Child - 1 Son    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[employer]     Social Determinants of Health     Financial Resource Strain: Not on file   Food Insecurity: Not on file   Transportation Needs: Not on file   Physical Activity: Not on file   Stress: Not on file   Social Connections: Not on file   Intimate Partner Violence: Not on file   Housing Stability: Not on file      Family History:     Family History   Problem Relation Age of Onset   • Diabetes Mother    • Hypertension Mother    • Heart failure Mother         s/p ICD   • Diabetes type II Mother 55   • Hypothyroidism Mother    • Heart attack Mother 89   • Hyperlipidemia Mother    • Depression Mother    • Heart disease Mother    • Arthritis Mother    • Cancer Father    • Heart disease Father    • Heart block Father         s/p ICD   • Hypertension Father    • Prostate cancer Father 70        c Mets   • Hyperlipidemia Father    • Diabetes Brother    • Leukemia Brother 65        CLL   • Hyperlipidemia Brother    • Basal cell carcinoma Brother    • Diabetes type II Brother 60   • Osteoarthritis Brother         s/p MVA   • No Known Problems  Maternal Aunt    • No Known Problems Paternal Aunt    • Breast cancer Paternal Aunt 60   • No Known Problems Maternal Grandmother    • No Known Problems Maternal Grandfather    • No Known Problems Paternal Grandmother    • No Known Problems Paternal Grandfather    • OCD Son    • Anxiety disorder Son    • Hypertension Son    • Obesity Son    • Breast cancer Cousin 60   • Diabetes Family    • Arthritis Family    • Breast cancer Family       Current Medications:     Current Outpatient Medications   Medication Sig Dispense Refill   • ALPRAZolam (XANAX) 0.5 mg tablet Take 1 tablet (0.5 mg total) by mouth daily at bedtime as needed for anxiety 30 tablet 0   • buPROPion (WELLBUTRIN XL) 300 mg 24 hr tablet Take 1 tablet (300 mg total) by mouth every morning 90 tablet 0   • Calcium Carbonate-Vitamin D (Calcium 600-D) 600-400 MG-UNIT per tablet Take 2 capsules by mouth 2 (two) times a day       • Cholecalciferol (Vitamin D) 50 MCG (2000 UT) CAPS Take by mouth in the morning     • cyclobenzaprine (FLEXERIL) 10 mg tablet Take 1 tablet (10 mg total) by mouth 3 (three) times a day as needed for muscle spasms 30 tablet 3   • Meclizine HCl (ANTIVERT PO) Take 25 mg by mouth as needed Rx per prior PCP     • Multiple Vitamins-Minerals (BARIATRIC FUSION PO) Take 1 tablet by mouth daily     • ondansetron (Zofran ODT) 4 mg disintegrating tablet Take 1 tablet (4 mg total) by mouth every 6 (six) hours as needed for nausea or vomiting 20 tablet 0   • Semaglutide-Weight Management (Wegovy) 2.4 MG/0.75ML Inject 0.75 mL (2.4 mg total) under the skin once a week 3 mL 2     No current facility-administered medications for this visit.      Allergies:     Allergies   Allergen Reactions   • Macrodantin [Nitrofurantoin]      myalgias   • Sulfa Antibiotics Swelling and Anaphylaxis     Tongue swelling   • Dilaudid [Hydromorphone] Vomiting      Physical Exam:     /80 (BP Location: Left arm, Patient Position: Sitting, Cuff Size: Standard)   Pulse  "74   Temp (!) 97.4 °F (36.3 °C) (Tympanic)   Resp 17   Ht 5' 3.54\" (1.614 m)   Wt 73.7 kg (162 lb 6.4 oz)   LMP 05/01/2005 (Within Months)   SpO2 97%   BMI 28.28 kg/m²     Physical Exam  Vitals and nursing note reviewed.   Constitutional:       Appearance: Normal appearance.   HENT:      Right Ear: Tympanic membrane normal.      Left Ear: Tympanic membrane normal.      Mouth/Throat:      Mouth: Mucous membranes are moist.   Eyes:      Extraocular Movements: Extraocular movements intact.      Pupils: Pupils are equal, round, and reactive to light.   Cardiovascular:      Rate and Rhythm: Normal rate and regular rhythm.      Pulses: Normal pulses.      Heart sounds: Normal heart sounds.   Pulmonary:      Effort: Pulmonary effort is normal.      Breath sounds: Normal breath sounds.   Skin:     Capillary Refill: Capillary refill takes less than 2 seconds.   Neurological:      General: No focal deficit present.      Mental Status: She is alert and oriented to person, place, and time.   Psychiatric:         Mood and Affect: Mood normal.         Behavior: Behavior normal.          MD KEIRA Wagner [facility]    "

## 2023-12-21 LAB — VIT B1 BLD-SCNC: 159.8 NMOL/L (ref 66.5–200)

## 2023-12-22 LAB
COPPER SERPL-MCNC: 114 UG/DL (ref 80–158)
ZINC SERPL-MCNC: 65 UG/DL (ref 44–115)

## 2023-12-29 ENCOUNTER — OFFICE VISIT (OUTPATIENT)
Dept: BARIATRICS | Facility: CLINIC | Age: 63
End: 2023-12-29
Payer: COMMERCIAL

## 2023-12-29 ENCOUNTER — APPOINTMENT (OUTPATIENT)
Dept: LAB | Facility: HOSPITAL | Age: 63
End: 2023-12-29

## 2023-12-29 VITALS
RESPIRATION RATE: 16 BRPM | HEART RATE: 79 BPM | HEIGHT: 63 IN | SYSTOLIC BLOOD PRESSURE: 130 MMHG | BODY MASS INDEX: 28.99 KG/M2 | WEIGHT: 163.6 LBS | DIASTOLIC BLOOD PRESSURE: 77 MMHG

## 2023-12-29 DIAGNOSIS — E66.3 OVERWEIGHT: Primary | ICD-10-CM

## 2023-12-29 DIAGNOSIS — R63.8 ABNORMAL CRAVING: ICD-10-CM

## 2023-12-29 DIAGNOSIS — Z00.6 ENCOUNTER FOR EXAMINATION FOR NORMAL COMPARISON OR CONTROL IN CLINICAL RESEARCH PROGRAM: ICD-10-CM

## 2023-12-29 DIAGNOSIS — R79.89 ELEVATED PARATHYROID HORMONE: ICD-10-CM

## 2023-12-29 DIAGNOSIS — F43.21 ADJUSTMENT DISORDER WITH DEPRESSED MOOD: ICD-10-CM

## 2023-12-29 PROCEDURE — 36415 COLL VENOUS BLD VENIPUNCTURE: CPT

## 2023-12-29 PROCEDURE — 99214 OFFICE O/P EST MOD 30 MIN: CPT | Performed by: PHYSICIAN ASSISTANT

## 2023-12-29 RX ORDER — BUPROPION HYDROCHLORIDE 300 MG/1
300 TABLET ORAL EVERY MORNING
Qty: 90 TABLET | Refills: 1 | Status: SHIPPED | OUTPATIENT
Start: 2023-12-29

## 2023-12-29 RX ORDER — SEMAGLUTIDE 2.4 MG/.75ML
2.4 INJECTION, SOLUTION SUBCUTANEOUS WEEKLY
Qty: 3 ML | Refills: 5 | Status: SHIPPED | OUTPATIENT
Start: 2023-12-29

## 2023-12-29 NOTE — PROGRESS NOTES
Assessment/Plan:    Overweight  -s/p RYGB  -Patient is pursuing the Conservative Program  -Initial weight loss goal of 5-10% weight loss for improved health  -Denied hx of seizure and glaucoma.  -Did not tolerate Topamax  -Patient denies personal and family history of MEN2 tumors and medullary thyroid/thyroid carcinoma. Patient denies personal history of pancreatitis. (started 11/4/19- 205 lbs). >5 % weight loss. Having positive effects on appetite and helping prevent weight gain. Switched to Wegovy (178) and doing well  -Taking Wellbutrin. +effects on mood  -Tolerated naltrexone, but was having more pain so stopped to give option for opioid pain medication prn.   Vitamins 2/16/23. She will try taking calcium more consistently and will recheck PTH     Initial(Start of MWM 10/15/18): 201.5 lbs  Start of Saxenda (now on Wegovy): (205 lbs, per 11/8/19)  Current: 167.9 (-4lbs since last visit)  Change: -37.1  lb  Goal: 160-170s lbs and maintain    Follow up in approximately 6 months with Non-Surgical Physician/Advanced Practitioner.    Goals:  Food log (ie.) www.[employer].com,sparkpeople.com,OnHandit.com,[employer].com,etc. baritastic  No sugary beverages. At least 64oz of water daily.  Increase physical activity by 10 minutes daily. Gradually increase physical activity to a goal of 5 days per week for 30 minutes of MODERATE intensity PLUS 2 days per week of FULL BODY resistance training  Practice lesson plans 1-6 in bariatric manual  and Practice 30/60 rule  Goal protein intake of 60-80 grams per day  Alcohol and nicotine use is not recommended following bariatric surgery  NSAIDs (Motrin, Advil, Aleve, Naproxen, ibuprofen, etc) should be avoided following bariatric surgery    Diagnoses and all orders for this visit:    Overweight  -     Semaglutide-Weight Management (Wegovy) 2.4 MG/0.75ML; Inject 0.75 mL (2.4 mg total) under the skin once a week  -     PTH, intact; Future    Elevated parathyroid hormone  -     PTH,  "intact; Future    Body mass index 28.0-28.9, adult  -     buPROPion (WELLBUTRIN XL) 300 mg 24 hr tablet; Take 1 tablet (300 mg total) by mouth every morning  -     PTH, intact; Future    Adjustment disorder with depressed mood  -     buPROPion (WELLBUTRIN XL) 300 mg 24 hr tablet; Take 1 tablet (300 mg total) by mouth every morning  -     PTH, intact; Future    Abnormal craving  -     buPROPion (WELLBUTRIN XL) 300 mg 24 hr tablet; Take 1 tablet (300 mg total) by mouth every morning  -     PTH, intact; Future        Subjective:   Chief Complaint   Patient presents with    Follow-up     MWM- 5mth f/u, Waist 33.5in     Patient ID: Mercy Garcia  is a 63 y.o. female with excess weight/obesity here to pursue weight management.  Past Medical History:   Diagnosis Date    Anemia 3/2021    Anxiety 2004    Anxiety disorder     Arthritis     osteoarthritis hip/poss knuckles\"    Basal cell carcinoma 2022    midline inferior forehead    Calculus of gallbladder with acute cholecystitis 2023    Cancer (HCC)     Cervical disc herniation     C7//sees chiropracter and no recent problems    Exercise involving walking     [phone] steps per day    Full thickness rotator cuff tear 2020     1 para 1     Hearing loss     left ear    History of bariatric surgery     History of non anemic vitamin B12 deficiency     History of prediabetes     before bariatric surgery    Hyperlipidemia     not on meds    Hypertension     not on meds    Iron deficiency anemia     infusions determined by lab work    Irregular heart beat     \"history bigeminy from taking benadryl, tries to avoid taking it\"no recent issues\"    Motion sickness     \"on rides\"    Neck pain     occas    Nondisplaced fracture of proximal phalanx of left lesser toe(s), initial encounter for closed fracture 2019    OA (osteoarthritis) of hip     Obesity     Oral herpes simplex infection     occas    Other insomnia 2020    Postmenopausal     Sciatica  " "   Vertigo     Wears glasses     Wears glasses      HPI:  The patient presents for [facility] [place]    Wegovy- has some mild nausea day or so after injection, but not bothersome and is tolerating. Refilled.     Wellbutrin- helpful for mood. Refilled     Has been indulging some with the holidays, but has otherwise doing well. Doing better with water. Finds not snacking between meal helpful for her    Increased social connection/events make exercise more challenging     Doing well with vitamins overall, except calcium     The following portions of the patient's history were reviewed and updated as appropriate: allergies, current medications, past family history, past medical history, past social history, past surgical history, and problem list.    Review of Systems   Psychiatric/Behavioral:          Finds crying less often, overall feels she is doing well     Objective:    /77 (BP Location: Right arm, Patient Position: Sitting)   Pulse 79   Resp 16   Ht 5' 3\" (1.6 m)   Wt 74.2 kg (163 lb 9.6 oz)   LMP 05/01/2005 (Within Months)   BMI 28.98 kg/m²     Physical Exam  Vitals and nursing note reviewed.     Constitutional   General appearance: Abnormal.  well developed and overweight.   Pulmonary   Respiratory effort: No increased work of breathing or signs of respiratory distress.   Musculoskeletal   Gait and station: Normal.    Psychiatric   Orientation to person, place and time: Normal.    Affect: appropriate    30 minute visit, >50% time spent counseling patient on diet behavior and exercise modification for weight loss.  "

## 2023-12-29 NOTE — ASSESSMENT & PLAN NOTE
-s/p RYGB  -Patient is pursuing the Conservative Program  -Initial weight loss goal of 5-10% weight loss for improved health  -Denied hx of seizure and glaucoma.  -Did not tolerate Topamax  -Patient denies personal and family history of MEN2 tumors and medullary thyroid/thyroid carcinoma. Patient denies personal history of pancreatitis. (started 11/4/19- 205 lbs). >5 % weight loss. Having positive effects on appetite and helping prevent weight gain. Switched to Wegovy (178) and doing well  -Taking Wellbutrin. +effects on mood  -Tolerated naltrexone, but was having more pain so stopped to give option for opioid pain medication prn.   Vitamins 2/16/23. She will try taking calcium more consistently and will recheck PTH     Initial(Start of MWM 10/15/18): 201.5 lbs  Start of Saxenda (now on Wegovy): (205 lbs, per 11/8/19)  Current: 167.9 (-4lbs since last visit)  Change: -37.1  lb  Goal: 160-170s lbs and maintain

## 2024-01-10 DIAGNOSIS — F41.1 GENERALIZED ANXIETY DISORDER: ICD-10-CM

## 2024-01-10 RX ORDER — ALPRAZOLAM 0.5 MG/1
0.5 TABLET ORAL
Qty: 30 TABLET | Refills: 0 | Status: SHIPPED | OUTPATIENT
Start: 2024-01-10

## 2024-01-10 NOTE — TELEPHONE ENCOUNTER
Medication: xanax 1 mg tablet  PDMP 12/13/2023 12/13/2023 ALPRAZolam (Tablet) 30.0 30 0.5 MG NA [facility]  Active agreement on file -No

## 2024-01-15 ENCOUNTER — TELEPHONE (OUTPATIENT)
Dept: BARIATRICS | Facility: CLINIC | Age: 64
End: 2024-01-15

## 2024-01-15 DIAGNOSIS — E66.3 OVERWEIGHT: ICD-10-CM

## 2024-01-15 RX ORDER — SEMAGLUTIDE 2.4 MG/.75ML
2.4 INJECTION, SOLUTION SUBCUTANEOUS WEEKLY
Qty: 3 ML | Refills: 5 | Status: SHIPPED | OUTPATIENT
Start: 2024-01-15

## 2024-01-15 NOTE — TELEPHONE ENCOUNTER
----- Message from Tatiana Romero sent at 1/15/2024  7:17 AM EST -----  Regarding: FW: Huber  Contact: [phone]    ----- Message -----  From: Mercy Garcia  Sent: 1/13/2024   5:26 PM EST  To: [facility]  Subject: Huber Escobar,   I didn't realize at my appointment last month I had no refills on my Wegovy.  Would you please re-order for me so I can pick it up later today or tomorrow?  Thanks in advance.    [facility]

## 2024-01-30 LAB
APOB+LDLR+PCSK9 GENE MUT ANL BLD/T: NOT DETECTED
BRCA1+BRCA2 DEL+DUP + FULL MUT ANL BLD/T: NOT DETECTED
MLH1+MSH2+MSH6+PMS2 GN DEL+DUP+FUL M: NOT DETECTED

## 2024-02-06 DIAGNOSIS — F41.1 GENERALIZED ANXIETY DISORDER: ICD-10-CM

## 2024-02-06 RX ORDER — ALPRAZOLAM 0.5 MG/1
0.5 TABLET ORAL
Qty: 30 TABLET | Refills: 0 | Status: SHIPPED | OUTPATIENT
Start: 2024-02-06

## 2024-02-06 NOTE — TELEPHONE ENCOUNTER
Medication:  PDMP   01/10/2024 01/10/2024 ALPRAZolam (Tablet) 30.0 30 0.5 MG NA MOHSEN JACOBS     Active agreement on file -No

## 2024-02-20 NOTE — PROGRESS NOTES
[facility]  Well Woman Annual Exam  Florencio Mitchell MD  22      Assessment   60 y o  postmenopausal female female with no concerns for annual examination- normal well-woman examination today  Plan     1  Cervical cancer screening: The patient previously had NILM, HPV neg pap in 2020 and is due again in 5 years- patient would prefer to have Pap smear completed next year    The current ASCCP guidelines were reviewed  The low risk patient will receive pap smear screening every 3 years or pap with HPV co-testing every 5 years  2  STD screening:  The patient is not sexually active and therefore STD testing was not performed  3  Breast cancer screening:  Last Mammogram: 2021- Birads 2, mammogram ordered today  Reviewed ACOG recommendations of yearly mammogram for breast cancer screening   4  Colon cancer screening: Last Colonoscopy 2021, Patient due in 10 years  5  Osteopenia- last DEXA scan 2022, follows with Dr Dominic Hamm  I emphasized the importance of an annual pelvic and breast exam    I have discussed the importance of monthly self-breast exams, exercise and healthy diet as well as adequate intake of calcium and vitamin D  All questions have been answered to her satisfaction  __________________________________________________________________      Subjective     60 y o  postmenopausal female presenting for annual exam   She reports the following concerns: None    GYN  Complaints: denies  Denies genital discharge, genital ulcers, pelvic pain and vulvar/vaginal symptoms  Menopause occurred at age 44s  She has had no bleeding since this time  Menopausal symptoms: decreased libido, depression, dry skin, hot flashes, insomnia, moodiness, no energy  Sexually active: No,  recently passed away  Hx Abnormal pap:  In 24s had cryosurgery for CARMEN 3  Last pap: 2020- NILM, HPV neg    OB     Pregnancy complications: denies      Complaints: denies  Denies urinary frequency, Make follow up appt w ENT routinely.   hematuria, urinary hesitancy, urinary retention and dysuria  Sometimes urge incontinence     BREAST  Complaints: denies  Denies: breast lump, breast tenderness, changed mole, dryness, nipple discharge, pruritus, rash, skin color change and skin lesion(s)  Last mammogram:  2021- Birads 2  Personal hx: deneis  Family hx: denies fhx ofuterine, ovarian, or colon cancers  Paternal Aunt and niece had breast cancer  Patient does not do regular self-exams    GENERAL  PMH reviewed/updated and is as below  Patient does follow with a PCP  Works as a a nurse for [employer]- [employer]  Denies domestic violence    Exercise: active lifestyle  Diet: follows a healthy diet    Past Medical History:   Diagnosis Date   • Anxiety disorder    • Arthritis     osteoarthritis hip/poss knuckles"   • Basal cell carcinoma 2022    midline inferior forehead   • Cancer (HCC)    • Cervical disc herniation     C7//sees chiropracter and no recent problems   • Exercise involving walking     [phone] steps per day   • Full thickness rotator cuff tear 2020   •  1 para 1    • Hearing loss     left ear   • History of bariatric surgery    • History of non anemic vitamin B12 deficiency    • History of prediabetes     before bariatric surgery   • Hyperlipidemia     not on meds   • Hypertension     not on meds   • Iron deficiency anemia     infusions determined by lab work   • Irregular heart beat     "history bigeminy from taking benadryl, tries to avoid taking it"no recent issues"   • Motion sickness     "on rides"   • Neck pain     occas   • Nondisplaced fracture of proximal phalanx of left lesser toe(s), initial encounter for closed fracture 2019   • OA (osteoarthritis) of hip    • Obesity    • Oral herpes simplex infection     occas   • Other insomnia 2020   • Postmenopausal    • Sciatica    • Vertigo    • Wears glasses    • Wears glasses        Past Surgical History:   Procedure Laterality Date   • BARIATRIC SURGERY  05/09/2004   • COLPOSCOPY      Cervical Dysplasia   • DENTAL SURGERY      one lower right   • DENTAL SURGERY      Has dental implant; s/p tooth abscess   • EGD     • GASTRIC BYPASS  2004    titanium staples   • GYNECOLOGIC CRYOSURGERY      Cervical Dysplasia   • LUMBAR LAMINECTOMY  1980    L5-S1; No Hardware   • MOHS SURGERY  07/26/2022    midline inferior forehead   • WI COLONOSCOPY FLX DX W/COLLJ SPEC WHEN PFRMD N/A 06/01/2016    Procedure: COLONOSCOPY;  Surgeon: Irina Barone MD;  [facility];   Service: Colorectal   • WI TOTAL HIP ARTHROPLASTY Right 12/16/2020    Procedure: ARTHROPLASTY HIP TOTAL ANTERIOR;  Surgeon: Anna Leblanc MD;  [facility];  Service: Orthopedics   • ROTATOR CUFF REPAIR Right 2010   • SKIN CANCER EXCISION      s/p BCC Back and R Clavicle   • WISDOM TOOTH EXTRACTION           Current Outpatient Medications:   •  ALPRAZolam (XANAX) 0 25 mg tablet, Take 1 tablet (0 25 mg total) by mouth daily at bedtime as needed for anxiety, Disp: 30 tablet, Rfl: 0  •  buPROPion (WELLBUTRIN XL) 300 mg 24 hr tablet, Take 1 tablet (300 mg total) by mouth every morning, Disp: 30 tablet, Rfl: 2  •  Calcium Carbonate-Vitamin D (Calcium 600-D) 600-400 MG-UNIT per tablet, Take 2 capsules by mouth 2 (two) times a day  , Disp: , Rfl:   •  Cholecalciferol (Vitamin D) 50 MCG (2000 UT) CAPS, Take by mouth in the morning, Disp: , Rfl:   •  cyclobenzaprine (FLEXERIL) 10 mg tablet, Take 10 mg by mouth 3 (three) times a day as needed for muscle spasms, Disp: , Rfl:   •  fluorouracil (EFUDEX) 5 % cream, , Disp: , Rfl:   •  Meclizine HCl (ANTIVERT PO), Take 1 tablet by mouth as needed Rx per prior PCP, Disp: , Rfl:   •  Multiple Vitamins-Minerals (BARIATRIC FUSION PO), Take 1 tablet by mouth daily, Disp: , Rfl:   •  Semaglutide-Weight Management (Wegovy) 2 4 MG/0 75ML, Inject 0 75 mL (2 4 mg total) under the skin once a week, Disp: 3 mL, Rfl: 2  •  topiramate (Topamax) 50 MG tablet, Take 1 tablet (50 mg total) by mouth 2 (two) times a day, Disp: 60 tablet, Rfl: 2  •  Insulin Pen Needle (NOVOFINE PLUS) 32G X 4 MM MISC, by Does not apply route daily (Patient taking differently: Use daily Rx per Weight Management for Saxenda), Disp: 30 each, Rfl: 3  •  Semaglutide-Weight Management (WEGOVY) 1 7 MG/0 75ML, Inject 0 75 mL (1 7 mg total) under the skin once a week For 4 weeks then increase to 2 4 mg weekly, Disp: 3 mL, Rfl: 0    Allergies   Allergen Reactions   • Macrodantin [Nitrofurantoin]      myalgias   • Sulfa Antibiotics Swelling     Tongue swelling   • Dilaudid [Hydromorphone] Vomiting       Social History     Socioeconomic History   • Marital status:      Spouse name: Not on file   • Number of children: Not on file   • Years of education: Not on file   • Highest education level: Not on file   Occupational History   • Not on file   Tobacco Use   • Smoking status: Never   • Smokeless tobacco: Never   Vaping Use   • Vaping Use: Never used   Substance and Sexual Activity   • Alcohol use:  Yes     Alcohol/week: 1 0 standard drink     Types: 1 Shots of liquor per week     Comment: rarely/holidays   • Drug use: Not Currently     Types: Marijuana     Comment: Last marijuana use in college   • Sexual activity: Not Currently     Partners: Male     Birth control/protection: Post-menopausal   Other Topics Concern   • Not on file   Social History Narrative        Lives with  who has ESRD, Son lives in [place] and stays at home every other weekend    Has 1 Child - 1 Son    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[employer]     Financial Resource Strain: Not on file   Food Insecurity: Not on file   Transportation Needs: Not on file   Physical Activity: Not on file   Stress: Not on file   Social Connections: Not on file   Intimate Partner Violence: Not on file   Housing Stability: Not on file     Review of Systems  Per HPI    Objective  /80 (BP Location: Left arm, Patient Position: Sitting, Cuff Size: Standard)   Wt 81 8 kg (180 lb 6 4 oz)   LMP 05/01/2005 (Within Months)   BMI 30 02 kg/m²      Physical Exam:  Physical Exam  Vitals reviewed  Constitutional:       General: She is not in acute distress  Appearance: She is well-developed and well-nourished  She is not diaphoretic  HENT:      Head: Normocephalic and atraumatic  Cardiovascular:      Rate and Rhythm: Normal rate and regular rhythm  Heart sounds: Normal heart sounds  No murmur heard  No friction rub  No gallop  Pulmonary:      Effort: Pulmonary effort is normal  No respiratory distress  Breath sounds: Normal breath sounds  No wheezing or rales  Abdominal:      Palpations: Abdomen is soft  Tenderness: There is no abdominal tenderness  There is no guarding or rebound  Genitourinary:     Vagina: Normal       Comments: Vulva appears mildly atrophic with no lesions, on speculum exam the vagina appeared mildly atrophic with no lesions, minimal thin white discharge, cervix appears normal with no lesions  Musculoskeletal:      Cervical back: Normal range of motion and neck supple  Right lower leg: Edema present  Left lower leg: Edema present  Skin:     General: Skin is warm and dry  Neurological:      Mental Status: She is alert and oriented to person, place, and time     Psychiatric:         Mood and Affect: Mood and affect and mood normal          Behavior: Behavior normal        Breast inspection negative, no nipple discharge or bleeding, no masses or nodularity palpable

## 2024-03-06 DIAGNOSIS — F41.1 GENERALIZED ANXIETY DISORDER: ICD-10-CM

## 2024-03-06 RX ORDER — ALPRAZOLAM 0.5 MG/1
0.5 TABLET ORAL
Qty: 30 TABLET | Refills: 0 | Status: SHIPPED | OUTPATIENT
Start: 2024-03-06

## 2024-03-06 NOTE — TELEPHONE ENCOUNTER
JAYLA met with pt at bedside regarding SNF choice. Pt elected for referrals to be sent to 1) [facility] and 2) [facility].  Choice faxed to EDITH Turk.    JAYLA called pt's daughter Yady and provided updates to d/c plan per pt request.    Medication:  PDMP   02/07/2024 02/06/2024 ALPRAZolam (Tablet) 30.0 30 0.5 MG NA MOHSEN JACOBS     Active agreement on file -No

## 2024-03-11 ENCOUNTER — APPOINTMENT (OUTPATIENT)
Dept: LAB | Facility: HOSPITAL | Age: 64
End: 2024-03-11
Payer: COMMERCIAL

## 2024-03-11 DIAGNOSIS — Z98.84 HISTORY OF GASTRIC BYPASS: ICD-10-CM

## 2024-03-11 DIAGNOSIS — K91.2 POSTSURGICAL MALABSORPTION: ICD-10-CM

## 2024-03-11 LAB
BASOPHILS # BLD AUTO: 0.03 THOUSANDS/ÂΜL (ref 0–0.1)
BASOPHILS NFR BLD AUTO: 1 % (ref 0–1)
EOSINOPHIL # BLD AUTO: 0.08 THOUSAND/ÂΜL (ref 0–0.61)
EOSINOPHIL NFR BLD AUTO: 1 % (ref 0–6)
ERYTHROCYTE [DISTWIDTH] IN BLOOD BY AUTOMATED COUNT: 12 % (ref 11.6–15.1)
FERRITIN SERPL-MCNC: 208 NG/ML (ref 11–307)
HCT VFR BLD AUTO: 41.5 % (ref 34.8–46.1)
HGB BLD-MCNC: 13.3 G/DL (ref 11.5–15.4)
IMM GRANULOCYTES # BLD AUTO: 0.05 THOUSAND/UL (ref 0–0.2)
IMM GRANULOCYTES NFR BLD AUTO: 1 % (ref 0–2)
IRON SATN MFR SERPL: 29 % (ref 15–50)
IRON SERPL-MCNC: 89 UG/DL (ref 50–212)
LYMPHOCYTES # BLD AUTO: 1.83 THOUSANDS/ÂΜL (ref 0.6–4.47)
LYMPHOCYTES NFR BLD AUTO: 32 % (ref 14–44)
MCH RBC QN AUTO: 30.8 PG (ref 26.8–34.3)
MCHC RBC AUTO-ENTMCNC: 32 G/DL (ref 31.4–37.4)
MCV RBC AUTO: 96 FL (ref 82–98)
MONOCYTES # BLD AUTO: 0.56 THOUSAND/ÂΜL (ref 0.17–1.22)
MONOCYTES NFR BLD AUTO: 10 % (ref 4–12)
NEUTROPHILS # BLD AUTO: 3.09 THOUSANDS/ÂΜL (ref 1.85–7.62)
NEUTS SEG NFR BLD AUTO: 55 % (ref 43–75)
NRBC BLD AUTO-RTO: 0 /100 WBCS
PLATELET # BLD AUTO: 212 THOUSANDS/UL (ref 149–390)
PMV BLD AUTO: 10.8 FL (ref 8.9–12.7)
RBC # BLD AUTO: 4.32 MILLION/UL (ref 3.81–5.12)
RETICS # AUTO: NORMAL 10*3/UL (ref 14097–95744)
RETICS # CALC: 1.44 % (ref 0.37–1.87)
TIBC SERPL-MCNC: 302 UG/DL (ref 250–450)
UIBC SERPL-MCNC: 213 UG/DL (ref 155–355)
WBC # BLD AUTO: 5.64 THOUSAND/UL (ref 4.31–10.16)

## 2024-03-11 PROCEDURE — 85045 AUTOMATED RETICULOCYTE COUNT: CPT

## 2024-03-11 PROCEDURE — 36415 COLL VENOUS BLD VENIPUNCTURE: CPT

## 2024-03-11 PROCEDURE — 83550 IRON BINDING TEST: CPT

## 2024-03-11 PROCEDURE — 82728 ASSAY OF FERRITIN: CPT

## 2024-03-11 PROCEDURE — 83540 ASSAY OF IRON: CPT

## 2024-03-11 PROCEDURE — 85025 COMPLETE CBC W/AUTO DIFF WBC: CPT

## 2024-03-14 ENCOUNTER — OFFICE VISIT (OUTPATIENT)
Dept: HEMATOLOGY ONCOLOGY | Facility: CLINIC | Age: 64
End: 2024-03-14
Payer: COMMERCIAL

## 2024-03-14 VITALS
HEIGHT: 63 IN | WEIGHT: 164 LBS | HEART RATE: 80 BPM | SYSTOLIC BLOOD PRESSURE: 120 MMHG | BODY MASS INDEX: 29.06 KG/M2 | OXYGEN SATURATION: 99 % | DIASTOLIC BLOOD PRESSURE: 78 MMHG | TEMPERATURE: 97.2 F

## 2024-03-14 DIAGNOSIS — Z98.84 HISTORY OF GASTRIC BYPASS: ICD-10-CM

## 2024-03-14 DIAGNOSIS — K91.2 POSTSURGICAL MALABSORPTION: Primary | ICD-10-CM

## 2024-03-14 PROCEDURE — 99214 OFFICE O/P EST MOD 30 MIN: CPT | Performed by: PHYSICIAN ASSISTANT

## 2024-03-14 NOTE — PROGRESS NOTES
[address]  [facility] [place]  [address] [address]  Hematology Ambulatory Follow-Up  Mercy Garcia, 1960, [id number]  3/14/2024      Assessment and Plan   1. Postsurgical malabsorption  2. History of gastric bypass  This is a 63-year-old female with history of Graciela-en-Y gastric bypass in 2004, who has had iron deficiency in the past.  Patient has been under observation with hematology every 6 months.    Patient continues to follow-up with bariatric clinic once a year in July.  Iron studies are stable.  With the patient taking multivitamin regularly, follow-up with hematology yearly is sufficient.  Pt and I discussed in 1 years time following up with blood work however, if iron panel remained stable, patient can then follow-up as needed as she continues with the yearly bariatric clinic.    - Iron Panel (Includes Ferritin, Iron Sat%, Iron, and TIBC); Future  - CBC and differential; Future  - Reticulocytes; Future      The patient is scheduled for follow-up in approximately 1 year.     Patient voiced agreement and understanding to the above.   Patient advised to call the Hematology/Oncology office with any questions and concerns regarding the above.    Barrier(s) to care: None.  The patient is able to self care.    FLORENCIA Sheikh-C  Medical Oncology/Hematology  [facility]    Subjective     Chief Complaint   Patient presents with    Follow-up       History of present illness:   This is a 63-year-old female with past medical history of bariatric bypass in 2004.  Patient was initially referred to hematology in 2021 secondary to iron deficiency anemia.    Trends:  5/17/2021 ferritin = 6, iron saturation 10%  IV iron treatments tolerated without significant toxicity-Venofer 300 mg x 5 doses  9/1/2021 ferritin = 166, iron saturation 22%, white blood cell count 4.96, hemoglobin 13.5  1/7/2022 hemoglobin = 13.7, MCV 99, B12 934,  ferritin 181  11/30/2022 ferritin = 321, iron saturation 24%, white blood cell count = 6.36, hemoglobin 13.5, MCV 96, platelet count 229  6/3/2023 ferritin = 252, iron saturation 34%, WBC = 4.9, hemoglobin 12.8, platelet count 205, MCV 97.  311/24 WBC 5.6, hemoglobin 13.3, MCV 96, platelet count 212, iron saturation 29, ferritin 208    Last colonoscopy:01/05/2022  Last Pap smear: 2020  Last mammogram: 03/16/2023  Last Dexa: May 2022, new osteopenia.     Interval history: No major changes.  Continue with bariatric vitamin.    Review of Systems   Constitutional:  Negative for appetite change, fatigue, fever and unexpected weight change.   HENT:  Negative for nosebleeds.    Respiratory:  Negative for cough, choking and shortness of breath.         Negative hemoptysis.   Cardiovascular:  Negative for chest pain, palpitations and leg swelling.   Gastrointestinal: Negative.  Negative for abdominal distention, abdominal pain, anal bleeding, blood in stool, constipation, diarrhea, nausea and vomiting.   Endocrine: Negative.  Negative for cold intolerance.   Genitourinary: Negative.  Negative for hematuria, menstrual problem, vaginal bleeding, vaginal discharge and vaginal pain.   Musculoskeletal: Negative.  Negative for arthralgias, myalgias, neck pain and neck stiffness.   Skin: Negative.  Negative for color change, pallor and rash.   Allergic/Immunologic: Negative.  Negative for immunocompromised state.   Neurological: Negative.  Negative for weakness and headaches.   Hematological:  Negative for adenopathy. Does not bruise/bleed easily.   All other systems reviewed and are negative.      Patient Active Problem List   Diagnosis    Anxiety disorder    Vitamin D deficiency    History of gastric bypass    Overweight    Vertigo    Weight gain following gastric bypass surgery    Cervical disc herniation    Primary osteoarthritis of one hip, right    Other insomnia    Lumbar spondylosis    Disorder of bursae of shoulder region  "   Lateral epicondylitis    History of total right hip replacement    Iron deficiency anemia    Postsurgical malabsorption    Osteopenia determined by x-ray     Past Medical History:   Diagnosis Date    Anemia 3/2021    Anxiety 2004    Anxiety disorder     Arthritis     osteoarthritis hip/poss knuckles\"    Basal cell carcinoma 2022    midline inferior forehead    Calculus of gallbladder with acute cholecystitis 2023    Cancer (HCC)     Cervical disc herniation     C7//sees chiropracter and no recent problems    Exercise involving walking     10-08510 steps per day    Full thickness rotator cuff tear 2020     1 para 1     Hearing loss     left ear    History of bariatric surgery     History of non anemic vitamin B12 deficiency     History of prediabetes     before bariatric surgery    Hyperlipidemia     not on meds    Hypertension     not on meds    Iron deficiency anemia     infusions determined by lab work    Irregular heart beat     \"history bigeminy from taking benadryl, tries to avoid taking it\"no recent issues\"    Motion sickness     \"on rides\"    Neck pain     occas    Nondisplaced fracture of proximal phalanx of left lesser toe(s), initial encounter for closed fracture 2019    OA (osteoarthritis) of hip     Obesity     Oral herpes simplex infection     occas    Other insomnia 2020    Postmenopausal     Sciatica     Vertigo     Wears glasses     Wears glasses      Past Surgical History:   Procedure Laterality Date    BARIATRIC SURGERY  2004    CHOLECYSTECTOMY LAPAROSCOPIC N/A 2023    Procedure: CHOLECYSTECTOMY LAPAROSCOPIC;  Surgeon: Mo Lara DO;  [facility];  Service: General    COLPOSCOPY      Cervical Dysplasia    DENTAL SURGERY      one lower right    DENTAL SURGERY      Has dental implant; s/p tooth abscess    EGD      GASTRIC BYPASS  2004    titanium staples    GYNECOLOGIC CRYOSURGERY      Cervical Dysplasia    JOINT REPLACEMENT  " 2020    R hip arthroplasty    LUMBAR LAMINECTOMY  1980    L5-S1; No Hardware    MOHS SURGERY  07/26/2022    midline inferior forehead    WY ARTHRP ACETBLR/PROX FEM PROSTC AGRFT/ALGRFT Right 12/16/2020    Procedure: ARTHROPLASTY HIP TOTAL ANTERIOR;  Surgeon: Inder Rhodes MD;  [facility];  Service: Orthopedics    WY COLONOSCOPY FLX DX W/COLLJ SPEC WHEN PFRMD N/A 06/01/2016    Procedure: COLONOSCOPY;  Surgeon: JOAQUÍN Pina MD;  [facility]: [facility];  Service: Colorectal    ROTATOR CUFF REPAIR Right 2010    SKIN CANCER EXCISION      s/p BCC Back and R Clavicle    SMALL INTESTINE SURGERY  2004 [facility]    SPINE SURGERY  1990    L5-S1 laminectomy    WISDOM TOOTH EXTRACTION       Family History   Problem Relation Age of Onset    Diabetes Mother     Hypertension Mother     Heart failure Mother         s/p ICD    Diabetes type II Mother 55    Hypothyroidism Mother     Heart attack Mother 89    Hyperlipidemia Mother     Depression Mother     Heart disease Mother     Arthritis Mother     Cancer Father     Heart disease Father     Heart block Father         s/p ICD    Hypertension Father     Prostate cancer Father 70        c Mets    Hyperlipidemia Father     Diabetes Brother     Leukemia Brother 65        CLL    Hyperlipidemia Brother     Basal cell carcinoma Brother     Diabetes type II Brother 60    Osteoarthritis Brother         s/p MVA    No Known Problems Maternal Aunt     No Known Problems Paternal Aunt     Breast cancer Paternal Aunt 60    No Known Problems Maternal Grandmother     No Known Problems Maternal Grandfather     No Known Problems Paternal Grandmother     No Known Problems Paternal Grandfather     OCD Son     Anxiety disorder Son     Hypertension Son     Obesity Son     Breast cancer Cousin 60    Diabetes Family     Arthritis Family     Breast cancer Family      Social History     Socioeconomic History    Marital status:      Spouse name: None    Number of children: None    Years of education:  None    Highest education level: None   Occupational History    None   Tobacco Use    Smoking status: Never     Passive exposure: Never    Smokeless tobacco: Never   Vaping Use    Vaping status: Never Used   Substance and Sexual Activity    Alcohol use: Yes     Alcohol/week: 1.0 standard drink of alcohol     Types: 1 Shots of liquor per week     Comment: rarely/holidays    Drug use: Not Currently     Types: Marijuana     Comment: During college years 5260-1872    Sexual activity: Not Currently     Partners: Male     Birth control/protection: Post-menopausal   Other Topics Concern    None   Social History Narrative        Lives with  who has ESRD, Son lives in [place] and stays at home every other weekend    Has 1 Child - 1 Son    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[employer]     Social Determinants of Health     Financial Resource Strain: Not on file   Food Insecurity: Not on file   Transportation Needs: Not on file   Physical Activity: Not on file   Stress: Not on file   Social Connections: Not on file   Intimate Partner Violence: Not on file   Housing Stability: Not on file       Current Outpatient Medications:     ALPRAZolam (XANAX) 0.5 mg tablet, Take 1 tablet (0.5 mg total) by mouth daily at bedtime as needed for anxiety, Disp: 30 tablet, Rfl: 0    buPROPion (WELLBUTRIN XL) 300 mg 24 hr tablet, Take 1 tablet (300 mg total) by mouth every morning, Disp: 90 tablet, Rfl: 1    Calcium Carbonate-Vitamin D (Calcium 600-D) 600-400 MG-UNIT per tablet, Take 2 capsules by mouth 2 (two) times a day  , Disp: , Rfl:     Cholecalciferol (Vitamin D) 50 MCG (2000 UT) CAPS, Take by mouth in the morning, Disp: , Rfl:     cyclobenzaprine (FLEXERIL) 10 mg tablet, Take 1 tablet (10 mg total) by mouth 3 (three) times a day as needed for muscle spasms (Patient taking differently: Take 10 mg by mouth as needed for muscle spasms), Disp: 30 tablet, Rfl: 3    Meclizine HCl (ANTIVERT PO), Take 25 mg by mouth as needed Rx per prior  "PCP, Disp: , Rfl:     Multiple Vitamins-Minerals (BARIATRIC FUSION PO), Take 1 tablet by mouth daily, Disp: , Rfl:     ondansetron (Zofran ODT) 4 mg disintegrating tablet, Take 1 tablet (4 mg total) by mouth every 6 (six) hours as needed for nausea or vomiting (Patient taking differently: Take 4 mg by mouth as needed for nausea or vomiting), Disp: 20 tablet, Rfl: 0    Semaglutide-Weight Management (Wegovy) 2.4 MG/0.75ML, Inject 0.75 mL (2.4 mg total) under the skin once a week, Disp: 3 mL, Rfl: 5  Allergies   Allergen Reactions    Macrodantin [Nitrofurantoin]      myalgias    Sulfa Antibiotics Swelling and Anaphylaxis     Tongue swelling    Dilaudid [Hydromorphone] Vomiting       Objective   /78 (BP Location: Right arm, Patient Position: Sitting, Cuff Size: Standard)   Pulse 80   Temp (!) 97.2 °F (36.2 °C) (Temporal)   Ht 5' 3\" (1.6 m)   Wt 74.4 kg (164 lb)   LMP 05/01/2005 (Within Months)   SpO2 99%   BMI 29.05 kg/m²    Physical Exam  Constitutional:       General: She is not in acute distress.     Appearance: She is well-developed.   HENT:      Head: Normocephalic and atraumatic.      Mouth/Throat:      Pharynx: No oropharyngeal exudate.   Eyes:      General: No scleral icterus.     Conjunctiva/sclera: Conjunctivae normal.      Pupils: Pupils are equal, round, and reactive to light.   Cardiovascular:      Rate and Rhythm: Normal rate and regular rhythm.      Heart sounds: No murmur heard.  Pulmonary:      Effort: Pulmonary effort is normal. No respiratory distress.      Breath sounds: Normal breath sounds.   Abdominal:      General: Bowel sounds are normal. There is no distension.      Palpations: Abdomen is soft.      Tenderness: There is no abdominal tenderness.   Musculoskeletal:         General: Normal range of motion.      Cervical back: Normal range of motion.   Lymphadenopathy:      Cervical: No cervical adenopathy.   Skin:     General: Skin is warm.      Coloration: Skin is not pale.      " Findings: No rash.   Neurological:      Mental Status: She is alert and oriented to person, place, and time.      Cranial Nerves: No cranial nerve deficit.   Psychiatric:         Thought Content: Thought content normal.         Result Review  Labs:  Appointment on 03/11/2024   Component Date Value Ref Range Status    WBC 03/11/2024 5.64  4.31 - 10.16 Thousand/uL Final    RBC 03/11/2024 4.32  3.81 - 5.12 Million/uL Final    Hemoglobin 03/11/2024 13.3  11.5 - 15.4 g/dL Final    Hematocrit 03/11/2024 41.5  34.8 - 46.1 % Final    MCV 03/11/2024 96  82 - 98 fL Final    MCH 03/11/2024 30.8  26.8 - 34.3 pg Final    MCHC 03/11/2024 32.0  31.4 - 37.4 g/dL Final    RDW 03/11/2024 12.0  11.6 - 15.1 % Final    MPV 03/11/2024 10.8  8.9 - 12.7 fL Final    Platelets 03/11/2024 212  149 - 390 Thousands/uL Final    nRBC 03/11/2024 0  /100 WBCs Final    Neutrophils Relative 03/11/2024 55  43 - 75 % Final    Immature Grans % 03/11/2024 1  0 - 2 % Final    Lymphocytes Relative 03/11/2024 32  14 - 44 % Final    Monocytes Relative 03/11/2024 10  4 - 12 % Final    Eosinophils Relative 03/11/2024 1  0 - 6 % Final    Basophils Relative 03/11/2024 1  0 - 1 % Final    Neutrophils Absolute 03/11/2024 3.09  1.85 - 7.62 Thousands/µL Final    Absolute Immature Grans 03/11/2024 0.05  0.00 - 0.20 Thousand/uL Final    Absolute Lymphocytes 03/11/2024 1.83  0.60 - 4.47 Thousands/µL Final    Absolute Monocytes 03/11/2024 0.56  0.17 - 1.22 Thousand/µL Final    Eosinophils Absolute 03/11/2024 0.08  0.00 - 0.61 Thousand/µL Final    Basophils Absolute 03/11/2024 0.03  0.00 - 0.10 Thousands/µL Final    Retic Ct Abs 03/11/2024 62,200  14,097 - 95,744 Final    Retic Ct Pct 03/11/2024 1.44  0.37 - 1.87 % Final    Iron Saturation 03/11/2024 29  15 - 50 % Final    TIBC 03/11/2024 302  250 - 450 ug/dL Final    Iron 03/11/2024 89  50 - 212 ug/dL Final    Patients treated with metal-binding drugs (ie. Deferoxamine) may have depressed iron values.    UIBC 03/11/2024  213  155 - 355 ug/dL Final    Ferritin 03/11/2024 208  11 - 307 ng/mL Final       Please note:  This report has been generated by a voice recognition software system. Therefore there may be syntax, spelling, and/or grammatical errors. Please call if you have any questions.

## 2024-03-19 ENCOUNTER — HOSPITAL ENCOUNTER (OUTPATIENT)
Dept: RADIOLOGY | Age: 64
Discharge: HOME | End: 2024-03-19
Payer: COMMERCIAL

## 2024-03-19 DIAGNOSIS — Z12.31 VISIT FOR SCREENING MAMMOGRAM: ICD-10-CM

## 2024-03-19 PROCEDURE — 77067 SCR MAMMO BI INCL CAD: CPT

## 2024-03-19 PROCEDURE — 77063 BREAST TOMOSYNTHESIS BI: CPT

## 2024-04-05 DIAGNOSIS — F41.1 GENERALIZED ANXIETY DISORDER: ICD-10-CM

## 2024-04-08 RX ORDER — ALPRAZOLAM 0.5 MG/1
0.5 TABLET ORAL
Qty: 30 TABLET | Refills: 0 | Status: SHIPPED | OUTPATIENT
Start: 2024-04-08

## 2024-04-08 NOTE — TELEPHONE ENCOUNTER
Medication:  PDMP   03/06/2024 03/06/2024 ALPRAZolam (Tablet) 30.0 30 0.5 MG NA MOHSEN JACOBS     Active agreement on file -No

## 2024-05-03 DIAGNOSIS — F41.1 GENERALIZED ANXIETY DISORDER: ICD-10-CM

## 2024-05-06 RX ORDER — ALPRAZOLAM 0.5 MG/1
0.5 TABLET ORAL
Qty: 30 TABLET | Refills: 0 | Status: SHIPPED | OUTPATIENT
Start: 2024-05-06

## 2024-05-06 NOTE — TELEPHONE ENCOUNTER
Medication:  PDMP   04/08/2024 04/08/2024 ALPRAZolam (Tablet) 30.0 30 0.5 MG NA MOHSEN JACOBS     Active agreement on file -No

## 2024-05-13 ENCOUNTER — TELEMEDICINE (OUTPATIENT)
Dept: BARIATRICS | Facility: CLINIC | Age: 64
End: 2024-05-13
Payer: COMMERCIAL

## 2024-05-13 ENCOUNTER — TELEPHONE (OUTPATIENT)
Dept: OTOLARYNGOLOGY | Facility: CLINIC | Age: 64
End: 2024-05-13

## 2024-05-13 ENCOUNTER — CLINICAL SUPPORT (OUTPATIENT)
Dept: BARIATRICS | Facility: CLINIC | Age: 64
End: 2024-05-13

## 2024-05-13 ENCOUNTER — TELEPHONE (OUTPATIENT)
Dept: BARIATRICS | Facility: CLINIC | Age: 64
End: 2024-05-13

## 2024-05-13 VITALS
TEMPERATURE: 97.4 F | SYSTOLIC BLOOD PRESSURE: 122 MMHG | WEIGHT: 160.2 LBS | BODY MASS INDEX: 29.48 KG/M2 | RESPIRATION RATE: 16 BRPM | DIASTOLIC BLOOD PRESSURE: 70 MMHG | HEIGHT: 62 IN | HEART RATE: 76 BPM

## 2024-05-13 DIAGNOSIS — F43.21 ADJUSTMENT DISORDER WITH DEPRESSED MOOD: ICD-10-CM

## 2024-05-13 DIAGNOSIS — E66.3 OVERWEIGHT: ICD-10-CM

## 2024-05-13 DIAGNOSIS — R63.8 ABNORMAL CRAVING: ICD-10-CM

## 2024-05-13 DIAGNOSIS — R63.5 ABNORMAL WEIGHT GAIN: Primary | ICD-10-CM

## 2024-05-13 PROCEDURE — RECHECK

## 2024-05-13 PROCEDURE — 99214 OFFICE O/P EST MOD 30 MIN: CPT | Performed by: PHYSICIAN ASSISTANT

## 2024-05-13 RX ORDER — SEMAGLUTIDE 2.4 MG/.75ML
2.4 INJECTION, SOLUTION SUBCUTANEOUS WEEKLY
Qty: 3 ML | Refills: 5 | Status: SHIPPED | OUTPATIENT
Start: 2024-05-13

## 2024-05-13 RX ORDER — BUPROPION HYDROCHLORIDE 300 MG/1
300 TABLET ORAL EVERY MORNING
Qty: 90 TABLET | Refills: 1 | Status: SHIPPED | OUTPATIENT
Start: 2024-05-13

## 2024-05-13 NOTE — TELEPHONE ENCOUNTER
REKHA to call the [facility] at [phone] to schedule her next virtual appt for mid November.

## 2024-05-13 NOTE — ASSESSMENT & PLAN NOTE
-s/p RYGB  -Patient is pursuing the Conservative Program  -Initial weight loss goal of 5-10% weight loss for improved health  -Denied hx of seizure and glaucoma.  -Did not tolerate Topamax  -Patient denies personal and family history of MEN2 tumors and medullary thyroid/thyroid carcinoma. Patient denies personal history of pancreatitis. (started 11/4/19- 205 lbs). >5 % weight loss. Having positive effects on appetite and helping prevent weight gain. Switched to Wegovy (178) and doing well  -Taking Wellbutrin. +effects on mood  Vitamins 2/16/23. She will try taking calcium more consistently and will recheck PTH  -Screening labs:  vitamin D, B12, b1 reviewed from 12/16/24  -dietary recs provided     Initial(Start of MWM 10/15/18): 201.5 lbs  Start of Saxenda (now on Wegovy): (205 lbs, per 11/8/19)  Current: 160.1 (7 lbs since last visit)  Change: -45 lb  Goal: 160-170s lbs and maintain

## 2024-05-13 NOTE — PROGRESS NOTES
Assessment/Plan:  Overweight  -s/p RYGB  -Patient is pursuing the Conservative Program  -Initial weight loss goal of 5-10% weight loss for improved health  -Denied hx of seizure and glaucoma.  -Did not tolerate Topamax  -Patient denies personal and family history of MEN2 tumors and medullary thyroid/thyroid carcinoma. Patient denies personal history of pancreatitis. (started 11/4/19- 205 lbs). >5 % weight loss. Having positive effects on appetite and helping prevent weight gain. Switched to Wegovy (178) and doing well  -Taking Wellbutrin. +effects on mood  Vitamins 2/16/23. She will try taking calcium more consistently and will recheck PTH  -Screening labs:  vitamin D, B12, b1 reviewed from 12/16/24  -dietary recs provided     Initial(Start of MWM 10/15/18): 201.5 lbs  Start of Saxenda (now on Wegovy): (205 lbs, per 11/8/19)  Current: 160.1 (7 lbs since last visit)  Change: -45 lb  Goal: 160-170s lbs and maintain    Follow up in approximately 6 months with Non-Surgical Physician/Advanced Practitioner.    Wt taken at [facility] office- vitals reviewed and within acceptable limits. See nurse visit note from today 5/13/24    Goals:  Food log (ie.) www.smartclip.com,sparkpeople.com,Tatangoit.com,KarmaKey.com,etc. baritastic  No sugary beverages. At least 64oz of water daily.  Increase physical activity by 10 minutes daily. Gradually increase physical activity to a goal of 5 days per week for 30 minutes of MODERATE intensity PLUS 2 days per week of FULL BODY resistance training  Practice lesson plans 1-6 in bariatric manual  and Practice 30/60 rule  Goal protein intake of 60-80 grams per day  Alcohol and nicotine use is not recommended following bariatric surgery  NSAIDs (Motrin, Advil, Aleve, Naproxen, ibuprofen, etc) should be avoided following bariatric surgery    Diagnoses and all orders for this visit:    Overweight  -     Semaglutide-Weight Management (Wegovy) 2.4 MG/0.75ML; Inject 0.75 mL (2.4 mg total) under  "the skin once a week    Body mass index 29.0-29.9, adult  -     buPROPion (WELLBUTRIN XL) 300 mg 24 hr tablet; Take 1 tablet (300 mg total) by mouth every morning    Adjustment disorder with depressed mood  -     buPROPion (WELLBUTRIN XL) 300 mg 24 hr tablet; Take 1 tablet (300 mg total) by mouth every morning    Abnormal craving  -     buPROPion (WELLBUTRIN XL) 300 mg 24 hr tablet; Take 1 tablet (300 mg total) by mouth every morning        Subjective:   No chief complaint on file.    Patient ID: Mercy Garcia  is a 64 y.o. female with excess weight/obesity here to pursue weight management.  Past Medical History:   Diagnosis Date    Anemia 3/2021    Anxiety 2004    Anxiety disorder     Arthritis     osteoarthritis hip/poss knuckles\"    Basal cell carcinoma 2022    midline inferior forehead    Calculus of gallbladder with acute cholecystitis 2023    Cancer (HCC)     Cervical disc herniation     C7//sees chiropracter and no recent problems    Exercise involving walking     [phone] steps per day    Full thickness rotator cuff tear 2020     1 para 1     Hearing loss     left ear    History of bariatric surgery     History of non anemic vitamin B12 deficiency     History of prediabetes     before bariatric surgery    Hyperlipidemia     not on meds    Hypertension     not on meds    Iron deficiency anemia     infusions determined by lab work    Irregular heart beat     \"history bigeminy from taking benadryl, tries to avoid taking it\"no recent issues\"    Motion sickness     \"on rides\"    Neck pain     occas    Nondisplaced fracture of proximal phalanx of left lesser toe(s), initial encounter for closed fracture 2019    OA (osteoarthritis) of hip     Obesity     Oral herpes simplex infection     occas    Other insomnia 2020    Postmenopausal     Sciatica     Vertigo     Wears glasses     Wears glasses      HPI:  The patient presents for [facility]    Wegovy: Effects wear off by the end of the " week. Takes on a Sunday. Suggested gradually shifting to mid week so she has stronger effect over the summer.Stools are looser since starting Wegovy, but not going frequently.     Doing well with Wellbutrin, feels mood is food    Trying to increase calcium, but gets constipated. Planning to try patches.    Continues staying consistent with lifestyle changes, protein intake good; Working on improving fiber.   Hydration has been better    Hoping as weather is nicer will be more active.     The following portions of the patient's history were reviewed and updated as appropriate: allergies, current medications, past family history, past medical history, past social history, past surgical history, and problem list.    Objective:    LMP 05/01/2005 (Within Months)     Virtual Regular Visit    Verification of patient location:    Patient is located at Home in the following state in which I hold an active license [place]      Assessment/Plan:    Problem List Items Addressed This Visit          Other    Overweight     -s/p RYGB  -Patient is pursuing the Conservative Program  -Initial weight loss goal of 5-10% weight loss for improved health  -Denied hx of seizure and glaucoma.  -Did not tolerate Topamax  -Patient denies personal and family history of MEN2 tumors and medullary thyroid/thyroid carcinoma. Patient denies personal history of pancreatitis. (started 11/4/19- 205 lbs). >5 % weight loss. Having positive effects on appetite and helping prevent weight gain. Switched to Wegovy (178) and doing well  -Taking Wellbutrin. +effects on mood  Vitamins 2/16/23. She will try taking calcium more consistently and will recheck PTH  -Screening labs:  vitamin D, B12, b1 reviewed from 12/16/24  -dietary recs provided     Initial(Start of MWM 10/15/18): 201.5 lbs  Start of Saxenda (now on Wegovy): (205 lbs, per 11/8/19)  Current: 160.1 (7 lbs since last visit)  Change: -45 lb  Goal: 160-170s lbs and maintain         Relevant Medications     "Semaglutide-Weight Management (Wegovy) 2.4 MG/0.75ML     Other Visit Diagnoses       Body mass index 29.0-29.9, adult        Relevant Medications    buPROPion (WELLBUTRIN XL) 300 mg 24 hr tablet    Adjustment disorder with depressed mood        Relevant Medications    Semaglutide-Weight Management (Wegovy) 2.4 MG/0.75ML    buPROPion (WELLBUTRIN XL) 300 mg 24 hr tablet    Abnormal craving        Relevant Medications    buPROPion (WELLBUTRIN XL) 300 mg 24 hr tablet                 Reason for visit is No chief complaint on file.       Encounter provider Luz Yo PA-C      Recent Visits  No visits were found meeting these conditions.  Showing recent visits within past 7 days and meeting all other requirements  Today's Visits  Date Type Provider Dept   05/13/24 Telemedicine Luz Yo PA-C  Weight Management [facility] [place]   Showing today's visits and meeting all other requirements  Future Appointments  No visits were found meeting these conditions.  Showing future appointments within next 150 days and meeting all other requirements       The patient was identified by name and date of birth. Mercy Garcia was informed that this is a telemedicine visit and that the visit is being conducted through the Epic Embedded platform. She agrees to proceed..  My office door was closed. No one else was in the room.  She acknowledged consent and understanding of privacy and security of the video platform. The patient has agreed to participate and understands they can discontinue the visit at any time.    Patient is aware this is a billable service.     Subjective  Mercy Garcia is a 64 y.o. female for [facility] f/u .    HPI     Past Medical History:   Diagnosis Date    Anemia 3/2021    Anxiety 2004    Anxiety disorder     Arthritis     osteoarthritis hip/poss knuckles\"    Basal cell carcinoma 05/27/2022    midline inferior forehead    Calculus of gallbladder with acute cholecystitis 05/05/2023    Cancer (HCC)     Cervical disc " "herniation     C7//sees chiropracter and no recent problems    Exercise involving walking     10-18098 steps per day    Full thickness rotator cuff tear 2020     1 para 1     Hearing loss     left ear    History of bariatric surgery     History of non anemic vitamin B12 deficiency     History of prediabetes     before bariatric surgery    Hyperlipidemia     not on meds    Hypertension     not on meds    Iron deficiency anemia     infusions determined by lab work    Irregular heart beat     \"history bigeminy from taking benadryl, tries to avoid taking it\"no recent issues\"    Motion sickness     \"on rides\"    Neck pain     occas    Nondisplaced fracture of proximal phalanx of left lesser toe(s), initial encounter for closed fracture 2019    OA (osteoarthritis) of hip     Obesity     Oral herpes simplex infection     occas    Other insomnia 2020    Postmenopausal     Sciatica     Vertigo     Wears glasses     Wears glasses        Past Surgical History:   Procedure Laterality Date    BARIATRIC SURGERY  2004    CHOLECYSTECTOMY LAPAROSCOPIC N/A 2023    Procedure: CHOLECYSTECTOMY LAPAROSCOPIC;  Surgeon: Mo Lara DO;  [facility];  Service: General    COLPOSCOPY      Cervical Dysplasia    DENTAL SURGERY      one lower right    DENTAL SURGERY      Has dental implant; s/p tooth abscess    EGD      GASTRIC BYPASS      titanium staples    GYNECOLOGIC CRYOSURGERY      Cervical Dysplasia    JOINT REPLACEMENT      R hip arthroplasty    LUMBAR LAMINECTOMY  1980    L5-S1; No Hardware    MOHS SURGERY  2022    midline inferior forehead    [place] ARTHRP ACETBLR/PROX FEM PROSTC AGRFT/ALGRFT Right 2020    Procedure: ARTHROPLASTY HIP TOTAL ANTERIOR;  Surgeon: Inder Rhodes MD;  [facility];  Service: Orthopedics    [place] COLONOSCOPY FLX DX W/COLLJ SPEC WHEN PFRMD N/A 2016    Procedure: COLONOSCOPY;  Surgeon: JOAQUÍN Pina MD;  [facility]: [facility];  " Service: Colorectal    ROTATOR CUFF REPAIR Right 2010    SKIN CANCER EXCISION      s/p BCC Back and R Clavicle    SMALL INTESTINE SURGERY  2004 [facility]    SPINE SURGERY  1990    L5-S1 laminectomy    WISDOM TOOTH EXTRACTION         Current Outpatient Medications   Medication Sig Dispense Refill    buPROPion (WELLBUTRIN XL) 300 mg 24 hr tablet Take 1 tablet (300 mg total) by mouth every morning 90 tablet 1    Semaglutide-Weight Management (Wegovy) 2.4 MG/0.75ML Inject 0.75 mL (2.4 mg total) under the skin once a week 3 mL 5    ALPRAZolam (XANAX) 0.5 mg tablet Take 1 tablet (0.5 mg total) by mouth daily at bedtime as needed for anxiety (Patient taking differently: Take 0.5 mg by mouth as needed for anxiety) 30 tablet 0    Calcium Carbonate-Vitamin D (Calcium 600-D) 600-400 MG-UNIT per tablet Take 2 capsules by mouth 2 (two) times a day        Cholecalciferol (Vitamin D) 50 MCG (2000 UT) CAPS Take by mouth in the morning      cyclobenzaprine (FLEXERIL) 10 mg tablet Take 1 tablet (10 mg total) by mouth 3 (three) times a day as needed for muscle spasms (Patient taking differently: Take 10 mg by mouth as needed for muscle spasms) 30 tablet 3    Meclizine HCl (ANTIVERT PO) Take 25 mg by mouth as needed Rx per prior PCP      Multiple Vitamins-Minerals (BARIATRIC FUSION PO) Take 1 tablet by mouth daily      ondansetron (Zofran ODT) 4 mg disintegrating tablet Take 1 tablet (4 mg total) by mouth every 6 (six) hours as needed for nausea or vomiting (Patient taking differently: Take 4 mg by mouth as needed for nausea or vomiting) 20 tablet 0     No current facility-administered medications for this visit.        Allergies   Allergen Reactions    Macrodantin [Nitrofurantoin]      myalgias    Sulfa Antibiotics Swelling and Anaphylaxis     Tongue swelling    Dilaudid [Hydromorphone] Vomiting       Review of Systems   Psychiatric/Behavioral: Negative.       Video Exam    There were no vitals filed for this visit.    Physical Exam  Vitals  and nursing note reviewed.          Visit Time  Total Visit Duration: 20 minutes

## 2024-05-13 NOTE — PROGRESS NOTES
Patient last visit weight:163lb 9.6oz  Patient current visit weight:160.2lb      If you are taking phentermine or other oral weight loss medications, are you experiencing any of the following symptoms:  Headache:   Blurred Vision:   Chest Pain:   Palpitations:  Insomnia:   SPECIFY ORAL MEDICATION AND DOSAGE:     If you are taking an injectable medication,  are you experiencing any of the following symptoms:  Bloating: NO     Nausea:NO  Vomiting: NO  Constipation: NO  Diarrhea:NO  SPECIFY INJECTABLE MEDICATION AND CURRENT DOSAGE: WEGOVY      Vitals:    Is BP less than 100/60?NO  Is BP greater than 140/90?NO  Is HR greater than 100?NO  **If yes to any of the above, have patient relax and repeat in 5-10 minutes**    Repeat values:    Is BP less than 100/60?  Is BP greater than 140/90?  Is HR greater than 100?  **If values remain outside of ranges above, please consult provider for next steps**

## 2024-05-14 ENCOUNTER — TELEPHONE (OUTPATIENT)
Dept: OTOLARYNGOLOGY | Facility: CLINIC | Age: 64
End: 2024-05-14

## 2024-05-14 NOTE — TELEPHONE ENCOUNTER
Spoke to the pharmacy, asked if she still has refills. They told me that a new script was sent in yesterday. It is on profile because she just picked up the end of April.

## 2024-06-07 DIAGNOSIS — F41.1 GENERALIZED ANXIETY DISORDER: ICD-10-CM

## 2024-06-07 NOTE — TELEPHONE ENCOUNTER
Medication:  PDMP     05/06/2024 05/06/2024 ALPRAZolam (Tablet) 30.0 30 0.5 MG NA MOHSEN JACOBS     Active agreement on file -No

## 2024-06-12 RX ORDER — ALPRAZOLAM 0.5 MG/1
0.5 TABLET ORAL
Qty: 30 TABLET | Refills: 0 | Status: SHIPPED | OUTPATIENT
Start: 2024-06-12

## 2024-06-15 ENCOUNTER — APPOINTMENT (OUTPATIENT)
Dept: LAB | Facility: CLINIC | Age: 64
End: 2024-06-15
Payer: COMMERCIAL

## 2024-06-15 DIAGNOSIS — Z00.8 HEALTH EXAMINATION IN POPULATION SURVEY: ICD-10-CM

## 2024-06-15 LAB
ALBUMIN SERPL BCP-MCNC: 4 G/DL (ref 3.5–5)
ALP SERPL-CCNC: 93 U/L (ref 34–104)
ALT SERPL W P-5'-P-CCNC: 15 U/L (ref 7–52)
ANION GAP SERPL CALCULATED.3IONS-SCNC: 6 MMOL/L (ref 4–13)
AST SERPL W P-5'-P-CCNC: 16 U/L (ref 13–39)
BASOPHILS # BLD AUTO: 0.05 THOUSANDS/ÂΜL (ref 0–0.1)
BASOPHILS NFR BLD AUTO: 1 % (ref 0–1)
BILIRUB SERPL-MCNC: 0.52 MG/DL (ref 0.2–1)
BUN SERPL-MCNC: 17 MG/DL (ref 5–25)
CALCIUM SERPL-MCNC: 10.1 MG/DL (ref 8.4–10.2)
CHLORIDE SERPL-SCNC: 106 MMOL/L (ref 96–108)
CHOLEST SERPL-MCNC: 199 MG/DL
CO2 SERPL-SCNC: 28 MMOL/L (ref 21–32)
CREAT SERPL-MCNC: 0.76 MG/DL (ref 0.6–1.3)
EOSINOPHIL # BLD AUTO: 0.09 THOUSAND/ÂΜL (ref 0–0.61)
EOSINOPHIL NFR BLD AUTO: 2 % (ref 0–6)
ERYTHROCYTE [DISTWIDTH] IN BLOOD BY AUTOMATED COUNT: 12.6 % (ref 11.6–15.1)
EST. AVERAGE GLUCOSE BLD GHB EST-MCNC: 103 MG/DL
GFR SERPL CREATININE-BSD FRML MDRD: 82 ML/MIN/1.73SQ M
GLUCOSE P FAST SERPL-MCNC: 84 MG/DL (ref 65–99)
HBA1C MFR BLD: 5.2 %
HCT VFR BLD AUTO: 41.6 % (ref 34.8–46.1)
HDLC SERPL-MCNC: 94 MG/DL
HGB BLD-MCNC: 13.2 G/DL (ref 11.5–15.4)
IMM GRANULOCYTES # BLD AUTO: 0.01 THOUSAND/UL (ref 0–0.2)
IMM GRANULOCYTES NFR BLD AUTO: 0 % (ref 0–2)
LDLC SERPL CALC-MCNC: 95 MG/DL (ref 0–100)
LYMPHOCYTES # BLD AUTO: 1.5 THOUSANDS/ÂΜL (ref 0.6–4.47)
LYMPHOCYTES NFR BLD AUTO: 31 % (ref 14–44)
MCH RBC QN AUTO: 30.4 PG (ref 26.8–34.3)
MCHC RBC AUTO-ENTMCNC: 31.7 G/DL (ref 31.4–37.4)
MCV RBC AUTO: 96 FL (ref 82–98)
MONOCYTES # BLD AUTO: 0.46 THOUSAND/ÂΜL (ref 0.17–1.22)
MONOCYTES NFR BLD AUTO: 10 % (ref 4–12)
NEUTROPHILS # BLD AUTO: 2.7 THOUSANDS/ÂΜL (ref 1.85–7.62)
NEUTS SEG NFR BLD AUTO: 56 % (ref 43–75)
NONHDLC SERPL-MCNC: 105 MG/DL
NRBC BLD AUTO-RTO: 0 /100 WBCS
PLATELET # BLD AUTO: 205 THOUSANDS/UL (ref 149–390)
PMV BLD AUTO: 10.9 FL (ref 8.9–12.7)
POTASSIUM SERPL-SCNC: 4 MMOL/L (ref 3.5–5.3)
PROT SERPL-MCNC: 6.4 G/DL (ref 6.4–8.4)
RBC # BLD AUTO: 4.34 MILLION/UL (ref 3.81–5.12)
SODIUM SERPL-SCNC: 140 MMOL/L (ref 135–147)
TRIGL SERPL-MCNC: 50 MG/DL
WBC # BLD AUTO: 4.81 THOUSAND/UL (ref 4.31–10.16)

## 2024-06-18 ENCOUNTER — OFFICE VISIT (OUTPATIENT)
Dept: FAMILY MEDICINE CLINIC | Facility: CLINIC | Age: 64
End: 2024-06-18
Payer: COMMERCIAL

## 2024-06-18 VITALS
SYSTOLIC BLOOD PRESSURE: 110 MMHG | HEIGHT: 62 IN | RESPIRATION RATE: 17 BRPM | OXYGEN SATURATION: 100 % | HEART RATE: 74 BPM | BODY MASS INDEX: 28.93 KG/M2 | TEMPERATURE: 97.8 F | WEIGHT: 157.2 LBS | DIASTOLIC BLOOD PRESSURE: 68 MMHG

## 2024-06-18 DIAGNOSIS — M85.80 OSTEOPENIA DETERMINED BY X-RAY: Primary | ICD-10-CM

## 2024-06-18 DIAGNOSIS — E66.3 OVERWEIGHT: ICD-10-CM

## 2024-06-18 DIAGNOSIS — E55.9 VITAMIN D DEFICIENCY: ICD-10-CM

## 2024-06-18 DIAGNOSIS — G47.09 OTHER INSOMNIA: ICD-10-CM

## 2024-06-18 DIAGNOSIS — F41.9 ANXIETY DISORDER, UNSPECIFIED TYPE: ICD-10-CM

## 2024-06-18 DIAGNOSIS — Z23 ENCOUNTER FOR IMMUNIZATION: ICD-10-CM

## 2024-06-18 PROBLEM — Z90.49 HISTORY OF LAPAROSCOPIC CHOLECYSTECTOMY: Status: ACTIVE | Noted: 2023-05-05

## 2024-06-18 PROCEDURE — 99214 OFFICE O/P EST MOD 30 MIN: CPT | Performed by: FAMILY MEDICINE

## 2024-06-18 PROCEDURE — 90471 IMMUNIZATION ADMIN: CPT

## 2024-06-18 PROCEDURE — 90677 PCV20 VACCINE IM: CPT

## 2024-06-18 NOTE — PROGRESS NOTES
"Ambulatory Visit  Name: Mercy Garcia      : 1960      MRN: [id number]  Encounter Provider: Suellen Parker MD  Encounter Date: 2024   Encounter department: [facility]    Assessment & Plan   1. Osteopenia determined by x-ray  Assessment & Plan:  Due for dexa scan   Ordered today    Orders:  -     DXA bone density spine hip and pelvis; Future; Expected date: 2024  2. Overweight  Assessment & Plan:  Stable on wegovy     3. Anxiety disorder, unspecified type  Assessment & Plan:  She take xanax to help her sleep     4. Other insomnia  Assessment & Plan:  Stable on xanax at bedtime  5. Encounter for immunization  -     Pneumococcal Conjugate Vaccine 20-valent (Pcv20)  6. Vitamin D deficiency  Assessment & Plan:  Continue vitamin D as directed         History of Present Illness     HPI  Here for follow up   Feels well overall  No side effects from the medications      Review of Systems   Constitutional: Negative.    HENT: Negative.     Respiratory: Negative.     Cardiovascular: Negative.    Endocrine: Negative.    Genitourinary: Negative.    Musculoskeletal: Negative.    Hematological: Negative.    Psychiatric/Behavioral: Negative.  Negative for agitation.      Past Medical History:   Diagnosis Date   • Anemia 3/2021   • Anxiety    • Anxiety disorder    • Arthritis     osteoarthritis hip/poss knuckles\"   • Basal cell carcinoma 2022    midline inferior forehead   • Calculus of gallbladder with acute cholecystitis 2023   • Cancer (HCC)    • Cervical disc herniation     C7//sees chiropracter and no recent problems   • Exercise involving walking     [phone] steps per day   • Full thickness rotator cuff tear 2020   •  1 para 1    • Hearing loss     left ear   • History of bariatric surgery    • History of non anemic vitamin B12 deficiency    • History of prediabetes     before bariatric surgery   • Hyperlipidemia     not on meds   • Hypertension     not on meds " "  • Iron deficiency anemia     infusions determined by lab work   • Irregular heart beat     \"history bigeminy from taking benadryl, tries to avoid taking it\"no recent issues\"   • Motion sickness     \"on rides\"   • Neck pain     occas   • Nondisplaced fracture of proximal phalanx of left lesser toe(s), initial encounter for closed fracture 06/02/2019   • OA (osteoarthritis) of hip    • Obesity    • Oral herpes simplex infection     occas   • Other insomnia 05/11/2020   • Postmenopausal    • Sciatica    • Vertigo    • Wears glasses    • Wears glasses      Past Surgical History:   Procedure Laterality Date   • BARIATRIC SURGERY  05/09/2004   • CHOLECYSTECTOMY LAPAROSCOPIC N/A 05/05/2023    Procedure: CHOLECYSTECTOMY LAPAROSCOPIC;  Surgeon: Mo Lara DO;  [facility];  Service: General   • COLPOSCOPY      Cervical Dysplasia   • DENTAL SURGERY      one lower right   • DENTAL SURGERY      Has dental implant; s/p tooth abscess   • EGD     • GASTRIC BYPASS  2004    titanium staples   • GYNECOLOGIC CRYOSURGERY      Cervical Dysplasia   • JOINT REPLACEMENT  2020    R hip arthroplasty   • LUMBAR LAMINECTOMY  1980    L5-S1; No Hardware   • MOHS SURGERY  07/26/2022    midline inferior forehead   • [place] ARTHRP ACETBLR/PROX FEM PROSTC AGRFT/ALGRFT Right 12/16/2020    Procedure: ARTHROPLASTY HIP TOTAL ANTERIOR;  Surgeon: Inder Rhodes MD;  [facility];  Service: Orthopedics   • [place] COLONOSCOPY FLX DX W/COLLJ SPEC WHEN PFRMD N/A 06/01/2016    Procedure: COLONOSCOPY;  Surgeon: JOAQUÍN Pina MD;  [facility]: [facility];  Service: Colorectal   • ROTATOR CUFF REPAIR Right 2010   • SKIN CANCER EXCISION      s/p BCC Back and R Clavicle   • SMALL INTESTINE SURGERY  2004 [facility]   • SPINE SURGERY  1990    L5-S1 laminectomy   • WISDOM TOOTH EXTRACTION       Family History   Problem Relation Age of Onset   • Diabetes Mother    • Hypertension Mother    • Heart failure Mother         s/p ICD   • Diabetes type II Mother " 55   • Hypothyroidism Mother    • Heart attack Mother 89   • Hyperlipidemia Mother    • Depression Mother    • Heart disease Mother    • Arthritis Mother    • Cancer Father    • Heart disease Father    • Heart block Father         s/p ICD   • Hypertension Father    • Prostate cancer Father 70        c Mets   • Hyperlipidemia Father    • Diabetes Brother    • Leukemia Brother 65        CLL   • Hyperlipidemia Brother    • Basal cell carcinoma Brother    • Diabetes type II Brother 60   • Osteoarthritis Brother         s/p MVA   • No Known Problems Maternal Aunt    • No Known Problems Paternal Aunt    • Breast cancer Paternal Aunt 60   • No Known Problems Maternal Grandmother    • No Known Problems Maternal Grandfather    • No Known Problems Paternal Grandmother    • No Known Problems Paternal Grandfather    • OCD Son    • Anxiety disorder Son    • Hypertension Son    • Obesity Son    • Breast cancer Cousin 60   • Diabetes Family    • Arthritis Family    • Breast cancer Family      Social History     Tobacco Use   • Smoking status: Never     Passive exposure: Never   • Smokeless tobacco: Never   Vaping Use   • Vaping status: Never Used   Substance and Sexual Activity   • Alcohol use: Yes     Alcohol/week: 1.0 standard drink of alcohol     Types: 1 Shots of liquor per week     Comment: rarely/holidays   • Drug use: Not Currently     Types: Marijuana     Comment: During college years [id number]   • Sexual activity: Not Currently     Partners: Male     Birth control/protection: Post-menopausal     Current Outpatient Medications on File Prior to Visit   Medication Sig   • ALPRAZolam (XANAX) 0.5 mg tablet Take 1 tablet (0.5 mg total) by mouth daily at bedtime as needed for anxiety   • buPROPion (WELLBUTRIN XL) 300 mg 24 hr tablet Take 1 tablet (300 mg total) by mouth every morning   • Calcium Carbonate-Vitamin D (Calcium 600-D) 600-400 MG-UNIT per tablet Take 2 capsules by mouth 2 (two) times a day     • Cholecalciferol  "(Vitamin D) 50 MCG (2000 UT) CAPS Take by mouth in the morning   • cyclobenzaprine (FLEXERIL) 10 mg tablet Take 1 tablet (10 mg total) by mouth 3 (three) times a day as needed for muscle spasms (Patient taking differently: Take 10 mg by mouth as needed for muscle spasms)   • Meclizine HCl (ANTIVERT PO) Take 25 mg by mouth as needed Rx per prior PCP   • Multiple Vitamins-Minerals (BARIATRIC FUSION PO) Take 1 tablet by mouth daily   • Semaglutide-Weight Management (Wegovy) 2.4 MG/0.75ML Inject 0.75 mL (2.4 mg total) under the skin once a week   • [DISCONTINUED] ondansetron (Zofran ODT) 4 mg disintegrating tablet Take 1 tablet (4 mg total) by mouth every 6 (six) hours as needed for nausea or vomiting (Patient taking differently: Take 4 mg by mouth as needed for nausea or vomiting)     Allergies   Allergen Reactions   • Macrodantin [Nitrofurantoin]      myalgias   • Sulfa Antibiotics Swelling and Anaphylaxis     Tongue swelling   • Dilaudid [Hydromorphone] Vomiting     Immunization History   Administered Date(s) Administered   • COVID-19 PFIZER VACCINE 0.3 ML IM 01/22/2021, 02/12/2021, 10/23/2021   • INFLUENZA 10/15/2016, 10/15/2016, 10/10/2017, 10/10/2017, 10/22/2018, 10/22/2018, 10/09/2020, 10/09/2021, 10/24/2022, 11/15/2023   • Influenza, seasonal, injectable 10/01/2012   • Pneumococcal Conjugate Vaccine 20-valent (Pcv20), Polysace 06/18/2024   • Tdap 07/21/2014   • Zoster Vaccine Recombinant 06/23/2020, 09/01/2020     Objective     /68 (BP Location: Left arm, Patient Position: Sitting, Cuff Size: Standard)   Pulse 74   Temp 97.8 °F (36.6 °C) (Tympanic)   Resp 17   Ht 5' 2\" (1.575 m)   Wt 71.3 kg (157 lb 3.2 oz)   LMP 05/01/2005 (Within Months)   SpO2 100%   BMI 28.75 kg/m²     Physical Exam  Vitals and nursing note reviewed.   Constitutional:       Appearance: Normal appearance.   HENT:      Right Ear: Tympanic membrane normal.      Left Ear: Tympanic membrane normal.      Mouth/Throat:      Mouth: " Mucous membranes are moist.   Eyes:      Extraocular Movements: Extraocular movements intact.      Pupils: Pupils are equal, round, and reactive to light.   Cardiovascular:      Rate and Rhythm: Normal rate and regular rhythm.      Pulses: Normal pulses.      Heart sounds: Normal heart sounds.   Pulmonary:      Effort: Pulmonary effort is normal.      Breath sounds: Normal breath sounds.   Musculoskeletal:      Cervical back: Normal range of motion.   Neurological:      General: No focal deficit present.      Mental Status: She is alert and oriented to person, place, and time.   Psychiatric:         Mood and Affect: Mood normal.         Behavior: Behavior normal.

## 2024-07-19 ENCOUNTER — TELEPHONE (OUTPATIENT)
Dept: BARIATRICS | Facility: CLINIC | Age: 64
End: 2024-07-19

## 2024-07-22 DIAGNOSIS — F41.1 GENERALIZED ANXIETY DISORDER: ICD-10-CM

## 2024-07-22 RX ORDER — ALPRAZOLAM 0.5 MG/1
0.5 TABLET ORAL
Qty: 30 TABLET | Refills: 0 | Status: SHIPPED | OUTPATIENT
Start: 2024-07-22

## 2024-07-22 NOTE — TELEPHONE ENCOUNTER
Medication:  PDMP   06/12/2024 06/12/2024 ALPRAZolam (Tablet) 30.0 30 0.5 MG NA SAURABH ARELLANO     Active agreement on file -No          Will forward for review.

## 2024-08-21 DIAGNOSIS — F41.1 GENERALIZED ANXIETY DISORDER: ICD-10-CM

## 2024-08-21 RX ORDER — ALPRAZOLAM 0.5 MG
0.5 TABLET ORAL
Qty: 30 TABLET | Refills: 0 | Status: SHIPPED | OUTPATIENT
Start: 2024-08-21

## 2024-08-21 NOTE — TELEPHONE ENCOUNTER
Medication:  PDMP     07/22/2024 07/22/2024 ALPRAZolam (Tablet) 30.0 30 0.5 MG NA MOHSEN JACOBS     Active agreement on file -No

## 2024-09-19 DIAGNOSIS — M54.50 CHRONIC RIGHT-SIDED LOW BACK PAIN WITHOUT SCIATICA: ICD-10-CM

## 2024-09-19 DIAGNOSIS — F41.1 GENERALIZED ANXIETY DISORDER: ICD-10-CM

## 2024-09-19 DIAGNOSIS — G89.29 CHRONIC RIGHT-SIDED LOW BACK PAIN WITHOUT SCIATICA: ICD-10-CM

## 2024-09-20 RX ORDER — ALPRAZOLAM 0.5 MG
0.5 TABLET ORAL
Qty: 30 TABLET | Refills: 0 | Status: SHIPPED | OUTPATIENT
Start: 2024-09-20

## 2024-09-20 RX ORDER — CYCLOBENZAPRINE HCL 10 MG
10 TABLET ORAL 3 TIMES DAILY PRN
Qty: 30 TABLET | Refills: 0 | Status: SHIPPED | OUTPATIENT
Start: 2024-09-20

## 2024-09-20 NOTE — TELEPHONE ENCOUNTER
Medication:  PDMP     08/21/2024 08/21/2024 ALPRAZolam (Tablet) 30.0 30 0.5 MG NA MOHSEN JACOBS     Active agreement on file -No

## 2024-10-10 PROCEDURE — 88305 TISSUE EXAM BY PATHOLOGIST: CPT | Performed by: STUDENT IN AN ORGANIZED HEALTH CARE EDUCATION/TRAINING PROGRAM

## 2024-10-10 PROCEDURE — 88341 IMHCHEM/IMCYTCHM EA ADD ANTB: CPT | Performed by: STUDENT IN AN ORGANIZED HEALTH CARE EDUCATION/TRAINING PROGRAM

## 2024-10-10 PROCEDURE — 88342 IMHCHEM/IMCYTCHM 1ST ANTB: CPT | Performed by: STUDENT IN AN ORGANIZED HEALTH CARE EDUCATION/TRAINING PROGRAM

## 2024-10-11 ENCOUNTER — LAB REQUISITION (OUTPATIENT)
Dept: LAB | Facility: HOSPITAL | Age: 64
End: 2024-10-11
Payer: COMMERCIAL

## 2024-10-11 DIAGNOSIS — D48.5 NEOPLASM OF UNCERTAIN BEHAVIOR OF SKIN: ICD-10-CM

## 2024-10-16 PROCEDURE — 88305 TISSUE EXAM BY PATHOLOGIST: CPT | Performed by: STUDENT IN AN ORGANIZED HEALTH CARE EDUCATION/TRAINING PROGRAM

## 2024-10-16 PROCEDURE — 88341 IMHCHEM/IMCYTCHM EA ADD ANTB: CPT | Performed by: STUDENT IN AN ORGANIZED HEALTH CARE EDUCATION/TRAINING PROGRAM

## 2024-10-16 PROCEDURE — 88342 IMHCHEM/IMCYTCHM 1ST ANTB: CPT | Performed by: STUDENT IN AN ORGANIZED HEALTH CARE EDUCATION/TRAINING PROGRAM

## 2024-10-20 DIAGNOSIS — F41.1 GENERALIZED ANXIETY DISORDER: ICD-10-CM

## 2024-10-21 RX ORDER — ALPRAZOLAM 0.5 MG
0.5 TABLET ORAL
Qty: 30 TABLET | Refills: 0 | Status: SHIPPED | OUTPATIENT
Start: 2024-10-21

## 2024-10-21 NOTE — TELEPHONE ENCOUNTER
Medication:  PDMP   09/20/2024 09/20/2024 ALPRAZolam (Tablet) 30.0 30 0.5 MG NA MOHSEN JACOBS     Active agreement on file -No

## 2024-11-11 PROCEDURE — 88341 IMHCHEM/IMCYTCHM EA ADD ANTB: CPT | Performed by: STUDENT IN AN ORGANIZED HEALTH CARE EDUCATION/TRAINING PROGRAM

## 2024-11-11 PROCEDURE — 88305 TISSUE EXAM BY PATHOLOGIST: CPT | Performed by: STUDENT IN AN ORGANIZED HEALTH CARE EDUCATION/TRAINING PROGRAM

## 2024-11-11 PROCEDURE — 88342 IMHCHEM/IMCYTCHM 1ST ANTB: CPT | Performed by: STUDENT IN AN ORGANIZED HEALTH CARE EDUCATION/TRAINING PROGRAM

## 2024-11-13 ENCOUNTER — LAB REQUISITION (OUTPATIENT)
Dept: LAB | Facility: HOSPITAL | Age: 64
End: 2024-11-13
Payer: COMMERCIAL

## 2024-11-13 DIAGNOSIS — D48.5 NEOPLASM OF UNCERTAIN BEHAVIOR OF SKIN: ICD-10-CM

## 2024-11-14 ENCOUNTER — NURSE TRIAGE (OUTPATIENT)
Age: 64
End: 2024-11-14

## 2024-11-14 ENCOUNTER — OFFICE VISIT (OUTPATIENT)
Dept: FAMILY MEDICINE CLINIC | Facility: CLINIC | Age: 64
End: 2024-11-14

## 2024-11-14 VITALS
WEIGHT: 153.6 LBS | SYSTOLIC BLOOD PRESSURE: 150 MMHG | OXYGEN SATURATION: 98 % | BODY MASS INDEX: 28.26 KG/M2 | HEART RATE: 75 BPM | DIASTOLIC BLOOD PRESSURE: 100 MMHG | HEIGHT: 62 IN | RESPIRATION RATE: 16 BRPM | TEMPERATURE: 97.5 F

## 2024-11-14 DIAGNOSIS — R03.0 ELEVATED BLOOD PRESSURE READING IN OFFICE WITHOUT DIAGNOSIS OF HYPERTENSION: ICD-10-CM

## 2024-11-14 DIAGNOSIS — R00.2 PALPITATIONS: Primary | ICD-10-CM

## 2024-11-14 RX ORDER — SODIUM FLUORIDE1.1%, POTASSIUM NITRATE 5% 5.8; 57.5 MG/ML; MG/ML
GEL, DENTIFRICE DENTAL
COMMUNITY
Start: 2024-10-04

## 2024-11-14 NOTE — PROGRESS NOTES
"Name: Mercy Garcia      : 1960      MRN: [id number]  Encounter Provider: HERB Hardy  Encounter Date: 2024   Encounter department: [facility]  :  Assessment & Plan  Palpitations  RRR on exam, EKG shows NSR.  Will check labs, reassurance provided and pt is scheduled to see pcp in 1 month.  Encourage increased hydration.  Will follow up if labs are abnormal.  Pt advised to call back if symptoms worsen or persist.    Orders:    POCT ECG    CBC and differential; Future    TSH, 3rd generation with Free T4 reflex; Future    Basic metabolic panel; Future    Elevated blood pressure reading in office without diagnosis of hypertension  No history of HTN, today pt is anxious/distressed about the palpitations she is having so the elevated readings are more likely related to that than an indication of new onset of HTN.  Will check labs, pt returns in 1 month to see pcp.                History of Present Illness     Pt is a 64 y.o. female who is seen today for evaluation of palpitations.  She called the office today with complaint of increased palpitations.  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  Her blood pressure at work was 130/90 with HR in the 70-80s.  Past medical history of bariatric surgery, osteopenia, axniety, iron deficiency anemia, insomnia.  She states several years ago she took benadryl and \"went into bigeminy\" and does not take this anymore.  She says she occasionally gets palpitations since then but intermittently.  This morning she was having them frequently and got concerned.  She denies chest pain, sob, lightheadedness, dizziness, headache, n/v, blurry vision.  She has increased gas/belching and she had 5 bowel movements today, no diarrhea.  She normally says she has more than one bowel movement a day but not that many.  She has no abdominal pain.  She denies an increase in her stress/anxiety " "levels.      Review of Systems       Objective   /100   Pulse 75   Temp 97.5 °F (36.4 °C) (Tympanic)   Resp 16   Ht 5' 2\" (1.575 m)   Wt 69.7 kg (153 lb 9.6 oz)   LMP 05/01/2005 (Within Months)   SpO2 98%   BMI 28.09 kg/m²      Physical Exam  Vitals reviewed.   Constitutional:       General: She is awake. She is not in acute distress.     Appearance: Normal appearance. She is well-developed and well-groomed. She is not ill-appearing.   Eyes:      General: Lids are normal.      Conjunctiva/sclera: Conjunctivae normal.   Neck:      Thyroid: No thyromegaly.      Vascular: Normal carotid pulses. No carotid bruit or JVD.   Cardiovascular:      Rate and Rhythm: Normal rate and regular rhythm.      Pulses: Normal pulses.      Heart sounds: Normal heart sounds. No murmur heard.  Pulmonary:      Effort: Pulmonary effort is normal. No tachypnea, accessory muscle usage or respiratory distress.      Breath sounds: Normal breath sounds.   Musculoskeletal:      Right lower leg: No edema.      Left lower leg: No edema.   Lymphadenopathy:      Cervical: No cervical adenopathy.   Skin:     General: Skin is warm and dry.   Neurological:      Mental Status: She is alert and oriented to person, place, and time.   Psychiatric:         Attention and Perception: Attention normal.         Mood and Affect: Mood is anxious.         Speech: Speech normal.         Behavior: Behavior normal. Behavior is cooperative.         Thought Content: Thought content normal.         Cognition and Memory: Cognition normal.         Judgment: Judgment normal.         "

## 2024-11-14 NOTE — TELEPHONE ENCOUNTER
"Pt calling and c/o heart palpitations.  She states that over the last few years she infrequently experienced palpitations, but this morning they seem more frequent.  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   Per pt the nurse practitioner told her that she heard 2 possible PVCs over a minute.  Pt's /90 and HR in the 70s-80s.  She denies any SOB, chest pain or lightheadedness.  She is already limiting her caffeine intake and does not feel like she is under any unusual stress. She is requesting first available appt for today after 1130.  Scheduled with Beatris at 1430.  ED precautions given.     Reason for Disposition   Skipped or extra beat(s) and occurs 4 or more times per minute    Answer Assessment - Initial Assessment Questions  1. DESCRIPTION: \"Please describe your heart rate or heartbeat that you are having\" (e.g., fast/slow, regular/irregular, skipped or extra beats, \"palpitations\")      Skipping a beat at times   2. ONSET: \"When did it start?\" (e.g., minutes, hours, days)       Ongoing but intermittent but more frequent today   3. DURATION: \"How long does it last\" (e.g., seconds, minutes, hours)        4. PATTERN \"Does it come and go, or has it been constant since it started?\"  \"Does it get worse with exertion?\"   \"Are you feeling it now?\"      *No Answer*  5. TAP: \"Using your hand, can you tap out what you are feeling on a chair or table in front of you, so that I can hear?\" Note: Not all patients can do this.        *No Answer*  6. HEART RATE: \"Can you tell me your heart rate?\" \"How many beats in 15 seconds?\"  Note: Not all patients can do this.        *No Answer*  7. RECURRENT SYMPTOM: \"Have you ever had this before?\" If Yes, ask: \"When was the last time?\" and \"What happened that time?\"       *No Answer*  8. CAUSE: \"What do you think is causing the palpitations?\"      *No Answer*  9. CARDIAC HISTORY: \"Do you have any history of heart disease?\" (e.g., heart attack, " "angina, bypass surgery, angioplasty, arrhythmia)       *No Answer*  10. OTHER SYMPTOMS: \"Do you have any other symptoms?\" (e.g., dizziness, chest pain, sweating, difficulty breathing)        *No Answer*  11. PREGNANCY: \"Is there any chance you are pregnant?\" \"When was your last menstrual period?\"        *No Answer*    Protocols used: Heart Rate and Heartbeat Questions-Adult-[place]    "

## 2024-11-15 ENCOUNTER — APPOINTMENT (OUTPATIENT)
Dept: LAB | Facility: HOSPITAL | Age: 64
End: 2024-11-15
Payer: COMMERCIAL

## 2024-11-15 ENCOUNTER — RESULTS FOLLOW-UP (OUTPATIENT)
Dept: FAMILY MEDICINE CLINIC | Facility: CLINIC | Age: 64
End: 2024-11-15

## 2024-11-15 DIAGNOSIS — R00.2 PALPITATIONS: ICD-10-CM

## 2024-11-15 DIAGNOSIS — R63.8 ABNORMAL CRAVING: ICD-10-CM

## 2024-11-15 DIAGNOSIS — F43.21 ADJUSTMENT DISORDER WITH DEPRESSED MOOD: ICD-10-CM

## 2024-11-15 DIAGNOSIS — R79.89 ELEVATED PARATHYROID HORMONE: ICD-10-CM

## 2024-11-15 DIAGNOSIS — E66.3 OVERWEIGHT: ICD-10-CM

## 2024-11-15 LAB
ANION GAP SERPL CALCULATED.3IONS-SCNC: 6 MMOL/L (ref 4–13)
BASOPHILS # BLD AUTO: 0.03 THOUSANDS/ÂΜL (ref 0–0.1)
BASOPHILS NFR BLD AUTO: 1 % (ref 0–1)
BUN SERPL-MCNC: 12 MG/DL (ref 5–25)
CALCIUM SERPL-MCNC: 10.2 MG/DL (ref 8.4–10.2)
CHLORIDE SERPL-SCNC: 107 MMOL/L (ref 96–108)
CO2 SERPL-SCNC: 28 MMOL/L (ref 21–32)
CREAT SERPL-MCNC: 0.7 MG/DL (ref 0.6–1.3)
EOSINOPHIL # BLD AUTO: 0.08 THOUSAND/ÂΜL (ref 0–0.61)
EOSINOPHIL NFR BLD AUTO: 2 % (ref 0–6)
ERYTHROCYTE [DISTWIDTH] IN BLOOD BY AUTOMATED COUNT: 12.2 % (ref 11.6–15.1)
GFR SERPL CREATININE-BSD FRML MDRD: 91 ML/MIN/1.73SQ M
GLUCOSE P FAST SERPL-MCNC: 83 MG/DL (ref 65–99)
HCT VFR BLD AUTO: 42.4 % (ref 34.8–46.1)
HGB BLD-MCNC: 13.6 G/DL (ref 11.5–15.4)
IMM GRANULOCYTES # BLD AUTO: 0.01 THOUSAND/UL (ref 0–0.2)
IMM GRANULOCYTES NFR BLD AUTO: 0 % (ref 0–2)
LYMPHOCYTES # BLD AUTO: 1.59 THOUSANDS/ÂΜL (ref 0.6–4.47)
LYMPHOCYTES NFR BLD AUTO: 30 % (ref 14–44)
MCH RBC QN AUTO: 30.1 PG (ref 26.8–34.3)
MCHC RBC AUTO-ENTMCNC: 32.1 G/DL (ref 31.4–37.4)
MCV RBC AUTO: 94 FL (ref 82–98)
MONOCYTES # BLD AUTO: 0.45 THOUSAND/ÂΜL (ref 0.17–1.22)
MONOCYTES NFR BLD AUTO: 8 % (ref 4–12)
NEUTROPHILS # BLD AUTO: 3.19 THOUSANDS/ÂΜL (ref 1.85–7.62)
NEUTS SEG NFR BLD AUTO: 59 % (ref 43–75)
NRBC BLD AUTO-RTO: 0 /100 WBCS
PLATELET # BLD AUTO: 201 THOUSANDS/UL (ref 149–390)
PMV BLD AUTO: 10.7 FL (ref 8.9–12.7)
POTASSIUM SERPL-SCNC: 3.8 MMOL/L (ref 3.5–5.3)
PTH-INTACT SERPL-MCNC: 94 PG/ML (ref 12–88)
RBC # BLD AUTO: 4.52 MILLION/UL (ref 3.81–5.12)
SODIUM SERPL-SCNC: 141 MMOL/L (ref 135–147)
TSH SERPL DL<=0.05 MIU/L-ACNC: 1.11 UIU/ML (ref 0.45–4.5)
WBC # BLD AUTO: 5.35 THOUSAND/UL (ref 4.31–10.16)

## 2024-11-15 PROCEDURE — 83970 ASSAY OF PARATHORMONE: CPT

## 2024-11-15 PROCEDURE — 80048 BASIC METABOLIC PNL TOTAL CA: CPT

## 2024-11-15 PROCEDURE — 85025 COMPLETE CBC W/AUTO DIFF WBC: CPT

## 2024-11-15 PROCEDURE — 84443 ASSAY THYROID STIM HORMONE: CPT

## 2024-11-15 PROCEDURE — 36415 COLL VENOUS BLD VENIPUNCTURE: CPT

## 2024-11-18 ENCOUNTER — RESULTS FOLLOW-UP (OUTPATIENT)
Age: 64
End: 2024-11-18

## 2024-11-18 ENCOUNTER — TELEPHONE (OUTPATIENT)
Dept: BARIATRICS | Facility: CLINIC | Age: 64
End: 2024-11-18

## 2024-11-18 DIAGNOSIS — E66.3 OVERWEIGHT: ICD-10-CM

## 2024-11-18 DIAGNOSIS — F41.1 GENERALIZED ANXIETY DISORDER: ICD-10-CM

## 2024-11-18 PROCEDURE — 88341 IMHCHEM/IMCYTCHM EA ADD ANTB: CPT | Performed by: STUDENT IN AN ORGANIZED HEALTH CARE EDUCATION/TRAINING PROGRAM

## 2024-11-18 PROCEDURE — 88342 IMHCHEM/IMCYTCHM 1ST ANTB: CPT | Performed by: STUDENT IN AN ORGANIZED HEALTH CARE EDUCATION/TRAINING PROGRAM

## 2024-11-18 PROCEDURE — 88305 TISSUE EXAM BY PATHOLOGIST: CPT | Performed by: STUDENT IN AN ORGANIZED HEALTH CARE EDUCATION/TRAINING PROGRAM

## 2024-11-18 RX ORDER — SEMAGLUTIDE 2.4 MG/.75ML
2.4 INJECTION, SOLUTION SUBCUTANEOUS WEEKLY
Qty: 3 ML | Refills: 1 | Status: SHIPPED | OUTPATIENT
Start: 2024-11-18

## 2024-11-18 RX ORDER — ALPRAZOLAM 0.5 MG
0.5 TABLET ORAL
Qty: 30 TABLET | Refills: 0 | Status: SHIPPED | OUTPATIENT
Start: 2024-11-18

## 2024-11-18 NOTE — TELEPHONE ENCOUNTER
Medication:  PDMP   10/21/2024 10/21/2024 ALPRAZolam (Tablet) 30.0 30 0.5 MG NA MOHSEN JACOBS     Active agreement on file -No

## 2024-11-22 ENCOUNTER — HOSPITAL ENCOUNTER (OUTPATIENT)
Dept: RADIOLOGY | Age: 64
Discharge: HOME | End: 2024-11-22
Payer: COMMERCIAL

## 2024-11-22 DIAGNOSIS — M85.80 OSTEOPENIA DETERMINED BY X-RAY: ICD-10-CM

## 2024-11-22 PROCEDURE — 77080 DXA BONE DENSITY AXIAL: CPT

## 2024-11-25 ENCOUNTER — RESULTS FOLLOW-UP (OUTPATIENT)
Dept: FAMILY MEDICINE CLINIC | Facility: CLINIC | Age: 64
End: 2024-11-25

## 2024-12-09 ENCOUNTER — PATIENT MESSAGE (OUTPATIENT)
Dept: FAMILY MEDICINE CLINIC | Facility: CLINIC | Age: 64
End: 2024-12-09

## 2024-12-09 DIAGNOSIS — J32.9 RECURRENT SINUS INFECTIONS: Primary | ICD-10-CM

## 2024-12-11 ENCOUNTER — TELEPHONE (OUTPATIENT)
Dept: FAMILY MEDICINE CLINIC | Facility: CLINIC | Age: 64
End: 2024-12-11

## 2024-12-11 NOTE — TELEPHONE ENCOUNTER
Informed patient to reschedule appointment and at her visit 12/18/2024 will address the Jaw pain and sinuses with PCP.

## 2024-12-18 ENCOUNTER — OFFICE VISIT (OUTPATIENT)
Dept: FAMILY MEDICINE CLINIC | Facility: CLINIC | Age: 64
End: 2024-12-18
Payer: COMMERCIAL

## 2024-12-18 VITALS
DIASTOLIC BLOOD PRESSURE: 80 MMHG | WEIGHT: 154.4 LBS | TEMPERATURE: 98.4 F | HEART RATE: 98 BPM | SYSTOLIC BLOOD PRESSURE: 120 MMHG | HEIGHT: 63 IN | RESPIRATION RATE: 17 BRPM | BODY MASS INDEX: 27.36 KG/M2 | OXYGEN SATURATION: 98 %

## 2024-12-18 DIAGNOSIS — Z00.00 ANNUAL PHYSICAL EXAM: Primary | ICD-10-CM

## 2024-12-18 DIAGNOSIS — R51.9 FACIAL PAIN: ICD-10-CM

## 2024-12-18 DIAGNOSIS — Z23 ENCOUNTER FOR IMMUNIZATION: ICD-10-CM

## 2024-12-18 DIAGNOSIS — M85.80 OSTEOPENIA DETERMINED BY X-RAY: ICD-10-CM

## 2024-12-18 DIAGNOSIS — F41.1 GENERALIZED ANXIETY DISORDER: ICD-10-CM

## 2024-12-18 DIAGNOSIS — D50.8 OTHER IRON DEFICIENCY ANEMIA: ICD-10-CM

## 2024-12-18 PROCEDURE — 90715 TDAP VACCINE 7 YRS/> IM: CPT

## 2024-12-18 PROCEDURE — 99397 PER PM REEVAL EST PAT 65+ YR: CPT | Performed by: FAMILY MEDICINE

## 2024-12-18 PROCEDURE — 90471 IMMUNIZATION ADMIN: CPT

## 2024-12-18 RX ORDER — ALPRAZOLAM 0.5 MG
0.5 TABLET ORAL
Qty: 30 TABLET | Refills: 0 | Status: SHIPPED | OUTPATIENT
Start: 2024-12-18

## 2024-12-18 NOTE — ASSESSMENT & PLAN NOTE
Refilled today   Orders:  •  ALPRAZolam (XANAX) 0.5 mg tablet; Take 1 tablet (0.5 mg total) by mouth daily at bedtime as needed for anxiety

## 2024-12-18 NOTE — PROGRESS NOTES
Adult Annual Physical  Name: Mercy Garcia      : 1960      MRN: [id number]  Encounter Provider: Suellen Parker MD  Encounter Date: 2024   Encounter department: [facility]    Assessment & Plan  Annual physical exam         Encounter for immunization    Orders:  •  TDAP VACCINE GREATER THAN OR EQUAL TO 8YO IM    Generalized anxiety disorder  Refilled today   Orders:  •  ALPRAZolam (XANAX) 0.5 mg tablet; Take 1 tablet (0.5 mg total) by mouth daily at bedtime as needed for anxiety    Other iron deficiency anemia  Labs done in 3/2024 which are normal        Osteopenia determined by x-ray  2024 dexa scan showed Since a DXA study from 2022, there has been:A  STATISTICALLY SIGNIFICANT DECREASE in bone mineral density of 0.049 g/cm2 (5.3%) in the lumbar spine.A  STATISTICALLY SIGNIFICANT DECREASE in bone mineral density of 0.105 g/cm2 (12.6)% in the left total hip.  She wants to  try Algacal OTC x 1 year   Doesn't want any meds at this time   Continue vitamin D, calcium and weight bearing exercises       Facial pain  Improved   Continue to monitor        Immunizations and preventive care screenings were discussed with patient today. Appropriate education was printed on patient's after visit summary.    Counseling:  Alcohol/drug use: discussed moderation in alcohol intake, the recommendations for healthy alcohol use, and avoidance of illicit drug use.  Dental Health: discussed importance of regular tooth brushing, flossing, and dental visits.  Injury prevention: discussed safety/seat belts, safety helmets, smoke detectors, carbon monoxide detectors, and smoking near bedding or upholstery.  Sexual health: discussed sexually transmitted diseases, partner selection, use of condoms, avoidance of unintended pregnancy, and contraceptive alternatives.  Exercise: the importance of regular exercise/physical activity was discussed. Recommend exercise 3-5 times per week for at least 30 minutes.        Depression Screening and Follow-up Plan: Patient was screened for depression during today's encounter. They screened negative with a PHQ-2 score of 2.    Falls Plan of Care: balance, strength, and gait training instructions were provided.         History of Present Illness     C/o shooting pain on right facial maxillary bone on and off for a couple of weeks   Seen by dentist and told that it was sinuses  CT scan sinus wasn't covered    Adult Annual Physical:  Patient presents for annual physical.     Diet and Physical Activity:  - Diet/Nutrition: well balanced diet and consuming 3-5 servings of fruits/vegetables daily.  - Exercise: walking.    Depression Screening:  - PHQ-2 Score: 2    General Health:  - Sleep: sleeps well.  - Hearing: normal hearing bilateral ears.  - Vision: wears glasses and goes for regular eye exams.  - Dental: regular dental visits and brushes teeth twice daily.    /GYN Health:  - Follows with GYN: no.   - Menopause: postmenopausal.   - History of STDs: no    Advanced Care Planning:  - Has an advanced directive?: no    - Has a durable medical POA?: no    - ACP document given to patient?: no      Review of Systems   Constitutional: Negative.  Negative for chills and fever.   HENT: Negative.  Negative for ear pain and sore throat.    Eyes: Negative.  Negative for pain and visual disturbance.   Respiratory: Negative.  Negative for cough and shortness of breath.    Cardiovascular:  Negative for chest pain, palpitations and leg swelling.   Gastrointestinal: Negative.  Negative for abdominal pain and vomiting.   Endocrine: Negative.    Genitourinary: Negative.  Negative for dysuria and hematuria.   Musculoskeletal:  Negative for arthralgias and back pain.   Skin:  Negative for color change and rash.   Allergic/Immunologic: Negative.    Neurological: Negative.  Negative for seizures and syncope.   Hematological: Negative.    Psychiatric/Behavioral: Negative.     All other systems reviewed and  "are negative.    Medical History Reviewed by provider this encounter:  Meds  Problems     .  Past Medical History   Past Medical History:   Diagnosis Date   • Anemia 3/2021   • Anxiety    • Anxiety disorder    • Arthritis     osteoarthritis hip/poss knuckles\"   • Basal cell carcinoma 2022    midline inferior forehead   • Calculus of gallbladder with acute cholecystitis 2023   • Cancer (HCC)    • Cervical disc herniation     C7//sees chiropracter and no recent problems   • Exercise involving walking     [id number] steps per day   • Full thickness rotator cuff tear 2020   •  1 para 1    • Hearing loss     left ear   • History of bariatric surgery    • History of non anemic vitamin B12 deficiency    • History of prediabetes     before bariatric surgery   • Hyperlipidemia     not on meds   • Hypertension     not on meds   • Iron deficiency anemia     infusions determined by lab work   • Irregular heart beat     \"history bigeminy from taking benadryl, tries to avoid taking it\"no recent issues\"   • Motion sickness     \"on rides\"   • Neck pain     occas   • Nondisplaced fracture of proximal phalanx of left lesser toe(s), initial encounter for closed fracture 2019   • OA (osteoarthritis) of hip    • Obesity    • Oral herpes simplex infection     occas   • Other insomnia 2020   • Postmenopausal    • Sciatica    • Vertigo    • Wears glasses    • Wears glasses      Past Surgical History:   Procedure Laterality Date   • BARIATRIC SURGERY  2004   • CHOLECYSTECTOMY LAPAROSCOPIC N/A 2023    Procedure: CHOLECYSTECTOMY LAPAROSCOPIC;  Surgeon: Mo Lara DO;  Location: [facility];  Service: General   • COLPOSCOPY      Cervical Dysplasia   • DENTAL SURGERY      one lower right   • DENTAL SURGERY      Has dental implant; s/p tooth abscess   • EGD     • GASTRIC BYPASS      titanium staples   • GYNECOLOGIC CRYOSURGERY      Cervical Dysplasia   • JOINT REPLACEMENT   "    R hip arthroplasty   • LUMBAR LAMINECTOMY  1980    L5-S1; No Hardware   • MOHS SURGERY  07/26/2022    midline inferior forehead   • KY ARTHRP ACETBLR/PROX FEM PROSTC AGRFT/ALGRFT Right 12/16/2020    Procedure: ARTHROPLASTY HIP TOTAL ANTERIOR;  Surgeon: Inder Rhodes MD;  Location: [facility];  Service: Orthopedics   • KY COLONOSCOPY FLX DX W/COLLJ SPEC WHEN PFRMD N/A 06/01/2016    Procedure: COLONOSCOPY;  Surgeon: JOAQUÍN Pina MD;  Location: [facility];  Service: Colorectal   • ROTATOR CUFF REPAIR Right 2010   • SKIN CANCER EXCISION      s/p BCC Back and R Clavicle   • SMALL INTESTINE SURGERY  2004 [facility]   • SPINE SURGERY  1990    L5-S1 laminectomy   • WISDOM TOOTH EXTRACTION       Family History   Problem Relation Age of Onset   • Diabetes Mother    • Hypertension Mother    • Heart failure Mother         s/p ICD   • Diabetes type II Mother 55   • Hypothyroidism Mother    • Heart attack Mother 89   • Hyperlipidemia Mother    • Depression Mother    • Heart disease Mother    • Arthritis Mother    • Cancer Father    • Heart disease Father    • Heart block Father         s/p ICD   • Hypertension Father    • Prostate cancer Father 70        c Mets   • Hyperlipidemia Father    • Diabetes Brother    • Leukemia Brother 65        CLL   • Hyperlipidemia Brother    • Basal cell carcinoma Brother    • Diabetes type II Brother 60   • Osteoarthritis Brother         s/p MVA   • No Known Problems Maternal Aunt    • No Known Problems Paternal Aunt    • Breast cancer Paternal Aunt 60   • No Known Problems Maternal Grandmother    • No Known Problems Maternal Grandfather    • No Known Problems Paternal Grandmother    • No Known Problems Paternal Grandfather    • OCD Son    • Anxiety disorder Son    • Hypertension Son    • Obesity Son    • Breast cancer Cousin 60   • Diabetes Family    • Arthritis Family    • Breast cancer Family       reports that she has never smoked. She has never been exposed to tobacco smoke. She has never used  smokeless tobacco. She reports current alcohol use of about 1.0 standard drink of alcohol per week. She reports that she does not currently use drugs after having used the following drugs: Marijuana.  Current Outpatient Medications on File Prior to Visit   Medication Sig Dispense Refill   • buPROPion (WELLBUTRIN XL) 300 mg 24 hr tablet Take 1 tablet (300 mg total) by mouth every morning 90 tablet 1   • Calcium Carbonate-Vitamin D (Calcium 600-D) 600-400 MG-UNIT per tablet Take 2 capsules by mouth 2 (two) times a day       • Cholecalciferol (Vitamin D) 50 MCG (2000 UT) CAPS Take by mouth in the morning     • cyclobenzaprine (FLEXERIL) 10 mg tablet Take 1 tablet (10 mg total) by mouth 3 (three) times a day as needed for muscle spasms 30 tablet 0   • Meclizine HCl (ANTIVERT PO) Take 25 mg by mouth as needed Rx per prior PCP     • Multiple Vitamins-Minerals (BARIATRIC FUSION PO) Take 1 tablet by mouth daily     • Semaglutide-Weight Management (Wegovy) 2.4 MG/0.75ML Inject 0.75 mL (2.4 mg total) under the skin once a week 3 mL 1   • Sodium Fluoride 5000 Sensitive 1.1-5 % GEL      • [DISCONTINUED] ALPRAZolam (XANAX) 0.5 mg tablet Take 1 tablet (0.5 mg total) by mouth daily at bedtime as needed for anxiety 30 tablet 0   • [DISCONTINUED] Wegovy 2.4 MG/0.75ML Inject 0.75 mL (2.4 mg total) under the skin once a week (Patient not taking: Reported on 12/18/2024) 3 mL 1     No current facility-administered medications on file prior to visit.     Allergies   Allergen Reactions   • Macrodantin [Nitrofurantoin]      myalgias   • Sulfa Antibiotics Swelling and Anaphylaxis     Tongue swelling   • Dilaudid [Hydromorphone] Vomiting      Current Outpatient Medications on File Prior to Visit   Medication Sig Dispense Refill   • buPROPion (WELLBUTRIN XL) 300 mg 24 hr tablet Take 1 tablet (300 mg total) by mouth every morning 90 tablet 1   • Calcium Carbonate-Vitamin D (Calcium 600-D) 600-400 MG-UNIT per tablet Take 2 capsules by mouth 2  "(two) times a day       • Cholecalciferol (Vitamin D) 50 MCG (2000 UT) CAPS Take by mouth in the morning     • cyclobenzaprine (FLEXERIL) 10 mg tablet Take 1 tablet (10 mg total) by mouth 3 (three) times a day as needed for muscle spasms 30 tablet 0   • Meclizine HCl (ANTIVERT PO) Take 25 mg by mouth as needed Rx per prior PCP     • Multiple Vitamins-Minerals (BARIATRIC FUSION PO) Take 1 tablet by mouth daily     • Semaglutide-Weight Management (Wegovy) 2.4 MG/0.75ML Inject 0.75 mL (2.4 mg total) under the skin once a week 3 mL 1   • Sodium Fluoride 5000 Sensitive 1.1-5 % GEL      • [DISCONTINUED] ALPRAZolam (XANAX) 0.5 mg tablet Take 1 tablet (0.5 mg total) by mouth daily at bedtime as needed for anxiety 30 tablet 0   • [DISCONTINUED] Wegovy 2.4 MG/0.75ML Inject 0.75 mL (2.4 mg total) under the skin once a week (Patient not taking: Reported on 12/18/2024) 3 mL 1     No current facility-administered medications on file prior to visit.      Social History     Tobacco Use   • Smoking status: Never     Passive exposure: Never   • Smokeless tobacco: Never   Vaping Use   • Vaping status: Never Used   Substance and Sexual Activity   • Alcohol use: Yes     Alcohol/week: 1.0 standard drink of alcohol     Types: 1 Shots of liquor per week     Comment: rarely/holidays   • Drug use: Not Currently     Types: Marijuana     Comment: During college years [id number]   • Sexual activity: Not Currently     Partners: Male     Birth control/protection: Post-menopausal       Objective   /80 (BP Location: Left arm, Patient Position: Sitting, Cuff Size: Standard)   Pulse 98   Temp 98.4 °F (36.9 °C) (Tympanic)   Resp 17   Ht 5' 3.27\" (1.607 m)   Wt 70 kg (154 lb 6.4 oz)   LMP 05/01/2005 (Within Months)   SpO2 98%   BMI 27.12 kg/m²     Physical Exam  Vitals and nursing note reviewed.   Constitutional:       Appearance: Normal appearance. She is well-developed.   HENT:      Head: Normocephalic and atraumatic.      Right Ear: " Tympanic membrane normal.      Left Ear: Tympanic membrane normal.      Nose: Nose normal.      Mouth/Throat:      Mouth: Mucous membranes are moist.   Eyes:      Pupils: Pupils are equal, round, and reactive to light.   Neck:      Thyroid: No thyromegaly.   Cardiovascular:      Rate and Rhythm: Normal rate and regular rhythm.      Heart sounds: No murmur heard.  Pulmonary:      Effort: Pulmonary effort is normal.      Breath sounds: Normal breath sounds.   Abdominal:      General: Bowel sounds are normal.      Palpations: Abdomen is soft.   Musculoskeletal:         General: No deformity. Normal range of motion.      Cervical back: Normal range of motion and neck supple.   Skin:     General: Skin is warm.      Capillary Refill: Capillary refill takes less than 2 seconds.      Findings: No erythema or rash.   Neurological:      General: No focal deficit present.      Mental Status: She is alert and oriented to person, place, and time.      Deep Tendon Reflexes: Reflexes are normal and symmetric.   Psychiatric:         Mood and Affect: Mood normal.         Behavior: Behavior normal.

## 2024-12-18 NOTE — ASSESSMENT & PLAN NOTE
11/2024 dexa scan showed Since a DXA study from 5/27/2022, there has been:A  STATISTICALLY SIGNIFICANT DECREASE in bone mineral density of 0.049 g/cm2 (5.3%) in the lumbar spine.A  STATISTICALLY SIGNIFICANT DECREASE in bone mineral density of 0.105 g/cm2 (12.6)% in the left total hip.  She wants to  try Algacal OTC x 1 year   Doesn't want any meds at this time   Continue vitamin D, calcium and weight bearing exercises

## 2024-12-19 ENCOUNTER — TELEPHONE (OUTPATIENT)
Dept: BARIATRICS | Facility: CLINIC | Age: 64
End: 2024-12-19

## 2024-12-19 DIAGNOSIS — E66.3 OVERWEIGHT: ICD-10-CM

## 2024-12-19 RX ORDER — SEMAGLUTIDE 2.4 MG/.75ML
2.4 INJECTION, SOLUTION SUBCUTANEOUS WEEKLY
Qty: 3 ML | Refills: 1 | Status: SHIPPED | OUTPATIENT
Start: 2024-12-19 | End: 2024-12-20 | Stop reason: SDUPTHER

## 2024-12-20 ENCOUNTER — OFFICE VISIT (OUTPATIENT)
Dept: BARIATRICS | Facility: CLINIC | Age: 64
End: 2024-12-20
Payer: COMMERCIAL

## 2024-12-20 VITALS
HEIGHT: 63 IN | WEIGHT: 164.2 LBS | SYSTOLIC BLOOD PRESSURE: 124 MMHG | RESPIRATION RATE: 16 BRPM | HEART RATE: 84 BPM | DIASTOLIC BLOOD PRESSURE: 82 MMHG | BODY MASS INDEX: 29.09 KG/M2 | TEMPERATURE: 98.1 F

## 2024-12-20 DIAGNOSIS — F43.21 ADJUSTMENT DISORDER WITH DEPRESSED MOOD: ICD-10-CM

## 2024-12-20 DIAGNOSIS — R63.8 ABNORMAL CRAVING: ICD-10-CM

## 2024-12-20 DIAGNOSIS — E66.3 OVERWEIGHT: ICD-10-CM

## 2024-12-20 PROCEDURE — 99214 OFFICE O/P EST MOD 30 MIN: CPT | Performed by: PHYSICIAN ASSISTANT

## 2024-12-20 RX ORDER — SEMAGLUTIDE 2.4 MG/.75ML
2.4 INJECTION, SOLUTION SUBCUTANEOUS WEEKLY
Qty: 3 ML | Refills: 5 | Status: SHIPPED | OUTPATIENT
Start: 2024-12-20

## 2024-12-20 RX ORDER — BUPROPION HYDROCHLORIDE 300 MG/1
300 TABLET ORAL EVERY MORNING
Qty: 90 TABLET | Refills: 1 | Status: SHIPPED | OUTPATIENT
Start: 2024-12-20

## 2024-12-20 NOTE — ASSESSMENT & PLAN NOTE
-s/p RYGB  -Patient is pursuing the Conservative Program  -Initial weight loss goal of 5-10% weight loss for improved health  -Denied hx of seizure and glaucoma.  -Did not tolerate Topamax  -Patient denies personal and family history of MEN2 tumors and medullary thyroid/thyroid carcinoma. Patient denies personal history of pancreatitis. (started 11/4/19- 205 lbs). >5 % weight loss. Having positive effects on appetite and helping prevent weight gain. Switched to Wegovy (178) and doing well  -Taking Wellbutrin. +effects on mood  Vitamins 2/16/23. She will try taking calcium more consistently and will recheck PTH  -Screening labs:  vitamin D, B12, b1 reviewed from 12/16/24  -dietary recs provided; Encouraged strength training   -PA due 11/18/25     Initial(Start of MWM 10/15/18): 201.5 lbs  Start of Saxenda (now on Wegovy): (205 lbs, per 11/8/19)  Current: 164.1 (+4 lbs since last visit)  Change: -37.4 lb  Goal: 160-170s lbs and maintain

## 2024-12-20 NOTE — PROGRESS NOTES
Assessment/Plan:    Overweight  -s/p RYGB  -Patient is pursuing the Conservative Program  -Initial weight loss goal of 5-10% weight loss for improved health  -Denied hx of seizure and glaucoma.  -Did not tolerate Topamax  -Patient denies personal and family history of MEN2 tumors and medullary thyroid/thyroid carcinoma. Patient denies personal history of pancreatitis. (started 11/4/19- 205 lbs). >5 % weight loss. Having positive effects on appetite and helping prevent weight gain. Switched to Wegovy (178) and doing well  -Taking Wellbutrin. +effects on mood  Vitamins 2/16/23. She will try taking calcium more consistently and will recheck PTH  -Screening labs:  vitamin D, B12, b1 reviewed from 12/16/24  -dietary recs provided; Encouraged strength training   -PA due 11/18/25     Initial(Start of MWM 10/15/18): 201.5 lbs  Start of Saxenda (now on Wegovy): (205 lbs, per 11/8/19)  Current: 164.1 (+4 lbs since last visit)  Change: -37.4 lb  Goal: 160-170s lbs and maintain    Follow up in approximately 6 months with Non-Surgical Physician/Advanced Practitioner.    Goals:  Food log (ie.) www.Versaworks.com,sparkpeople.com,AnaptysBioit.[email or web address],Triggertrap.com,etc. baritastic  No sugary beverages. At least 64oz of water daily.  Increase physical activity by 10 minutes daily. Gradually increase physical activity to a goal of 5 days per week for 30 minutes of MODERATE intensity PLUS 2 days per week of FULL BODY resistance training  Practice lesson plans 1-6 in bariatric manual  and Practice 30/60 rule  Goal protein intake of 60-80 grams per day  Alcohol and nicotine use is not recommended following bariatric surgery  NSAIDs (Motrin, Advil, Aleve, Naproxen, ibuprofen, etc) should be avoided following bariatric surgery    Diagnoses and all orders for this visit:    Overweight  -     Semaglutide-Weight Management (Wegovy) 2.4 MG/0.75ML; Inject 0.75 mL (2.4 mg total) under the skin once a week    Body mass index 28.0-28.9, adult  -      "buPROPion (WELLBUTRIN XL) 300 mg 24 hr tablet; Take 1 tablet (300 mg total) by mouth every morning    Adjustment disorder with depressed mood  -     buPROPion (WELLBUTRIN XL) 300 mg 24 hr tablet; Take 1 tablet (300 mg total) by mouth every morning    Abnormal craving  -     buPROPion (WELLBUTRIN XL) 300 mg 24 hr tablet; Take 1 tablet (300 mg total) by mouth every morning        Subjective:   Chief Complaint   Patient presents with    Follow-up     MWM F/u; Waist 37in     Patient ID: Mercy Garcia  is a 64 y.o. female with excess weight/obesity here to pursue weight management.  Past Medical History:   Diagnosis Date    Anemia 3/2021    Anxiety 2004    Anxiety disorder     Arthritis     osteoarthritis hip/poss knuckles\"    Basal cell carcinoma 2022    midline inferior forehead    Calculus of gallbladder with acute cholecystitis 2023    Cancer (HCC)     Cervical disc herniation     C7//sees chiropracter and no recent problems    Exercise involving walking     [phone] steps per day    Full thickness rotator cuff tear 2020     1 para 1     Hearing loss     left ear    History of bariatric surgery     History of non anemic vitamin B12 deficiency     History of prediabetes     before bariatric surgery    Hyperlipidemia     not on meds    Hypertension     not on meds    Iron deficiency anemia     infusions determined by lab work    Irregular heart beat     \"history bigeminy from taking benadryl, tries to avoid taking it\"no recent issues\"    Motion sickness     \"on rides\"    Neck pain     occas    Nondisplaced fracture of proximal phalanx of left lesser toe(s), initial encounter for closed fracture 2019    OA (osteoarthritis) of hip     Obesity     Oral herpes simplex infection     occas    Other insomnia 2020    Postmenopausal     Sciatica     Vertigo     Wears glasses     Wears glasses      HPI:  The patient presents for MWM    Wegovy: continues to do well, refilled  Wellbutrin: feels " "overall helping with mood, will continue    Less dining out, more cooking at home   B: open faced egg sandwich or quiche   S: recently 1-2 landen cookies  L: sandwich  S:  no  D: protein + veg  S: no    Exercise: walking   Hydration: has been doing better - has added 20 oz before leaving for work in the am which has helped     The following portions of the patient's history were reviewed and updated as appropriate: allergies, current medications, past family history, past medical history, past social history, past surgical history, and problem list.    Review of Systems   Psychiatric/Behavioral: Negative.       Objective:    /82 (BP Location: Left arm, Patient Position: Sitting)   Pulse 84   Temp 98.1 °F (36.7 °C) (Tympanic)   Resp 16   Ht 5' 3.27\" (1.607 m)   Wt 74.5 kg (164 lb 3.2 oz)   LMP 05/01/2005 (Within Months)   BMI 28.84 kg/m²     Physical Exam  Vitals and nursing note reviewed.     Constitutional   General appearance: Abnormal.  well developed and obese.   Pulmonary   Respiratory effort: No increased work of breathing or signs of respiratory distress.    Abdomen   Abdomen: Abnormal.  The abdomen was obese.   Musculoskeletal   Gait and station: Normal.    Psychiatric   Orientation to person, place and time: Normal.    Affect: appropriate  "

## 2025-01-17 DIAGNOSIS — F41.1 GENERALIZED ANXIETY DISORDER: ICD-10-CM

## 2025-01-17 RX ORDER — ALPRAZOLAM 0.5 MG
0.5 TABLET ORAL
Qty: 30 TABLET | Refills: 0 | Status: SHIPPED | OUTPATIENT
Start: 2025-01-17

## 2025-01-17 NOTE — TELEPHONE ENCOUNTER
Medication:  PDMP     12/18/2024 12/18/2024 ALPRAZolam (Tablet) 30.0 30 0.5 MG NA MOHSEN JACOBS     Active agreement on file -No

## 2025-02-13 RX ORDER — METOPROLOL TARTRATE 25 MG/1
12.5 TABLET, FILM COATED ORAL
Qty: 15 TABLET | Status: CANCELLED | OUTPATIENT
Start: 2025-02-13

## 2025-02-13 NOTE — PROGRESS NOTES
[facility]   [facility] [place] [address] [facility] [address] [place] [address] [place] [address]  p: [phone] p: [phone]  f: [phone] f: [phone]         Cardiology Consultation Visit    [address]  Patient Identification:  Mercy Garcia  1960     [id number] Care Team:  Suellen Parker MD (PCP)  Self, Referral (Referring Provider)         ASSESSMENT & PLAN   Assessment & Plan     Discussion & Summary: Mrs. Mercy Garcia  was seen and examined today to establish care. Precautions and reasons to call our office or proceed to ER were discussed in detail. Patient expressed understanding and questions were answered.     Follow Up & Next Steps: Plan on follow up in-clinic in 4 months.  Will schedule sooner follow up if clinically indicated.    1. Palpitation  Comments:  Etiology unclear at this time.  Hx of buproprion though long and nathan though well controlled.  Repeat labs bmp/tsh with cbc/fe already ordered.  Zio/TTE as well.  Orders:  -     Basic metabolic panel; Future  -     TSH, 3rd generation with Free T4 reflex; Future  -     Zio Monitor  -     Echo complete w/ contrast if indicated; Future; Expected date: 02/19/2025  2. Elevated HDL  Comments:  HDL nearly 100. Family Hx of CAD. Will proceed with Advanced Lipid Panel and CCS as well given her episode of Palpitations in November.  Orders:  -     Cardio IQ(R) Advanced Lipid Panel; Future  -     CT coronary calcium score; Future; Expected date: 02/19/2025  3. Weight gain following gastric bypass surgery  4. Other iron deficiency anemia  5. Postsurgical malabsorption  6. Overweight  7. History of gastric bypass        SUBJECTIVE   Subjective     Chief Complaint: Mrs. Garcia is a 65 y.o. female and former smoker (social in college) with a PMH significant for but not limited to NATHAN, S/P Bariatric Surgery and Overweight.  She presents to clinic to  establish care.    HPI / Interval Hx: She was at her baseline state of health: independent of adl's, working as a Bariatric Coordinator with [employer] , though not exercising regularly, when she developed progressive palpitations in November . She notes that for three days in November she experienced palpitations that were more severe than ever before. She describes feeling a bounding  and irregular pulse.  It lasted for minutes at a time.  It resolved on its own. But she also notes that she moved her bowels five times that day with soft stools. On telemetry while at work nothing was capture.  Since then the events occur every week or so. She denies any recent hospitalizations, prior cardiac history, family history of early cad, or family history of scd.  She does note that her mother  at Age 92 with CHF in her 80s.  Her father had CABG in his 70s. She currently denies any cp, diaphoresis, n/v, sob, le, progressive palpitations beyond her baseline, near syncope, syncope, orthopnea, pnd, or ble edema. She notes less than ideal control of her diet and exercise habits. She reports a diet that is somewhat balanced but has room for improvement and no dedicated exercise. She is otherwise compliant with medications, checks BP occasionally but does not maintain a detailed BP log.  Of note, the patient's cardiac risk factor(s) include: advanced age and sedentary lifestyle.    Review of Systems: This was a comprehensive review of symptoms with problem focused details as note above.  Review of Systems   Constitutional: Negative for chills and fever.   HENT:  Negative for congestion and sore throat.    Eyes:  Negative for blurred vision and double vision.   Cardiovascular:  Positive for palpitations. Negative for chest pain, claudication, dyspnea on exertion, irregular heartbeat, leg swelling, near-syncope, orthopnea, paroxysmal nocturnal dyspnea and syncope.   Respiratory:  Negative for cough, shortness of breath, sleep  disturbances due to breathing, snoring and wheezing.    Hematologic/Lymphatic: Negative for bleeding problem. Does not bruise/bleed easily.   Skin:  Negative for itching and rash.   Musculoskeletal:  Negative for joint pain and joint swelling.   Gastrointestinal:  Negative for abdominal pain, constipation, diarrhea, nausea and vomiting.   Genitourinary:  Negative for dysuria and hematuria.   Neurological:  Negative for numbness and paresthesias.   Psychiatric/Behavioral:  Positive for depression (mild since her spouse passed). The patient is nervous/anxious (next steps re: shelter in the future).         Past Hx: The following portions of the patient's history were reviewed and updated as appropriate: past medical history, past social history, past family history, past surgical history, current medications, allergies, past surgical history and problem list.  Social History     Socioeconomic History   • Marital status:      Spouse name: Not on file   • Number of children: Not on file   • Years of education: Not on file   • Highest education level: Not on file   Occupational History   • Not on file   Tobacco Use   • Smoking status: Never     Passive exposure: Never   • Smokeless tobacco: Never   Vaping Use   • Vaping status: Never Used   Substance and Sexual Activity   • Alcohol use: Yes     Alcohol/week: 1.0 standard drink of alcohol     Types: 1 Shots of liquor per week     Comment: rarely/holidays   • Drug use: Not Currently     Types: Marijuana     Comment: During college years [id number]   • Sexual activity: Not Currently     Partners: Male     Birth control/protection: Post-menopausal   Other Topics Concern   • Not on file   Social History Narrative        Lives with  who has ESRD, Son lives in [place] and stays at home every other weekend    Has 1 Child - 1 Son    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[employer]     Social Drivers of Health     Financial Resource Strain: Not on file   Food  Insecurity: Not on file   Transportation Needs: Not on file   Physical Activity: Not on file   Stress: Not on file   Social Connections: Not on file   Intimate Partner Violence: Not on file   Housing Stability: Not on file        Family History   Problem Relation Age of Onset   • Diabetes Mother    • Hypertension Mother    • Heart failure Mother         s/p ICD   • Diabetes type II Mother 55   • Hypothyroidism Mother    • Heart attack Mother 89   • Hyperlipidemia Mother    • Depression Mother    • Heart disease Mother    • Arthritis Mother    • Cancer Father    • Heart disease Father    • Heart block Father         s/p ICD   • Hypertension Father    • Prostate cancer Father 70        c Mets   • Hyperlipidemia Father    • Diabetes Brother    • Leukemia Brother 65        CLL   • Hyperlipidemia Brother    • Basal cell carcinoma Brother    • Diabetes type II Brother 60   • Osteoarthritis Brother         s/p MVA   • No Known Problems Maternal Aunt    • No Known Problems Paternal Aunt    • Breast cancer Paternal Aunt 60   • No Known Problems Maternal Grandmother    • No Known Problems Maternal Grandfather    • No Known Problems Paternal Grandmother    • No Known Problems Paternal Grandfather    • OCD Son    • Anxiety disorder Son    • Hypertension Son    • Obesity Son    • Breast cancer Cousin 60   • Diabetes Family    • Arthritis Family    • Breast cancer Family         Patient Active Problem List   Diagnosis   • Anxiety disorder   • Vitamin D deficiency   • History of gastric bypass   • Overweight   • Vertigo   • Weight gain following gastric bypass surgery   • Cervical disc herniation   • Primary osteoarthritis of one hip, right   • Other insomnia   • Lumbar spondylosis   • Disorder of bursae of shoulder region   • Lateral epicondylitis   • History of total right hip replacement   • Iron deficiency anemia   • Postsurgical malabsorption   • Osteopenia determined by x-ray   • History of laparoscopic cholecystectomy         Past Surgical History:   Procedure Laterality Date   • BARIATRIC SURGERY  05/09/2004   • CHOLECYSTECTOMY LAPAROSCOPIC N/A 05/05/2023    Procedure: CHOLECYSTECTOMY LAPAROSCOPIC;  Surgeon: Mo Lara DO;  Location: [facility];  Service: General   • COLPOSCOPY      Cervical Dysplasia   • DENTAL SURGERY      one lower right   • DENTAL SURGERY      Has dental implant; s/p tooth abscess   • EGD     • GASTRIC BYPASS  2004    titanium staples   • GYNECOLOGIC CRYOSURGERY      Cervical Dysplasia   • JOINT REPLACEMENT  2020    R hip arthroplasty   • LUMBAR LAMINECTOMY  1980    L5-S1; No Hardware   • MOHS SURGERY  07/26/2022    midline inferior forehead   • NJ ARTHRP ACETBLR/PROX FEM PROSTC AGRFT/ALGRFT Right 12/16/2020    Procedure: ARTHROPLASTY HIP TOTAL ANTERIOR;  Surgeon: Inder Rhodes MD;  Location: [facility];  Service: Orthopedics   • NJ COLONOSCOPY FLX DX W/COLLJ SPEC WHEN PFRMD N/A 06/01/2016    Procedure: COLONOSCOPY;  Surgeon: JOAQUÍN Pina MD;  Location: [facility];  Service: Colorectal   • ROTATOR CUFF REPAIR Right 2010   • SKIN CANCER EXCISION      s/p BCC Back and R Clavicle   • SMALL INTESTINE SURGERY  2004 [facility]   • SPINE SURGERY  1990    L5-S1 laminectomy   • WISDOM TOOTH EXTRACTION          Current Outpatient Medications   Medication Instructions   • ALPRAZolam (XANAX) 0.5 mg, Oral, Daily at bedtime PRN   • buPROPion (WELLBUTRIN XL) 300 mg, Oral, Every morning   • Calcium Carbonate-Vitamin D (Calcium 600-D) 600-400 MG-UNIT per tablet 2 capsules, 2 times daily   • Cholecalciferol (Vitamin D) 50 MCG (2000 UT) CAPS Daily   • cyclobenzaprine (FLEXERIL) 10 mg, Oral, 3 times daily PRN   • Meclizine HCl (ANTIVERT PO) 25 mg, As needed   • Multiple Vitamins-Minerals (BARIATRIC FUSION PO) 1 tablet, Daily   • Sodium Fluoride 5000 Sensitive 1.1-5 % GEL    • Wegovy 2.4 mg, Subcutaneous, Weekly        Allergies   Allergen Reactions   • Macrodantin [Nitrofurantoin]      myalgias   • Sulfa Antibiotics Swelling  and Anaphylaxis     Tongue swelling   • Dilaudid [Hydromorphone] Vomiting         OBJECTIVE   Objective     Physical Exam: Patient seen and examined unaccompanied today.  VITALS   Vitals:    25 1106   BP: 122/78   Pulse: 80   SpO2: 99%     BMI   Body mass index is 27.49 kg/m².  Physical Exam  Constitutional:       General: She is not in acute distress.     Appearance: She is not toxic-appearing.      Interventions: She is not intubated.  HENT:      Head: Normocephalic and atraumatic.      Right Ear: External ear normal.      Left Ear: External ear normal.      Nose: Nose normal.   Eyes:      General: No scleral icterus.        Right eye: No discharge.         Left eye: No discharge.   Neck:      Vascular: No carotid bruit.   Cardiovascular:      Rate and Rhythm: Normal rate and regular rhythm.      Pulses: Normal pulses.      Heart sounds: Normal heart sounds.   Pulmonary:      Effort: Pulmonary effort is normal. No tachypnea, accessory muscle usage or respiratory distress. She is not intubated.      Breath sounds: Normal breath sounds. No wheezing or rales.   Musculoskeletal:      Cervical back: Neck supple.      Right lower leg: No edema.      Left lower leg: No edema.   Skin:     General: Skin is warm and dry.      Capillary Refill: Capillary refill takes less than 2 seconds.   Neurological:      Mental Status: She is alert and oriented to person, place, and time. Mental status is at baseline.   Psychiatric:         Mood and Affect: Mood normal.         Behavior: Behavior normal.          Recent CV Testin24  EKG  NSR    For historical clinical findings, see the Supplemental Documentation section.     CLOSING   Administrative Statements     Coordination of Care: During our visit, I spent 40 minutes with the patient, and greater than 55% of this time was spent on counseling and coordination of care, including addressing diagnostic results, prognosis, risks and benefits of treatment options,  instructions for management, patient/family education, importance of treatment compliance, along with risk factor reductions. During today's visit, the patient was unaccompanied, and there was no need for interpretive services.     Dictation Disclaimer: This note was completed in part utilizing direct voice recognition software. Grammatical errors, random word insertion, spelling mistakes, and incomplete sentences may be an occasional consequence of the system secondary to software limitations, ambient noise and hardware issues. At the time of dictation, efforts were made to edit, clarify and /or correct errors.  Please read the chart carefully and recognize, using context, where substitutions have occurred.  If you have any questions or concerns about the context, text or information contained within the body of this dictation, please contact me, the provider, for further clarification.     Erica Du, DO 02/19/25                      SUPPLEMENTAL DOCUMENTATION     Thoracic History   ======================  PRIOR VISIT INTERVALS  ======================  Date: 02/19/25, Initial Consultation      =====================  PRIOR DIAGNOSTIC TESTS  =====================  IMAGING   I have personally reviewed pertinent reports.  No results found.      EKG   Date: 11/14/24, normal sinus rhythm  Date: 05/05/23, normal sinus rhythm  Date: 01/11/21, normal sinus rhythm, possible lat infarct age undetermined  Date: 11/23/20, normal sinus rhythm      TELE   No results found for this or any recent visit.       HOLTER   No results found for this or any previous visit.      DEVICE   No results found for this or any previous visit.      EP   No results found for this or any previous visit.      CT   No results found for this or any previous visit.      CATH   No results found for this or any previous visit.      STRESS   No results found for this or any previous visit.       TTE   No results found for this or any previous  visit.      CHIVO   No results found for this or any previous visit.      CMR   No results found for this or any previous visit.      LABS     Lab Results   Component Value Date     12/31/2015     12/30/2014     05/01/2014    K 3.8 11/15/2024    K 4.0 06/15/2024    K 3.8 06/03/2023    K 3.7 12/31/2015    K 3.9 12/30/2014    K 3.9 05/01/2014     11/15/2024     06/15/2024     06/03/2023     12/31/2015     12/30/2014     05/01/2014    CO2 28 11/15/2024    CO2 28 06/15/2024    CO2 29 06/03/2023    CO2 27.5 12/31/2015    CO2 29 12/30/2014    CO2 30 05/01/2014    BUN 12 11/15/2024    BUN 17 06/15/2024    BUN 14 06/03/2023    BUN 13 12/31/2015    BUN 16 12/30/2014    BUN 15 05/01/2014    CREATININE 0.70 11/15/2024    CREATININE 0.76 06/15/2024    CREATININE 0.80 06/03/2023    CREATININE 0.74 12/31/2015    CREATININE 0.66 12/30/2014    CREATININE 0.68 05/01/2014    EGFR 91 11/15/2024    EGFR 82 06/15/2024    EGFR 78 06/03/2023    GLUC 100 05/05/2023    GLUC 114 01/11/2021    GLUC 110 12/18/2020    AST 16 06/15/2024    AST 16 06/03/2023     (H) 05/05/2023    AST 17 12/31/2015    AST 18 12/30/2014    ALT 15 06/15/2024    ALT 14 06/03/2023    ALT 81 (H) 05/05/2023    ALT 18 12/31/2015    ALT 20 12/30/2014    TBILI 0.52 06/15/2024    TBILI 0.50 06/03/2023    TBILI 0.75 05/05/2023    ALB 4.0 06/15/2024    ALB 4.0 06/03/2023    ALB 4.2 05/05/2023    ALB 3.4 (L) 12/31/2015    ALB 3.6 12/30/2014    MG 1.8 06/20/2020    MG 1.9 03/14/2020    MG 1.8 08/25/2018    MG 1.8 05/01/2014    CALCIUM 10.2 11/15/2024    CALCIUM 10.1 06/15/2024    CALCIUM 9.6 06/03/2023    CALCIUM 8.3 12/31/2015    CALCIUM 8.7 12/30/2014    CALCIUM 9.0 05/01/2014      Lab Results   Component Value Date    WBC 5.35 11/15/2024    WBC 4.81 06/15/2024    WBC 5.64 03/11/2024    WBC 4.83 12/31/2015    WBC 5.80 12/30/2014    HGB 13.6 11/15/2024    HGB 13.2 06/15/2024    HGB 13.3 03/11/2024    HGB 13.4  "12/31/2015    HGB 13.7 12/30/2014    HCT 42.4 11/15/2024    HCT 41.6 06/15/2024    HCT 41.5 03/11/2024    HCT 42.7 12/31/2015    HCT 44.0 12/30/2014     11/15/2024     06/15/2024     03/11/2024     12/31/2015     12/30/2014    INR 0.94 05/05/2023    INR 1.11 01/11/2021    INR 0.98 11/22/2020    IRON 89 03/11/2024    IRON 92 06/03/2023    IRON 70 11/30/2022    FERRITIN 208 03/11/2024    FERRITIN 252 06/03/2023    FERRITIN 321 11/30/2022    FERRITIN 9 12/31/2015    FERRITIN 9.0 (L) 12/30/2014    TIBC 302 03/11/2024    TIBC 271 06/03/2023    TIBC 290 11/30/2022    TIBC 369 12/31/2015    TIBC 370 12/30/2014      Lab Results   Component Value Date    CHOLESTEROL 199 06/15/2024    CHOLESTEROL 199 06/09/2023    CHOLESTEROL 205 (H) 04/04/2022    TRIG 50 06/15/2024    TRIG 72 06/09/2023    TRIG 26 04/04/2022    TRIG 45 12/31/2015    TRIG 39 08/01/2015    TRIG 41 12/30/2014    HDL 94 06/15/2024    HDL 92 06/09/2023    HDL 98 04/04/2022    HDL 84 12/31/2015    HDL 89 08/01/2015    HDL 81 12/30/2014    LDLDIRECT 104 (H) 06/20/2020    LDLCALC 95 06/15/2024    LDLCALC 93 06/09/2023    LDLCALC 102 (H) 04/04/2022    LDLCALC 91 12/31/2015    LDLCALC 97 08/01/2015    LDLCALC 108 (H) 12/30/2014     Lab Results   Component Value Date    HGBA1C 5.2 06/15/2024    HGBA1C 4.7 06/09/2023    HGBA1C 4.9 04/04/2022    HGBA1C 5.4 12/31/2015    HGBA1C 5.5 08/01/2015    GLUF 83 11/15/2024    GLUF 84 06/15/2024    GLUF 73 06/03/2023    GLUF 91 07/20/2014    POCGLU 73 05/28/2020     Lab Results   Component Value Date    QKO2VIINOTVZ 1.112 11/15/2024    WDW1GXWSTWVM 0.858 06/03/2023    GKH6KLAOKLNO 1.501 06/20/2020    GFR4CBWNGUPD 1.633 12/31/2015    KLY3HLAKRQMB 0.736 12/30/2014     Lab Results   Component Value Date    HSTNI0 2 05/05/2023    HSTNI2 3 05/05/2023    HSTNI4 4 05/05/2023    TROPONINI <0.02 01/11/2021     No results found for: \"BNP\", \"NTBNP\"       "

## 2025-02-14 DIAGNOSIS — F41.1 GENERALIZED ANXIETY DISORDER: ICD-10-CM

## 2025-02-14 RX ORDER — ALPRAZOLAM 0.5 MG
0.5 TABLET ORAL
Qty: 30 TABLET | Refills: 0 | Status: SHIPPED | OUTPATIENT
Start: 2025-02-14

## 2025-02-14 NOTE — TELEPHONE ENCOUNTER
Medication:  PDMP   01/17/2025 01/17/2025 ALPRAZolam (Tablet) 30.0 30 0.5 MG NA MOHSEN JACOBS     Active agreement on file -No

## 2025-02-19 ENCOUNTER — OFFICE VISIT (OUTPATIENT)
Dept: CARDIOLOGY CLINIC | Facility: CLINIC | Age: 65
End: 2025-02-19
Payer: COMMERCIAL

## 2025-02-19 VITALS
BODY MASS INDEX: 27.49 KG/M2 | DIASTOLIC BLOOD PRESSURE: 78 MMHG | SYSTOLIC BLOOD PRESSURE: 122 MMHG | WEIGHT: 156.5 LBS | HEART RATE: 80 BPM | OXYGEN SATURATION: 99 %

## 2025-02-19 DIAGNOSIS — E78.89 ELEVATED HDL: ICD-10-CM

## 2025-02-19 DIAGNOSIS — E66.3 OVERWEIGHT: ICD-10-CM

## 2025-02-19 DIAGNOSIS — K91.2 POSTSURGICAL MALABSORPTION: ICD-10-CM

## 2025-02-19 DIAGNOSIS — R63.5 WEIGHT GAIN FOLLOWING GASTRIC BYPASS SURGERY: ICD-10-CM

## 2025-02-19 DIAGNOSIS — D50.8 OTHER IRON DEFICIENCY ANEMIA: ICD-10-CM

## 2025-02-19 DIAGNOSIS — Z98.84 HISTORY OF GASTRIC BYPASS: ICD-10-CM

## 2025-02-19 DIAGNOSIS — R00.2 PALPITATION: Primary | ICD-10-CM

## 2025-02-19 DIAGNOSIS — Z98.84 WEIGHT GAIN FOLLOWING GASTRIC BYPASS SURGERY: ICD-10-CM

## 2025-02-19 PROCEDURE — 99204 OFFICE O/P NEW MOD 45 MIN: CPT | Performed by: INTERNAL MEDICINE

## 2025-03-05 ENCOUNTER — HOSPITAL ENCOUNTER (OUTPATIENT)
Dept: CT IMAGING | Facility: HOSPITAL | Age: 65
Discharge: HOME | End: 2025-03-05
Attending: INTERNAL MEDICINE
Payer: COMMERCIAL

## 2025-03-05 DIAGNOSIS — E78.89 ELEVATED HDL: ICD-10-CM

## 2025-03-05 PROCEDURE — 75571 CT HRT W/O DYE W/CA TEST: CPT

## 2025-03-12 ENCOUNTER — HOSPITAL ENCOUNTER (OUTPATIENT)
Dept: NON INVASIVE DIAGNOSTICS | Facility: CLINIC | Age: 65
Discharge: HOME | End: 2025-03-12
Payer: COMMERCIAL

## 2025-03-12 VITALS
WEIGHT: 156 LBS | HEIGHT: 63 IN | BODY MASS INDEX: 27.64 KG/M2 | HEART RATE: 82 BPM | DIASTOLIC BLOOD PRESSURE: 78 MMHG | SYSTOLIC BLOOD PRESSURE: 122 MMHG

## 2025-03-12 DIAGNOSIS — R00.2 PALPITATION: ICD-10-CM

## 2025-03-12 DIAGNOSIS — F41.1 GENERALIZED ANXIETY DISORDER: ICD-10-CM

## 2025-03-12 LAB
AORTIC ROOT: 2.7 CM
ASCENDING AORTA: 2.5 CM
BSA FOR ECHO PROCEDURE: 1.74 M2
DOP CALC LVOT AREA: 3.46 CM2
DOP CALC LVOT DIAMETER: 2.1 CM
E WAVE DECELERATION TIME: 148 MS
E/A RATIO: 0.9
FRACTIONAL SHORTENING: 33 (ref 28–44)
INTERVENTRICULAR SEPTUM IN DIASTOLE (PARASTERNAL SHORT AXIS VIEW): 0.8 CM
INTERVENTRICULAR SEPTUM: 0.8 CM (ref 0.6–1.1)
LAAS-AP2: 22.5 CM2
LAAS-AP4: 18.3 CM2
LEFT ATRIUM SIZE: 3.4 CM
LEFT ATRIUM VOLUME (MOD BIPLANE): 58 ML
LEFT ATRIUM VOLUME INDEX (MOD BIPLANE): 33.1 ML/M2
LEFT INTERNAL DIMENSION IN SYSTOLE: 2.7 CM (ref 2.1–4)
LEFT VENTRICULAR INTERNAL DIMENSION IN DIASTOLE: 4 CM (ref 3.5–6)
LEFT VENTRICULAR POSTERIOR WALL IN END DIASTOLE: 0.9 CM
LEFT VENTRICULAR STROKE VOLUME: 42 ML
LV EF US.2D.A4C+ESTIMATED: 63 %
LVSV (TEICH): 42 ML
MV E'TISSUE VEL-SEP: 11 CM/S
MV PEAK A VEL: 0.79 M/S
MV PEAK E VEL: 71 CM/S
MV STENOSIS PRESSURE HALF TIME: 43 MS
MV VALVE AREA P 1/2 METHOD: 5.12
RIGHT ATRIUM AREA SYSTOLE A4C: 13.8 CM2
RIGHT VENTRICLE ID DIMENSION: 3.4 CM
SL CV LEFT ATRIUM LENGTH A2C: 5.9 CM
SL CV LV EF: 65
SL CV PED ECHO LEFT VENTRICLE DIASTOLIC VOLUME (MOD BIPLANE) 2D: 69 ML
SL CV PED ECHO LEFT VENTRICLE SYSTOLIC VOLUME (MOD BIPLANE) 2D: 28 ML
TRICUSPID ANNULAR PLANE SYSTOLIC EXCURSION: 2.3 CM

## 2025-03-12 PROCEDURE — 93306 TTE W/DOPPLER COMPLETE: CPT

## 2025-03-12 PROCEDURE — 93306 TTE W/DOPPLER COMPLETE: CPT | Performed by: INTERNAL MEDICINE

## 2025-03-13 ENCOUNTER — RESULTS FOLLOW-UP (OUTPATIENT)
Dept: CARDIOLOGY CLINIC | Facility: CLINIC | Age: 65
End: 2025-03-13

## 2025-03-13 RX ORDER — ALPRAZOLAM 0.5 MG
0.5 TABLET ORAL
Qty: 30 TABLET | Refills: 0 | Status: SHIPPED | OUTPATIENT
Start: 2025-03-13

## 2025-03-13 NOTE — TELEPHONE ENCOUNTER
Medication:  PDMP     02/14/2025 02/14/2025 ALPRAZolam (Tablet) 30.0 30 0.5 MG NA MOHSEN JACOBS     Active agreement on file -No

## 2025-03-31 ENCOUNTER — TELEPHONE (OUTPATIENT)
Age: 65
End: 2025-03-31

## 2025-03-31 ENCOUNTER — APPOINTMENT (OUTPATIENT)
Dept: LAB | Facility: HOSPITAL | Age: 65
End: 2025-03-31
Payer: COMMERCIAL

## 2025-03-31 DIAGNOSIS — K91.2 POSTSURGICAL MALABSORPTION: Primary | ICD-10-CM

## 2025-03-31 DIAGNOSIS — K91.2 POSTSURGICAL MALABSORPTION: ICD-10-CM

## 2025-03-31 LAB
BASOPHILS # BLD AUTO: 0.04 THOUSANDS/ÂΜL (ref 0–0.1)
BASOPHILS NFR BLD AUTO: 1 % (ref 0–1)
EOSINOPHIL # BLD AUTO: 0.1 THOUSAND/ÂΜL (ref 0–0.61)
EOSINOPHIL NFR BLD AUTO: 2 % (ref 0–6)
ERYTHROCYTE [DISTWIDTH] IN BLOOD BY AUTOMATED COUNT: 12 % (ref 11.6–15.1)
FERRITIN SERPL-MCNC: 156 NG/ML (ref 11–307)
HCT VFR BLD AUTO: 42.5 % (ref 34.8–46.1)
HGB BLD-MCNC: 13.6 G/DL (ref 11.5–15.4)
IMM GRANULOCYTES # BLD AUTO: 0.01 THOUSAND/UL (ref 0–0.2)
IMM GRANULOCYTES NFR BLD AUTO: 0 % (ref 0–2)
IRON SATN MFR SERPL: 22 % (ref 15–50)
IRON SERPL-MCNC: 74 UG/DL (ref 50–212)
LYMPHOCYTES # BLD AUTO: 1.98 THOUSANDS/ÂΜL (ref 0.6–4.47)
LYMPHOCYTES NFR BLD AUTO: 32 % (ref 14–44)
MCH RBC QN AUTO: 30.4 PG (ref 26.8–34.3)
MCHC RBC AUTO-ENTMCNC: 32 G/DL (ref 31.4–37.4)
MCV RBC AUTO: 95 FL (ref 82–98)
MONOCYTES # BLD AUTO: 0.58 THOUSAND/ÂΜL (ref 0.17–1.22)
MONOCYTES NFR BLD AUTO: 9 % (ref 4–12)
NEUTROPHILS # BLD AUTO: 3.55 THOUSANDS/ÂΜL (ref 1.85–7.62)
NEUTS SEG NFR BLD AUTO: 56 % (ref 43–75)
NRBC BLD AUTO-RTO: 0 /100 WBCS
PLATELET # BLD AUTO: 200 THOUSANDS/UL (ref 149–390)
PMV BLD AUTO: 10.7 FL (ref 8.9–12.7)
RBC # BLD AUTO: 4.48 MILLION/UL (ref 3.81–5.12)
RETICS # AUTO: NORMAL 10*3/UL (ref 14097–95744)
RETICS # CALC: 1.33 % (ref 0.37–1.87)
TIBC SERPL-MCNC: 334.6 UG/DL (ref 250–450)
TRANSFERRIN SERPL-MCNC: 239 MG/DL (ref 203–362)
UIBC SERPL-MCNC: 261 UG/DL (ref 155–355)
WBC # BLD AUTO: 6.26 THOUSAND/UL (ref 4.31–10.16)

## 2025-03-31 PROCEDURE — 85045 AUTOMATED RETICULOCYTE COUNT: CPT

## 2025-03-31 PROCEDURE — 82728 ASSAY OF FERRITIN: CPT

## 2025-03-31 PROCEDURE — 83550 IRON BINDING TEST: CPT

## 2025-03-31 PROCEDURE — 36415 COLL VENOUS BLD VENIPUNCTURE: CPT

## 2025-03-31 PROCEDURE — 83540 ASSAY OF IRON: CPT

## 2025-03-31 PROCEDURE — 85025 COMPLETE CBC W/AUTO DIFF WBC: CPT

## 2025-03-31 NOTE — TELEPHONE ENCOUNTER
Patient is asking for her labs to be placed for her appointment tomorrow. She went over the weekend and they told her there was no orders placed

## 2025-04-01 ENCOUNTER — OFFICE VISIT (OUTPATIENT)
Dept: HEMATOLOGY ONCOLOGY | Facility: CLINIC | Age: 65
End: 2025-04-01
Payer: COMMERCIAL

## 2025-04-01 VITALS
WEIGHT: 157 LBS | SYSTOLIC BLOOD PRESSURE: 116 MMHG | HEIGHT: 63 IN | OXYGEN SATURATION: 98 % | BODY MASS INDEX: 27.82 KG/M2 | TEMPERATURE: 98.1 F | DIASTOLIC BLOOD PRESSURE: 78 MMHG | HEART RATE: 80 BPM | RESPIRATION RATE: 17 BRPM

## 2025-04-01 DIAGNOSIS — K91.2 POSTSURGICAL MALABSORPTION: Primary | ICD-10-CM

## 2025-04-01 DIAGNOSIS — Z98.84 HISTORY OF GASTRIC BYPASS: ICD-10-CM

## 2025-04-01 PROCEDURE — 99213 OFFICE O/P EST LOW 20 MIN: CPT | Performed by: PHYSICIAN ASSISTANT

## 2025-04-01 NOTE — ASSESSMENT & PLAN NOTE
Surgery in 2004.    Orders:  •  CBC and differential; Future  •  Iron Panel (Includes Ferritin, Iron Sat%, Iron, and TIBC); Future  •  Reticulocytes; Future

## 2025-04-01 NOTE — ASSESSMENT & PLAN NOTE
This is a 65-year-old female with past medical history of iron deficiency anemia secondary to postsurgical malabsorption.  Patient is compliant in taking oral bariatric medications.  Yearly check and demonstrated adequate hemoglobin as well as iron levels.  No real change since her last appointment.  Recommend follow-up in 1 year.      Orders:  •  CBC and differential; Future  •  Iron Panel (Includes Ferritin, Iron Sat%, Iron, and TIBC); Future  •  Reticulocytes; Future

## 2025-04-01 NOTE — PROGRESS NOTES
Name: Mercy Garcia      : 1960      MRN: [id number]  Encounter Provider: Chani Sahu PA-C  Encounter Date: 2025   Encounter department: [facility] [place]  :  Assessment & Plan  Postsurgical malabsorption  This is a 65-year-old female with past medical history of iron deficiency anemia secondary to postsurgical malabsorption.  Patient is compliant in taking oral bariatric medications.  Yearly check and demonstrated adequate hemoglobin as well as iron levels.  No real change since her last appointment.  Recommend follow-up in 1 year.      Orders:  •  CBC and differential; Future  •  Iron Panel (Includes Ferritin, Iron Sat%, Iron, and TIBC); Future  •  Reticulocytes; Future    History of gastric bypass  Surgery in .    Orders:  •  CBC and differential; Future  •  Iron Panel (Includes Ferritin, Iron Sat%, Iron, and TIBC); Future  •  Reticulocytes; Future    Return in about 1 year (around 2026) for Labs, [facility] Office Visit.    History of Present Illness   Chief Complaint   Patient presents with   • Follow-up     Pertinent Medical History   This is a 63-year-old female with past medical history of bariatric bypass in .  Patient was initially referred to hematology in  secondary to iron deficiency anemia.     Trends:  2021 ferritin = 6, iron saturation 10%  IV iron treatments tolerated without significant toxicity-Venofer 300 mg x 5 doses  2021 ferritin = 166, iron saturation 22%, white blood cell count 4.96, hemoglobin 13.5  2022 hemoglobin = 13.7, MCV 99, B12 934, ferritin 181  2022 ferritin = 321, iron saturation 24%, white blood cell count = 6.36, hemoglobin 13.5, MCV 96, platelet count 229  6/3/2023 ferritin = 252, iron saturation 34%, WBC = 4.9, hemoglobin 12.8, platelet count 205, MCV 97.  3/11/24 WBC 5.6, hemoglobin 13.3, MCV 96, platelet count 212, iron saturation 29, ferritin 208  3/31/25 WBC 6.2, hemoglobin 13.6, MCV  "95, platelet count 200, differential WNL, ferritin 156, iron saturation 22%    04/01/25: Patient had episodes of palpitations this year undergoing evaluation with cardiology secondary to loop monitor patient had to reschedule mammogram scheduled to have it later this month.    Last DEXA: 11/22/2024 - osteopenia  Last Mammo : 03/19/2024  Last Colonscopy: 01/05/2022 - Follow up 10 years.   Last Pap:  11/04/2020       Review of Systems   Constitutional:  Negative for appetite change, fatigue, fever and unexpected weight change.   HENT:  Negative for nosebleeds.    Respiratory:  Negative for cough, choking and shortness of breath.         Negative hemoptysis.   Cardiovascular:  Negative for chest pain, palpitations and leg swelling.   Gastrointestinal: Negative.  Negative for abdominal distention, abdominal pain, anal bleeding, blood in stool, constipation, diarrhea, nausea and vomiting.   Endocrine: Negative.  Negative for cold intolerance.   Genitourinary: Negative.  Negative for hematuria, menstrual problem, vaginal bleeding, vaginal discharge and vaginal pain.   Musculoskeletal: Negative.  Negative for arthralgias, myalgias, neck pain and neck stiffness.   Skin: Negative.  Negative for color change, pallor and rash.   Allergic/Immunologic: Negative.  Negative for immunocompromised state.   Neurological: Negative.  Negative for weakness and headaches.   Hematological:  Negative for adenopathy. Does not bruise/bleed easily.   All other systems reviewed and are negative.    Medical History Reviewed by provider this encounter:  Tobacco  Allergies  Meds  Problems  Med Hx  Surg Hx  Fam Hx     .      Objective   /78 (BP Location: Left arm, Patient Position: Sitting, Cuff Size: Adult)   Pulse 80   Temp 98.1 °F (36.7 °C) (Temporal)   Resp 17   Ht 5' 3\" (1.6 m)   Wt 71.2 kg (157 lb)   LMP 05/01/2005 (Within Months)   SpO2 98%   BMI 27.81 kg/m²     Physical Exam  Constitutional:       General: She is not " in acute distress.     Appearance: She is well-developed.   HENT:      Head: Normocephalic and atraumatic.   Eyes:      General: No scleral icterus.     Conjunctiva/sclera: Conjunctivae normal.   Cardiovascular:      Rate and Rhythm: Normal rate.   Pulmonary:      Effort: Pulmonary effort is normal. No respiratory distress.   Skin:     General: Skin is warm.      Coloration: Skin is not pale.      Findings: No rash.   Neurological:      Mental Status: She is alert and oriented to person, place, and time.   Psychiatric:         Thought Content: Thought content normal.       Labs: I have reviewed the following labs:  Results for orders placed or performed in visit on 03/31/25   CBC and differential   Result Value Ref Range    WBC 6.26 4.31 - 10.16 Thousand/uL    RBC 4.48 3.81 - 5.12 Million/uL    Hemoglobin 13.6 11.5 - 15.4 g/dL    Hematocrit 42.5 34.8 - 46.1 %    MCV 95 82 - 98 fL    MCH 30.4 26.8 - 34.3 pg    MCHC 32.0 31.4 - 37.4 g/dL    RDW 12.0 11.6 - 15.1 %    MPV 10.7 8.9 - 12.7 fL    Platelets 200 149 - 390 Thousands/uL    nRBC 0 /100 WBCs    Segmented % 56 43 - 75 %    Immature Grans % 0 0 - 2 %    Lymphocytes % 32 14 - 44 %    Monocytes % 9 4 - 12 %    Eosinophils Relative 2 0 - 6 %    Basophils Relative 1 0 - 1 %    Absolute Neutrophils 3.55 1.85 - 7.62 Thousands/µL    Absolute Immature Grans 0.01 0.00 - 0.20 Thousand/uL    Absolute Lymphocytes 1.98 0.60 - 4.47 Thousands/µL    Absolute Monocytes 0.58 0.17 - 1.22 Thousand/µL    Eosinophils Absolute 0.10 0.00 - 0.61 Thousand/µL    Basophils Absolute 0.04 0.00 - 0.10 Thousands/µL   Reticulocytes   Result Value Ref Range    Retic Ct Abs 59,600 14,097 - 95,744    Retic Ct Pct 1.33 0.37 - 1.87 %   TIBC Panel (incl. Iron, TIBC, % Iron Saturation)   Result Value Ref Range    Iron Saturation 22 15 - 50 %    TIBC 334.6 250 - 450 ug/dL    Iron 74 50 - 212 ug/dL    Transferrin 239 203 - 362 mg/dL    UIBC 261 155 - 355 ug/dL   Result Value Ref Range    Ferritin 156 11 -  307 ng/mL     Administrative Statements   I have spent a total time of 23 minutes in caring for this patient on the day of the visit/encounter including Instructions for management, Patient and family education, Documenting in the medical record, and Obtaining or reviewing history  .

## 2025-04-09 ENCOUNTER — PROBLEM (OUTPATIENT)
Dept: URBAN - METROPOLITAN AREA CLINIC 6 | Facility: CLINIC | Age: 65
End: 2025-04-09

## 2025-04-09 DIAGNOSIS — H35.361: ICD-10-CM

## 2025-04-09 DIAGNOSIS — H53.8: ICD-10-CM

## 2025-04-09 DIAGNOSIS — H25.13: ICD-10-CM

## 2025-04-09 LAB
CV ZIO BASELINE AVG BPM: 78 BPM
CV ZIO BASELINE BPM HIGH: 184 BPM
CV ZIO BASELINE BPM LOW: 55 BPM
CV ZIO DEVICE ANALYSIS TIME: NORMAL
CV ZIO ECT SVE COUNT: 1312 EPISODES
CV ZIO ECT SVE CPLT COUNT: 94 EPISODES
CV ZIO ECT SVE CPLT FREQ: NORMAL
CV ZIO ECT SVE FREQ: NORMAL
CV ZIO ECT SVE TPLT COUNT: 27 EPISODES
CV ZIO ECT SVE TPLT FREQ: NORMAL
CV ZIO ECT VE COUNT: 366 EPISODES
CV ZIO ECT VE CPLT COUNT: 0 EPISODES
CV ZIO ECT VE CPLT FREQ: 0
CV ZIO ECT VE FREQ: NORMAL
CV ZIO ECT VE TPLT COUNT: 0 EPISODES
CV ZIO ECT VE TPLT FREQ: 0
CV ZIO ECTOPIC SVE COUPLET RAW PERCENT: 0.01 %
CV ZIO ECTOPIC SVE ISOLATED PERCENT: 0.09 %
CV ZIO ECTOPIC SVE TRIPLET RAW PERCENT: 0.01 %
CV ZIO ECTOPIC VE COUPLET RAW PERCENT: 0 %
CV ZIO ECTOPIC VE ISOLATED PERCENT: 0.02 %
CV ZIO ECTOPIC VE TRIPLET RAW PERCENT: 0 %
CV ZIO ENROLLMENT END: NORMAL
CV ZIO ENROLLMENT START: NORMAL
CV ZIO PATIENT EVENTS DIARIES: 0
CV ZIO PATIENT EVENTS TRIGGERS: 0
CV ZIO PAUSE COUNT: 0
CV ZIO PRESCRIPTION STATUS: NORMAL
CV ZIO SVT AVG BPM: 127 BPM
CV ZIO SVT BPM HIGH: 184 BPM
CV ZIO SVT BPM LOW: 100 BPM
CV ZIO SVT COUNT: 35
CV ZIO SVT F EPI AVG BPM: 169 BPM
CV ZIO SVT F EPI BEATS: 7 BEATS
CV ZIO SVT F EPI BPM HIGH: 184 BPM
CV ZIO SVT F EPI BPM LOW: 143 BPM
CV ZIO SVT F EPI DUR: 2.7 SEC
CV ZIO SVT F EPI END: NORMAL
CV ZIO SVT F EPI START: NORMAL
CV ZIO SVT L EPI AVG BPM: 135 BPM
CV ZIO SVT L EPI BEATS: 74 BEATS
CV ZIO SVT L EPI BPM HIGH: 179 BPM
CV ZIO SVT L EPI BPM LOW: 122 BPM
CV ZIO SVT L EPI DUR: 33 SEC
CV ZIO SVT L EPI END: NORMAL
CV ZIO SVT L EPI START: NORMAL
CV ZIO TOTAL  ENROLLMENT PERIOD: NORMAL
CV ZIO VT COUNT: 0

## 2025-04-09 PROCEDURE — 92014 COMPRE OPH EXAM EST PT 1/>: CPT

## 2025-04-09 PROCEDURE — 92015 DETERMINE REFRACTIVE STATE: CPT

## 2025-04-09 ASSESSMENT — TONOMETRY
OD_IOP_MMHG: 17
OS_IOP_MMHG: 16

## 2025-04-09 ASSESSMENT — VISUAL ACUITY
OD_CC: 20/20
OS_CC: J1+
OS_CC: 20/25+1

## 2025-04-10 DIAGNOSIS — F41.1 GENERALIZED ANXIETY DISORDER: ICD-10-CM

## 2025-04-11 RX ORDER — ALPRAZOLAM 0.5 MG
0.5 TABLET ORAL
Qty: 30 TABLET | Refills: 0 | Status: SHIPPED | OUTPATIENT
Start: 2025-04-11

## 2025-04-11 NOTE — TELEPHONE ENCOUNTER
Medication:  PDMP     03/13/2025 03/13/2025 ALPRAZolam (Tablet) 30.0 30 0.5 MG NA MOHSEN JACOBS        Active agreement on file -No

## 2025-05-12 DIAGNOSIS — F41.1 GENERALIZED ANXIETY DISORDER: ICD-10-CM

## 2025-05-13 RX ORDER — ALPRAZOLAM 0.5 MG
0.5 TABLET ORAL
Qty: 30 TABLET | Refills: 0 | Status: SHIPPED | OUTPATIENT
Start: 2025-05-13

## 2025-05-13 NOTE — TELEPHONE ENCOUNTER
Medication:  PDMP   04/11/2025 04/11/2025 ALPRAZolam (Tablet) 30.0 30 0.5 MG NA MOHSEN JACOBS        Active agreement on file -No

## 2025-06-12 DIAGNOSIS — F41.1 GENERALIZED ANXIETY DISORDER: ICD-10-CM

## 2025-06-12 RX ORDER — ALPRAZOLAM 0.5 MG
0.5 TABLET ORAL
Qty: 30 TABLET | Refills: 0 | Status: SHIPPED | OUTPATIENT
Start: 2025-06-12

## 2025-06-12 NOTE — TELEPHONE ENCOUNTER
Medication:  PDMP   05/13/2025 05/13/2025 ALPRAZolam (Tablet) 30.0 30 0.5 MG NA MOHSEN JACOBS        Active agreement on file -No

## 2025-06-21 ENCOUNTER — APPOINTMENT (OUTPATIENT)
Dept: LAB | Facility: CLINIC | Age: 65
End: 2025-06-21
Attending: INTERNAL MEDICINE
Payer: COMMERCIAL

## 2025-06-21 DIAGNOSIS — E78.89 ELEVATED HDL: ICD-10-CM

## 2025-06-21 DIAGNOSIS — R00.2 PALPITATION: ICD-10-CM

## 2025-06-21 DIAGNOSIS — Z98.84 HISTORY OF GASTRIC BYPASS: ICD-10-CM

## 2025-06-21 DIAGNOSIS — K91.2 POSTSURGICAL MALABSORPTION: ICD-10-CM

## 2025-06-21 LAB
ANION GAP SERPL CALCULATED.3IONS-SCNC: 1 MMOL/L (ref 4–13)
BUN SERPL-MCNC: 16 MG/DL (ref 5–25)
CALCIUM SERPL-MCNC: 10.7 MG/DL (ref 8.4–10.2)
CHLORIDE SERPL-SCNC: 109 MMOL/L (ref 96–108)
CO2 SERPL-SCNC: 30 MMOL/L (ref 21–32)
CREAT SERPL-MCNC: 0.79 MG/DL (ref 0.6–1.3)
GFR SERPL CREATININE-BSD FRML MDRD: 78 ML/MIN/1.73SQ M
GLUCOSE P FAST SERPL-MCNC: 83 MG/DL (ref 65–99)
POTASSIUM SERPL-SCNC: 3.8 MMOL/L (ref 3.5–5.3)
SODIUM SERPL-SCNC: 140 MMOL/L (ref 135–147)
TSH SERPL DL<=0.05 MIU/L-ACNC: 1.17 UIU/ML (ref 0.45–4.5)

## 2025-06-21 PROCEDURE — 36415 COLL VENOUS BLD VENIPUNCTURE: CPT

## 2025-06-21 PROCEDURE — 83695 ASSAY OF LIPOPROTEIN(A): CPT

## 2025-06-21 PROCEDURE — 83704 LIPOPROTEIN BLD QUAN PART: CPT

## 2025-06-21 PROCEDURE — 80061 LIPID PANEL: CPT

## 2025-06-21 PROCEDURE — 84443 ASSAY THYROID STIM HORMONE: CPT

## 2025-06-21 PROCEDURE — 82172 ASSAY OF APOLIPOPROTEIN: CPT

## 2025-06-21 PROCEDURE — 80048 BASIC METABOLIC PNL TOTAL CA: CPT

## 2025-06-23 ENCOUNTER — OFFICE VISIT (OUTPATIENT)
Dept: FAMILY MEDICINE CLINIC | Facility: CLINIC | Age: 65
End: 2025-06-23
Payer: COMMERCIAL

## 2025-06-23 VITALS
WEIGHT: 153.6 LBS | RESPIRATION RATE: 17 BRPM | OXYGEN SATURATION: 98 % | HEIGHT: 63 IN | BODY MASS INDEX: 27.21 KG/M2 | HEART RATE: 82 BPM | DIASTOLIC BLOOD PRESSURE: 70 MMHG | SYSTOLIC BLOOD PRESSURE: 110 MMHG | TEMPERATURE: 97.8 F

## 2025-06-23 DIAGNOSIS — F41.1 GENERALIZED ANXIETY DISORDER: Primary | ICD-10-CM

## 2025-06-23 DIAGNOSIS — E66.3 OVERWEIGHT: ICD-10-CM

## 2025-06-23 DIAGNOSIS — Z01.84 IMMUNITY STATUS TESTING: ICD-10-CM

## 2025-06-23 DIAGNOSIS — M54.50 CHRONIC RIGHT-SIDED LOW BACK PAIN WITHOUT SCIATICA: ICD-10-CM

## 2025-06-23 DIAGNOSIS — D50.8 OTHER IRON DEFICIENCY ANEMIA: ICD-10-CM

## 2025-06-23 DIAGNOSIS — M85.80 OSTEOPENIA DETERMINED BY X-RAY: ICD-10-CM

## 2025-06-23 DIAGNOSIS — G89.29 CHRONIC RIGHT-SIDED LOW BACK PAIN WITHOUT SCIATICA: ICD-10-CM

## 2025-06-23 DIAGNOSIS — R93.1 ELEVATED CORONARY ARTERY CALCIUM SCORE: ICD-10-CM

## 2025-06-23 PROCEDURE — 99214 OFFICE O/P EST MOD 30 MIN: CPT | Performed by: FAMILY MEDICINE

## 2025-06-23 RX ORDER — CYCLOBENZAPRINE HCL 10 MG
10 TABLET ORAL 3 TIMES DAILY PRN
Qty: 30 TABLET | Refills: 0 | Status: SHIPPED | OUTPATIENT
Start: 2025-06-23

## 2025-06-23 RX ORDER — ASPIRIN 81 MG/1
81 TABLET ORAL DAILY
Start: 2025-06-23

## 2025-06-23 NOTE — PROGRESS NOTES
"Name: Mercy Garcia      : 1960      MRN: [id number]  Encounter Provider: Suellen Parker MD  Encounter Date: 2025   Encounter department: [facility]  :  Assessment & Plan  Generalized anxiety disorder  PDMP checked   Alprazolam as needed        Chronic right-sided low back pain without sciatica    Orders:  •  cyclobenzaprine (FLEXERIL) 10 mg tablet; Take 1 tablet (10 mg total) by mouth 3 (three) times a day as needed for muscle spasms    Osteopenia determined by x-ray  Continue calcium , Vitamin and weight bearing exercise       Other iron deficiency anemia  Continue multi-vitamin daily        Overweight  Doing well on wegovy   Orders:  •  Hemoglobin A1C    Immunity status testing    Orders:  •  Measles/Mumps/Rubella Immunity; Future    Elevated coronary artery calcium score    Orders:  •  aspirin (ECOTRIN LOW STRENGTH) 81 mg EC tablet; Take 1 tablet (81 mg total) by mouth daily          Depression Screening and Follow-up Plan: Patient was screened for depression during today's encounter. They screened negative with a PHQ-2 score of 1.        History of Present Illness   HPI  Here for follow up  Feels well overall    Review of Systems   Constitutional: Negative.    HENT: Negative.     Respiratory: Negative.     Cardiovascular: Negative.    Gastrointestinal: Negative.    Endocrine: Negative.    Musculoskeletal: Negative.    Allergic/Immunologic: Negative.    Neurological: Negative.    Hematological: Negative.    Psychiatric/Behavioral: Negative.         Objective   /70 (BP Location: Left arm, Patient Position: Sitting, Cuff Size: Standard)   Pulse 82   Temp 97.8 °F (36.6 °C) (Tympanic)   Resp 17   Ht 5' 3\" (1.6 m)   Wt 69.7 kg (153 lb 9.6 oz)   LMP 2005 (Within Months)   SpO2 98%   BMI 27.21 kg/m²      Physical Exam  Vitals and nursing note reviewed.   Constitutional:       Appearance: Normal appearance.     Cardiovascular:      Rate and Rhythm: Normal rate and " regular rhythm.      Pulses: Normal pulses.      Heart sounds: Normal heart sounds.   Pulmonary:      Effort: Pulmonary effort is normal.      Breath sounds: Normal breath sounds.     Neurological:      General: No focal deficit present.      Mental Status: She is alert and oriented to person, place, and time.     Psychiatric:         Mood and Affect: Mood normal.         Behavior: Behavior normal.

## 2025-06-29 ENCOUNTER — TELEPHONE (OUTPATIENT)
Age: 65
End: 2025-06-29

## 2025-06-29 DIAGNOSIS — K91.2 POSTSURGICAL MALABSORPTION: Primary | ICD-10-CM

## 2025-06-29 DIAGNOSIS — D50.8 OTHER IRON DEFICIENCY ANEMIA: ICD-10-CM

## 2025-06-29 DIAGNOSIS — Z98.84 HISTORY OF GASTRIC BYPASS: ICD-10-CM

## 2025-06-29 DIAGNOSIS — E55.9 VITAMIN D DEFICIENCY: ICD-10-CM

## 2025-06-30 LAB
APO B SERPL-MCNC: 71 MG/DL
CHOLEST SERPL-MCNC: 187 MG/DL
CHOLEST/HDLC SERPL: 1.9 CALC
HDLC SERPL-MCNC: 98 MG/DL
HLD.LARGE SERPL-SCNC: ABNORMAL NMOL/L
LDL SERPL QN: 223.7 ANGSTROM
LDL SERPL-SCNC: 1281 NMOL/L
LDL SMALL SERPL-SCNC: 170 NMOL/L
LDLC REAL SIZE PAT SERPL: ABNORMAL PATTERN
LDLC SERPL CALC-MCNC: 75 MG/DL
LPA SERPL-SCNC: 120 NMOL/L
NONHDLC SERPL-MCNC: 89 MG/DL
SL AMB LDL MEDIUM: 193 NMOL/L
TRIGL SERPL-MCNC: 49 MG/DL

## 2025-07-03 ENCOUNTER — APPOINTMENT (OUTPATIENT)
Dept: LAB | Facility: CLINIC | Age: 65
End: 2025-07-03
Payer: COMMERCIAL

## 2025-07-03 ENCOUNTER — APPOINTMENT (OUTPATIENT)
Dept: LAB | Facility: CLINIC | Age: 65
End: 2025-07-03
Attending: PREVENTIVE MEDICINE
Payer: COMMERCIAL

## 2025-07-03 DIAGNOSIS — Z00.8 HEALTH EXAMINATION IN POPULATION SURVEY: ICD-10-CM

## 2025-07-03 DIAGNOSIS — D50.8 OTHER IRON DEFICIENCY ANEMIA: ICD-10-CM

## 2025-07-03 DIAGNOSIS — Z98.84 HISTORY OF GASTRIC BYPASS: ICD-10-CM

## 2025-07-03 DIAGNOSIS — Z01.84 IMMUNITY STATUS TESTING: ICD-10-CM

## 2025-07-03 DIAGNOSIS — K91.2 POSTSURGICAL MALABSORPTION: ICD-10-CM

## 2025-07-03 DIAGNOSIS — E55.9 VITAMIN D DEFICIENCY: ICD-10-CM

## 2025-07-03 LAB
25(OH)D3 SERPL-MCNC: 73 NG/ML (ref 30–100)
ALBUMIN SERPL BCG-MCNC: 3.9 G/DL (ref 3.5–5)
ALP SERPL-CCNC: 103 U/L (ref 34–104)
ALT SERPL W P-5'-P-CCNC: 16 U/L (ref 7–52)
ANION GAP SERPL CALCULATED.3IONS-SCNC: 3 MMOL/L (ref 4–13)
AST SERPL W P-5'-P-CCNC: 15 U/L (ref 13–39)
BILIRUB SERPL-MCNC: 0.43 MG/DL (ref 0.2–1)
BUN SERPL-MCNC: 12 MG/DL (ref 5–25)
CALCIUM SERPL-MCNC: 10.5 MG/DL (ref 8.4–10.2)
CHLORIDE SERPL-SCNC: 107 MMOL/L (ref 96–108)
CHOLEST SERPL-MCNC: 192 MG/DL (ref ?–200)
CO2 SERPL-SCNC: 31 MMOL/L (ref 21–32)
CREAT SERPL-MCNC: 0.74 MG/DL (ref 0.6–1.3)
EST. AVERAGE GLUCOSE BLD GHB EST-MCNC: 105 MG/DL
FOLATE SERPL-MCNC: 17.8 NG/ML
GFR SERPL CREATININE-BSD FRML MDRD: 84 ML/MIN/1.73SQ M
GLUCOSE P FAST SERPL-MCNC: 79 MG/DL (ref 65–99)
HBA1C MFR BLD: 5.3 %
HDLC SERPL-MCNC: 97 MG/DL
LDLC SERPL CALC-MCNC: 86 MG/DL (ref 0–100)
NONHDLC SERPL-MCNC: 95 MG/DL
POTASSIUM SERPL-SCNC: 3.9 MMOL/L (ref 3.5–5.3)
PROT SERPL-MCNC: 6.1 G/DL (ref 6.4–8.4)
PTH-INTACT SERPL-MCNC: 92 PG/ML (ref 12–88)
SODIUM SERPL-SCNC: 141 MMOL/L (ref 135–147)
TRIGL SERPL-MCNC: 43 MG/DL (ref ?–150)
VIT B12 SERPL-MCNC: 774 PG/ML (ref 180–914)

## 2025-07-03 PROCEDURE — 86765 RUBEOLA ANTIBODY: CPT

## 2025-07-03 PROCEDURE — 82306 VITAMIN D 25 HYDROXY: CPT

## 2025-07-03 PROCEDURE — 86762 RUBELLA ANTIBODY: CPT

## 2025-07-03 PROCEDURE — 80053 COMPREHEN METABOLIC PANEL: CPT

## 2025-07-03 PROCEDURE — 82607 VITAMIN B-12: CPT

## 2025-07-03 PROCEDURE — 36415 COLL VENOUS BLD VENIPUNCTURE: CPT

## 2025-07-03 PROCEDURE — 86735 MUMPS ANTIBODY: CPT

## 2025-07-03 PROCEDURE — 84425 ASSAY OF VITAMIN B-1: CPT

## 2025-07-03 PROCEDURE — 80061 LIPID PANEL: CPT

## 2025-07-03 PROCEDURE — 82746 ASSAY OF FOLIC ACID SERUM: CPT

## 2025-07-03 PROCEDURE — 83970 ASSAY OF PARATHORMONE: CPT

## 2025-07-03 PROCEDURE — 83036 HEMOGLOBIN GLYCOSYLATED A1C: CPT

## 2025-07-04 LAB
MEV IGG SER IA-ACNC: >300 AU/ML
MUV IGG SER IA-ACNC: <9 AU/ML
RUBV IGG SERPL IA-ACNC: 18.7 INDEX

## 2025-07-06 LAB — VIT B1 BLD-SCNC: 157.1 NMOL/L (ref 66.5–200)

## 2025-07-07 ENCOUNTER — TELEMEDICINE (OUTPATIENT)
Age: 65
End: 2025-07-07
Payer: COMMERCIAL

## 2025-07-07 ENCOUNTER — CLINICAL SUPPORT (OUTPATIENT)
Dept: BARIATRICS | Facility: CLINIC | Age: 65
End: 2025-07-07

## 2025-07-07 VITALS — WEIGHT: 154 LBS | BODY MASS INDEX: 27.28 KG/M2

## 2025-07-07 VITALS
RESPIRATION RATE: 16 BRPM | SYSTOLIC BLOOD PRESSURE: 114 MMHG | BODY MASS INDEX: 27.29 KG/M2 | WEIGHT: 154 LBS | HEART RATE: 64 BPM | HEIGHT: 63 IN | DIASTOLIC BLOOD PRESSURE: 70 MMHG

## 2025-07-07 DIAGNOSIS — R63.5 ABNORMAL WEIGHT GAIN: Primary | ICD-10-CM

## 2025-07-07 DIAGNOSIS — E66.3 OVERWEIGHT: Primary | ICD-10-CM

## 2025-07-07 DIAGNOSIS — K91.2 POSTSURGICAL MALABSORPTION: ICD-10-CM

## 2025-07-07 DIAGNOSIS — R63.8 ABNORMAL CRAVING: ICD-10-CM

## 2025-07-07 DIAGNOSIS — F43.21 ADJUSTMENT DISORDER WITH DEPRESSED MOOD: ICD-10-CM

## 2025-07-07 PROCEDURE — RECHECK

## 2025-07-07 PROCEDURE — 99215 OFFICE O/P EST HI 40 MIN: CPT | Performed by: PHYSICIAN ASSISTANT

## 2025-07-07 RX ORDER — SEMAGLUTIDE 2.4 MG/.75ML
2.4 INJECTION, SOLUTION SUBCUTANEOUS WEEKLY
Qty: 3 ML | Refills: 5 | Status: SHIPPED | OUTPATIENT
Start: 2025-07-07

## 2025-07-07 RX ORDER — SEMAGLUTIDE 2.4 MG/.75ML
2.4 INJECTION, SOLUTION SUBCUTANEOUS WEEKLY
Qty: 3 ML | Refills: 5 | Status: SHIPPED | OUTPATIENT
Start: 2025-07-07 | End: 2025-07-07 | Stop reason: SDUPTHER

## 2025-07-07 RX ORDER — BUPROPION HYDROCHLORIDE 300 MG/1
300 TABLET ORAL EVERY MORNING
Qty: 90 TABLET | Refills: 1 | Status: SHIPPED | OUTPATIENT
Start: 2025-07-07

## 2025-07-07 NOTE — PROGRESS NOTES
Assessment/Plan:    Overweight    Orders:    Semaglutide-Weight Management (Wegovy) 2.4 MG/0.75ML; Inject 0.75 mL (2.4 mg total) under the skin once a week  -s/p RYGB  -Patient is pursuing the Conservative Program  -Initial weight loss goal of 5-10% weight loss for improved health  -Denied hx of seizure and glaucoma.  -Did not tolerate Topamax  -Patient denies personal and family history of MEN2 tumors and medullary thyroid/thyroid carcinoma. Patient denies personal history of pancreatitis. (started 11/4/19- 205 lbs). >5 % weight loss. Having positive effects on appetite and helping prevent weight gain. Switched to Wegovy (178) and doing well  -Taking Wellbutrin. +effects on mood  -Has been more consistent with vitamin supplementation  -Screening labs:  CMP, B1, folate, b12, vitamin D, PTH, LP, A1c reviewed from 7/3/25  -dietary recs provided; commended pt for adding strength training 2x/week; set step goal   -PA due 11/18/25  -She may consider adding smoothie daily   -Discussed potential benefits of Wegovy on CV risks. She recently saw cardiology for calcium score, zio monitor, and lipid screening- she has f/u planned with cardiology in September.      Initial(Start of MWM 10/15/18): 201.5 lbs  Start of Saxenda (now on Wegovy): (205 lbs, per 11/8/19; BMI 34)  Current: 154 (-10  lbs since last visit)  Change: -51 lbs  Goal: 160-170s lbs and maintain    Follow up in approximately 6 months with Non-Surgical Physician/Advanced Practitioner.    Goals:  Food log (ie.) [email or web address],[email or web address],[email or web address],[employer].com,etc. baritastic  No sugary beverages. At least 64oz of water daily.  Increase physical activity by 10 minutes daily. Gradually increase physical activity to a goal of 5 days per week for 30 minutes of MODERATE intensity PLUS 2 days per week of FULL BODY resistance training  Practice lesson plans 1-6 in bariatric manual  and Practice 30/60 rule  Goal protein intake of 60-80 grams per  day  Alcohol and nicotine use is not recommended following bariatric surgery  NSAIDs (Motrin, Advil, Aleve, Naproxen, ibuprofen, etc) should be avoided following bariatric surgery    Diagnoses and all orders for this visit:    Overweight  -     Discontinue: Semaglutide-Weight Management (Wegovy) 2.4 MG/0.75ML; Inject 0.75 mL (2.4 mg total) under the skin once a week  -     Semaglutide-Weight Management (Wegovy) 2.4 MG/0.75ML; Inject 0.75 mL (2.4 mg total) under the skin once a week    Body mass index 27.0-27.9, adult  -     buPROPion (WELLBUTRIN XL) 300 mg 24 hr tablet; Take 1 tablet (300 mg total) by mouth every morning    Adjustment disorder with depressed mood  -     buPROPion (WELLBUTRIN XL) 300 mg 24 hr tablet; Take 1 tablet (300 mg total) by mouth every morning    Abnormal craving  -     buPROPion (WELLBUTRIN XL) 300 mg 24 hr tablet; Take 1 tablet (300 mg total) by mouth every morning    Postsurgical malabsorption        Subjective:   Chief Complaint   Patient presents with    Virtual Regular Visit     Patient ID: Mercy Garcia  is a 65 y.o. female with excess weight/obesity here to pursue weight management.  Past Medical History[1]    HPI:  The patient presents for [facility]    Wegovy: going well, refilled     Continues with + lifestyle modifications  B: open faced egg sandwich   L: protein + coleslaw   S:  no  D: protein + veg  S: no  Has been snacking on watermelon     Exercise: started strength training with resistance band- 2x/week     The following portions of the patient's history were reviewed and updated as appropriate: allergies, current medications, past family history, past medical history, past social history, past surgical history, and problem list.      Objective:    Wt 69.9 kg (154 lb)   LMP 2005 (Within Months)   BMI 27.28 kg/m²   Virtual Regular Visit  Name: Mercy Garcia      : 1960      MRN: [id number]  Encounter Provider: Luz Yo PA-C  Encounter Date: 2025  "  Encounter department: [facility] [place]  :  Assessment & Plan  Overweight    Orders:    Semaglutide-Weight Management (Wegovy) 2.4 MG/0.75ML; Inject 0.75 mL (2.4 mg total) under the skin once a week    Body mass index 27.0-27.9, adult    Orders:    buPROPion (WELLBUTRIN XL) 300 mg 24 hr tablet; Take 1 tablet (300 mg total) by mouth every morning    Adjustment disorder with depressed mood    Orders:    buPROPion (WELLBUTRIN XL) 300 mg 24 hr tablet; Take 1 tablet (300 mg total) by mouth every morning    Abnormal craving    Orders:    buPROPion (WELLBUTRIN XL) 300 mg 24 hr tablet; Take 1 tablet (300 mg total) by mouth every morning    Postsurgical malabsorption             History of Present Illness     HPI  Review of Systems   Psychiatric/Behavioral:          +effects with Wellbutrin       Objective   Wt 69.9 kg (154 lb)   LMP 05/01/2005 (Within Months)   BMI 27.28 kg/m²     Physical Exam  Weight from in office nurse visit 7/7/25:    7/7/2025  0751       BP: 114/70   Weight: 69.9 kg (154 lb)   Height: 5' 3\" (1.6 m)   BMI (Calculated): 27.3     Administrative Statements   Encounter provider Luz Yo PA-C    The Patient is located at Home and in the following state in which I hold an active license [place].    The patient was identified by name and date of birth. Mercy Garcia was informed that this is a telemedicine visit and that the visit is being conducted through the Epic Embedded platform. She agrees to proceed..  My office door was closed. No one else was in the room.  She acknowledged consent and understanding of privacy and security of the video platform. The patient has agreed to participate and understands they can discontinue the visit at any time.    I have spent a total time of 45 minutes in caring for this patient on the day of the visit/encounter including Diagnostic results, Instructions for management, Patient and family education, Importance of tx compliance, and Risk " "factor reductions, not including the time spent for establishing the audio/video connection.          [1]   Past Medical History:  Diagnosis Date    Anemia 3/2021    Anxiety 2004    Anxiety disorder     Arthritis     osteoarthritis hip/poss knuckles\"    Basal cell carcinoma 2007    midline inferior forehead    Calculus of gallbladder with acute cholecystitis 2023    Cancer (HCC)     Basal cell    Cervical disc herniation     C7//sees chiropracter and no recent problems    Exercise involving walking     [phone] steps per day    Full thickness rotator cuff tear 2020     1 para 1     Hearing loss     left ear    History of bariatric surgery     History of non anemic vitamin B12 deficiency     History of prediabetes     before bariatric surgery    Hyperlipidemia     not on meds    Hypertension     not on meds    Iron deficiency anemia     infusions determined by lab work    Irregular heart beat     \"history bigeminy from taking benadryl, tries to avoid taking it\"no recent issues\"    Motion sickness     \"on rides\"    Neck pain     occas    Nondisplaced fracture of proximal phalanx of left lesser toe(s), initial encounter for closed fracture 2019    OA (osteoarthritis) of hip     Obesity     Oral herpes simplex infection     occas    Other insomnia 2020    Postmenopausal     Sciatica     Vertigo     Wears glasses     Wears glasses      "

## 2025-07-11 DIAGNOSIS — F41.1 GENERALIZED ANXIETY DISORDER: ICD-10-CM

## 2025-07-11 RX ORDER — ALPRAZOLAM 0.5 MG
0.5 TABLET ORAL
Qty: 30 TABLET | Refills: 0 | Status: SHIPPED | OUTPATIENT
Start: 2025-07-11

## 2025-07-11 NOTE — TELEPHONE ENCOUNTER
Medication:  PDMP   06/12/2025 06/12/2025 ALPRAZolam (Tablet) 30.0 30 0.5 MG NA MARI THOMAS     Active agreement on file -Yes

## 2025-07-21 ENCOUNTER — HOSPITAL ENCOUNTER (OUTPATIENT)
Dept: RADIOLOGY | Age: 65
Discharge: HOME | End: 2025-07-21
Attending: FAMILY MEDICINE
Payer: COMMERCIAL

## 2025-07-21 VITALS — BODY MASS INDEX: 26.93 KG/M2 | HEIGHT: 63 IN | WEIGHT: 152 LBS

## 2025-07-21 DIAGNOSIS — Z12.31 VISIT FOR SCREENING MAMMOGRAM: ICD-10-CM

## 2025-07-21 PROCEDURE — 77063 BREAST TOMOSYNTHESIS BI: CPT

## 2025-07-21 PROCEDURE — 77067 SCR MAMMO BI INCL CAD: CPT

## 2025-08-21 ENCOUNTER — TELEPHONE (OUTPATIENT)
Age: 65
End: 2025-08-21

## (undated) DEVICE — SUT VICRYL 2-0 CT-1 36 IN J945H

## (undated) DEVICE — POSITIONER HANA TABLE PACK

## (undated) DEVICE — SCD SEQUENTIAL COMPRESSION COMFORT SLEEVE MEDIUM KNEE LENGTH: Brand: KENDALL SCD

## (undated) DEVICE — OCCLUSIVE GAUZE STRIP,3% BISMUTH TRIBROMOPHENATE IN PETROLATUM BLEND: Brand: XEROFORM

## (undated) DEVICE — BIPOLAR SEALER 23-113-1 AQM 2.3: Brand: AQUAMANTYS™

## (undated) DEVICE — GAUZE SPONGES,16 PLY: Brand: CURITY

## (undated) DEVICE — COBAN 4 IN STERILE

## (undated) DEVICE — BETHLEHEM TOTAL HIP, KIT: Brand: CARDINAL HEALTH

## (undated) DEVICE — TRAY FOLEY 16FR URIMETER SURESTEP

## (undated) DEVICE — TROCAR: Brand: KII® SLEEVE

## (undated) DEVICE — SAW BLADE OSCILLATING BRAZOL 167

## (undated) DEVICE — SUT MONOCRYL 4-0 PS-2 27 IN Y426H

## (undated) DEVICE — SUT MONOCRYL 3-0 PS-2 27 IN Y427H

## (undated) DEVICE — GLOVE SRG BIOGEL 6.5

## (undated) DEVICE — TROCAR: Brand: KII FIOS FIRST ENTRY

## (undated) DEVICE — THE SIMPULSE SOLO SYSTEM WITH ULTREX RETRACTABLE SPLASH SHIELD TIP: Brand: SIMPULSE SOLO

## (undated) DEVICE — GLOVE INDICATOR PI UNDERGLOVE SZ 7 BLUE

## (undated) DEVICE — TOWEL SURG XR DETECT GREEN STRL RFD

## (undated) DEVICE — INTENDED FOR TISSUE SEPARATION, AND OTHER PROCEDURES THAT REQUIRE A SHARP SURGICAL BLADE TO PUNCTURE OR CUT.: Brand: BARD-PARKER SAFETY BLADES SIZE 10, STERILE

## (undated) DEVICE — 3M™ MICROFOAM™ TAPE 1528-4: Brand: 3M™ MICROFOAM™

## (undated) DEVICE — FRAZIER SUCTION INSTRUMENT 18 FR W/OBTURATOR, NO CONTROL VENT: Brand: FRAZIER

## (undated) DEVICE — DRAPE EQUIPMENT RF WAND

## (undated) DEVICE — CHLORAPREP HI-LITE 26ML ORANGE

## (undated) DEVICE — NEEDLE 22 G X 1 1/2 SAFETY

## (undated) DEVICE — HOOD: Brand: FLYTE, SURGICOOL

## (undated) DEVICE — GLOVE INDICATOR PI UNDERGLOVE SZ 8 BLUE

## (undated) DEVICE — 3M™ STERI-STRIP™ REINFORCED ADHESIVE SKIN CLOSURES, R1547, 1/2 IN X 4 IN (12 MM X 100 MM), 6 STRIPS/ENVELOPE: Brand: 3M™ STERI-STRIP™

## (undated) DEVICE — PLUMEPEN PRO 10FT

## (undated) DEVICE — PREP SURGICAL PURPREP 26ML

## (undated) DEVICE — LIGAMAX 5 MM ENDOSCOPIC MULTIPLE CLIP APPLIER: Brand: LIGAMAX

## (undated) DEVICE — DECANTER: Brand: UNBRANDED

## (undated) DEVICE — ADHESIVE SKIN HIGH VISCOSITY EXOFIN 1ML

## (undated) DEVICE — GLOVE SRG BIOGEL 8

## (undated) DEVICE — SUT STRATAFIX SPIRAL PDS PLUS 1 CTX 18 IN SXPP1A400

## (undated) DEVICE — INTENDED FOR TISSUE SEPARATION, AND OTHER PROCEDURES THAT REQUIRE A SHARP SURGICAL BLADE TO PUNCTURE OR CUT.: Brand: BARD-PARKER SAFETY BLADES SIZE 11, STERILE

## (undated) DEVICE — 6617 IOBAN II PATIENT ISOLATION DRAPE 5/BX,4BX/CS: Brand: STERI-DRAPE™ IOBAN™ 2

## (undated) DEVICE — HARMONIC 1100 SHEARS, 36CM SHAFT LENGTH: Brand: HARMONIC

## (undated) DEVICE — GLOVE SRG BIOGEL ORTHOPEDIC 8

## (undated) DEVICE — 3000CC GUARDIAN II: Brand: GUARDIAN

## (undated) DEVICE — PACK PBDS LAP CHOLE RF

## (undated) DEVICE — SURGICAL GOWN, XL SMARTSLEEVE: Brand: CONVERTORS

## (undated) DEVICE — CAPIT HIP COP -CERAMIC ON POLY

## (undated) DEVICE — INTENDED FOR TISSUE SEPARATION, AND OTHER PROCEDURES THAT REQUIRE A SHARP SURGICAL BLADE TO PUNCTURE OR CUT.: Brand: BARD-PARKER ® CARBON RIB-BACK BLADES

## (undated) DEVICE — WEBRIL 6 IN UNSTERILE

## (undated) DEVICE — SUT VICRYL 0 UR-6 27 IN J603H

## (undated) DEVICE — DRAPE C-ARM X-RAY